# Patient Record
Sex: FEMALE | Race: WHITE | NOT HISPANIC OR LATINO | Employment: OTHER | URBAN - METROPOLITAN AREA
[De-identification: names, ages, dates, MRNs, and addresses within clinical notes are randomized per-mention and may not be internally consistent; named-entity substitution may affect disease eponyms.]

---

## 2017-07-25 PROBLEM — F51.01 PRIMARY INSOMNIA: Chronic | Status: ACTIVE | Noted: 2017-07-25

## 2017-07-25 PROBLEM — F32.5 MAJOR DEPRESSIVE DISORDER IN REMISSION: Chronic | Status: ACTIVE | Noted: 2017-07-25

## 2017-09-01 ENCOUNTER — TELEPHONE (OUTPATIENT)
Dept: FAMILY MEDICINE | Facility: CLINIC | Age: 42
End: 2017-09-01

## 2017-09-01 RX ORDER — CLORAZEPATE DIPOTASSIUM 3.75 MG/1
7.5 TABLET ORAL 3 TIMES DAILY
COMMUNITY
End: 2017-12-11

## 2017-09-01 RX ORDER — BUTALBITAL, ASPIRIN, AND CAFFEINE 325; 50; 40 MG/1; MG/1; MG/1
1 CAPSULE ORAL EVERY 4 HOURS PRN
COMMUNITY
End: 2017-12-28

## 2017-10-09 ENCOUNTER — TELEPHONE (OUTPATIENT)
Dept: PRIMARY CARE CLINIC | Facility: CLINIC | Age: 42
End: 2017-10-09

## 2017-10-09 NOTE — TELEPHONE ENCOUNTER
Spoke to patient regarding custody of her son. Pt asking advice on this situation. I explained to her that I cannot give out medical information on her son and we cant give out legal advice that she would need to follow up with her .

## 2017-12-11 ENCOUNTER — OFFICE VISIT (OUTPATIENT)
Dept: PRIMARY CARE CLINIC | Facility: CLINIC | Age: 42
End: 2017-12-11
Payer: MEDICAID

## 2017-12-11 VITALS — WEIGHT: 120 LBS | BODY MASS INDEX: 22.67 KG/M2

## 2017-12-11 DIAGNOSIS — Z72.0 TOBACCO ABUSE: ICD-10-CM

## 2017-12-11 DIAGNOSIS — F43.10 PTSD (POST-TRAUMATIC STRESS DISORDER): ICD-10-CM

## 2017-12-11 DIAGNOSIS — F32.A DEPRESSION, UNSPECIFIED DEPRESSION TYPE: ICD-10-CM

## 2017-12-11 DIAGNOSIS — B20 HIV (HUMAN IMMUNODEFICIENCY VIRUS INFECTION): ICD-10-CM

## 2017-12-11 DIAGNOSIS — N30.01 ACUTE CYSTITIS WITH HEMATURIA: Primary | ICD-10-CM

## 2017-12-11 DIAGNOSIS — F32.5 MAJOR DEPRESSIVE DISORDER WITH SINGLE EPISODE, IN REMISSION: ICD-10-CM

## 2017-12-11 DIAGNOSIS — S29.019A THORACIC MYOFASCIAL STRAIN, INITIAL ENCOUNTER: ICD-10-CM

## 2017-12-11 DIAGNOSIS — R51.9 NONINTRACTABLE HEADACHE, UNSPECIFIED CHRONICITY PATTERN, UNSPECIFIED HEADACHE TYPE: ICD-10-CM

## 2017-12-11 DIAGNOSIS — S16.1XXD STRAIN OF NECK MUSCLE, SUBSEQUENT ENCOUNTER: ICD-10-CM

## 2017-12-11 PROBLEM — S16.1XXA CERVICAL STRAIN: Status: ACTIVE | Noted: 2017-12-11

## 2017-12-11 PROCEDURE — 99999 PR PBB SHADOW E&M-EST. PATIENT-LVL III: CPT | Mod: PBBFAC,,, | Performed by: FAMILY MEDICINE

## 2017-12-11 PROCEDURE — 99214 OFFICE O/P EST MOD 30 MIN: CPT | Mod: S$PBB,,, | Performed by: FAMILY MEDICINE

## 2017-12-11 PROCEDURE — 99213 OFFICE O/P EST LOW 20 MIN: CPT | Mod: PBBFAC,PN | Performed by: FAMILY MEDICINE

## 2017-12-11 RX ORDER — CLONAZEPAM 0.5 MG/1
0.5 TABLET ORAL DAILY
COMMUNITY
Start: 2017-10-23 | End: 2018-05-04

## 2017-12-11 RX ORDER — ZOLPIDEM TARTRATE 10 MG/1
10 TABLET ORAL NIGHTLY
COMMUNITY
Start: 2017-10-23 | End: 2018-05-04

## 2017-12-11 RX ORDER — NITROFURANTOIN 25; 75 MG/1; MG/1
100 CAPSULE ORAL 2 TIMES DAILY
Qty: 14 CAPSULE | Refills: 0 | Status: SHIPPED | OUTPATIENT
Start: 2017-12-11 | End: 2017-12-28

## 2017-12-11 RX ORDER — PHENAZOPYRIDINE HYDROCHLORIDE 200 MG/1
200 TABLET, FILM COATED ORAL 3 TIMES DAILY PRN
Qty: 15 TABLET | Refills: 0 | Status: SHIPPED | OUTPATIENT
Start: 2017-12-11 | End: 2017-12-21

## 2017-12-11 RX ORDER — VENLAFAXINE HYDROCHLORIDE 75 MG/1
75 CAPSULE, EXTENDED RELEASE ORAL DAILY
COMMUNITY
Start: 2017-10-23 | End: 2018-05-04
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
## 2017-12-28 ENCOUNTER — OFFICE VISIT (OUTPATIENT)
Dept: PRIMARY CARE CLINIC | Facility: CLINIC | Age: 42
End: 2017-12-28
Payer: MEDICAID

## 2017-12-28 VITALS
DIASTOLIC BLOOD PRESSURE: 81 MMHG | BODY MASS INDEX: 23.22 KG/M2 | TEMPERATURE: 99 F | HEIGHT: 61 IN | WEIGHT: 123 LBS | RESPIRATION RATE: 18 BRPM | HEART RATE: 74 BPM | SYSTOLIC BLOOD PRESSURE: 124 MMHG | OXYGEN SATURATION: 100 %

## 2017-12-28 DIAGNOSIS — R45.83 EXCESSIVE CRYING: ICD-10-CM

## 2017-12-28 DIAGNOSIS — S16.1XXD STRAIN OF NECK MUSCLE, SUBSEQUENT ENCOUNTER: ICD-10-CM

## 2017-12-28 DIAGNOSIS — Z72.0 TOBACCO ABUSE: ICD-10-CM

## 2017-12-28 DIAGNOSIS — M50.30 DDD (DEGENERATIVE DISC DISEASE), CERVICAL: ICD-10-CM

## 2017-12-28 DIAGNOSIS — R51.9 NONINTRACTABLE HEADACHE, UNSPECIFIED CHRONICITY PATTERN, UNSPECIFIED HEADACHE TYPE: ICD-10-CM

## 2017-12-28 DIAGNOSIS — S29.019A THORACIC MYOFASCIAL STRAIN, INITIAL ENCOUNTER: ICD-10-CM

## 2017-12-28 DIAGNOSIS — B20 HIV (HUMAN IMMUNODEFICIENCY VIRUS INFECTION): ICD-10-CM

## 2017-12-28 DIAGNOSIS — F31.9 BIPOLAR 1 DISORDER: ICD-10-CM

## 2017-12-28 DIAGNOSIS — F32.5 MAJOR DEPRESSIVE DISORDER WITH SINGLE EPISODE, IN REMISSION: Primary | ICD-10-CM

## 2017-12-28 DIAGNOSIS — F43.10 PTSD (POST-TRAUMATIC STRESS DISORDER): ICD-10-CM

## 2017-12-28 PROCEDURE — 99999 PR PBB SHADOW E&M-EST. PATIENT-LVL V: CPT | Mod: PBBFAC,,, | Performed by: FAMILY MEDICINE

## 2017-12-28 PROCEDURE — 99214 OFFICE O/P EST MOD 30 MIN: CPT | Mod: S$PBB,,, | Performed by: FAMILY MEDICINE

## 2017-12-28 PROCEDURE — 99215 OFFICE O/P EST HI 40 MIN: CPT | Mod: PBBFAC,PN | Performed by: FAMILY MEDICINE

## 2017-12-28 RX ORDER — LITHIUM CARBONATE 450 MG/1
TABLET ORAL
COMMUNITY
Start: 2017-12-26 | End: 2018-05-04

## 2017-12-28 RX ORDER — BUTALBITAL, ACETAMINOPHEN AND CAFFEINE 50; 325; 40 MG/1; MG/1; MG/1
1 TABLET ORAL EVERY 4 HOURS PRN
Qty: 30 TABLET | Refills: 0 | Status: SHIPPED | OUTPATIENT
Start: 2017-12-28 | End: 2018-01-23 | Stop reason: SDUPTHER

## 2017-12-28 RX ORDER — OLANZAPINE 10 MG/1
TABLET ORAL
COMMUNITY
Start: 2017-12-26 | End: 2018-05-04
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
## 2018-01-16 ENCOUNTER — OFFICE VISIT (OUTPATIENT)
Dept: PRIMARY CARE CLINIC | Facility: CLINIC | Age: 43
End: 2018-01-16
Payer: MEDICAID

## 2018-01-16 VITALS
RESPIRATION RATE: 18 BRPM | TEMPERATURE: 99 F | HEART RATE: 80 BPM | WEIGHT: 123 LBS | BODY MASS INDEX: 23.22 KG/M2 | OXYGEN SATURATION: 100 % | DIASTOLIC BLOOD PRESSURE: 78 MMHG | HEIGHT: 61 IN | SYSTOLIC BLOOD PRESSURE: 117 MMHG

## 2018-01-16 DIAGNOSIS — S00.83XA CONTUSION OF FOREHEAD, INITIAL ENCOUNTER: Primary | ICD-10-CM

## 2018-01-16 DIAGNOSIS — S39.012A LUMBOSACRAL STRAIN, INITIAL ENCOUNTER: ICD-10-CM

## 2018-01-16 DIAGNOSIS — F43.10 PTSD (POST-TRAUMATIC STRESS DISORDER): ICD-10-CM

## 2018-01-16 DIAGNOSIS — B20 HIV (HUMAN IMMUNODEFICIENCY VIRUS INFECTION): ICD-10-CM

## 2018-01-16 DIAGNOSIS — F32.5 MAJOR DEPRESSIVE DISORDER WITH SINGLE EPISODE, IN REMISSION: ICD-10-CM

## 2018-01-16 DIAGNOSIS — S80.01XA TRAUMATIC ECCHYMOSIS OF RIGHT KNEE, INITIAL ENCOUNTER: ICD-10-CM

## 2018-01-16 DIAGNOSIS — Z72.0 TOBACCO ABUSE: ICD-10-CM

## 2018-01-16 DIAGNOSIS — R51.9 NONINTRACTABLE HEADACHE, UNSPECIFIED CHRONICITY PATTERN, UNSPECIFIED HEADACHE TYPE: ICD-10-CM

## 2018-01-16 DIAGNOSIS — S29.019A THORACIC MYOFASCIAL STRAIN, INITIAL ENCOUNTER: ICD-10-CM

## 2018-01-16 PROCEDURE — 99213 OFFICE O/P EST LOW 20 MIN: CPT | Mod: S$PBB,,, | Performed by: FAMILY MEDICINE

## 2018-01-16 PROCEDURE — 99999 PR PBB SHADOW E&M-EST. PATIENT-LVL IV: CPT | Mod: PBBFAC,,, | Performed by: FAMILY MEDICINE

## 2018-01-16 PROCEDURE — 99214 OFFICE O/P EST MOD 30 MIN: CPT | Mod: PBBFAC,PN | Performed by: FAMILY MEDICINE

## 2018-01-16 RX ORDER — TRAMADOL HYDROCHLORIDE 50 MG/1
50 TABLET ORAL EVERY 8 HOURS PRN
Qty: 30 TABLET | Refills: 0 | Status: SHIPPED | OUTPATIENT
Start: 2018-01-16 | End: 2018-01-26
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
## 2018-01-23 ENCOUNTER — CLINICAL SUPPORT (OUTPATIENT)
Dept: PRIMARY CARE CLINIC | Facility: CLINIC | Age: 43
End: 2018-01-23
Payer: MEDICAID

## 2018-01-23 ENCOUNTER — OFFICE VISIT (OUTPATIENT)
Dept: PRIMARY CARE CLINIC | Facility: CLINIC | Age: 43
End: 2018-01-23
Payer: MEDICAID

## 2018-01-23 VITALS
HEART RATE: 63 BPM | RESPIRATION RATE: 18 BRPM | BODY MASS INDEX: 22.84 KG/M2 | OXYGEN SATURATION: 100 % | SYSTOLIC BLOOD PRESSURE: 113 MMHG | HEIGHT: 61 IN | DIASTOLIC BLOOD PRESSURE: 75 MMHG | WEIGHT: 121 LBS | TEMPERATURE: 99 F

## 2018-01-23 DIAGNOSIS — F32.A DEPRESSION, UNSPECIFIED DEPRESSION TYPE: ICD-10-CM

## 2018-01-23 DIAGNOSIS — F31.9 BIPOLAR 1 DISORDER: ICD-10-CM

## 2018-01-23 DIAGNOSIS — Z72.0 TOBACCO ABUSE: ICD-10-CM

## 2018-01-23 DIAGNOSIS — B20 HIV (HUMAN IMMUNODEFICIENCY VIRUS INFECTION): ICD-10-CM

## 2018-01-23 DIAGNOSIS — G44.311 INTRACTABLE ACUTE POST-TRAUMATIC HEADACHE: Primary | ICD-10-CM

## 2018-01-23 DIAGNOSIS — R45.83 EXCESSIVE CRYING: ICD-10-CM

## 2018-01-23 DIAGNOSIS — F43.10 PTSD (POST-TRAUMATIC STRESS DISORDER): ICD-10-CM

## 2018-01-23 PROCEDURE — 99999 PR PBB SHADOW E&M-EST. PATIENT-LVL II: CPT | Mod: PBBFAC,,,

## 2018-01-23 PROCEDURE — 99214 OFFICE O/P EST MOD 30 MIN: CPT | Mod: S$PBB,,, | Performed by: FAMILY MEDICINE

## 2018-01-23 PROCEDURE — 84439 ASSAY OF FREE THYROXINE: CPT

## 2018-01-23 PROCEDURE — 80053 COMPREHEN METABOLIC PANEL: CPT

## 2018-01-23 PROCEDURE — 99999 PR PBB SHADOW E&M-EST. PATIENT-LVL IV: CPT | Mod: PBBFAC,,, | Performed by: FAMILY MEDICINE

## 2018-01-23 PROCEDURE — 99212 OFFICE O/P EST SF 10 MIN: CPT | Mod: PBBFAC,27,PN

## 2018-01-23 PROCEDURE — 99214 OFFICE O/P EST MOD 30 MIN: CPT | Mod: PBBFAC,PN | Performed by: FAMILY MEDICINE

## 2018-01-23 PROCEDURE — 85025 COMPLETE CBC W/AUTO DIFF WBC: CPT

## 2018-01-23 PROCEDURE — 84443 ASSAY THYROID STIM HORMONE: CPT

## 2018-01-23 RX ORDER — BUTALBITAL, ACETAMINOPHEN AND CAFFEINE 50; 325; 40 MG/1; MG/1; MG/1
1 TABLET ORAL EVERY 4 HOURS PRN
Qty: 30 TABLET | Refills: 0 | Status: SHIPPED | OUTPATIENT
Start: 2018-01-23 | End: 2018-03-15 | Stop reason: SDUPTHER

## 2018-01-23 RX ORDER — BUTALBITAL, ACETAMINOPHEN AND CAFFEINE 50; 325; 40 MG/1; MG/1; MG/1
1 TABLET ORAL EVERY 4 HOURS PRN
Qty: 30 TABLET | Refills: 0 | Status: SHIPPED | OUTPATIENT
Start: 2018-01-23 | End: 2018-01-23 | Stop reason: SDUPTHER
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
## 2018-01-24 LAB
ALBUMIN SERPL BCP-MCNC: 3.7 G/DL
ALP SERPL-CCNC: 81 U/L
ALT SERPL W/O P-5'-P-CCNC: 14 U/L
ANION GAP SERPL CALC-SCNC: 8 MMOL/L
AST SERPL-CCNC: 22 U/L
BASOPHILS # BLD AUTO: 0.06 K/UL
BASOPHILS NFR BLD: 1 %
BILIRUB SERPL-MCNC: 0.1 MG/DL
BUN SERPL-MCNC: 9 MG/DL
CALCIUM SERPL-MCNC: 9.5 MG/DL
CHLORIDE SERPL-SCNC: 106 MMOL/L
CO2 SERPL-SCNC: 23 MMOL/L
CREAT SERPL-MCNC: 0.8 MG/DL
DIFFERENTIAL METHOD: ABNORMAL
EOSINOPHIL # BLD AUTO: 0.2 K/UL
EOSINOPHIL NFR BLD: 4.1 %
ERYTHROCYTE [DISTWIDTH] IN BLOOD BY AUTOMATED COUNT: 16.8 %
EST. GFR  (AFRICAN AMERICAN): >60 ML/MIN/1.73 M^2
EST. GFR  (NON AFRICAN AMERICAN): >60 ML/MIN/1.73 M^2
GLUCOSE SERPL-MCNC: 80 MG/DL
HCT VFR BLD AUTO: 38.6 %
HGB BLD-MCNC: 12.2 G/DL
IMM GRANULOCYTES # BLD AUTO: 0.01 K/UL
IMM GRANULOCYTES NFR BLD AUTO: 0.2 %
LYMPHOCYTES # BLD AUTO: 1.4 K/UL
LYMPHOCYTES NFR BLD: 23.9 %
MCH RBC QN AUTO: 27.1 PG
MCHC RBC AUTO-ENTMCNC: 31.6 G/DL
MCV RBC AUTO: 86 FL
MONOCYTES # BLD AUTO: 0.4 K/UL
MONOCYTES NFR BLD: 6.3 %
NEUTROPHILS # BLD AUTO: 3.8 K/UL
NEUTROPHILS NFR BLD: 64.5 %
NRBC BLD-RTO: 0 /100 WBC
PLATELET # BLD AUTO: 237 K/UL
PMV BLD AUTO: 12.2 FL
POTASSIUM SERPL-SCNC: 4.1 MMOL/L
PROT SERPL-MCNC: 8 G/DL
RBC # BLD AUTO: 4.5 M/UL
SODIUM SERPL-SCNC: 137 MMOL/L
T4 FREE SERPL-MCNC: 1.04 NG/DL
TSH SERPL DL<=0.005 MIU/L-ACNC: 4.48 UIU/ML
WBC # BLD AUTO: 5.87 K/UL

## 2018-02-02 RX ORDER — VARENICLINE TARTRATE 0.5 (11)-1
KIT ORAL
Qty: 1 PACKAGE | Refills: 0 | OUTPATIENT
Start: 2018-02-02

## 2018-02-02 RX ORDER — CLONAZEPAM 0.5 MG/1
0.5 TABLET ORAL DAILY
OUTPATIENT
Start: 2018-02-02

## 2018-02-07 ENCOUNTER — TELEPHONE (OUTPATIENT)
Dept: PRIMARY CARE CLINIC | Facility: CLINIC | Age: 43
End: 2018-02-07

## 2018-02-07 NOTE — TELEPHONE ENCOUNTER
----- Message from Chio Lafleur sent at 2/7/2018  2:50 PM CST -----  Contact: self  Patient had a CT Scan with contrast which has made her very sick. Patient has been vomiting since she left at her 2:00 appointment. Patient would like something for nausea and headache. Please call patient at [phone]. Thanks!   [facility] - [place], [place] - [address]  [address] [facility] [address]  Phone: [phone] Fax: [phone]

## 2018-02-08 ENCOUNTER — OFFICE VISIT (OUTPATIENT)
Dept: PRIMARY CARE CLINIC | Facility: CLINIC | Age: 43
End: 2018-02-08
Payer: MEDICAID

## 2018-02-08 VITALS
OXYGEN SATURATION: 100 % | DIASTOLIC BLOOD PRESSURE: 75 MMHG | HEART RATE: 90 BPM | BODY MASS INDEX: 23.03 KG/M2 | WEIGHT: 122 LBS | SYSTOLIC BLOOD PRESSURE: 112 MMHG | TEMPERATURE: 99 F | HEIGHT: 61 IN | RESPIRATION RATE: 18 BRPM

## 2018-02-08 DIAGNOSIS — F31.9 BIPOLAR 1 DISORDER: ICD-10-CM

## 2018-02-08 DIAGNOSIS — R11.10 VOMITING, INTRACTABILITY OF VOMITING NOT SPECIFIED, PRESENCE OF NAUSEA NOT SPECIFIED, UNSPECIFIED VOMITING TYPE: ICD-10-CM

## 2018-02-08 DIAGNOSIS — R45.83 EXCESSIVE CRYING: ICD-10-CM

## 2018-02-08 DIAGNOSIS — F51.01 PRIMARY INSOMNIA: ICD-10-CM

## 2018-02-08 DIAGNOSIS — F32.A DEPRESSION, UNSPECIFIED DEPRESSION TYPE: ICD-10-CM

## 2018-02-08 DIAGNOSIS — F43.10 PTSD (POST-TRAUMATIC STRESS DISORDER): ICD-10-CM

## 2018-02-08 DIAGNOSIS — R11.0 NAUSEA: Primary | ICD-10-CM

## 2018-02-08 DIAGNOSIS — F32.5 MAJOR DEPRESSIVE DISORDER WITH SINGLE EPISODE, IN REMISSION: ICD-10-CM

## 2018-02-08 DIAGNOSIS — B20 HIV (HUMAN IMMUNODEFICIENCY VIRUS INFECTION): ICD-10-CM

## 2018-02-08 DIAGNOSIS — Z72.0 TOBACCO ABUSE: ICD-10-CM

## 2018-02-08 DIAGNOSIS — G44.311 INTRACTABLE ACUTE POST-TRAUMATIC HEADACHE: ICD-10-CM

## 2018-02-08 PROCEDURE — 99214 OFFICE O/P EST MOD 30 MIN: CPT | Mod: S$PBB,,, | Performed by: FAMILY MEDICINE

## 2018-02-08 PROCEDURE — 99213 OFFICE O/P EST LOW 20 MIN: CPT | Mod: PBBFAC,PN | Performed by: FAMILY MEDICINE

## 2018-02-08 PROCEDURE — 3008F BODY MASS INDEX DOCD: CPT | Mod: ,,, | Performed by: FAMILY MEDICINE

## 2018-02-08 PROCEDURE — 99999 PR PBB SHADOW E&M-EST. PATIENT-LVL III: CPT | Mod: PBBFAC,,, | Performed by: FAMILY MEDICINE

## 2018-02-08 RX ORDER — ONDANSETRON 2 MG/ML
4 INJECTION INTRAMUSCULAR; INTRAVENOUS
Status: COMPLETED | OUTPATIENT
Start: 2018-02-08 | End: 2018-02-08

## 2018-02-08 RX ORDER — ONDANSETRON 2 MG/ML
4 INJECTION INTRAMUSCULAR; INTRAVENOUS
Status: DISCONTINUED | OUTPATIENT
Start: 2018-02-08 | End: 2018-02-08

## 2018-02-08 RX ADMIN — ONDANSETRON 4 MG: 2 INJECTION INTRAMUSCULAR; INTRAVENOUS at 03:02
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
## 2018-02-08 NOTE — PROGRESS NOTES
Pt ID verified by  and Name. Allergies verified. Zofran 4mg IM to L VG administered per MD order. No adverse reactions noted. Pt tolerated well.

## 2018-03-15 DIAGNOSIS — R51.9 NONINTRACTABLE HEADACHE, UNSPECIFIED CHRONICITY PATTERN, UNSPECIFIED HEADACHE TYPE: Primary | ICD-10-CM

## 2018-03-17 RX ORDER — BUTALBITAL, ACETAMINOPHEN AND CAFFEINE 50; 325; 40 MG/1; MG/1; MG/1
1 TABLET ORAL DAILY PRN
Qty: 30 TABLET | Refills: 0 | Status: SHIPPED | OUTPATIENT
Start: 2018-03-17 | End: 2018-04-16

## 2018-05-04 ENCOUNTER — OFFICE VISIT (OUTPATIENT)
Dept: PRIMARY CARE CLINIC | Facility: CLINIC | Age: 43
End: 2018-05-04
Payer: MEDICAID

## 2018-05-04 VITALS
HEART RATE: 72 BPM | TEMPERATURE: 99 F | HEIGHT: 61 IN | WEIGHT: 122 LBS | SYSTOLIC BLOOD PRESSURE: 101 MMHG | DIASTOLIC BLOOD PRESSURE: 71 MMHG | BODY MASS INDEX: 23.03 KG/M2 | OXYGEN SATURATION: 100 % | RESPIRATION RATE: 18 BRPM

## 2018-05-04 DIAGNOSIS — F31.9 BIPOLAR 1 DISORDER: ICD-10-CM

## 2018-05-04 DIAGNOSIS — G89.29 CHRONIC INTRACTABLE HEADACHE, UNSPECIFIED HEADACHE TYPE: ICD-10-CM

## 2018-05-04 DIAGNOSIS — B20 HIV (HUMAN IMMUNODEFICIENCY VIRUS INFECTION): ICD-10-CM

## 2018-05-04 DIAGNOSIS — N30.00 ACUTE CYSTITIS WITHOUT HEMATURIA: Primary | ICD-10-CM

## 2018-05-04 DIAGNOSIS — F43.10 PTSD (POST-TRAUMATIC STRESS DISORDER): ICD-10-CM

## 2018-05-04 DIAGNOSIS — F32.A DEPRESSION, UNSPECIFIED DEPRESSION TYPE: ICD-10-CM

## 2018-05-04 DIAGNOSIS — R51.9 CHRONIC INTRACTABLE HEADACHE, UNSPECIFIED HEADACHE TYPE: ICD-10-CM

## 2018-05-04 DIAGNOSIS — Z72.0 TOBACCO ABUSE: ICD-10-CM

## 2018-05-04 DIAGNOSIS — E03.9 ACQUIRED HYPOTHYROIDISM: ICD-10-CM

## 2018-05-04 PROCEDURE — 99999 PR PBB SHADOW E&M-EST. PATIENT-LVL III: CPT | Mod: PBBFAC,,, | Performed by: FAMILY MEDICINE

## 2018-05-04 PROCEDURE — 99213 OFFICE O/P EST LOW 20 MIN: CPT | Mod: PBBFAC,PN | Performed by: FAMILY MEDICINE

## 2018-05-04 PROCEDURE — 99213 OFFICE O/P EST LOW 20 MIN: CPT | Mod: S$PBB,,, | Performed by: FAMILY MEDICINE

## 2018-05-04 RX ORDER — LEVOTHYROXINE SODIUM 25 UG/1
25 TABLET ORAL
Qty: 90 TABLET | Refills: 0 | Status: SHIPPED | OUTPATIENT
Start: 2018-05-04 | End: 2018-08-16 | Stop reason: SDUPTHER

## 2018-05-04 RX ORDER — PHENAZOPYRIDINE HYDROCHLORIDE 200 MG/1
200 TABLET, FILM COATED ORAL 3 TIMES DAILY PRN
Qty: 15 TABLET | Refills: 0 | Status: SHIPPED | OUTPATIENT
Start: 2018-05-04 | End: 2018-05-14

## 2018-05-04 RX ORDER — BUTALBITAL, ACETAMINOPHEN AND CAFFEINE 50; 325; 40 MG/1; MG/1; MG/1
1 TABLET ORAL EVERY 6 HOURS PRN
Qty: 30 TABLET | Refills: 0 | Status: SHIPPED | OUTPATIENT
Start: 2018-05-04 | End: 2018-05-23 | Stop reason: SDUPTHER

## 2018-05-04 RX ORDER — CIPROFLOXACIN 500 MG/1
500 TABLET ORAL 2 TIMES DAILY
Qty: 14 TABLET | Refills: 0 | Status: SHIPPED | OUTPATIENT
Start: 2018-05-04 | End: 2018-05-11
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
## 2018-05-23 ENCOUNTER — OFFICE VISIT (OUTPATIENT)
Dept: PRIMARY CARE CLINIC | Facility: CLINIC | Age: 43
End: 2018-05-23
Payer: MEDICAID

## 2018-05-23 ENCOUNTER — CLINICAL SUPPORT (OUTPATIENT)
Dept: PRIMARY CARE CLINIC | Facility: CLINIC | Age: 43
End: 2018-05-23
Payer: MEDICAID

## 2018-05-23 VITALS
TEMPERATURE: 99 F | SYSTOLIC BLOOD PRESSURE: 109 MMHG | DIASTOLIC BLOOD PRESSURE: 78 MMHG | WEIGHT: 122 LBS | HEART RATE: 115 BPM | HEIGHT: 61 IN | RESPIRATION RATE: 18 BRPM | OXYGEN SATURATION: 98 % | BODY MASS INDEX: 23.03 KG/M2

## 2018-05-23 DIAGNOSIS — F32.A DEPRESSION, UNSPECIFIED DEPRESSION TYPE: ICD-10-CM

## 2018-05-23 DIAGNOSIS — F31.9 BIPOLAR 1 DISORDER: Primary | ICD-10-CM

## 2018-05-23 DIAGNOSIS — F43.10 PTSD (POST-TRAUMATIC STRESS DISORDER): ICD-10-CM

## 2018-05-23 DIAGNOSIS — E03.9 ACQUIRED HYPOTHYROIDISM: ICD-10-CM

## 2018-05-23 DIAGNOSIS — G89.29 CHRONIC INTRACTABLE HEADACHE, UNSPECIFIED HEADACHE TYPE: ICD-10-CM

## 2018-05-23 DIAGNOSIS — Z85.41 HISTORY OF CERVICAL CANCER: ICD-10-CM

## 2018-05-23 DIAGNOSIS — B20 HIV (HUMAN IMMUNODEFICIENCY VIRUS INFECTION): ICD-10-CM

## 2018-05-23 DIAGNOSIS — N30.00 ACUTE CYSTITIS WITHOUT HEMATURIA: ICD-10-CM

## 2018-05-23 DIAGNOSIS — Z72.0 TOBACCO ABUSE: ICD-10-CM

## 2018-05-23 DIAGNOSIS — R51.9 CHRONIC INTRACTABLE HEADACHE, UNSPECIFIED HEADACHE TYPE: ICD-10-CM

## 2018-05-23 PROCEDURE — 87077 CULTURE AEROBIC IDENTIFY: CPT

## 2018-05-23 PROCEDURE — 99214 OFFICE O/P EST MOD 30 MIN: CPT | Mod: PBBFAC,PN | Performed by: FAMILY MEDICINE

## 2018-05-23 PROCEDURE — 99213 OFFICE O/P EST LOW 20 MIN: CPT | Mod: S$PBB,,, | Performed by: FAMILY MEDICINE

## 2018-05-23 PROCEDURE — 99211 OFF/OP EST MAY X REQ PHY/QHP: CPT | Mod: PBBFAC,27,PN

## 2018-05-23 PROCEDURE — 87088 URINE BACTERIA CULTURE: CPT

## 2018-05-23 PROCEDURE — 87186 SC STD MICRODIL/AGAR DIL: CPT

## 2018-05-23 PROCEDURE — 99999 PR PBB SHADOW E&M-EST. PATIENT-LVL IV: CPT | Mod: PBBFAC,,, | Performed by: FAMILY MEDICINE

## 2018-05-23 PROCEDURE — 87086 URINE CULTURE/COLONY COUNT: CPT

## 2018-05-23 PROCEDURE — 99999 PR PBB SHADOW E&M-EST. PATIENT-LVL I: CPT | Mod: PBBFAC,,,

## 2018-05-23 RX ORDER — SUMATRIPTAN 50 MG/1
TABLET, FILM COATED ORAL
Qty: 9 TABLET | Refills: 5 | Status: SHIPPED | OUTPATIENT
Start: 2018-05-23 | End: 2018-06-14 | Stop reason: SDUPTHER

## 2018-05-23 RX ORDER — BUTALBITAL, ACETAMINOPHEN AND CAFFEINE 50; 325; 40 MG/1; MG/1; MG/1
1 TABLET ORAL EVERY 6 HOURS PRN
Qty: 30 TABLET | Refills: 0 | Status: SHIPPED | OUTPATIENT
Start: 2018-05-23 | End: 2018-06-29 | Stop reason: SDUPTHER

## 2018-05-23 RX ORDER — NITROFURANTOIN 25; 75 MG/1; MG/1
100 CAPSULE ORAL 2 TIMES DAILY
Qty: 14 CAPSULE | Refills: 0 | Status: SHIPPED | OUTPATIENT
Start: 2018-05-23 | End: 2018-06-29
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
## 2018-05-25 LAB — BACTERIA UR CULT: NORMAL

## 2018-06-14 DIAGNOSIS — R51.9 CHRONIC INTRACTABLE HEADACHE, UNSPECIFIED HEADACHE TYPE: ICD-10-CM

## 2018-06-14 DIAGNOSIS — G89.29 CHRONIC INTRACTABLE HEADACHE, UNSPECIFIED HEADACHE TYPE: ICD-10-CM

## 2018-06-14 NOTE — TELEPHONE ENCOUNTER
----- Message from Chio Lafleur sent at 6/14/2018  2:11 PM CDT -----  Contact: self  Type:  RX Refill Request    Who Called:  self  Refill or New Rx:  refill  RX Name and Strength:  sumatriptan (IMITREX) 50 MG tablet; butalbital-acetaminophen-caffeine -40 mg (FIORICET, ESGIC) -40 mg per tablet  How is the patient currently taking it? (ex. 1XDay):    Is this a 30 day or 90 day RX:  30  Preferred Pharmacy with phone number:  [facility]  Local or Mail Order:  local  Ordering Provider:  Dr Britt  Best Call Back Number:  [phone] or [phone]  Additional Information:  Patient would like to know which one has more aspirin in it Fioricet or Fiorinal. Please call patient. Thanks!   [facility] [address]  [address] [place] [facility] [address]  Phone: [phone] Fax: [phone]

## 2018-06-16 RX ORDER — BUTALBITAL, ACETAMINOPHEN AND CAFFEINE 50; 325; 40 MG/1; MG/1; MG/1
1 TABLET ORAL EVERY 6 HOURS PRN
Qty: 30 TABLET | Refills: 0 | OUTPATIENT
Start: 2018-06-16

## 2018-06-16 RX ORDER — SUMATRIPTAN 50 MG/1
TABLET, FILM COATED ORAL
Qty: 9 TABLET | Refills: 5 | Status: SHIPPED | OUTPATIENT
Start: 2018-06-16 | End: 2018-06-29 | Stop reason: SDUPTHER

## 2018-06-16 NOTE — TELEPHONE ENCOUNTER
Call tell fioricet not refilled over thephone OK imitrex If having alot headaches Give referral to neurologist covered by her insurance If medicaid refer to [facility] neurology

## 2018-06-29 ENCOUNTER — CLINICAL SUPPORT (OUTPATIENT)
Dept: PRIMARY CARE CLINIC | Facility: CLINIC | Age: 43
End: 2018-06-29
Payer: MEDICAID

## 2018-06-29 ENCOUNTER — OFFICE VISIT (OUTPATIENT)
Dept: PRIMARY CARE CLINIC | Facility: CLINIC | Age: 43
End: 2018-06-29
Payer: MEDICAID

## 2018-06-29 VITALS
TEMPERATURE: 98 F | DIASTOLIC BLOOD PRESSURE: 80 MMHG | SYSTOLIC BLOOD PRESSURE: 114 MMHG | WEIGHT: 119 LBS | OXYGEN SATURATION: 99 % | RESPIRATION RATE: 18 BRPM | BODY MASS INDEX: 22.47 KG/M2 | HEIGHT: 61 IN

## 2018-06-29 DIAGNOSIS — B20 HIV (HUMAN IMMUNODEFICIENCY VIRUS INFECTION): ICD-10-CM

## 2018-06-29 DIAGNOSIS — G89.29 CHRONIC INTRACTABLE HEADACHE, UNSPECIFIED HEADACHE TYPE: ICD-10-CM

## 2018-06-29 DIAGNOSIS — F32.5 MAJOR DEPRESSIVE DISORDER WITH SINGLE EPISODE, IN REMISSION: Chronic | ICD-10-CM

## 2018-06-29 DIAGNOSIS — Z85.41 HISTORY OF CERVICAL CANCER: ICD-10-CM

## 2018-06-29 DIAGNOSIS — R51.9 NONINTRACTABLE HEADACHE, UNSPECIFIED CHRONICITY PATTERN, UNSPECIFIED HEADACHE TYPE: ICD-10-CM

## 2018-06-29 DIAGNOSIS — E03.9 ACQUIRED HYPOTHYROIDISM: ICD-10-CM

## 2018-06-29 DIAGNOSIS — R00.0 TACHYCARDIA: Primary | ICD-10-CM

## 2018-06-29 DIAGNOSIS — R51.9 CHRONIC INTRACTABLE HEADACHE, UNSPECIFIED HEADACHE TYPE: ICD-10-CM

## 2018-06-29 DIAGNOSIS — F43.10 PTSD (POST-TRAUMATIC STRESS DISORDER): ICD-10-CM

## 2018-06-29 DIAGNOSIS — Z72.0 TOBACCO ABUSE: ICD-10-CM

## 2018-06-29 DIAGNOSIS — R11.0 NAUSEA: ICD-10-CM

## 2018-06-29 DIAGNOSIS — R00.0 TACHYCARDIA: ICD-10-CM

## 2018-06-29 LAB
ALBUMIN SERPL BCP-MCNC: 4.2 G/DL
ALP SERPL-CCNC: 80 U/L
ALT SERPL W/O P-5'-P-CCNC: 18 U/L
ANION GAP SERPL CALC-SCNC: 9 MMOL/L
AST SERPL-CCNC: 25 U/L
BASOPHILS # BLD AUTO: 0 K/UL
BASOPHILS NFR BLD: 0.7 %
BILIRUB SERPL-MCNC: 0.3 MG/DL
BUN SERPL-MCNC: 15 MG/DL
CALCIUM SERPL-MCNC: 9.3 MG/DL
CHLORIDE SERPL-SCNC: 102 MMOL/L
CO2 SERPL-SCNC: 23 MMOL/L
CREAT SERPL-MCNC: 1 MG/DL
DIFFERENTIAL METHOD: ABNORMAL
EOSINOPHIL # BLD AUTO: 0 K/UL
EOSINOPHIL NFR BLD: 0.5 %
ERYTHROCYTE [DISTWIDTH] IN BLOOD BY AUTOMATED COUNT: 15.2 %
EST. GFR  (AFRICAN AMERICAN): >60 ML/MIN/1.73 M^2
EST. GFR  (NON AFRICAN AMERICAN): >60 ML/MIN/1.73 M^2
GLUCOSE SERPL-MCNC: 118 MG/DL
HCT VFR BLD AUTO: 40.4 %
HGB BLD-MCNC: 13.4 G/DL
LYMPHOCYTES # BLD AUTO: 2.1 K/UL
LYMPHOCYTES NFR BLD: 31 %
MCH RBC QN AUTO: 27.5 PG
MCHC RBC AUTO-ENTMCNC: 33.2 G/DL
MCV RBC AUTO: 83 FL
MONOCYTES # BLD AUTO: 0.5 K/UL
MONOCYTES NFR BLD: 8.1 %
NEUTROPHILS # BLD AUTO: 4 K/UL
NEUTROPHILS NFR BLD: 59.7 %
PLATELET # BLD AUTO: 231 K/UL
PMV BLD AUTO: 9.9 FL
POTASSIUM SERPL-SCNC: 3.6 MMOL/L
PROT SERPL-MCNC: 8.7 G/DL
RBC # BLD AUTO: 4.88 M/UL
SODIUM SERPL-SCNC: 134 MMOL/L
WBC # BLD AUTO: 6.7 K/UL

## 2018-06-29 PROCEDURE — 99999 PR PBB SHADOW E&M-EST. PATIENT-LVL III: CPT | Mod: PBBFAC,,, | Performed by: FAMILY MEDICINE

## 2018-06-29 PROCEDURE — 99213 OFFICE O/P EST LOW 20 MIN: CPT | Mod: PBBFAC,PN | Performed by: FAMILY MEDICINE

## 2018-06-29 PROCEDURE — 99213 OFFICE O/P EST LOW 20 MIN: CPT | Mod: S$PBB,,, | Performed by: FAMILY MEDICINE

## 2018-06-29 RX ORDER — SUMATRIPTAN 50 MG/1
TABLET, FILM COATED ORAL
Qty: 9 TABLET | Refills: 5 | Status: SHIPPED | OUTPATIENT
Start: 2018-06-29 | End: 2018-08-16 | Stop reason: SDUPTHER

## 2018-06-29 RX ORDER — BUTALBITAL, ACETAMINOPHEN AND CAFFEINE 50; 325; 40 MG/1; MG/1; MG/1
1 TABLET ORAL EVERY 6 HOURS PRN
Qty: 30 TABLET | Refills: 0 | Status: SHIPPED | OUTPATIENT
Start: 2018-06-29 | End: 2018-07-16 | Stop reason: SDUPTHER

## 2018-06-29 RX ORDER — TOPIRAMATE 50 MG/1
TABLET, FILM COATED ORAL
COMMUNITY
Start: 2018-05-29 | End: 2018-08-16 | Stop reason: SDUPTHER

## 2018-06-29 RX ORDER — ONDANSETRON 4 MG/1
4 TABLET, ORALLY DISINTEGRATING ORAL EVERY 8 HOURS PRN
Qty: 10 TABLET | Refills: 0 | Status: SHIPPED | OUTPATIENT
Start: 2018-06-29 | End: 2018-08-16 | Stop reason: SDUPTHER
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
## 2018-07-16 DIAGNOSIS — R51.9 CHRONIC INTRACTABLE HEADACHE, UNSPECIFIED HEADACHE TYPE: ICD-10-CM

## 2018-07-16 DIAGNOSIS — G89.29 CHRONIC INTRACTABLE HEADACHE, UNSPECIFIED HEADACHE TYPE: ICD-10-CM

## 2018-07-16 RX ORDER — BUTALBITAL, ACETAMINOPHEN AND CAFFEINE 50; 325; 40 MG/1; MG/1; MG/1
1 TABLET ORAL EVERY 6 HOURS PRN
Qty: 30 TABLET | Refills: 0 | Status: SHIPPED | OUTPATIENT
Start: 2018-07-16 | End: 2018-08-16 | Stop reason: SDUPTHER

## 2018-07-16 NOTE — TELEPHONE ENCOUNTER
Call tell fioricet not refilled over the phone if alot HA needs see [facility] neurology clinic see if may be helped with NSAID's or triptans Do HA diary quality HA, duration, location, severity

## 2018-08-16 ENCOUNTER — OFFICE VISIT (OUTPATIENT)
Dept: PRIMARY CARE CLINIC | Facility: CLINIC | Age: 43
End: 2018-08-16
Payer: MEDICAID

## 2018-08-16 VITALS
WEIGHT: 119 LBS | RESPIRATION RATE: 18 BRPM | TEMPERATURE: 99 F | SYSTOLIC BLOOD PRESSURE: 118 MMHG | HEIGHT: 61 IN | DIASTOLIC BLOOD PRESSURE: 82 MMHG | HEART RATE: 99 BPM | BODY MASS INDEX: 22.47 KG/M2 | OXYGEN SATURATION: 99 %

## 2018-08-16 DIAGNOSIS — E03.9 ACQUIRED HYPOTHYROIDISM: ICD-10-CM

## 2018-08-16 DIAGNOSIS — R51.9 CHRONIC INTRACTABLE HEADACHE, UNSPECIFIED HEADACHE TYPE: ICD-10-CM

## 2018-08-16 DIAGNOSIS — G89.29 CHRONIC INTRACTABLE HEADACHE, UNSPECIFIED HEADACHE TYPE: ICD-10-CM

## 2018-08-16 DIAGNOSIS — F31.9 BIPOLAR 1 DISORDER: Primary | ICD-10-CM

## 2018-08-16 DIAGNOSIS — Z12.31 ENCOUNTER FOR SCREENING MAMMOGRAM FOR BREAST CANCER: ICD-10-CM

## 2018-08-16 DIAGNOSIS — F43.10 PTSD (POST-TRAUMATIC STRESS DISORDER): ICD-10-CM

## 2018-08-16 PROCEDURE — 99213 OFFICE O/P EST LOW 20 MIN: CPT | Mod: PBBFAC,PN | Performed by: INTERNAL MEDICINE

## 2018-08-16 PROCEDURE — 99213 OFFICE O/P EST LOW 20 MIN: CPT | Mod: S$PBB,,, | Performed by: INTERNAL MEDICINE

## 2018-08-16 PROCEDURE — 99999 PR PBB SHADOW E&M-EST. PATIENT-LVL III: CPT | Mod: PBBFAC,,, | Performed by: INTERNAL MEDICINE

## 2018-08-16 RX ORDER — ONDANSETRON 4 MG/1
4 TABLET, ORALLY DISINTEGRATING ORAL EVERY 8 HOURS PRN
Qty: 10 TABLET | Refills: 0 | Status: SHIPPED | OUTPATIENT
Start: 2018-08-16 | End: 2019-01-14

## 2018-08-16 RX ORDER — SUMATRIPTAN 50 MG/1
TABLET, FILM COATED ORAL
Qty: 9 TABLET | Refills: 5 | Status: SHIPPED | OUTPATIENT
Start: 2018-08-16 | End: 2019-01-14

## 2018-08-16 RX ORDER — LEVOTHYROXINE SODIUM 25 UG/1
25 TABLET ORAL
Qty: 90 TABLET | Refills: 1 | Status: SHIPPED | OUTPATIENT
Start: 2018-08-16 | End: 2019-01-14

## 2018-08-16 RX ORDER — BUTALBITAL, ACETAMINOPHEN AND CAFFEINE 50; 325; 40 MG/1; MG/1; MG/1
1 TABLET ORAL EVERY 6 HOURS PRN
Qty: 30 TABLET | Refills: 0 | Status: SHIPPED | OUTPATIENT
Start: 2018-08-16 | End: 2019-01-14

## 2018-08-16 RX ORDER — TOPIRAMATE 50 MG/1
50 TABLET, FILM COATED ORAL 2 TIMES DAILY
Qty: 60 TABLET | Refills: 3 | Status: SHIPPED | OUTPATIENT
Start: 2018-08-16 | End: 2019-01-14
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
## 2018-09-21 ENCOUNTER — OFFICE VISIT (OUTPATIENT)
Dept: PRIMARY CARE CLINIC | Facility: CLINIC | Age: 43
End: 2018-09-21
Payer: MEDICAID

## 2018-09-21 VITALS
DIASTOLIC BLOOD PRESSURE: 75 MMHG | OXYGEN SATURATION: 99 % | RESPIRATION RATE: 18 BRPM | BODY MASS INDEX: 21.71 KG/M2 | HEART RATE: 68 BPM | SYSTOLIC BLOOD PRESSURE: 105 MMHG | HEIGHT: 61 IN | WEIGHT: 115 LBS

## 2018-09-21 DIAGNOSIS — J40 BRONCHITIS: ICD-10-CM

## 2018-09-21 DIAGNOSIS — F32.5 MAJOR DEPRESSIVE DISORDER WITH SINGLE EPISODE, IN REMISSION: Chronic | ICD-10-CM

## 2018-09-21 DIAGNOSIS — E03.9 ACQUIRED HYPOTHYROIDISM: ICD-10-CM

## 2018-09-21 DIAGNOSIS — J98.01 BRONCHOSPASM: ICD-10-CM

## 2018-09-21 DIAGNOSIS — F31.9 BIPOLAR 1 DISORDER: ICD-10-CM

## 2018-09-21 DIAGNOSIS — S16.1XXD STRAIN OF NECK MUSCLE, SUBSEQUENT ENCOUNTER: ICD-10-CM

## 2018-09-21 DIAGNOSIS — F43.10 PTSD (POST-TRAUMATIC STRESS DISORDER): ICD-10-CM

## 2018-09-21 DIAGNOSIS — J32.9 SINUSITIS, UNSPECIFIED CHRONICITY, UNSPECIFIED LOCATION: Primary | ICD-10-CM

## 2018-09-21 DIAGNOSIS — Z72.0 TOBACCO ABUSE: ICD-10-CM

## 2018-09-21 PROBLEM — R11.0 NAUSEA: Status: RESOLVED | Noted: 2018-06-29 | Resolved: 2018-09-21

## 2018-09-21 PROCEDURE — 99213 OFFICE O/P EST LOW 20 MIN: CPT | Mod: PBBFAC,PN,25 | Performed by: FAMILY MEDICINE

## 2018-09-21 PROCEDURE — 96372 THER/PROPH/DIAG INJ SC/IM: CPT | Mod: PBBFAC,PN

## 2018-09-21 PROCEDURE — 99214 OFFICE O/P EST MOD 30 MIN: CPT | Mod: S$PBB,,, | Performed by: FAMILY MEDICINE

## 2018-09-21 PROCEDURE — 99999 PR PBB SHADOW E&M-EST. PATIENT-LVL III: CPT | Mod: PBBFAC,,, | Performed by: FAMILY MEDICINE

## 2018-09-21 RX ORDER — AZITHROMYCIN 250 MG/1
TABLET, FILM COATED ORAL
Qty: 6 TABLET | Refills: 0 | Status: SHIPPED | OUTPATIENT
Start: 2018-09-21 | End: 2018-09-25

## 2018-09-21 RX ORDER — PROMETHAZINE HYDROCHLORIDE AND CODEINE PHOSPHATE 6.25; 1 MG/5ML; MG/5ML
5 SOLUTION ORAL EVERY 6 HOURS PRN
Qty: 180 ML | Refills: 0 | Status: SHIPPED | OUTPATIENT
Start: 2018-09-21 | End: 2019-01-14

## 2018-09-21 RX ORDER — PREDNISONE 5 MG/1
TABLET ORAL
Qty: 20 TABLET | Refills: 0 | Status: SHIPPED | OUTPATIENT
Start: 2018-09-21 | End: 2019-01-14

## 2018-09-21 RX ORDER — BETAMETHASONE SODIUM PHOSPHATE AND BETAMETHASONE ACETATE 3; 3 MG/ML; MG/ML
12 INJECTION, SUSPENSION INTRA-ARTICULAR; INTRALESIONAL; INTRAMUSCULAR; SOFT TISSUE
Status: COMPLETED | OUTPATIENT
Start: 2018-09-21 | End: 2018-09-21

## 2018-09-21 RX ADMIN — BETAMETHASONE SODIUM PHOSPHATE AND BETAMETHASONE ACETATE 12 MG: 3; 3 INJECTION, SUSPENSION INTRA-ARTICULAR; INTRALESIONAL; INTRAMUSCULAR at 03:09

## 2018-09-21 NOTE — PROGRESS NOTES
Subjective:       Patient ID: Patricia Nye is a 43 y.o. female.    Chief Complaint: Sinus Problem (cough congestion)    HPI  43-year-old female in for cold--started last week--+ subjective fever and chills, +runny nose, +sore throat, +cough, +phlegm-clear.  No pneumonia asthma TB.  +smoking 1 pack per day    Review of Systems     Skin history of eczema-dry skin no psoriasis or skin cancer--some insect bite  HEENT no headaches ocular pain blurred vision diplopia epistaxis hoarseness change in voice +hypothyroidism  Lung-no pneumonia asthma TB smoking 1 pack per day  Heart history of tachycardia in the past but now faint heartbeat, no chest pain ankle edema palpitations MI heart murmur hypertension or hyperlipidemia  Abd no nausea vomiting diarrhea constipation ulcers hepatitis gallbladder disease melena hematochezia hematemesis   patient worried may have a bladder infection pain in the suprapubic area--urine very concentrated, dark yellow, no odor.  No frequency urgency hesitancy.  States just block all curled up on the sofa  GYN just had.  In 09/05/2018  MS occasional neck pain and occasional left elbow pain  Neuro occasional dizziness no loss of consciousness or seizures--can't walk for long distances but able to drive okay  No diabetes, no anemia +anxiety, +depression, +posttraumatic stress disorder +bipolar  Objective:    General 43-year-old white female then well-nourished well-developed in no acute distress  Skin insect bites on legs or arms--most lesions are excoriated from scratching minimal erythema  HEENT ears TMs clear nose patent throat erythema +1/4 eyes PERRLA EOM intact  Neck is supple nodes bruise JVD  Chest lungs with coarse cough no rales rhonchi wheezes  Abdomen flat bowel sounds 5 min tenderness again megaly rebound or guarding  MS range of motion with strength intact reflexes 2/4--patient states has difficulty ambulating but did fine when asked to do so in the office and walk down the  [address]  Neurologic patient alert cranial nerves intact oriented x3 Romberg negative heel-toe intact  Extremities no sinus clubbing or edema      Assessment:       1. Sinusitis, unspecified chronicity, unspecified location    2. Bronchitis    3. Bronchospasm    4. Tobacco abuse    5. Strain of neck muscle, subsequent encounter    6. Acquired hypothyroidism    7. Major depressive disorder with single episode, in remission    8. PTSD (post-traumatic stress disorder)    9. Bipolar 1 disorder        Plan:       Sinusitis, unspecified chronicity, unspecified location    Bronchitis    Bronchospasm    Tobacco abuse  -     Ambulatory referral to Smoking Cessation Program    Strain of neck muscle, subsequent encounter    Acquired hypothyroidism    Major depressive disorder with single episode, in remission    PTSD (post-traumatic stress disorder)    Bipolar 1 disorder    Other orders  -     betamethasone acetate-betamethasone sodium phosphate injection 12 mg  -     azithromycin (Z-RUBÉN) 250 MG tablet; 2 tabs by mouth day 1, then 1 tab by mouth daily x 4 days  Dispense: 6 tablet; Refill: 0  -     Discontinue: promethazine-codeine 6.25-10 mg/5 ml (PHENERGAN WITH CODEINE) 6.25-10 mg/5 mL syrup; Take 5 mLs by mouth every 6 (six) hours as needed for Cough.  Dispense: 180 mL; Refill: 0  -     Discontinue: predniSONE (DELTASONE) 5 MG tablet; 4 po qd x 2, 3 po qd x2, 2 po qd x2, 1 po qd x2  Dispense: 20 tablet; Refill: 0  -     methylPREDNISolone (MEDROL DOSEPACK) 4 mg tablet; use as directed  Dispense: 1 Package; Refill: 0  -     traMADol (ULTRAM) 50 mg tablet; Take 1 tablet (50 mg total) by mouth every 6 (six) hours as needed.  Dispense: 30 tablet; Refill: 0  -     butalbital-acetaminophen-caffeine -40 mg (FIORICET, ESGIC) -40 mg per tablet; Take 1 tablet by mouth daily as needed for Pain.  Dispense: 30 tablet; Refill: 0      Celestone/Ultram/Medrol/NSAID of choice  Moist heat Theragesic, ROM exercises  Fioricet for  headache  Patient needs to go see a neurologist for neuropathy of the left arm and left hip  Due to depression Celexa 10 mg q.d. does rule 50 q.h.s. p.r.n. sleep

## 2018-09-21 NOTE — PROGRESS NOTES
Patient verified by name and . 12 mg betamethasone given im to right vent gluteal using aseptic technique. Patient tolerated well

## 2018-10-18 ENCOUNTER — TELEPHONE (OUTPATIENT)
Dept: PRIMARY CARE CLINIC | Facility: CLINIC | Age: 43
End: 2018-10-18

## 2018-10-18 NOTE — TELEPHONE ENCOUNTER
----- Message from Madison Han sent at 10/18/2018  1:26 PM CDT -----  Contact: Patient  Type: Needs Medical Advice    Who Called:  Patricia patient  Symptoms (please be specific):  Burning urination  How long has patient had these symptoms:  3 days  Pharmacy name and phone #:    [facility]  [place] [address]  Best Call Back Number: [phone] or [phone]  Additional Information: Calling to ask for something for a bladder infection. Please advise. Thanks.

## 2018-11-07 DIAGNOSIS — R51.9 CHRONIC INTRACTABLE HEADACHE, UNSPECIFIED HEADACHE TYPE: ICD-10-CM

## 2018-11-07 DIAGNOSIS — G89.29 CHRONIC INTRACTABLE HEADACHE, UNSPECIFIED HEADACHE TYPE: ICD-10-CM

## 2018-11-07 RX ORDER — CIPROFLOXACIN 500 MG/1
500 TABLET ORAL 2 TIMES DAILY
Qty: 20 TABLET | Refills: 0 | OUTPATIENT
Start: 2018-11-07

## 2018-11-07 RX ORDER — BUTALBITAL, ACETAMINOPHEN AND CAFFEINE 50; 325; 40 MG/1; MG/1; MG/1
1 TABLET ORAL EVERY 6 HOURS PRN
Qty: 30 TABLET | Refills: 0 | OUTPATIENT
Start: 2018-11-07

## 2018-11-07 RX ORDER — PROMETHAZINE HYDROCHLORIDE AND CODEINE PHOSPHATE 6.25; 1 MG/5ML; MG/5ML
5 SOLUTION ORAL EVERY 6 HOURS PRN
Qty: 180 ML | Refills: 0 | OUTPATIENT
Start: 2018-11-07

## 2018-11-07 RX ORDER — CIPROFLOXACIN 500 MG/1
500 TABLET ORAL 2 TIMES DAILY
Refills: 0 | COMMUNITY
Start: 2018-10-20 | End: 2019-01-14

## 2018-11-07 NOTE — TELEPHONE ENCOUNTER
----- Message from Moraima Patel sent at 11/7/2018  1:23 PM CST -----  Pt wants to see if she can get refills on antibiotic,cough syrup, and diflucan and furicets to [facility]

## 2019-01-16 ENCOUNTER — TELEPHONE (OUTPATIENT)
Dept: OBSTETRICS AND GYNECOLOGY | Facility: CLINIC | Age: 44
End: 2019-01-16

## 2019-01-16 NOTE — TELEPHONE ENCOUNTER
Pt missed appt today. Calling to reschedule.     No answer. Message left to call clinic back to reschedule annual appt.

## 2019-02-11 ENCOUNTER — TELEPHONE (OUTPATIENT)
Dept: PRIMARY CARE CLINIC | Facility: CLINIC | Age: 44
End: 2019-02-11

## 2019-02-11 NOTE — TELEPHONE ENCOUNTER
Spoke with patient states she needs her regular meds refilled. Imitrex, levaquin and fioricet. Notified her I do not see those in her chart and that she will have to see Dr. Britt for that. States she will see him on Friday.

## 2019-02-11 NOTE — TELEPHONE ENCOUNTER
----- Message from Moraima Patel sent at 2/11/2019  2:30 PM CST -----  Pt had appt for today and the doctor was out, asked if you can call in her meds.  Please call her.

## 2019-02-15 ENCOUNTER — OFFICE VISIT (OUTPATIENT)
Dept: PRIMARY CARE CLINIC | Facility: CLINIC | Age: 44
End: 2019-02-15
Payer: MEDICAID

## 2019-02-15 ENCOUNTER — TELEPHONE (OUTPATIENT)
Dept: PRIMARY CARE CLINIC | Facility: CLINIC | Age: 44
End: 2019-02-15

## 2019-02-15 VITALS
OXYGEN SATURATION: 99 % | HEIGHT: 61 IN | HEART RATE: 84 BPM | RESPIRATION RATE: 18 BRPM | SYSTOLIC BLOOD PRESSURE: 105 MMHG | BODY MASS INDEX: 22.66 KG/M2 | WEIGHT: 120 LBS | DIASTOLIC BLOOD PRESSURE: 67 MMHG

## 2019-02-15 DIAGNOSIS — M79.602 LEFT ARM PAIN: ICD-10-CM

## 2019-02-15 DIAGNOSIS — F31.9 BIPOLAR 1 DISORDER: ICD-10-CM

## 2019-02-15 DIAGNOSIS — M25.552 LEFT HIP PAIN: ICD-10-CM

## 2019-02-15 DIAGNOSIS — E03.9 ACQUIRED HYPOTHYROIDISM: ICD-10-CM

## 2019-02-15 DIAGNOSIS — F51.01 PRIMARY INSOMNIA: Chronic | ICD-10-CM

## 2019-02-15 DIAGNOSIS — R51.9 NONINTRACTABLE HEADACHE, UNSPECIFIED CHRONICITY PATTERN, UNSPECIFIED HEADACHE TYPE: ICD-10-CM

## 2019-02-15 DIAGNOSIS — Z85.41 HISTORY OF CERVICAL CANCER: ICD-10-CM

## 2019-02-15 DIAGNOSIS — Z72.0 TOBACCO ABUSE: ICD-10-CM

## 2019-02-15 DIAGNOSIS — F43.10 PTSD (POST-TRAUMATIC STRESS DISORDER): ICD-10-CM

## 2019-02-15 DIAGNOSIS — F32.5 MAJOR DEPRESSIVE DISORDER WITH SINGLE EPISODE, IN REMISSION: Primary | Chronic | ICD-10-CM

## 2019-02-15 PROCEDURE — 99214 PR OFFICE/OUTPT VISIT, EST, LEVL IV, 30-39 MIN: ICD-10-PCS | Mod: S$PBB,,, | Performed by: FAMILY MEDICINE

## 2019-02-15 PROCEDURE — 99214 OFFICE O/P EST MOD 30 MIN: CPT | Mod: PBBFAC,PN | Performed by: FAMILY MEDICINE

## 2019-02-15 PROCEDURE — 99214 OFFICE O/P EST MOD 30 MIN: CPT | Mod: S$PBB,,, | Performed by: FAMILY MEDICINE

## 2019-02-15 PROCEDURE — 99999 PR PBB SHADOW E&M-EST. PATIENT-LVL IV: ICD-10-PCS | Mod: PBBFAC,,, | Performed by: FAMILY MEDICINE

## 2019-02-15 PROCEDURE — 99999 PR PBB SHADOW E&M-EST. PATIENT-LVL IV: CPT | Mod: PBBFAC,,, | Performed by: FAMILY MEDICINE

## 2019-02-15 RX ORDER — CITALOPRAM 10 MG/1
10 TABLET ORAL DAILY
Qty: 30 TABLET | Refills: 11 | Status: SHIPPED | OUTPATIENT
Start: 2019-02-15 | End: 2019-03-18 | Stop reason: SDUPTHER

## 2019-02-15 RX ORDER — TRAZODONE HYDROCHLORIDE 50 MG/1
50 TABLET ORAL NIGHTLY
Qty: 30 TABLET | Refills: 11 | Status: SHIPPED | OUTPATIENT
Start: 2019-02-15 | End: 2019-04-17 | Stop reason: SDUPTHER

## 2019-02-15 RX ORDER — BUTALBITAL, ACETAMINOPHEN AND CAFFEINE 50; 325; 40 MG/1; MG/1; MG/1
1 TABLET ORAL DAILY PRN
Qty: 30 TABLET | Refills: 0 | Status: SHIPPED | OUTPATIENT
Start: 2019-02-15 | End: 2019-02-15 | Stop reason: SDUPTHER

## 2019-02-15 RX ORDER — METHYLPREDNISOLONE 4 MG/1
TABLET ORAL
Qty: 1 PACKAGE | Refills: 0 | Status: SHIPPED | OUTPATIENT
Start: 2019-02-15 | End: 2019-03-18

## 2019-02-15 RX ORDER — LEVOTHYROXINE SODIUM 25 UG/1
25 TABLET ORAL DAILY
Qty: 30 TABLET | Refills: 5 | Status: SHIPPED | OUTPATIENT
Start: 2019-02-15 | End: 2019-03-18 | Stop reason: SDUPTHER

## 2019-02-15 RX ORDER — NITROFURANTOIN 25; 75 MG/1; MG/1
100 CAPSULE ORAL 2 TIMES DAILY
Qty: 10 CAPSULE | Refills: 0 | Status: SHIPPED | OUTPATIENT
Start: 2019-02-15 | End: 2019-03-18

## 2019-02-15 RX ORDER — BUTALBITAL, ACETAMINOPHEN AND CAFFEINE 50; 325; 40 MG/1; MG/1; MG/1
1 TABLET ORAL DAILY PRN
Qty: 30 TABLET | Refills: 0 | Status: SHIPPED | OUTPATIENT
Start: 2019-02-15 | End: 2019-03-18

## 2019-02-15 RX ORDER — TRAMADOL HYDROCHLORIDE 50 MG/1
50 TABLET ORAL EVERY 6 HOURS PRN
Qty: 30 TABLET | Refills: 0 | Status: SHIPPED | OUTPATIENT
Start: 2019-02-15 | End: 2019-02-25

## 2019-02-15 RX ORDER — LEVOTHYROXINE SODIUM 25 UG/1
25 TABLET ORAL DAILY
COMMUNITY
End: 2019-02-15 | Stop reason: SDUPTHER

## 2019-02-15 NOTE — TELEPHONE ENCOUNTER
----- Message from Nohelia Her sent at 2/15/2019  4:53 PM CST -----  Contact: [phone]  Patient is requesting a call back from the nurse stated she was seen today and want to know was medication for uti called in.  She's at the pharmacy waiting.  Patient inquiring about urine results, was it clear on pregnancy?    Please call the patient upon request at phone number [phone].
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
## 2019-02-19 ENCOUNTER — TELEPHONE (OUTPATIENT)
Dept: PRIMARY CARE CLINIC | Facility: CLINIC | Age: 44
End: 2019-02-19

## 2019-02-19 NOTE — TELEPHONE ENCOUNTER
----- Message from Madison Maria sent at 2/19/2019  1:19 PM CST -----  Type:  Test Results    Who Called:  self  Name of Test (Lab/Mammo/Etc):  Urine test  Date of Test:  02/15/19   Ordering Provider:  Dr Britt   Where the test was performed:  At office   Best Call Back Number:   [phone]   Additional Information:  States she will com back and do another urine test ?

## 2019-02-20 ENCOUNTER — TELEPHONE (OUTPATIENT)
Dept: PRIMARY CARE CLINIC | Facility: CLINIC | Age: 44
End: 2019-02-20

## 2019-02-20 NOTE — TELEPHONE ENCOUNTER
----- Message from Enzo Wilson sent at 2/20/2019  3:16 PM CST -----  Type: Needs Medical Advice    Who Called:  Patient    Best Call Back Number: [phone]  Additional Information: Caller states that she would like her referral to [facility] faxed to Dr. Stephens (Not sure of the name).  She also states that she would like a referral to OBGYN and she would also like a written copy of her negative HIV test.  Please call to advise
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
## 2019-03-18 PROBLEM — N30.00 ACUTE CYSTITIS WITHOUT HEMATURIA: Status: RESOLVED | Noted: 2017-12-11 | Resolved: 2019-03-18

## 2019-04-05 ENCOUNTER — OFFICE VISIT (OUTPATIENT)
Dept: OBSTETRICS AND GYNECOLOGY | Facility: CLINIC | Age: 44
End: 2019-04-05
Payer: MEDICAID

## 2019-04-05 VITALS
SYSTOLIC BLOOD PRESSURE: 148 MMHG | HEIGHT: 62 IN | BODY MASS INDEX: 21.54 KG/M2 | WEIGHT: 117.06 LBS | DIASTOLIC BLOOD PRESSURE: 100 MMHG

## 2019-04-05 DIAGNOSIS — Z01.419 WELL WOMAN EXAM WITH ROUTINE GYNECOLOGICAL EXAM: Primary | ICD-10-CM

## 2019-04-05 DIAGNOSIS — Z11.3 ROUTINE SCREENING FOR STI (SEXUALLY TRANSMITTED INFECTION): ICD-10-CM

## 2019-04-05 PROCEDURE — 87624 HPV HI-RISK TYP POOLED RSLT: CPT

## 2019-04-05 PROCEDURE — 99999 PR PBB SHADOW E&M-EST. PATIENT-LVL III: CPT | Mod: PBBFAC,,, | Performed by: OBSTETRICS & GYNECOLOGY

## 2019-04-05 PROCEDURE — 88175 CYTOPATH C/V AUTO FLUID REDO: CPT

## 2019-04-05 PROCEDURE — 99386 PR PREVENTIVE VISIT,NEW,40-64: ICD-10-PCS | Mod: S$PBB,,, | Performed by: OBSTETRICS & GYNECOLOGY

## 2019-04-05 PROCEDURE — 99386 PREV VISIT NEW AGE 40-64: CPT | Mod: S$PBB,,, | Performed by: OBSTETRICS & GYNECOLOGY

## 2019-04-05 PROCEDURE — 87491 CHLMYD TRACH DNA AMP PROBE: CPT

## 2019-04-05 PROCEDURE — 99213 OFFICE O/P EST LOW 20 MIN: CPT | Mod: PBBFAC,PN | Performed by: OBSTETRICS & GYNECOLOGY

## 2019-04-05 PROCEDURE — 87510 GARDNER VAG DNA DIR PROBE: CPT

## 2019-04-05 PROCEDURE — 99999 PR PBB SHADOW E&M-EST. PATIENT-LVL III: ICD-10-PCS | Mod: PBBFAC,,, | Performed by: OBSTETRICS & GYNECOLOGY

## 2019-04-05 PROCEDURE — 87480 CANDIDA DNA DIR PROBE: CPT

## 2019-04-05 NOTE — PROGRESS NOTES
History & Physical  Gynecology      SUBJECTIVE:     Chief Complaint: STD CHECK and Gynecologic Exam       History of Present Illness:  Ms. Nye is a 43 yr old female who presents for annual, well woman exam. Complaints: Possible exposure to STI. Also some vaginal itching, irritation with discharge. Denies odor. Denies pelvic pain. Denies fever or chills. Reports menstrual cycles normal. + hx abnormal paps. Last pap was last year and had colpo for HR HPV +. Denies excisional prodecure. Currently sexually active. No hx of STIs. Denies fam hx of breast, ovarian or colon cancer. Feels safe at home, wears seatbelts, exercises intermittently. Patient is HIV positive. Used to go to [facility] clinic but stopped going. Couseled patient extensively re follow up with [facility]        Review of patient's allergies indicates:   Allergen Reactions    Sulfa (sulfonamide antibiotics)      Rash (skin)^       Past Medical History:   Diagnosis Date    Allergy     Anxiety     Depression     HIV infection     Hyperthyroidism     Insomnia      Past Surgical History:   Procedure Laterality Date    SKULL FRACTURE ELEVATION       OB History        4    Para   2    Term                AB   2    Living           SAB   2    TAB        Ectopic        Multiple        Live Births                   Family History   Problem Relation Age of Onset    Heart disease Mother     Breast cancer Neg Hx     Colon cancer Neg Hx     Ovarian cancer Neg Hx      Social History     Tobacco Use    Smoking status: Current Some Day Smoker    Smokeless tobacco: Never Used   Substance Use Topics    Alcohol use: No    Drug use: No       Current Outpatient Medications   Medication Sig    ALPRAZolam (XANAX) 0.5 MG tablet TAKE 1 TABLET (0.5 MG TOTAL) BY MOUTH 2 (TWO) TIMES DAILY AS NEEDED FOR SLEEP OR ANXIETY    citalopram (CELEXA) 10 MG tablet Take 1 tablet (10 mg total) by mouth once daily.    levothyroxine (SYNTHROID) 25 MCG tablet Take 1 tablet  (25 mcg total) by mouth once daily.    topiramate (TOPAMAX) 25 MG tablet TAKE 1 TABLET (25 MG TOTAL) BY MOUTH 2 (TWO) TIMES DAILY FOR HEADACHES    elviteg-cob-emtri-tenof ALAFEN (GENVOYA) [phone] mg Tab Take 1 tablet by mouth.    traZODone (DESYREL) 50 MG tablet Take 1 tablet (50 mg total) by mouth every evening.     No current facility-administered medications for this visit.          Review of Systems:  Review of Systems   Constitutional: Negative for activity change, fatigue, fever and unexpected weight change.   Respiratory: Negative for cough and shortness of breath.    Cardiovascular: Negative for chest pain and palpitations.   Gastrointestinal: Negative for abdominal pain, constipation, diarrhea and nausea.   Endocrine: Negative for hot flashes.   Genitourinary: Positive for vaginal discharge and vaginal pain. Negative for dyspareunia, dysuria, menorrhagia, menstrual problem and pelvic pain.   Musculoskeletal: Negative for back pain.   Integumentary:  Negative for nipple discharge.   Neurological: Negative for headaches.   Psychiatric/Behavioral: The patient is not nervous/anxious.    Breast: Negative for nipple discharge       OBJECTIVE:     Physical Exam:  Physical Exam   Constitutional: She is oriented to person, place, and time. She appears well-developed and well-nourished.   HENT:   Head: Normocephalic and atraumatic.   Neck: Normal range of motion. Neck supple.   Cardiovascular: Normal rate, regular rhythm, normal heart sounds and intact distal pulses.   Pulmonary/Chest: Effort normal and breath sounds normal. Right breast exhibits no inverted nipple, no mass, no nipple discharge, no skin change and no tenderness. Left breast exhibits no inverted nipple, no mass, no nipple discharge, no skin change and no tenderness.   Abdominal: Soft. Bowel sounds are normal. There is no tenderness. There is no guarding.   Genitourinary: There is no rash, tenderness, lesion or injury on the right labia.  There is no rash, tenderness, lesion or injury on the left labia. Uterus is not deviated, not enlarged, not fixed and not tender. Cervix exhibits no motion tenderness, no discharge and no friability. Right adnexum displays no mass, no tenderness and no fullness. Left adnexum displays no mass, no tenderness and no fullness. No erythema, tenderness or bleeding in the vagina. No foreign body in the vagina. No signs of injury around the vagina. No vaginal discharge found.   Neurological: She is alert and oriented to person, place, and time.   Skin: Skin is warm and dry.   Psychiatric: She has a normal mood and affect.   Vitals reviewed.        ASSESSMENT:       ICD-10-CM ICD-9-CM    1. Well woman exam with routine gynecological exam Z01.419 V72.31 Liquid-based pap smear, screening      HPV High Risk Genotypes, PCR   2. Routine screening for STI (sexually transmitted infection) Z11.3 V74.5 Vaginosis Screen by DNA Probe      C. trachomatis/N. gonorrhoeae by AMP DNA      HIV 1/2 Ag/Ab (4th Gen)      RPR      Hepatitis panel, acute          Plan:      Annual, well woman, pap/cotesting today. Hx of HR HPV infection with colpo last year  Possible exposure to STI. Hepatitis and RPR. Hx of HIV. Was being seen at [facility] but now non-compliant. Stressed importance of follow up with [facility] and PCP. Discussed transmission with high viral load and that patient is required by law to disclosure HIV status to partners. Pt voiced understanding.  Vaginal discharge: Affirm and GCCT collected and sent  Mammogram scheduled  RTC in 1 yr for annual exam or PRN    Counseling time: 15 minutes    Jai Velarde

## 2019-04-08 LAB
BACTERIAL VAGINOSIS DNA: NEGATIVE
CANDIDA GLABRATA DNA: NEGATIVE
CANDIDA KRUSEI DNA: NEGATIVE
CANDIDA RRNA VAG QL PROBE: POSITIVE
T VAGINALIS RRNA GENITAL QL PROBE: NEGATIVE

## 2019-04-09 ENCOUNTER — TELEPHONE (OUTPATIENT)
Dept: OBSTETRICS AND GYNECOLOGY | Facility: CLINIC | Age: 44
End: 2019-04-09

## 2019-04-09 LAB
C TRACH DNA SPEC QL NAA+PROBE: NOT DETECTED
N GONORRHOEA DNA SPEC QL NAA+PROBE: NOT DETECTED

## 2019-04-09 RX ORDER — FLUCONAZOLE 150 MG/1
150 TABLET ORAL DAILY
Qty: 2 TABLET | Refills: 0 | Status: SHIPPED | OUTPATIENT
Start: 2019-04-09 | End: 2019-04-17

## 2019-04-09 NOTE — TELEPHONE ENCOUNTER
----- Message from Jai Velarde MD sent at 4/9/2019  8:04 AM CDT -----  Results reviewed. + yeast. Rx for diflucan sent to pharmacy. Please notify patient.

## 2019-04-09 NOTE — TELEPHONE ENCOUNTER
----- Message from Daija Frank sent at 4/9/2019 12:56 PM CDT -----  Contact: Pt   Name of Who is Calling: HEIDI CAMARGO [[id number]]      What is the request in detail: Patient states she is experiencing vaginal burning, itching, and irritation with urinating, symptoms have gotten worsen since visit. Please contact to further discuss and advise      Can the clinic reply by [facility]: No       [facility]: [phone]

## 2019-04-10 ENCOUNTER — TELEPHONE (OUTPATIENT)
Dept: OBSTETRICS AND GYNECOLOGY | Facility: CLINIC | Age: 44
End: 2019-04-10

## 2019-04-10 NOTE — TELEPHONE ENCOUNTER
----- Message from Alex Siegel sent at 4/10/2019  1:20 PM CDT -----  Contact: HEIDI CAMARGO [[id number]]  Type:  Patient Returning Call    Who Called: HEIDI CAMARGO [[id number]]    Who Left Message for Patient: Radha Fairbanks LPN      Does the patient know what this is regarding?: Yes ( test results)    Best Call Back Number:[phone]    Additional Information:  Pt request a call back from nurse. Pt states that she need to know results as well as if she was tested for possible staff infection. Please advise. Thanks

## 2019-04-10 NOTE — TELEPHONE ENCOUNTER
----- Message from Adelia Ang sent at 4/10/2019  2:00 PM CDT -----  Contact: Pt  Type:  Patient Returning Call    Who Called: HEIDI CAMARGO [[id number]]    Who Left Message for Patient: Radha Fairbanks LPN      Does the patient know what this is regarding?:Test results     Best Call Back Number: [phone]    Additional Information: No

## 2019-04-11 LAB
HPV HR 12 DNA CVX QL NAA+PROBE: NEGATIVE
HPV16 AG SPEC QL: NEGATIVE
HPV18 DNA SPEC QL NAA+PROBE: NEGATIVE

## 2019-04-16 ENCOUNTER — TELEPHONE (OUTPATIENT)
Dept: PRIMARY CARE CLINIC | Facility: CLINIC | Age: 44
End: 2019-04-16

## 2019-04-16 NOTE — TELEPHONE ENCOUNTER
----- Message from Ishan Peña sent at 4/16/2019  3:18 PM CDT -----  Type: Needs Medical Advice    Who Called:  Patient  Best Call Back Number: [phone] (home) [phone] (work)  Additional Information: Patient would like to speak to office regarding mouthwash prescription

## 2019-04-17 ENCOUNTER — OFFICE VISIT (OUTPATIENT)
Dept: PRIMARY CARE CLINIC | Facility: CLINIC | Age: 44
End: 2019-04-17
Payer: MEDICAID

## 2019-04-17 VITALS
HEART RATE: 62 BPM | SYSTOLIC BLOOD PRESSURE: 111 MMHG | OXYGEN SATURATION: 99 % | RESPIRATION RATE: 18 BRPM | BODY MASS INDEX: 21.53 KG/M2 | WEIGHT: 117 LBS | DIASTOLIC BLOOD PRESSURE: 75 MMHG | HEIGHT: 62 IN

## 2019-04-17 DIAGNOSIS — F31.9 BIPOLAR 1 DISORDER: ICD-10-CM

## 2019-04-17 DIAGNOSIS — R51.9 NONINTRACTABLE HEADACHE, UNSPECIFIED CHRONICITY PATTERN, UNSPECIFIED HEADACHE TYPE: ICD-10-CM

## 2019-04-17 DIAGNOSIS — Z72.0 TOBACCO ABUSE: ICD-10-CM

## 2019-04-17 DIAGNOSIS — F32.5 MAJOR DEPRESSIVE DISORDER WITH SINGLE EPISODE, IN REMISSION: Chronic | ICD-10-CM

## 2019-04-17 DIAGNOSIS — E03.9 ACQUIRED HYPOTHYROIDISM: ICD-10-CM

## 2019-04-17 DIAGNOSIS — F43.10 PTSD (POST-TRAUMATIC STRESS DISORDER): ICD-10-CM

## 2019-04-17 DIAGNOSIS — K05.10 GINGIVITIS: Primary | ICD-10-CM

## 2019-04-17 DIAGNOSIS — B20 HIV (HUMAN IMMUNODEFICIENCY VIRUS INFECTION): ICD-10-CM

## 2019-04-17 DIAGNOSIS — F32.0 CURRENT MILD EPISODE OF MAJOR DEPRESSIVE DISORDER WITHOUT PRIOR EPISODE: ICD-10-CM

## 2019-04-17 PROCEDURE — 99213 PR OFFICE/OUTPT VISIT, EST, LEVL III, 20-29 MIN: ICD-10-PCS | Mod: S$PBB,,, | Performed by: FAMILY MEDICINE

## 2019-04-17 PROCEDURE — 99999 PR PBB SHADOW E&M-EST. PATIENT-LVL III: CPT | Mod: PBBFAC,,, | Performed by: FAMILY MEDICINE

## 2019-04-17 PROCEDURE — 99999 PR PBB SHADOW E&M-EST. PATIENT-LVL III: ICD-10-PCS | Mod: PBBFAC,,, | Performed by: FAMILY MEDICINE

## 2019-04-17 PROCEDURE — 99213 OFFICE O/P EST LOW 20 MIN: CPT | Mod: S$PBB,,, | Performed by: FAMILY MEDICINE

## 2019-04-17 PROCEDURE — 99213 OFFICE O/P EST LOW 20 MIN: CPT | Mod: PBBFAC,PN | Performed by: FAMILY MEDICINE

## 2019-04-17 RX ORDER — AMOXICILLIN 500 MG/1
500 CAPSULE ORAL EVERY 8 HOURS
Qty: 30 CAPSULE | Refills: 0 | Status: SHIPPED | OUTPATIENT
Start: 2019-04-17 | End: 2019-09-11

## 2019-04-17 RX ORDER — LEVOTHYROXINE SODIUM 25 UG/1
25 TABLET ORAL DAILY
Qty: 90 TABLET | Refills: 0 | Status: SHIPPED | OUTPATIENT
Start: 2019-04-17 | End: 2019-09-11

## 2019-04-17 RX ORDER — TOPIRAMATE 25 MG/1
TABLET ORAL
Qty: 60 TABLET | Refills: 5 | Status: SHIPPED | OUTPATIENT
Start: 2019-04-17 | End: 2019-09-11

## 2019-04-17 RX ORDER — NYSTATIN 100000 [USP'U]/ML
5 SUSPENSION ORAL 4 TIMES DAILY
Qty: 200 ML | Refills: 0 | Status: SHIPPED | OUTPATIENT
Start: 2019-04-17 | End: 2019-04-27

## 2019-04-17 RX ORDER — FLUCONAZOLE 150 MG/1
TABLET ORAL
Qty: 3 TABLET | Refills: 0 | Status: SHIPPED | OUTPATIENT
Start: 2019-04-17 | End: 2019-09-11

## 2019-04-17 RX ORDER — CITALOPRAM 10 MG/1
10 TABLET ORAL DAILY
Qty: 90 TABLET | Refills: 0 | Status: SHIPPED | OUTPATIENT
Start: 2019-04-17 | End: 2019-10-22 | Stop reason: ALTCHOICE

## 2019-04-17 RX ORDER — TRAZODONE HYDROCHLORIDE 50 MG/1
50 TABLET ORAL NIGHTLY
Qty: 30 TABLET | Refills: 11 | Status: SHIPPED | OUTPATIENT
Start: 2019-04-17 | End: 2020-05-14 | Stop reason: SDUPTHER
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
## 2019-05-13 ENCOUNTER — OFFICE VISIT (OUTPATIENT)
Dept: NEUROLOGY | Facility: CLINIC | Age: 44
End: 2019-05-13
Payer: MEDICAID

## 2019-05-13 VITALS
WEIGHT: 114.44 LBS | HEIGHT: 61 IN | HEART RATE: 69 BPM | SYSTOLIC BLOOD PRESSURE: 102 MMHG | BODY MASS INDEX: 21.61 KG/M2 | DIASTOLIC BLOOD PRESSURE: 68 MMHG

## 2019-05-13 DIAGNOSIS — M54.42 CHRONIC MIDLINE LOW BACK PAIN WITH LEFT-SIDED SCIATICA: ICD-10-CM

## 2019-05-13 DIAGNOSIS — G89.29 CHRONIC MIDLINE LOW BACK PAIN WITH LEFT-SIDED SCIATICA: ICD-10-CM

## 2019-05-13 DIAGNOSIS — M54.2 NECK PAIN: Primary | ICD-10-CM

## 2019-05-13 DIAGNOSIS — M25.552 LEFT HIP PAIN: ICD-10-CM

## 2019-05-13 DIAGNOSIS — M25.522 ELBOW PAIN, LEFT: ICD-10-CM

## 2019-05-13 DIAGNOSIS — R51.9 HEADACHE, UNSPECIFIED HEADACHE TYPE: ICD-10-CM

## 2019-05-13 PROCEDURE — 99205 OFFICE O/P NEW HI 60 MIN: CPT | Mod: S$PBB,,, | Performed by: PSYCHIATRY & NEUROLOGY

## 2019-05-13 PROCEDURE — 99214 OFFICE O/P EST MOD 30 MIN: CPT | Mod: PBBFAC | Performed by: PSYCHIATRY & NEUROLOGY

## 2019-05-13 PROCEDURE — 99999 PR PBB SHADOW E&M-EST. PATIENT-LVL IV: ICD-10-PCS | Mod: PBBFAC,,, | Performed by: PSYCHIATRY & NEUROLOGY

## 2019-05-13 PROCEDURE — 99205 PR OFFICE/OUTPT VISIT, NEW, LEVL V, 60-74 MIN: ICD-10-PCS | Mod: S$PBB,,, | Performed by: PSYCHIATRY & NEUROLOGY

## 2019-05-13 PROCEDURE — 99999 PR PBB SHADOW E&M-EST. PATIENT-LVL IV: CPT | Mod: PBBFAC,,, | Performed by: PSYCHIATRY & NEUROLOGY

## 2019-05-13 RX ORDER — METOPROLOL SUCCINATE 25 MG/1
25 TABLET, EXTENDED RELEASE ORAL DAILY
COMMUNITY
End: 2019-09-11

## 2019-05-13 RX ORDER — GABAPENTIN 100 MG/1
100 CAPSULE ORAL 3 TIMES DAILY
Qty: 90 CAPSULE | Refills: 1 | Status: SHIPPED | OUTPATIENT
Start: 2019-05-13 | End: 2020-05-14 | Stop reason: SDUPTHER

## 2019-05-13 RX ORDER — TRAMADOL HYDROCHLORIDE 50 MG/1
50 TABLET ORAL EVERY 8 HOURS PRN
COMMUNITY
End: 2020-02-06 | Stop reason: SDUPTHER
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
## 2019-05-15 ENCOUNTER — TELEPHONE (OUTPATIENT)
Dept: NEUROLOGY | Facility: CLINIC | Age: 44
End: 2019-05-15

## 2019-05-15 DIAGNOSIS — G89.29 CHRONIC MIDLINE LOW BACK PAIN WITH LEFT-SIDED SCIATICA: ICD-10-CM

## 2019-05-15 DIAGNOSIS — M54.42 CHRONIC MIDLINE LOW BACK PAIN WITH LEFT-SIDED SCIATICA: ICD-10-CM

## 2019-05-15 DIAGNOSIS — M54.2 NECK PAIN: Primary | ICD-10-CM

## 2019-05-15 NOTE — TELEPHONE ENCOUNTER
----- Message from Anna Cindy sent at 5/15/2019  9:59 AM CDT -----  Contact: self @ [phone]  Pt says she called the [facility] spine center and was advised that they are not taking medicaid.  Pt would like to discuss with Dr Tate.  Pls call.

## 2019-05-17 ENCOUNTER — TELEPHONE (OUTPATIENT)
Dept: NEUROLOGY | Facility: CLINIC | Age: 44
End: 2019-05-17

## 2019-05-17 NOTE — TELEPHONE ENCOUNTER
----- Message from Adelia Ang sent at 5/17/2019 11:51 AM CDT -----    Name of Who is Calling:HEIDI CAMARGO [[id number]]    What is the request in detail:  Patient would like to be to schedule an appointment Please contact to further discuss and advise    Can the clinic reply by [facility]: no    What Number to Call Back if not in [facility]: [phone]

## 2019-05-24 RX ORDER — BUTALBITAL, ACETAMINOPHEN AND CAFFEINE 50; 325; 40 MG/1; MG/1; MG/1
TABLET ORAL
Qty: 30 TABLET | Refills: 0 | OUTPATIENT
Start: 2019-05-24

## 2019-05-28 ENCOUNTER — DOCUMENTATION ONLY (OUTPATIENT)
Dept: NEUROLOGY | Facility: CLINIC | Age: 44
End: 2019-05-28

## 2019-08-23 DIAGNOSIS — Z12.39 BREAST CANCER SCREENING: ICD-10-CM

## 2019-09-11 ENCOUNTER — OFFICE VISIT (OUTPATIENT)
Dept: PRIMARY CARE CLINIC | Facility: CLINIC | Age: 44
End: 2019-09-11
Payer: MEDICAID

## 2019-09-11 VITALS
RESPIRATION RATE: 18 BRPM | HEART RATE: 99 BPM | OXYGEN SATURATION: 98 % | HEIGHT: 61 IN | TEMPERATURE: 99 F | BODY MASS INDEX: 20.05 KG/M2 | SYSTOLIC BLOOD PRESSURE: 126 MMHG | WEIGHT: 106.19 LBS | DIASTOLIC BLOOD PRESSURE: 87 MMHG

## 2019-09-11 DIAGNOSIS — F32.5 MAJOR DEPRESSIVE DISORDER WITH SINGLE EPISODE, IN REMISSION: Chronic | ICD-10-CM

## 2019-09-11 DIAGNOSIS — Z72.0 TOBACCO ABUSE: ICD-10-CM

## 2019-09-11 DIAGNOSIS — R39.9 UTI SYMPTOMS: ICD-10-CM

## 2019-09-11 DIAGNOSIS — E03.9 ACQUIRED HYPOTHYROIDISM: ICD-10-CM

## 2019-09-11 DIAGNOSIS — F43.10 PTSD (POST-TRAUMATIC STRESS DISORDER): ICD-10-CM

## 2019-09-11 DIAGNOSIS — F32.0 CURRENT MILD EPISODE OF MAJOR DEPRESSIVE DISORDER WITHOUT PRIOR EPISODE: ICD-10-CM

## 2019-09-11 DIAGNOSIS — G89.29 CHRONIC MIDLINE LOW BACK PAIN WITH LEFT-SIDED SCIATICA: ICD-10-CM

## 2019-09-11 DIAGNOSIS — F31.9 BIPOLAR 1 DISORDER: Primary | ICD-10-CM

## 2019-09-11 DIAGNOSIS — M54.42 CHRONIC MIDLINE LOW BACK PAIN WITH LEFT-SIDED SCIATICA: ICD-10-CM

## 2019-09-11 DIAGNOSIS — M54.2 NECK PAIN: ICD-10-CM

## 2019-09-11 DIAGNOSIS — R51.9 NONINTRACTABLE HEADACHE, UNSPECIFIED CHRONICITY PATTERN, UNSPECIFIED HEADACHE TYPE: ICD-10-CM

## 2019-09-11 DIAGNOSIS — R51.9 HEADACHE, UNSPECIFIED HEADACHE TYPE: ICD-10-CM

## 2019-09-11 DIAGNOSIS — B20 HIV (HUMAN IMMUNODEFICIENCY VIRUS INFECTION): ICD-10-CM

## 2019-09-11 LAB
BILIRUB SERPL-MCNC: ABNORMAL MG/DL
BLOOD URINE, POC: ABNORMAL
COLOR, POC UA: YELLOW
GLUCOSE UR QL STRIP: ABNORMAL
KETONES UR QL STRIP: ABNORMAL
LEUKOCYTE ESTERASE URINE, POC: ABNORMAL
NITRITE, POC UA: ABNORMAL
PH, POC UA: 6
PROTEIN, POC: ABNORMAL
SPECIFIC GRAVITY, POC UA: 1.01
UROBILINOGEN, POC UA: ABNORMAL

## 2019-09-11 PROCEDURE — 99999 PR PBB SHADOW E&M-EST. PATIENT-LVL IV: CPT | Mod: PBBFAC,,, | Performed by: FAMILY MEDICINE

## 2019-09-11 PROCEDURE — 99214 PR OFFICE/OUTPT VISIT, EST, LEVL IV, 30-39 MIN: ICD-10-PCS | Mod: S$PBB,,, | Performed by: FAMILY MEDICINE

## 2019-09-11 PROCEDURE — 99999 PR PBB SHADOW E&M-EST. PATIENT-LVL IV: ICD-10-PCS | Mod: PBBFAC,,, | Performed by: FAMILY MEDICINE

## 2019-09-11 PROCEDURE — 99214 OFFICE O/P EST MOD 30 MIN: CPT | Mod: S$PBB,,, | Performed by: FAMILY MEDICINE

## 2019-09-11 PROCEDURE — 99214 OFFICE O/P EST MOD 30 MIN: CPT | Mod: PBBFAC,PN | Performed by: FAMILY MEDICINE

## 2019-09-11 PROCEDURE — 81001 URINALYSIS AUTO W/SCOPE: CPT | Mod: PBBFAC,PN | Performed by: FAMILY MEDICINE

## 2019-09-11 RX ORDER — TRAZODONE HYDROCHLORIDE 50 MG/1
50 TABLET ORAL NIGHTLY
Qty: 30 TABLET | Refills: 5 | Status: CANCELLED | OUTPATIENT
Start: 2019-09-11 | End: 2020-09-10

## 2019-09-11 RX ORDER — ALPRAZOLAM 0.5 MG/1
TABLET ORAL
Qty: 30 TABLET | Refills: 0 | Status: CANCELLED | OUTPATIENT
Start: 2019-09-11

## 2019-09-11 RX ORDER — GABAPENTIN 100 MG/1
100 CAPSULE ORAL 3 TIMES DAILY
Qty: 90 CAPSULE | Refills: 1 | Status: CANCELLED | OUTPATIENT
Start: 2019-09-11 | End: 2020-09-10

## 2019-09-11 RX ORDER — CITALOPRAM 10 MG/1
10 TABLET ORAL DAILY
Qty: 30 TABLET | Refills: 5 | Status: CANCELLED | OUTPATIENT
Start: 2019-09-11 | End: 2020-09-10

## 2019-09-11 RX ORDER — BUTALBITAL, ACETAMINOPHEN AND CAFFEINE 50; 325; 40 MG/1; MG/1; MG/1
TABLET ORAL
Qty: 30 TABLET | Refills: 0 | Status: CANCELLED | OUTPATIENT
Start: 2019-09-11
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
## 2019-09-18 ENCOUNTER — OFFICE VISIT (OUTPATIENT)
Dept: PSYCHOLOGY | Facility: CLINIC | Age: 44
End: 2019-09-18
Payer: MEDICAID

## 2019-09-18 DIAGNOSIS — F31.11 BIPOLAR 1 DISORDER, MANIC, MILD: Primary | ICD-10-CM

## 2019-09-18 DIAGNOSIS — F41.9 ANXIETY: ICD-10-CM

## 2019-09-18 PROCEDURE — 90791 PSYCH DIAGNOSTIC EVALUATION: CPT | Mod: HP,HB,, | Performed by: PSYCHOLOGIST

## 2019-09-18 PROCEDURE — 90791 PR PSYCHIATRIC DIAGNOSTIC EVALUATION: ICD-10-PCS | Mod: HP,HB,, | Performed by: PSYCHOLOGIST
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
## 2019-10-09 DIAGNOSIS — R51.9 HEADACHE, UNSPECIFIED HEADACHE TYPE: ICD-10-CM

## 2019-10-09 DIAGNOSIS — M54.2 NECK PAIN: ICD-10-CM

## 2019-10-09 DIAGNOSIS — M54.42 CHRONIC MIDLINE LOW BACK PAIN WITH LEFT-SIDED SCIATICA: ICD-10-CM

## 2019-10-09 DIAGNOSIS — G89.29 CHRONIC MIDLINE LOW BACK PAIN WITH LEFT-SIDED SCIATICA: ICD-10-CM

## 2019-10-09 RX ORDER — GABAPENTIN 100 MG/1
100 CAPSULE ORAL 3 TIMES DAILY
Qty: 90 CAPSULE | Refills: 1 | OUTPATIENT
Start: 2019-10-09 | End: 2020-10-08

## 2019-10-14 ENCOUNTER — DOCUMENTATION ONLY (OUTPATIENT)
Dept: NEUROLOGY | Facility: CLINIC | Age: 44
End: 2019-10-14

## 2019-10-22 ENCOUNTER — CLINICAL SUPPORT (OUTPATIENT)
Dept: PRIMARY CARE CLINIC | Facility: CLINIC | Age: 44
End: 2019-10-22
Payer: MEDICAID

## 2019-10-22 ENCOUNTER — OFFICE VISIT (OUTPATIENT)
Dept: PRIMARY CARE CLINIC | Facility: CLINIC | Age: 44
End: 2019-10-22
Payer: MEDICAID

## 2019-10-22 VITALS
OXYGEN SATURATION: 98 % | TEMPERATURE: 100 F | WEIGHT: 105.81 LBS | DIASTOLIC BLOOD PRESSURE: 80 MMHG | BODY MASS INDEX: 19.98 KG/M2 | HEART RATE: 96 BPM | RESPIRATION RATE: 18 BRPM | SYSTOLIC BLOOD PRESSURE: 106 MMHG | HEIGHT: 61 IN

## 2019-10-22 DIAGNOSIS — N76.0 BACTERIAL VAGINOSIS: ICD-10-CM

## 2019-10-22 DIAGNOSIS — B96.89 BACTERIAL VAGINOSIS: ICD-10-CM

## 2019-10-22 DIAGNOSIS — R30.0 DYSURIA: ICD-10-CM

## 2019-10-22 DIAGNOSIS — R51.9 NONINTRACTABLE HEADACHE, UNSPECIFIED CHRONICITY PATTERN, UNSPECIFIED HEADACHE TYPE: Primary | ICD-10-CM

## 2019-10-22 DIAGNOSIS — F32.A DEPRESSION, UNSPECIFIED DEPRESSION TYPE: ICD-10-CM

## 2019-10-22 DIAGNOSIS — F32.0 CURRENT MILD EPISODE OF MAJOR DEPRESSIVE DISORDER WITHOUT PRIOR EPISODE: ICD-10-CM

## 2019-10-22 PROCEDURE — 87186 SC STD MICRODIL/AGAR DIL: CPT

## 2019-10-22 PROCEDURE — 99214 OFFICE O/P EST MOD 30 MIN: CPT | Mod: S$PBB,,, | Performed by: FAMILY MEDICINE

## 2019-10-22 PROCEDURE — 99214 PR OFFICE/OUTPT VISIT, EST, LEVL IV, 30-39 MIN: ICD-10-PCS | Mod: S$PBB,,, | Performed by: FAMILY MEDICINE

## 2019-10-22 PROCEDURE — 99999 PR PBB SHADOW E&M-EST. PATIENT-LVL IV: ICD-10-PCS | Mod: PBBFAC,,, | Performed by: FAMILY MEDICINE

## 2019-10-22 PROCEDURE — 87086 URINE CULTURE/COLONY COUNT: CPT

## 2019-10-22 PROCEDURE — 99999 PR PBB SHADOW E&M-EST. PATIENT-LVL IV: CPT | Mod: PBBFAC,,, | Performed by: FAMILY MEDICINE

## 2019-10-22 PROCEDURE — 87077 CULTURE AEROBIC IDENTIFY: CPT

## 2019-10-22 PROCEDURE — 87088 URINE BACTERIA CULTURE: CPT

## 2019-10-22 PROCEDURE — 99214 OFFICE O/P EST MOD 30 MIN: CPT | Mod: PBBFAC,PN | Performed by: FAMILY MEDICINE

## 2019-10-22 RX ORDER — DULOXETIN HYDROCHLORIDE 30 MG/1
30 CAPSULE, DELAYED RELEASE ORAL DAILY
Qty: 30 CAPSULE | Refills: 11 | Status: SHIPPED | OUTPATIENT
Start: 2019-10-22 | End: 2019-12-02

## 2019-10-22 RX ORDER — METRONIDAZOLE 500 MG/1
500 TABLET ORAL EVERY 12 HOURS
Qty: 14 TABLET | Refills: 1 | Status: SHIPPED | OUTPATIENT
Start: 2019-10-22 | End: 2020-03-16

## 2019-10-22 NOTE — PROGRESS NOTES
"C  Subjective:       Patient ID: Patricia Nye is a 44 y.o. female.    Chief Complaint: Headache (Chronic); Rash ( right nostril); and Urinary Tract Infection (Burning when urinates,chills)    Complains of chronic headaches since head trauma years ago. Was referred to therapy but has been dealing with a lot of family strife lately and lost contact info. Would like new referral.  Also complains of burning irritation similar to vaginosis infections shes had a lot in the past.      Review of Systems   Constitutional: Negative for activity change, chills and fever.   Respiratory: Negative for cough, chest tightness, shortness of breath and wheezing.    Cardiovascular: Negative for chest pain, palpitations and leg swelling.   Gastrointestinal: Negative for abdominal distention, abdominal pain, constipation, diarrhea, nausea and vomiting.   Genitourinary: Positive for dysuria.   Skin: Negative for rash.   Neurological: Negative for weakness.   Hematological: Does not bruise/bleed easily.   Psychiatric/Behavioral: Positive for agitation and behavioral problems. Negative for suicidal ideas. The patient is nervous/anxious.        Objective:      Vitals:    10/22/19 1447   BP: 106/80   BP Location: Right arm   Patient Position: Sitting   BP Method: Medium (Manual)   Pulse: 96   Resp: 18   Temp: 99.8 °F (37.7 °C)   TempSrc: Oral   SpO2: 98%   Weight: 48 kg (105 lb 12.8 oz)   Height: 5' 1" (1.549 m)     Physical Exam   Constitutional: She appears well-developed and well-nourished.   HENT:   Head: Normocephalic and atraumatic.   Nose: Nose normal.   Mouth/Throat: Oropharynx is clear and moist.   Eyes: Conjunctivae and EOM are normal.   Cardiovascular: Normal rate, regular rhythm and normal heart sounds.   Pulmonary/Chest: Effort normal and breath sounds normal. No respiratory distress. She has no wheezes. She has no rales.   Abdominal: Soft. She exhibits no distension. There is no tenderness.   Lymphadenopathy:     She has no " cervical adenopathy.   Neurological: She is alert. No cranial nerve deficit.   Psychiatric: She has a normal mood and affect.   Nursing note and vitals reviewed.          Lab Results   Component Value Date     03/21/2019    K 3.2 (L) 03/21/2019     03/21/2019    CO2 23 03/21/2019    BUN 10 03/21/2019    CREATININE 0.6 03/21/2019    ANIONGAP 9 03/21/2019     No results found for: HGBA1C  Lab Results   Component Value Date    BNP 27 03/21/2019       Lab Results   Component Value Date    WBC 3.00 (L) 03/21/2019    HGB 11.6 (L) 03/21/2019    HCT 29 (L) 03/21/2019     03/21/2019    GRAN 1.9 03/21/2019    GRAN 61.1 03/21/2019     Lab Results   Component Value Date    CHOL 188 07/27/2017    HDL 83 07/27/2017    LDLCALC 84 07/27/2017    TRIG 106 07/27/2017          Current Outpatient Medications:     ALPRAZolam (XANAX) 0.5 MG tablet, TAKE 1 TABLET (0.5 MG TOTAL) BY MOUTH 2 (TWO) TIMES DAILY AS NEEDED FOR SLEEP OR ANXIETY, Disp: , Rfl:     gabapentin (NEURONTIN) 100 MG capsule, Take 1 capsule (100 mg total) by mouth 3 (three) times daily., Disp: 90 capsule, Rfl: 1    traMADol (ULTRAM) 50 mg tablet, Take 50 mg by mouth every 8 (eight) hours as needed for Pain., Disp: , Rfl:     traZODone (DESYREL) 50 MG tablet, Take 1 tablet (50 mg total) by mouth every evening., Disp: 30 tablet, Rfl: 11    DULoxetine (CYMBALTA) 30 MG capsule, Take 1 capsule (30 mg total) by mouth once daily., Disp: 30 capsule, Rfl: 11    metroNIDAZOLE (FLAGYL) 500 MG tablet, Take 1 tablet (500 mg total) by mouth every 12 (twelve) hours., Disp: 14 tablet, Rfl: 1        Assessment:       1. Nonintractable headache, unspecified chronicity pattern, unspecified headache type    2. Bacterial vaginosis    3. Current mild episode of major depressive disorder without prior episode    4. Depression, unspecified depression type    5. Dysuria           Plan:       Nonintractable headache, unspecified chronicity pattern, unspecified headache  type  -     Ambulatory consult to Neurology  Followed before but recent social stressors have made follow up complicated. Ready to follow up now and asking for help.     Bacterial vaginosis  -     metroNIDAZOLE (FLAGYL) 500 MG tablet; Take 1 tablet (500 mg total) by mouth every 12 (twelve) hours.  Dispense: 14 tablet; Refill: 1  Symptoms of discharge and irritation typcal of her vaginosis. No exam based on her hx today. Getting urine culture as well  Current mild episode of major depressive disorder without prior episode    Depression, unspecified depression type  -     DULoxetine (CYMBALTA) 30 MG capsule; Take 1 capsule (30 mg total) by mouth once daily.  Dispense: 30 capsule; Refill: 11  Denies any help from citalopram 10 mg prior. Dc today and starting cymbalta 30 mg. Encourage f/u with her psychiatrist asap as well. RTC in 1 month of eval if does not see pscych by then.     Dysuria  -     Urine culture  Recently screened for STIS at [facility] where she plans to get treatment for her HIV.

## 2019-10-24 DIAGNOSIS — N39.0 UTI (URINARY TRACT INFECTION) DUE TO ENTEROCOCCUS: Primary | ICD-10-CM

## 2019-10-24 DIAGNOSIS — B95.2 UTI (URINARY TRACT INFECTION) DUE TO ENTEROCOCCUS: Primary | ICD-10-CM

## 2019-10-24 RX ORDER — NITROFURANTOIN (MACROCRYSTALS) 100 MG/1
100 CAPSULE ORAL EVERY 12 HOURS
Qty: 10 CAPSULE | Refills: 0 | Status: SHIPPED | OUTPATIENT
Start: 2019-10-24 | End: 2020-03-16

## 2019-10-25 LAB — BACTERIA UR CULT: ABNORMAL

## 2019-11-05 ENCOUNTER — TELEPHONE (OUTPATIENT)
Dept: PRIMARY CARE CLINIC | Facility: CLINIC | Age: 44
End: 2019-11-05

## 2019-11-05 NOTE — TELEPHONE ENCOUNTER
"Patient called c/o SOB for the last week on and off along with a nonproductive cough. Patient states "It feels like something wants to jump out of my chest." Advised patient to go to the ER for a further workup. Patient states "I do not have a ride." Offered patient an appointment for tomorrow or Thursday. Appointment scheduled for 11/7/19 at 0915 with PCP. Patient verbalized understanding. Instructed patient to report to the ER if her symptoms worsen. Patient verbalized understanding.   "

## 2019-11-07 ENCOUNTER — OFFICE VISIT (OUTPATIENT)
Dept: PRIMARY CARE CLINIC | Facility: CLINIC | Age: 44
End: 2019-11-07
Payer: MEDICAID

## 2019-11-07 VITALS
RESPIRATION RATE: 17 BRPM | DIASTOLIC BLOOD PRESSURE: 80 MMHG | TEMPERATURE: 99 F | SYSTOLIC BLOOD PRESSURE: 100 MMHG | HEART RATE: 95 BPM | HEIGHT: 61 IN | BODY MASS INDEX: 19.63 KG/M2 | WEIGHT: 104 LBS | OXYGEN SATURATION: 99 %

## 2019-11-07 DIAGNOSIS — F32.0 CURRENT MILD EPISODE OF MAJOR DEPRESSIVE DISORDER WITHOUT PRIOR EPISODE: ICD-10-CM

## 2019-11-07 DIAGNOSIS — R10.13 ABDOMINAL PAIN, EPIGASTRIC: ICD-10-CM

## 2019-11-07 DIAGNOSIS — R51.9 NONINTRACTABLE HEADACHE, UNSPECIFIED CHRONICITY PATTERN, UNSPECIFIED HEADACHE TYPE: ICD-10-CM

## 2019-11-07 DIAGNOSIS — E03.9 ACQUIRED HYPOTHYROIDISM: ICD-10-CM

## 2019-11-07 DIAGNOSIS — Z72.0 TOBACCO ABUSE: ICD-10-CM

## 2019-11-07 DIAGNOSIS — F31.9 BIPOLAR 1 DISORDER: ICD-10-CM

## 2019-11-07 DIAGNOSIS — R11.2 NON-INTRACTABLE VOMITING WITH NAUSEA, UNSPECIFIED VOMITING TYPE: ICD-10-CM

## 2019-11-07 DIAGNOSIS — R07.89 ATYPICAL CHEST PAIN: Primary | ICD-10-CM

## 2019-11-07 DIAGNOSIS — B20 HUMAN IMMUNODEFICIENCY VIRUS (HIV) DISEASE: ICD-10-CM

## 2019-11-07 PROBLEM — F32.A DEPRESSION: Status: RESOLVED | Noted: 2017-12-11 | Resolved: 2019-11-07

## 2019-11-07 PROCEDURE — 99215 OFFICE O/P EST HI 40 MIN: CPT | Mod: PBBFAC,PN | Performed by: FAMILY MEDICINE

## 2019-11-07 PROCEDURE — 99999 PR PBB SHADOW E&M-EST. PATIENT-LVL V: CPT | Mod: PBBFAC,,, | Performed by: FAMILY MEDICINE

## 2019-11-07 PROCEDURE — 99214 PR OFFICE/OUTPT VISIT, EST, LEVL IV, 30-39 MIN: ICD-10-PCS | Mod: S$PBB,,, | Performed by: FAMILY MEDICINE

## 2019-11-07 PROCEDURE — 99214 OFFICE O/P EST MOD 30 MIN: CPT | Mod: S$PBB,,, | Performed by: FAMILY MEDICINE

## 2019-11-07 PROCEDURE — 99999 PR PBB SHADOW E&M-EST. PATIENT-LVL V: ICD-10-PCS | Mod: PBBFAC,,, | Performed by: FAMILY MEDICINE

## 2019-11-07 RX ORDER — ATOVAQUONE 750 MG/5ML
1500 SUSPENSION ORAL
COMMUNITY
Start: 2019-10-10 | End: 2022-06-06 | Stop reason: SDUPTHER

## 2019-11-07 RX ORDER — ONDANSETRON 4 MG/1
4 TABLET, FILM COATED ORAL 2 TIMES DAILY
Qty: 30 TABLET | Refills: 2 | Status: SHIPPED | OUTPATIENT
Start: 2019-11-07 | End: 2019-12-02

## 2019-11-07 RX ORDER — SUCRALFATE 1 G/1
TABLET ORAL
Qty: 30 TABLET | Refills: 0 | Status: SHIPPED | OUTPATIENT
Start: 2019-11-07 | End: 2019-12-02

## 2019-11-07 RX ORDER — OMEPRAZOLE 40 MG/1
40 CAPSULE, DELAYED RELEASE ORAL DAILY
Qty: 30 CAPSULE | Refills: 5 | Status: SHIPPED | OUTPATIENT
Start: 2019-11-07 | End: 2020-02-06 | Stop reason: SDUPTHER
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
## 2019-12-02 ENCOUNTER — OFFICE VISIT (OUTPATIENT)
Dept: PRIMARY CARE CLINIC | Facility: CLINIC | Age: 44
End: 2019-12-02
Payer: MEDICAID

## 2019-12-02 VITALS
HEART RATE: 100 BPM | OXYGEN SATURATION: 96 % | HEIGHT: 61 IN | SYSTOLIC BLOOD PRESSURE: 104 MMHG | BODY MASS INDEX: 19.45 KG/M2 | RESPIRATION RATE: 18 BRPM | DIASTOLIC BLOOD PRESSURE: 82 MMHG | WEIGHT: 103 LBS

## 2019-12-02 DIAGNOSIS — B20 HIV INFECTION, UNSPECIFIED SYMPTOM STATUS: ICD-10-CM

## 2019-12-02 DIAGNOSIS — F41.9 ANXIETY: ICD-10-CM

## 2019-12-02 DIAGNOSIS — B00.1 FEVER BLISTER: Primary | ICD-10-CM

## 2019-12-02 PROCEDURE — 99214 OFFICE O/P EST MOD 30 MIN: CPT | Mod: S$PBB,,, | Performed by: FAMILY MEDICINE

## 2019-12-02 PROCEDURE — 99999 PR PBB SHADOW E&M-EST. PATIENT-LVL III: ICD-10-PCS | Mod: PBBFAC,,, | Performed by: FAMILY MEDICINE

## 2019-12-02 PROCEDURE — 99213 OFFICE O/P EST LOW 20 MIN: CPT | Mod: PBBFAC,PN | Performed by: FAMILY MEDICINE

## 2019-12-02 PROCEDURE — 99999 PR PBB SHADOW E&M-EST. PATIENT-LVL III: CPT | Mod: PBBFAC,,, | Performed by: FAMILY MEDICINE

## 2019-12-02 PROCEDURE — 99214 PR OFFICE/OUTPT VISIT, EST, LEVL IV, 30-39 MIN: ICD-10-PCS | Mod: S$PBB,,, | Performed by: FAMILY MEDICINE

## 2019-12-02 RX ORDER — ACYCLOVIR 400 MG/1
400 TABLET ORAL 3 TIMES DAILY
Qty: 10 TABLET | Refills: 1 | Status: SHIPPED | OUTPATIENT
Start: 2019-12-02 | End: 2020-02-06 | Stop reason: SDUPTHER

## 2019-12-02 RX ORDER — CITALOPRAM 10 MG/1
10 TABLET ORAL DAILY
COMMUNITY
End: 2020-02-06 | Stop reason: SDUPTHER

## 2019-12-02 RX ORDER — AZITHROMYCIN 600 MG/1
1200 TABLET, FILM COATED ORAL
COMMUNITY
Start: 2019-11-25 | End: 2020-03-16

## 2019-12-02 NOTE — PROGRESS NOTES
"Subjective:       Patient ID: Patricia Nye is a 44 y.o. female.    Chief Complaint: Blister (blister on lip x 2-3 days )    1.Recently seen at [facility] for HIV management and started taking atovaquone but no genvoya as prescribed. Brings in all her meds today and this is not listed.    2.Complains of sore on her lip for the the past 5 days. Has not had this before but says she may have had something similarly on her genitals at one time.    3. Stopped taking cymbalta but seeing psychiatrist? Who is prescribing citalopram 10 mg which she finds helpful.      She is having a hard time keeping up with her care since her housing remains unstable.     Review of Systems   Constitutional: Negative for fever.   HENT: Positive for mouth sores, sore throat and trouble swallowing.    Allergic/Immunologic: Positive for immunocompromised state. Negative for environmental allergies and food allergies.   Psychiatric/Behavioral: Positive for agitation. The patient is hyperactive.        Objective:      Vitals:    12/02/19 1352   BP: 104/82   BP Location: Right arm   Patient Position: Sitting   BP Method: Medium (Manual)   Pulse: 100   Resp: 18   SpO2: 96%   Weight: 46.7 kg (103 lb)   Height: 5' 1" (1.549 m)     Physical Exam   Constitutional: She appears well-developed and well-nourished.   HENT:   Head: Normocephalic and atraumatic.   Nose: Nose normal.   Mouth/Throat: Oropharynx is clear and moist.   anterior lower lip with encrusted and draining ulceration. No surrounding erythema or induration.   Eyes: Conjunctivae and EOM are normal.   Cardiovascular: Normal rate, regular rhythm and normal heart sounds.   Pulmonary/Chest: Effort normal and breath sounds normal. No respiratory distress. She has no wheezes. She has no rales.   Abdominal: Soft. She exhibits no distension. There is no tenderness.   Lymphadenopathy:     She has no cervical adenopathy.   Neurological: She is alert. No cranial nerve deficit.   Psychiatric:   Agitated and " tangiential thoughts   Nursing note and vitals reviewed.          Lab Results   Component Value Date     11/07/2019    K 4.2 11/07/2019     11/07/2019    CO2 24 11/07/2019    BUN 9 11/07/2019    CREATININE 0.7 11/07/2019    ANIONGAP 12 11/07/2019     No results found for: HGBA1C  Lab Results   Component Value Date    BNP 27 03/21/2019       Lab Results   Component Value Date    WBC 4.60 11/07/2019    HGB 11.5 (L) 11/07/2019    HCT 36.0 (L) 11/07/2019    HCT 29 (L) 03/21/2019     11/07/2019    GRAN 3.5 11/07/2019    GRAN 77.0 (H) 11/07/2019     Lab Results   Component Value Date    CHOL 152 11/07/2019    HDL 51 11/07/2019    LDLCALC 62 11/07/2019    TRIG 194 (H) 11/07/2019          Current Outpatient Medications:     atovaquone (MEPRON) 750 mg/5 mL Susp, Take 1,500 mg by mouth., Disp: , Rfl:     azithromycin (ZITHROMAX) 600 MG Tab, Take 1,200 mg by mouth., Disp: , Rfl:     citalopram (CELEXA) 10 MG tablet, Take 10 mg by mouth once daily., Disp: , Rfl:     gabapentin (NEURONTIN) 100 MG capsule, Take 1 capsule (100 mg total) by mouth 3 (three) times daily., Disp: 90 capsule, Rfl: 1    metroNIDAZOLE (FLAGYL) 500 MG tablet, Take 1 tablet (500 mg total) by mouth every 12 (twelve) hours., Disp: 14 tablet, Rfl: 1    nitrofurantoin (MACRODANTIN) 100 MG capsule, Take 1 capsule (100 mg total) by mouth every 12 (twelve) hours., Disp: 10 capsule, Rfl: 0    acyclovir (ZOVIRAX) 400 MG tablet, Take 1 tablet (400 mg total) by mouth 3 (three) times daily., Disp: 10 tablet, Rfl: 1    ALPRAZolam (XANAX) 0.5 MG tablet, TAKE 1 TABLET (0.5 MG TOTAL) BY MOUTH 2 (TWO) TIMES DAILY AS NEEDED FOR SLEEP OR ANXIETY, Disp: , Rfl:     omeprazole (PRILOSEC) 40 MG capsule, Take 1 capsule (40 mg total) by mouth once daily. (Patient not taking: Reported on 12/2/2019), Disp: 30 capsule, Rfl: 5    traMADol (ULTRAM) 50 mg tablet, Take 50 mg by mouth every 8 (eight) hours as needed for Pain., Disp: , Rfl:     traZODone  (DESYREL) 50 MG tablet, Take 1 tablet (50 mg total) by mouth every evening. (Patient not taking: Reported on 11/7/2019), Disp: 30 tablet, Rfl: 11        Assessment:       1. Fever blister    2. Anxiety    3. HIV infection, unspecified symptom status           Plan:       Fever blister  -     acyclovir (ZOVIRAX) 400 MG tablet; Take 1 tablet (400 mg total) by mouth 3 (three) times daily.  Dispense: 10 tablet; Refill: 1    Anxiety  -encouraged f/u for psych. Mental health not optimized. Does not wish changes in her current meds of citalopram 10 mg.     HIV infection, unspecified symptom status  -brings in a list of meds today for one-month actual pill bottles.  However Genvoya a HIV medication is not included.  Educated on risks of non adherence to HIV medications as well as current problem created largely because of this.  Encouraged to follow up with \[facility]\"" HIV clinic ASAP to ensure delivery of this medication.  Referral to case management due to complexity of health delivery and unstable housing.      Additionally pt has not followed up with cardiac testing. She is encouraged to do so and risks explained. Care complicated by multiple providers and psychosocial barriers.

## 2019-12-03 ENCOUNTER — TELEPHONE (OUTPATIENT)
Dept: PSYCHOLOGY | Facility: CLINIC | Age: 44
End: 2019-12-03

## 2019-12-03 NOTE — TELEPHONE ENCOUNTER
----- Message from Zandra Mcduffie sent at 12/3/2019 12:10 PM CST -----  Contact: zandra  Patient needs appt with Dr Roper [phone] Thanks

## 2019-12-09 NOTE — TELEPHONE ENCOUNTER
Called patient back to follow up on making an appointment with Dr Roper. Unable to leave a callback message.

## 2019-12-16 NOTE — TELEPHONE ENCOUNTER
3rd attempt to contact patient about making an appointment. Cannot leave a message for the patient.

## 2019-12-21 ENCOUNTER — NURSE TRIAGE (OUTPATIENT)
Dept: ADMINISTRATIVE | Facility: CLINIC | Age: 44
End: 2019-12-21

## 2019-12-21 NOTE — TELEPHONE ENCOUNTER
Patient is calling stating that she had a temp of 101.9 and she has self admitted into [facility] and wanted to let Dr. Britt know. She states they are doing test and doing psych eval. Please contact caller directly to discuss further.    Reason for Disposition   [1] Follow-up call from patient regarding patient's clinical status AND [2] information NON-URGENT    Additional Information   Negative: Lab calling with strep throat test results and triager can call in prescription   Negative: Lab calling with urinalysis test results and triager can call in prescription   Negative: Medication questions   Negative: [1] Caller requests to speak to PCP now AND [2] won't tell us reason for call  (Exception: if 10 pm to 6 am, caller must first discuss reason for the call)   Negative: Lab or radiology calling with test results   Negative: Caller requesting lab results   Negative: ED call to PCP   Negative: Physician call to PCP   Negative: Call about patient who is currently hospitalized   Negative: Lab or radiology calling with CRITICAL test results   Negative: [1] Prescription not at pharmacy AND [2] was prescribed today by PCP   Negative: [1] Follow-up call from patient regarding patient's clinical status AND [2] information urgent   Negative: [1] Caller requests to speak ONLY to PCP AND [2] urgent question   Negative: Notification of hospital admission   Negative: Notification of death    Protocols used: [facility]

## 2020-01-07 ENCOUNTER — SSC ENCOUNTER (OUTPATIENT)
Dept: ADMINISTRATIVE | Facility: OTHER | Age: 45
End: 2020-01-07

## 2020-01-07 NOTE — PROGRESS NOTES
Please note the following patient's information was forwarded to the Medicaid ([place] Medicaid,  [employer], [employer], [facility], [facility], [facility]) Case Management office.    Please contact Outpatient Complex Care Management at ext [phone] with any questions.    Thank you,    Connie Colon, [facility]  [place] [facility] Mg[facility]nt  [phone]

## 2020-02-06 ENCOUNTER — OFFICE VISIT (OUTPATIENT)
Dept: PRIMARY CARE CLINIC | Facility: CLINIC | Age: 45
End: 2020-02-06
Payer: MEDICAID

## 2020-02-06 ENCOUNTER — CLINICAL SUPPORT (OUTPATIENT)
Dept: PRIMARY CARE CLINIC | Facility: CLINIC | Age: 45
End: 2020-02-06
Payer: MEDICAID

## 2020-02-06 VITALS
OXYGEN SATURATION: 99 % | RESPIRATION RATE: 18 BRPM | BODY MASS INDEX: 20.8 KG/M2 | HEIGHT: 61 IN | WEIGHT: 110.19 LBS | SYSTOLIC BLOOD PRESSURE: 118 MMHG | HEART RATE: 88 BPM | DIASTOLIC BLOOD PRESSURE: 78 MMHG | TEMPERATURE: 99 F

## 2020-02-06 DIAGNOSIS — Z85.41 HISTORY OF CERVICAL CANCER: ICD-10-CM

## 2020-02-06 DIAGNOSIS — Z23 NEED FOR VACCINATION: Primary | ICD-10-CM

## 2020-02-06 DIAGNOSIS — F41.9 ANXIETY: ICD-10-CM

## 2020-02-06 DIAGNOSIS — E03.9 ACQUIRED HYPOTHYROIDISM: ICD-10-CM

## 2020-02-06 DIAGNOSIS — F51.01 PRIMARY INSOMNIA: Chronic | ICD-10-CM

## 2020-02-06 DIAGNOSIS — F43.10 PTSD (POST-TRAUMATIC STRESS DISORDER): ICD-10-CM

## 2020-02-06 DIAGNOSIS — B20 HIV INFECTION, UNSPECIFIED SYMPTOM STATUS: ICD-10-CM

## 2020-02-06 DIAGNOSIS — N95.1 MENOPAUSAL SYNDROME: ICD-10-CM

## 2020-02-06 DIAGNOSIS — F32.5 MAJOR DEPRESSIVE DISORDER WITH SINGLE EPISODE, IN REMISSION: Chronic | ICD-10-CM

## 2020-02-06 DIAGNOSIS — Z72.0 TOBACCO ABUSE: ICD-10-CM

## 2020-02-06 DIAGNOSIS — G47.00 INSOMNIA, UNSPECIFIED TYPE: ICD-10-CM

## 2020-02-06 DIAGNOSIS — F31.9 BIPOLAR 1 DISORDER: ICD-10-CM

## 2020-02-06 DIAGNOSIS — B00.1 FEVER BLISTER: ICD-10-CM

## 2020-02-06 LAB
ALBUMIN SERPL BCP-MCNC: 4.3 G/DL (ref 3.5–5.2)
ALP SERPL-CCNC: 69 U/L (ref 38–126)
ALT SERPL W/O P-5'-P-CCNC: 12 U/L (ref 14–54)
ANION GAP SERPL CALC-SCNC: 9 MMOL/L (ref 8–16)
AST SERPL-CCNC: 21 U/L (ref 15–41)
BASOPHILS # BLD AUTO: 0.1 K/UL (ref 0–0.2)
BASOPHILS NFR BLD: 1.5 % (ref 0–1.9)
BILIRUB SERPL-MCNC: 0.4 MG/DL (ref 0.3–1.2)
BUN SERPL-MCNC: 11 MG/DL (ref 6–20)
CALCIUM SERPL-MCNC: 9.1 MG/DL (ref 8.6–10)
CHLORIDE SERPL-SCNC: 105 MMOL/L (ref 101–111)
CHOLEST SERPL-MCNC: 209 MG/DL (ref 80–200)
CHOLEST/HDLC SERPL: 2.2 {RATIO} (ref 2–5)
CO2 SERPL-SCNC: 25 MMOL/L (ref 23–29)
CREAT SERPL-MCNC: 0.7 MG/DL (ref 0.5–1.4)
DIFFERENTIAL METHOD: ABNORMAL
EOSINOPHIL # BLD AUTO: 0.1 K/UL (ref 0–0.5)
EOSINOPHIL NFR BLD: 2.3 % (ref 0–8)
ERYTHROCYTE [DISTWIDTH] IN BLOOD BY AUTOMATED COUNT: 18.6 % (ref 11.5–14.5)
EST. GFR  (AFRICAN AMERICAN): >60 ML/MIN/1.73 M^2
EST. GFR  (NON AFRICAN AMERICAN): >60 ML/MIN/1.73 M^2
ESTRADIOL SERPL-MCNC: 282 PG/ML
FSH SERPL-ACNC: 23.7 MIU/ML
GLUCOSE SERPL-MCNC: 89 MG/DL (ref 74–118)
HCT VFR BLD AUTO: 36.8 % (ref 37–48.5)
HDLC SERPL-MCNC: 97 MG/DL (ref 40–75)
HDLC SERPL: 46.4 % (ref 20–50)
HGB BLD-MCNC: 12 G/DL (ref 12–16)
LDLC SERPL CALC-MCNC: 93 MG/DL
LH SERPL-ACNC: 23.8 MIU/ML
LYMPHOCYTES # BLD AUTO: 0.9 K/UL (ref 1–4.8)
LYMPHOCYTES NFR BLD: 23.3 % (ref 18–48)
MCH RBC QN AUTO: 27.1 PG (ref 27–31)
MCHC RBC AUTO-ENTMCNC: 32.6 G/DL (ref 32–36)
MCV RBC AUTO: 83 FL (ref 82–98)
MONOCYTES # BLD AUTO: 0.3 K/UL (ref 0.3–1)
MONOCYTES NFR BLD: 7.3 % (ref 4–15)
NEUTROPHILS # BLD AUTO: 2.6 K/UL (ref 1.8–7.7)
NEUTROPHILS NFR BLD: 65.6 % (ref 38–73)
NONHDLC SERPL-MCNC: 112 MG/DL
PLATELET # BLD AUTO: 252 K/UL (ref 150–350)
PMV BLD AUTO: 8.4 FL (ref 9.2–12.9)
POTASSIUM SERPL-SCNC: 3.8 MMOL/L (ref 3.5–5.1)
PROT SERPL-MCNC: 8.8 G/DL (ref 6–8.4)
RBC # BLD AUTO: 4.43 M/UL (ref 4–5.4)
SODIUM SERPL-SCNC: 139 MMOL/L (ref 136–145)
T4 FREE SERPL-MCNC: 0.72 NG/DL (ref 0.61–1.12)
TRIGL SERPL-MCNC: 96 MG/DL (ref 30–150)
TSH SERPL DL<=0.005 MIU/L-ACNC: 2.85 UIU/ML (ref 0.45–5.33)
WBC # BLD AUTO: 4 K/UL (ref 3.9–12.7)

## 2020-02-06 PROCEDURE — 85025 COMPLETE CBC W/AUTO DIFF WBC: CPT

## 2020-02-06 PROCEDURE — 83002 ASSAY OF GONADOTROPIN (LH): CPT

## 2020-02-06 PROCEDURE — 82670 ASSAY OF TOTAL ESTRADIOL: CPT

## 2020-02-06 PROCEDURE — 90471 IMMUNIZATION ADMIN: CPT | Mod: PBBFAC,PN

## 2020-02-06 PROCEDURE — 83001 ASSAY OF GONADOTROPIN (FSH): CPT

## 2020-02-06 PROCEDURE — 99999 PR PBB SHADOW E&M-EST. PATIENT-LVL III: ICD-10-PCS | Mod: PBBFAC,,, | Performed by: FAMILY MEDICINE

## 2020-02-06 PROCEDURE — 99214 PR OFFICE/OUTPT VISIT, EST, LEVL IV, 30-39 MIN: ICD-10-PCS | Mod: S$PBB,25,, | Performed by: FAMILY MEDICINE

## 2020-02-06 PROCEDURE — 84443 ASSAY THYROID STIM HORMONE: CPT

## 2020-02-06 PROCEDURE — 99999 PR PBB SHADOW E&M-EST. PATIENT-LVL III: CPT | Mod: PBBFAC,,, | Performed by: FAMILY MEDICINE

## 2020-02-06 PROCEDURE — 99214 OFFICE O/P EST MOD 30 MIN: CPT | Mod: S$PBB,25,, | Performed by: FAMILY MEDICINE

## 2020-02-06 PROCEDURE — 99213 OFFICE O/P EST LOW 20 MIN: CPT | Mod: PBBFAC,PN,25 | Performed by: FAMILY MEDICINE

## 2020-02-06 PROCEDURE — 90714 TD VACC NO PRESV 7 YRS+ IM: CPT | Mod: PBBFAC,PN

## 2020-02-06 PROCEDURE — 80061 LIPID PANEL: CPT

## 2020-02-06 PROCEDURE — 80053 COMPREHEN METABOLIC PANEL: CPT

## 2020-02-06 PROCEDURE — 36415 COLL VENOUS BLD VENIPUNCTURE: CPT | Mod: PBBFAC,PN

## 2020-02-06 PROCEDURE — 84439 ASSAY OF FREE THYROXINE: CPT

## 2020-02-06 RX ORDER — ALPRAZOLAM 0.5 MG/1
TABLET ORAL
Qty: 30 TABLET | Refills: 0 | Status: SHIPPED | OUTPATIENT
Start: 2020-02-06 | End: 2020-05-14 | Stop reason: SDUPTHER

## 2020-02-06 RX ORDER — OMEPRAZOLE 40 MG/1
40 CAPSULE, DELAYED RELEASE ORAL DAILY
Qty: 30 CAPSULE | Refills: 5 | Status: SHIPPED | OUTPATIENT
Start: 2020-02-06 | End: 2020-11-20

## 2020-02-06 RX ORDER — TRAMADOL HYDROCHLORIDE 50 MG/1
50 TABLET ORAL EVERY 8 HOURS PRN
Qty: 30 TABLET | Refills: 0 | Status: SHIPPED | OUTPATIENT
Start: 2020-02-06 | End: 2020-03-16

## 2020-02-06 RX ORDER — BETAMETHASONE VALERATE 1.2 MG/G
CREAM TOPICAL 2 TIMES DAILY
Qty: 15 G | Refills: 0 | Status: SHIPPED | OUTPATIENT
Start: 2020-02-06 | End: 2020-11-20

## 2020-02-06 RX ORDER — CITALOPRAM 10 MG/1
10 TABLET ORAL DAILY
Qty: 30 TABLET | Refills: 0 | Status: SHIPPED | OUTPATIENT
Start: 2020-02-06 | End: 2020-05-14 | Stop reason: SDUPTHER

## 2020-02-06 RX ORDER — ACYCLOVIR 400 MG/1
400 TABLET ORAL 3 TIMES DAILY
Qty: 10 TABLET | Refills: 1 | Status: SHIPPED | OUTPATIENT
Start: 2020-02-06 | End: 2020-03-16

## 2020-02-06 NOTE — PROGRESS NOTES
Verified pt ID using name and . Verified allergies. Administered TD in right deltoid and pneumonia 23 in left deltoid per physician order using aseptic technique. Aspirated and no blood return noted. Pt tolerated well with no adverse reactions noted.
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
## 2020-03-10 ENCOUNTER — HOSPITAL ENCOUNTER (OUTPATIENT)
Dept: RADIOLOGY | Facility: OTHER | Age: 45
Discharge: HOME | End: 2020-03-10
Attending: OBSTETRICS & GYNECOLOGY
Payer: MEDICAID

## 2020-03-10 ENCOUNTER — PATIENT OUTREACH (OUTPATIENT)
Dept: ADMINISTRATIVE | Facility: OTHER | Age: 45
End: 2020-03-10

## 2020-03-10 ENCOUNTER — OFFICE VISIT (OUTPATIENT)
Dept: OBSTETRICS AND GYNECOLOGY | Facility: CLINIC | Age: 45
End: 2020-03-10
Payer: MEDICAID

## 2020-03-10 VITALS
HEIGHT: 62 IN | WEIGHT: 112.44 LBS | BODY MASS INDEX: 20.69 KG/M2 | SYSTOLIC BLOOD PRESSURE: 108 MMHG | DIASTOLIC BLOOD PRESSURE: 68 MMHG

## 2020-03-10 DIAGNOSIS — N93.8 DUB (DYSFUNCTIONAL UTERINE BLEEDING): Primary | ICD-10-CM

## 2020-03-10 DIAGNOSIS — N93.8 DUB (DYSFUNCTIONAL UTERINE BLEEDING): ICD-10-CM

## 2020-03-10 PROBLEM — Z85.41 HISTORY OF CERVICAL CANCER: Status: RESOLVED | Noted: 2018-05-23 | Resolved: 2020-03-10

## 2020-03-10 PROBLEM — B96.89 BACTERIAL VAGINOSIS: Status: RESOLVED | Noted: 2019-10-22 | Resolved: 2020-03-10

## 2020-03-10 PROBLEM — N76.0 BACTERIAL VAGINOSIS: Status: RESOLVED | Noted: 2019-10-22 | Resolved: 2020-03-10

## 2020-03-10 PROBLEM — N95.1 MENOPAUSAL SYNDROME: Status: RESOLVED | Noted: 2020-02-06 | Resolved: 2020-03-10

## 2020-03-10 LAB
B-HCG UR QL: NEGATIVE
CTP QC/QA: YES

## 2020-03-10 PROCEDURE — 76830 TRANSVAGINAL US NON-OB: CPT | Mod: TC

## 2020-03-10 PROCEDURE — 87491 CHLMYD TRACH DNA AMP PROBE: CPT

## 2020-03-10 PROCEDURE — 99213 OFFICE O/P EST LOW 20 MIN: CPT | Mod: PBBFAC,25 | Performed by: OBSTETRICS & GYNECOLOGY

## 2020-03-10 PROCEDURE — 76856 US EXAM PELVIC COMPLETE: CPT | Mod: 26,,, | Performed by: RADIOLOGY

## 2020-03-10 PROCEDURE — 99214 PR OFFICE/OUTPT VISIT, EST, LEVL IV, 30-39 MIN: ICD-10-PCS | Mod: S$PBB,,, | Performed by: OBSTETRICS & GYNECOLOGY

## 2020-03-10 PROCEDURE — 99999 PR PBB SHADOW E&M-EST. PATIENT-LVL III: ICD-10-PCS | Mod: PBBFAC,,, | Performed by: OBSTETRICS & GYNECOLOGY

## 2020-03-10 PROCEDURE — 76830 TRANSVAGINAL US NON-OB: CPT | Mod: 26,,, | Performed by: RADIOLOGY

## 2020-03-10 PROCEDURE — 76856 US PELVIS COMP WITH TRANSVAG NON-OB (XPD): ICD-10-PCS | Mod: 26,,, | Performed by: RADIOLOGY

## 2020-03-10 PROCEDURE — 76830 US PELVIS COMP WITH TRANSVAG NON-OB (XPD): ICD-10-PCS | Mod: 26,,, | Performed by: RADIOLOGY

## 2020-03-10 PROCEDURE — 99999 PR PBB SHADOW E&M-EST. PATIENT-LVL III: CPT | Mod: PBBFAC,,, | Performed by: OBSTETRICS & GYNECOLOGY

## 2020-03-10 PROCEDURE — 99214 OFFICE O/P EST MOD 30 MIN: CPT | Mod: S$PBB,,, | Performed by: OBSTETRICS & GYNECOLOGY

## 2020-03-10 PROCEDURE — 81025 URINE PREGNANCY TEST: CPT | Mod: PBBFAC | Performed by: OBSTETRICS & GYNECOLOGY

## 2020-03-10 RX ORDER — NAPROXEN SODIUM 550 MG/1
550 TABLET ORAL
Qty: 30 TABLET | Refills: 12 | Status: SHIPPED | OUTPATIENT
Start: 2020-03-10 | End: 2020-11-20

## 2020-03-10 RX ORDER — MEDROXYPROGESTERONE ACETATE 10 MG/1
10 TABLET ORAL DAILY
Qty: 10 TABLET | Refills: 12 | Status: SHIPPED | OUTPATIENT
Start: 2020-03-10 | End: 2020-11-20

## 2020-03-10 RX ORDER — MISOPROSTOL 200 UG/1
TABLET ORAL
Qty: 3 TABLET | Refills: 0 | Status: SHIPPED | OUTPATIENT
Start: 2020-03-10 | End: 2020-03-16

## 2020-03-10 NOTE — PROGRESS NOTES
Care Everywhere: updated  Immunization: updated  Health Maintenance: updated  Media Review: reviewed for possible outside mammogram report  Legacy Review: n/a  Order placed: n/a  Upcoming appts:n/a

## 2020-03-10 NOTE — PROGRESS NOTES
03/10/20 U/S  Uterus:  Size: 7.4 x 4.4 x 4.3 cm  Masses: None  Endometrium: Normal in this pre menopausal patient, measuring 7 mm.  Rightovary:  Size: 2.6 cm  Appearance: Normal  Vascular flow: Normal.  Left ovary:  Size: 3.5 cm  Appearance: Multiple simple physiologic follicles and or cysts the largest of which measures 2.8 cm compatible with benign findings.  Vascular Flow: Normal  Free Fluid:  None.      Impression     No sonographic abnormality.     PT HERE WITH NO BLEEDING SINCE 10/2019.  NOW WITH LIGHT TO MOD BLEEDING X 2 WEEKS. HAD SEEN PCP.  DID NOT REMEMBER NOT HAVING A MENSES.   NONCOMPLIANT WITH HIV MEDS.     2/6/2020   Hemoglobin 12.0   TSH 2.85   Free T4 0.72   Estradiol 282   FSH 23.70   LH 23.8     ROS:  GENERAL: No fever, chills, fatigability or weight loss.  VULVAR: No pain, no lesions and no itching.  VAGINAL: No relaxation, no itching, no discharge, no abnormal bleeding and no lesions.  ABDOMEN: No abdominal pain. Denies nausea. Denies vomiting. No diarrhea. No constipation  BREAST: Denies pain. No lumps. No discharge.  URINARY: No incontinence, no nocturia, no frequency and no dysuria.  CARDIOVASCULAR: No chest pain. No shortness of breath. No leg cramps.  NEUROLOGICAL: No headaches. No vision changes.  The remainder of the review of systems was negative.    PE:  General Appearance: normal weight And Well developed. Well nourished. In no acute distress.  Vulva: Lesions: No.  Urethral Meatus: Normal size. Normal location. No lesions. No prolapse.  Urethra: No masses. No tenderness. No prolapse. No scarring.  Bladder: No masses. No tenderness.  Vagina: Mucosa NI:yes discharge no, atrophy no, cystocele no or rectocele no.  Cervix: Lesion: no  Stenotic: no Cervical motion tenderness: no  Uterus: Uterus size: 7 weeks. Support good. Uterus size: Normal  Adnexa: Masses: No Tenderness: No CDS Nodularity: No  Abdomen: normal weight No masses. No tenderness.  CHEST: CTA B  HEART: RRR  EXT: NO  EDEMA      PROCEDURES:  UPT-    PLAN:     DIAGNOSIS:  1. DUB (dysfunctional uterine bleeding)        MEDICATIONS & ORDERS:  Orders Placed This Encounter    C. trachomatis/N. gonorrhoeae by AMP DNA    US Pelvis Comp with Transvag NON-OB (xpd    miSOPROStoL (CYTOTEC) 200 MCG Tab    medroxyPROGESTERone (PROVERA) 10 MG tablet    naproxen sodium (ANAPROX) 550 MG tablet       20 MIN D/W PT DUB.    FOLLOW-UP: With me TOMASX

## 2020-03-11 ENCOUNTER — TELEPHONE (OUTPATIENT)
Dept: OBSTETRICS AND GYNECOLOGY | Facility: CLINIC | Age: 45
End: 2020-03-11

## 2020-03-11 NOTE — TELEPHONE ENCOUNTER
----- Message from Champ Sutton Jr., MD sent at 3/11/2020 12:56 PM CDT -----  CALL [facility]

## 2020-03-12 LAB
C TRACH DNA SPEC QL NAA+PROBE: NOT DETECTED
N GONORRHOEA DNA SPEC QL NAA+PROBE: NOT DETECTED

## 2020-04-07 ENCOUNTER — TELEPHONE (OUTPATIENT)
Dept: PRIMARY CARE CLINIC | Facility: CLINIC | Age: 45
End: 2020-04-07

## 2020-05-12 ENCOUNTER — TELEPHONE (OUTPATIENT)
Dept: PRIMARY CARE CLINIC | Facility: CLINIC | Age: 45
End: 2020-05-12

## 2020-05-12 NOTE — TELEPHONE ENCOUNTER
Called for pt, no answer. LM on VM to return call   Let patient know we do not make those calls, that the patient needs to call and schedule that at their convenience

## 2020-05-12 NOTE — TELEPHONE ENCOUNTER
----- Message from Anna Del Cid sent at 5/12/2020  1:42 PM CDT -----  Contact: Patient [phone]  Patient is requesting a call back about someone calling ride assistance to arrange for transportation [phone]

## 2020-05-14 ENCOUNTER — OFFICE VISIT (OUTPATIENT)
Dept: PRIMARY CARE CLINIC | Facility: CLINIC | Age: 45
End: 2020-05-14
Payer: MEDICAID

## 2020-05-14 VITALS
RESPIRATION RATE: 18 BRPM | WEIGHT: 116.81 LBS | DIASTOLIC BLOOD PRESSURE: 76 MMHG | TEMPERATURE: 97 F | SYSTOLIC BLOOD PRESSURE: 118 MMHG | HEIGHT: 61 IN | OXYGEN SATURATION: 99 % | BODY MASS INDEX: 22.05 KG/M2 | HEART RATE: 82 BPM

## 2020-05-14 DIAGNOSIS — B00.1 FEVER BLISTER: ICD-10-CM

## 2020-05-14 DIAGNOSIS — M54.42 CHRONIC MIDLINE LOW BACK PAIN WITH LEFT-SIDED SCIATICA: ICD-10-CM

## 2020-05-14 DIAGNOSIS — M54.2 NECK PAIN: ICD-10-CM

## 2020-05-14 DIAGNOSIS — T74.91XD DOMESTIC VIOLENCE OF ADULT, SUBSEQUENT ENCOUNTER: ICD-10-CM

## 2020-05-14 DIAGNOSIS — R51.9 NONINTRACTABLE HEADACHE, UNSPECIFIED CHRONICITY PATTERN, UNSPECIFIED HEADACHE TYPE: ICD-10-CM

## 2020-05-14 DIAGNOSIS — B20 HIV INFECTION, UNSPECIFIED SYMPTOM STATUS: ICD-10-CM

## 2020-05-14 DIAGNOSIS — M25.552 LEFT HIP PAIN: ICD-10-CM

## 2020-05-14 DIAGNOSIS — F41.9 ANXIETY: ICD-10-CM

## 2020-05-14 DIAGNOSIS — E03.9 ACQUIRED HYPOTHYROIDISM: ICD-10-CM

## 2020-05-14 DIAGNOSIS — M25.552 PAIN OF LEFT HIP JOINT: ICD-10-CM

## 2020-05-14 DIAGNOSIS — R51.9 HEADACHE, UNSPECIFIED HEADACHE TYPE: ICD-10-CM

## 2020-05-14 DIAGNOSIS — S02.2XXD CLOSED FRACTURE OF NASAL BONE WITH ROUTINE HEALING, SUBSEQUENT ENCOUNTER: Primary | ICD-10-CM

## 2020-05-14 DIAGNOSIS — N30.00 ACUTE CYSTITIS WITHOUT HEMATURIA: ICD-10-CM

## 2020-05-14 DIAGNOSIS — F31.9 BIPOLAR 1 DISORDER: ICD-10-CM

## 2020-05-14 DIAGNOSIS — F43.10 PTSD (POST-TRAUMATIC STRESS DISORDER): ICD-10-CM

## 2020-05-14 DIAGNOSIS — Z72.0 TOBACCO ABUSE: ICD-10-CM

## 2020-05-14 DIAGNOSIS — G89.29 CHRONIC MIDLINE LOW BACK PAIN WITH LEFT-SIDED SCIATICA: ICD-10-CM

## 2020-05-14 LAB
BILIRUB SERPL-MCNC: ABNORMAL MG/DL
BLOOD URINE, POC: ABNORMAL
COLOR, POC UA: ABNORMAL
GLUCOSE UR QL STRIP: ABNORMAL
KETONES UR QL STRIP: ABNORMAL
LEUKOCYTE ESTERASE URINE, POC: ABNORMAL
NITRITE, POC UA: ABNORMAL
PH, POC UA: 7
PROTEIN, POC: ABNORMAL
SPECIFIC GRAVITY, POC UA: 1
UROBILINOGEN, POC UA: ABNORMAL

## 2020-05-14 PROCEDURE — 99214 PR OFFICE/OUTPT VISIT, EST, LEVL IV, 30-39 MIN: ICD-10-PCS | Mod: S$PBB,,, | Performed by: FAMILY MEDICINE

## 2020-05-14 PROCEDURE — 81002 URINALYSIS NONAUTO W/O SCOPE: CPT | Mod: PBBFAC,PN | Performed by: FAMILY MEDICINE

## 2020-05-14 PROCEDURE — 99213 OFFICE O/P EST LOW 20 MIN: CPT | Mod: PBBFAC,PN | Performed by: FAMILY MEDICINE

## 2020-05-14 PROCEDURE — 99999 PR PBB SHADOW E&M-EST. PATIENT-LVL III: ICD-10-PCS | Mod: PBBFAC,,, | Performed by: FAMILY MEDICINE

## 2020-05-14 PROCEDURE — 99214 OFFICE O/P EST MOD 30 MIN: CPT | Mod: S$PBB,,, | Performed by: FAMILY MEDICINE

## 2020-05-14 PROCEDURE — 99999 PR PBB SHADOW E&M-EST. PATIENT-LVL III: CPT | Mod: PBBFAC,,, | Performed by: FAMILY MEDICINE

## 2020-05-14 RX ORDER — ALPRAZOLAM 0.5 MG/1
TABLET ORAL
Qty: 30 TABLET | Refills: 0 | Status: SHIPPED | OUTPATIENT
Start: 2020-05-14 | End: 2020-12-11 | Stop reason: SDUPTHER

## 2020-05-14 RX ORDER — IBUPROFEN 600 MG/1
TABLET ORAL
Qty: 30 TABLET | Refills: 5 | Status: SHIPPED | OUTPATIENT
Start: 2020-05-14 | End: 2020-11-20

## 2020-05-14 RX ORDER — CITALOPRAM 10 MG/1
10 TABLET ORAL DAILY
Qty: 30 TABLET | Refills: 0 | Status: SHIPPED | OUTPATIENT
Start: 2020-05-14 | End: 2020-12-18 | Stop reason: SDUPTHER

## 2020-05-14 RX ORDER — CIPROFLOXACIN 250 MG/1
250 TABLET, FILM COATED ORAL 2 TIMES DAILY
Qty: 20 TABLET | Refills: 0 | Status: SHIPPED | OUTPATIENT
Start: 2020-05-14 | End: 2020-05-21

## 2020-05-14 RX ORDER — PHENAZOPYRIDINE HYDROCHLORIDE 200 MG/1
200 TABLET, FILM COATED ORAL 3 TIMES DAILY PRN
Qty: 15 TABLET | Refills: 0 | Status: SHIPPED | OUTPATIENT
Start: 2020-05-14 | End: 2020-05-24

## 2020-05-14 RX ORDER — TRIAMCINOLONE ACETONIDE 40 MG/ML
40 INJECTION, SUSPENSION INTRA-ARTICULAR; INTRAMUSCULAR ONCE
Status: COMPLETED | OUTPATIENT
Start: 2020-05-14 | End: 2020-05-14

## 2020-05-14 RX ORDER — VALACYCLOVIR HYDROCHLORIDE 1 G/1
1000 TABLET, FILM COATED ORAL 2 TIMES DAILY
Qty: 60 TABLET | Refills: 11 | Status: SHIPPED | OUTPATIENT
Start: 2020-05-14 | End: 2021-05-18

## 2020-05-14 RX ORDER — TRAMADOL HYDROCHLORIDE 50 MG/1
50 TABLET ORAL EVERY 6 HOURS PRN
Qty: 30 TABLET | Refills: 0 | Status: SHIPPED | OUTPATIENT
Start: 2020-05-14 | End: 2020-05-24

## 2020-05-14 RX ORDER — CYANOCOBALAMIN 1000 UG/ML
100 INJECTION, SOLUTION INTRAMUSCULAR; SUBCUTANEOUS
Status: COMPLETED | OUTPATIENT
Start: 2020-05-14 | End: 2020-05-14

## 2020-05-14 RX ORDER — GABAPENTIN 100 MG/1
100 CAPSULE ORAL 3 TIMES DAILY
Qty: 90 CAPSULE | Refills: 1 | Status: SHIPPED | OUTPATIENT
Start: 2020-05-14 | End: 2020-12-18

## 2020-05-14 RX ORDER — TRAZODONE HYDROCHLORIDE 50 MG/1
50 TABLET ORAL NIGHTLY
Qty: 30 TABLET | Refills: 11 | Status: SHIPPED | OUTPATIENT
Start: 2020-05-14 | End: 2021-05-18

## 2020-05-14 RX ORDER — LEVOTHYROXINE SODIUM 25 UG/1
25 TABLET ORAL DAILY
Qty: 30 TABLET | Refills: 5 | Status: SHIPPED | OUTPATIENT
Start: 2020-05-14 | End: 2020-12-18

## 2020-05-14 RX ORDER — ACYCLOVIR 50 MG/G
CREAM TOPICAL
Qty: 5 G | Refills: 2 | Status: SHIPPED | OUTPATIENT
Start: 2020-05-14 | End: 2021-05-18

## 2020-05-14 RX ORDER — CYCLOBENZAPRINE HCL 10 MG
10 TABLET ORAL 3 TIMES DAILY PRN
Qty: 30 TABLET | Refills: 5 | Status: SHIPPED | OUTPATIENT
Start: 2020-05-14 | End: 2020-05-24

## 2020-05-14 RX ADMIN — CYANOCOBALAMIN 100 MCG: 1000 INJECTION, SOLUTION INTRAMUSCULAR at 05:05

## 2020-05-14 RX ADMIN — TRIAMCINOLONE ACETONIDE 40 MG: 40 INJECTION, SUSPENSION INTRA-ARTICULAR; INTRAMUSCULAR at 05:05
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
## 2020-05-14 NOTE — PROGRESS NOTES
Verified pt ID using name and . Verified allergies. Administered 40 mg kenalog in left VG and 1000 mcg B12 in left deltoid per physician order using aseptic technique. Aspirated and no blood return noted. Pt tolerated well with no adverse reactions noted.

## 2020-05-15 ENCOUNTER — TELEPHONE (OUTPATIENT)
Dept: PSYCHOLOGY | Facility: CLINIC | Age: 45
End: 2020-05-15

## 2020-05-15 NOTE — TELEPHONE ENCOUNTER
----- Message from Arabella Mcduffie sent at 5/15/2020  8:13 AM CDT -----  Needs appt with Dr Roper

## 2020-06-08 ENCOUNTER — TELEPHONE (OUTPATIENT)
Dept: NEUROLOGY | Facility: CLINIC | Age: 45
End: 2020-06-08

## 2020-07-28 ENCOUNTER — TELEPHONE (OUTPATIENT)
Dept: PSYCHOLOGY | Facility: CLINIC | Age: 45
End: 2020-07-28

## 2020-08-03 ENCOUNTER — TELEPHONE (OUTPATIENT)
Dept: NEUROLOGY | Facility: CLINIC | Age: 45
End: 2020-08-03

## 2020-08-03 NOTE — TELEPHONE ENCOUNTER
----- Message from Luis Armando Sanches sent at 8/3/2020  2:35 PM CDT -----  Contact: pt @ [phone]  Pt calling to schedule an appt w/ the doctor, pt states she's pulled a muscle

## 2020-09-01 ENCOUNTER — OFFICE VISIT (OUTPATIENT)
Dept: PRIMARY CARE CLINIC | Facility: CLINIC | Age: 45
End: 2020-09-01
Payer: MEDICAID

## 2020-09-01 VITALS
HEIGHT: 61 IN | OXYGEN SATURATION: 99 % | HEART RATE: 86 BPM | WEIGHT: 119.06 LBS | SYSTOLIC BLOOD PRESSURE: 100 MMHG | DIASTOLIC BLOOD PRESSURE: 70 MMHG | RESPIRATION RATE: 17 BRPM | BODY MASS INDEX: 22.48 KG/M2

## 2020-09-01 DIAGNOSIS — R45.83 EXCESSIVE CRYING: ICD-10-CM

## 2020-09-01 DIAGNOSIS — R51.9 NONINTRACTABLE HEADACHE, UNSPECIFIED CHRONICITY PATTERN, UNSPECIFIED HEADACHE TYPE: ICD-10-CM

## 2020-09-01 DIAGNOSIS — F43.10 PTSD (POST-TRAUMATIC STRESS DISORDER): Primary | ICD-10-CM

## 2020-09-01 DIAGNOSIS — E03.9 ACQUIRED HYPOTHYROIDISM: ICD-10-CM

## 2020-09-01 DIAGNOSIS — B20 HIV INFECTION, UNSPECIFIED SYMPTOM STATUS: ICD-10-CM

## 2020-09-01 DIAGNOSIS — L73.9 FOLLICULITIS: ICD-10-CM

## 2020-09-01 DIAGNOSIS — N30.00 ACUTE CYSTITIS WITHOUT HEMATURIA: ICD-10-CM

## 2020-09-01 DIAGNOSIS — F32.5 MAJOR DEPRESSIVE DISORDER WITH SINGLE EPISODE, IN REMISSION: Chronic | ICD-10-CM

## 2020-09-01 DIAGNOSIS — Z72.0 TOBACCO ABUSE: ICD-10-CM

## 2020-09-01 DIAGNOSIS — F31.9 BIPOLAR 1 DISORDER: ICD-10-CM

## 2020-09-01 PROBLEM — N39.0 UTI (URINARY TRACT INFECTION): Status: ACTIVE | Noted: 2020-09-01

## 2020-09-01 LAB
BILIRUB SERPL-MCNC: ABNORMAL MG/DL
BLOOD URINE, POC: ABNORMAL
COLOR, POC UA: ABNORMAL
GLUCOSE UR QL STRIP: ABNORMAL
KETONES UR QL STRIP: ABNORMAL
LEUKOCYTE ESTERASE URINE, POC: ABNORMAL
NITRITE, POC UA: ABNORMAL
PH, POC UA: 7
PROTEIN, POC: ABNORMAL
SPECIFIC GRAVITY, POC UA: 1.01
UROBILINOGEN, POC UA: ABNORMAL

## 2020-09-01 PROCEDURE — 99999 PR PBB SHADOW E&M-EST. PATIENT-LVL III: ICD-10-PCS | Mod: PBBFAC,,, | Performed by: FAMILY MEDICINE

## 2020-09-01 PROCEDURE — 81001 URINALYSIS AUTO W/SCOPE: CPT | Mod: PBBFAC,PN | Performed by: FAMILY MEDICINE

## 2020-09-01 PROCEDURE — 99213 OFFICE O/P EST LOW 20 MIN: CPT | Mod: PBBFAC,PN | Performed by: FAMILY MEDICINE

## 2020-09-01 PROCEDURE — 99214 PR OFFICE/OUTPT VISIT, EST, LEVL IV, 30-39 MIN: ICD-10-PCS | Mod: S$PBB,,, | Performed by: FAMILY MEDICINE

## 2020-09-01 PROCEDURE — 99999 PR PBB SHADOW E&M-EST. PATIENT-LVL III: CPT | Mod: PBBFAC,,, | Performed by: FAMILY MEDICINE

## 2020-09-01 PROCEDURE — 99214 OFFICE O/P EST MOD 30 MIN: CPT | Mod: S$PBB,,, | Performed by: FAMILY MEDICINE

## 2020-09-01 RX ORDER — PHENAZOPYRIDINE HYDROCHLORIDE 200 MG/1
200 TABLET, FILM COATED ORAL 3 TIMES DAILY PRN
Qty: 15 TABLET | Refills: 0 | Status: SHIPPED | OUTPATIENT
Start: 2020-09-01 | End: 2020-09-11

## 2020-09-01 RX ORDER — MUPIROCIN 20 MG/G
OINTMENT TOPICAL 3 TIMES DAILY
Qty: 22 G | Refills: 1 | Status: SHIPPED | OUTPATIENT
Start: 2020-09-01 | End: 2020-12-18

## 2020-09-01 RX ORDER — NITROFURANTOIN 25; 75 MG/1; MG/1
100 CAPSULE ORAL 2 TIMES DAILY
Qty: 14 CAPSULE | Refills: 0 | Status: SHIPPED | OUTPATIENT
Start: 2020-09-01 | End: 2020-11-20
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
## 2020-10-12 ENCOUNTER — TELEPHONE (OUTPATIENT)
Dept: OBSTETRICS AND GYNECOLOGY | Facility: CLINIC | Age: 45
End: 2020-10-12

## 2020-10-12 NOTE — TELEPHONE ENCOUNTER
----- Message from Annemarie Feliciano sent at 10/12/2020  3:37 PM CDT -----  Type:  Sooner Appointment Request    Patient is requesting a sooner appointment.  Patient declined first available appointment listed as well as another facility and provider .  Patient will not accept being placed on the waitlist and is requesting a message be sent to doctor.    Name of Caller: pt     When is the first available appointment? 11/02    Symptoms: odor and pelvic pain     Would the patient rather a call back or a response via [facility]? Call back     Best Call Back Number: [phone] (home)     Additional Information: Pt would be a Np with Dr. Gallo. I offered pt walk in clinic but pt has to schedule transportation and needs for appt on Thursday. Walk in scheduled open 10/13 only. Pt asking for a call back to be accommodated by Dr. Gallo.

## 2020-10-19 ENCOUNTER — TELEPHONE (OUTPATIENT)
Dept: PRIMARY CARE CLINIC | Facility: CLINIC | Age: 45
End: 2020-10-19

## 2020-10-19 NOTE — TELEPHONE ENCOUNTER
Spoke with patient, verbalized she would need to be seen to get prescriptions. Patient has an appointment tomorrow morning for 0830 with Dr. Ferrell. Patient verbalized understanding.

## 2020-10-19 NOTE — TELEPHONE ENCOUNTER
----- Message from Alfie Prabhakar sent at 10/19/2020  4:38 PM CDT -----  Regarding: Pt [phone]  Would like to get medical advice.  Symptoms (please be specific):  Pain during urination, and chest congestion, and sore on the outside  How long has patient had these symptoms:  7 days  Pharmacy name and phone # [facility] - [place], [place] - [address] [phone] (Phone) [phone] (Fax)      Comments:

## 2020-11-20 ENCOUNTER — OFFICE VISIT (OUTPATIENT)
Dept: PRIMARY CARE CLINIC | Facility: CLINIC | Age: 45
End: 2020-11-20
Payer: MEDICAID

## 2020-11-20 VITALS
OXYGEN SATURATION: 98 % | WEIGHT: 121.19 LBS | BODY MASS INDEX: 22.88 KG/M2 | RESPIRATION RATE: 18 BRPM | HEART RATE: 112 BPM | SYSTOLIC BLOOD PRESSURE: 122 MMHG | TEMPERATURE: 99 F | HEIGHT: 61 IN | DIASTOLIC BLOOD PRESSURE: 74 MMHG

## 2020-11-20 DIAGNOSIS — F31.2 BIPOLAR AFFECTIVE DISORDER, CURRENTLY MANIC, SEVERE, WITH PSYCHOTIC FEATURES: ICD-10-CM

## 2020-11-20 DIAGNOSIS — F43.10 PTSD (POST-TRAUMATIC STRESS DISORDER): ICD-10-CM

## 2020-11-20 DIAGNOSIS — Z23 NEED FOR VACCINATION: Primary | ICD-10-CM

## 2020-11-20 DIAGNOSIS — R51.9 HEADACHE, UNSPECIFIED HEADACHE TYPE: ICD-10-CM

## 2020-11-20 DIAGNOSIS — B20 HIV INFECTION, UNSPECIFIED SYMPTOM STATUS: ICD-10-CM

## 2020-11-20 DIAGNOSIS — Z12.31 SCREENING MAMMOGRAM, ENCOUNTER FOR: ICD-10-CM

## 2020-11-20 PROBLEM — F31.10 BIPOLAR AFFECTIVE DISORDER, CURRENT EPISODE MANIC: Status: ACTIVE | Noted: 2020-11-20

## 2020-11-20 PROCEDURE — 99214 PR OFFICE/OUTPT VISIT, EST, LEVL IV, 30-39 MIN: ICD-10-PCS | Mod: S$PBB,,, | Performed by: STUDENT IN AN ORGANIZED HEALTH CARE EDUCATION/TRAINING PROGRAM

## 2020-11-20 PROCEDURE — 99999 PR PBB SHADOW E&M-EST. PATIENT-LVL IV: CPT | Mod: PBBFAC,,, | Performed by: STUDENT IN AN ORGANIZED HEALTH CARE EDUCATION/TRAINING PROGRAM

## 2020-11-20 PROCEDURE — 99214 OFFICE O/P EST MOD 30 MIN: CPT | Mod: PBBFAC,PN,25 | Performed by: STUDENT IN AN ORGANIZED HEALTH CARE EDUCATION/TRAINING PROGRAM

## 2020-11-20 PROCEDURE — 99999 PR PBB SHADOW E&M-EST. PATIENT-LVL IV: ICD-10-PCS | Mod: PBBFAC,,, | Performed by: STUDENT IN AN ORGANIZED HEALTH CARE EDUCATION/TRAINING PROGRAM

## 2020-11-20 PROCEDURE — 99214 OFFICE O/P EST MOD 30 MIN: CPT | Mod: S$PBB,,, | Performed by: STUDENT IN AN ORGANIZED HEALTH CARE EDUCATION/TRAINING PROGRAM

## 2020-11-20 PROCEDURE — 90471 IMMUNIZATION ADMIN: CPT | Mod: PBBFAC,PN

## 2020-11-20 RX ORDER — LITHIUM CARBONATE 300 MG/1
1 TABLET, FILM COATED, EXTENDED RELEASE ORAL DAILY
COMMUNITY
Start: 2020-11-04 | End: 2021-09-23 | Stop reason: SDUPTHER

## 2020-11-20 RX ORDER — LURASIDONE HYDROCHLORIDE 80 MG/1
1 TABLET, FILM COATED ORAL
COMMUNITY
Start: 2020-11-05 | End: 2021-05-18

## 2020-11-20 RX ORDER — IBUPROFEN 600 MG/1
600 TABLET ORAL EVERY 6 HOURS PRN
Qty: 40 TABLET | Refills: 1 | Status: SHIPPED | OUTPATIENT
Start: 2020-11-20 | End: 2020-12-18
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
## 2020-11-20 NOTE — PATIENT INSTRUCTIONS
In your Celexa as needed.  Take this medication every day or discuss stopping the medication with her psychiatrist.    Please make sure to follow-up with your psychiatrist and your infectious disease doctors.  Missing appointments with these physicians will affect the ability of our treatment team's to adequately address your medical needs.    I would advise not contacting or coming in contact with anyone you have gotten a restraining order against.

## 2020-12-01 ENCOUNTER — TELEPHONE (OUTPATIENT)
Dept: PRIMARY CARE CLINIC | Facility: CLINIC | Age: 45
End: 2020-12-01

## 2020-12-01 NOTE — TELEPHONE ENCOUNTER
----- Message from Shell Hardy sent at 12/1/2020  6:28 AM CST -----  Regarding: Same Day Appointment  Type:  Same Day Appointment Request      Name of Caller:Patient states experiencing sore throat and sinus infection need appointment for today  When is the first available appointment?  Symptoms:  Best Call Back Number:[phone]  Additional Information: I was not able to schedule appointment with any of the doctors

## 2020-12-04 ENCOUNTER — TELEPHONE (OUTPATIENT)
Dept: PRIMARY CARE CLINIC | Facility: CLINIC | Age: 45
End: 2020-12-04

## 2020-12-04 NOTE — TELEPHONE ENCOUNTER
----- Message from Anna Del Cid sent at 12/4/2020  2:04 PM CST -----  Regarding: refill reqeust  Contact: patient [phone]  Requesting an RX refill or new RX.  Is this a refill or new RX: refill  RX name and strength:ALPRAZolam (XANAX) 0.5 MG tablet  Is this a 30 day or 90 day RX: 90  Pharmacy name and phone # ([facility] [address] [phone] (Phone) [phone] (Fax)

## 2020-12-04 NOTE — TELEPHONE ENCOUNTER
Spoke with patient, verbalized that she would need an appointment to get a refill. Patient verbalized understanding. Also c/o sinus and sore throat. Would like to be tested for COVID. Audio appointment scheduled for Friday, 12/11/20 at 4:40 with PCP. Patient verbalized understanding.

## 2020-12-11 ENCOUNTER — OFFICE VISIT (OUTPATIENT)
Dept: PRIMARY CARE CLINIC | Facility: CLINIC | Age: 45
End: 2020-12-11
Payer: MEDICAID

## 2020-12-11 DIAGNOSIS — E03.9 ACQUIRED HYPOTHYROIDISM: ICD-10-CM

## 2020-12-11 DIAGNOSIS — Z72.0 TOBACCO ABUSE: ICD-10-CM

## 2020-12-11 DIAGNOSIS — F31.9 BIPOLAR 1 DISORDER: ICD-10-CM

## 2020-12-11 DIAGNOSIS — F43.10 PTSD (POST-TRAUMATIC STRESS DISORDER): Primary | ICD-10-CM

## 2020-12-11 DIAGNOSIS — R51.9 HEADACHE, UNSPECIFIED HEADACHE TYPE: ICD-10-CM

## 2020-12-11 DIAGNOSIS — F41.9 ANXIETY: ICD-10-CM

## 2020-12-11 DIAGNOSIS — F32.5 MAJOR DEPRESSIVE DISORDER WITH SINGLE EPISODE, IN REMISSION: Chronic | ICD-10-CM

## 2020-12-11 DIAGNOSIS — B20 HIV INFECTION, UNSPECIFIED SYMPTOM STATUS: ICD-10-CM

## 2020-12-11 DIAGNOSIS — F32.0 CURRENT MILD EPISODE OF MAJOR DEPRESSIVE DISORDER WITHOUT PRIOR EPISODE: ICD-10-CM

## 2020-12-11 DIAGNOSIS — R45.83 EXCESSIVE CRYING: ICD-10-CM

## 2020-12-11 PROCEDURE — 99214 PR OFFICE/OUTPT VISIT, EST, LEVL IV, 30-39 MIN: ICD-10-PCS | Mod: 95,,, | Performed by: FAMILY MEDICINE

## 2020-12-11 PROCEDURE — 99214 OFFICE O/P EST MOD 30 MIN: CPT | Mod: 95,,, | Performed by: FAMILY MEDICINE

## 2020-12-11 RX ORDER — ALPRAZOLAM 0.5 MG/1
TABLET ORAL
Qty: 30 TABLET | Refills: 2 | Status: SHIPPED | OUTPATIENT
Start: 2020-12-11 | End: 2021-06-25 | Stop reason: CLARIF
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
## 2020-12-18 ENCOUNTER — OFFICE VISIT (OUTPATIENT)
Dept: PRIMARY CARE CLINIC | Facility: CLINIC | Age: 45
End: 2020-12-18
Payer: MEDICAID

## 2020-12-18 VITALS
BODY MASS INDEX: 23.29 KG/M2 | HEIGHT: 61 IN | RESPIRATION RATE: 18 BRPM | TEMPERATURE: 98 F | WEIGHT: 123.38 LBS | SYSTOLIC BLOOD PRESSURE: 126 MMHG | HEART RATE: 83 BPM | DIASTOLIC BLOOD PRESSURE: 82 MMHG | OXYGEN SATURATION: 96 %

## 2020-12-18 DIAGNOSIS — F41.9 ANXIETY: ICD-10-CM

## 2020-12-18 DIAGNOSIS — M54.42 CHRONIC MIDLINE LOW BACK PAIN WITH LEFT-SIDED SCIATICA: ICD-10-CM

## 2020-12-18 DIAGNOSIS — R51.9 HEADACHE, UNSPECIFIED HEADACHE TYPE: ICD-10-CM

## 2020-12-18 DIAGNOSIS — N76.0 BACTERIAL VAGINOSIS: Primary | ICD-10-CM

## 2020-12-18 DIAGNOSIS — M54.2 NECK PAIN: ICD-10-CM

## 2020-12-18 DIAGNOSIS — B96.89 BACTERIAL VAGINOSIS: Primary | ICD-10-CM

## 2020-12-18 DIAGNOSIS — G89.29 CHRONIC MIDLINE LOW BACK PAIN WITH LEFT-SIDED SCIATICA: ICD-10-CM

## 2020-12-18 DIAGNOSIS — R30.0 DYSURIA: ICD-10-CM

## 2020-12-18 LAB
BILIRUB SERPL-MCNC: ABNORMAL MG/DL
BLOOD URINE, POC: ABNORMAL
COLOR, POC UA: YELLOW
GLUCOSE UR QL STRIP: ABNORMAL
KETONES UR QL STRIP: ABNORMAL
LEUKOCYTE ESTERASE URINE, POC: ABNORMAL
NITRITE, POC UA: ABNORMAL
PH, POC UA: 5
PROTEIN, POC: ABNORMAL
SPECIFIC GRAVITY, POC UA: 1.02
UROBILINOGEN, POC UA: ABNORMAL

## 2020-12-18 PROCEDURE — 99214 OFFICE O/P EST MOD 30 MIN: CPT | Mod: PBBFAC,PN | Performed by: STUDENT IN AN ORGANIZED HEALTH CARE EDUCATION/TRAINING PROGRAM

## 2020-12-18 PROCEDURE — 99214 OFFICE O/P EST MOD 30 MIN: CPT | Mod: S$PBB,,, | Performed by: STUDENT IN AN ORGANIZED HEALTH CARE EDUCATION/TRAINING PROGRAM

## 2020-12-18 PROCEDURE — 81001 URINALYSIS AUTO W/SCOPE: CPT | Mod: PBBFAC,PN | Performed by: STUDENT IN AN ORGANIZED HEALTH CARE EDUCATION/TRAINING PROGRAM

## 2020-12-18 PROCEDURE — 99999 PR PBB SHADOW E&M-EST. PATIENT-LVL IV: ICD-10-PCS | Mod: PBBFAC,,, | Performed by: STUDENT IN AN ORGANIZED HEALTH CARE EDUCATION/TRAINING PROGRAM

## 2020-12-18 PROCEDURE — 99214 PR OFFICE/OUTPT VISIT, EST, LEVL IV, 30-39 MIN: ICD-10-PCS | Mod: S$PBB,,, | Performed by: STUDENT IN AN ORGANIZED HEALTH CARE EDUCATION/TRAINING PROGRAM

## 2020-12-18 PROCEDURE — 99999 PR PBB SHADOW E&M-EST. PATIENT-LVL IV: CPT | Mod: PBBFAC,,, | Performed by: STUDENT IN AN ORGANIZED HEALTH CARE EDUCATION/TRAINING PROGRAM

## 2020-12-18 RX ORDER — METRONIDAZOLE 500 MG/1
500 TABLET ORAL EVERY 12 HOURS
Qty: 14 TABLET | Refills: 0 | Status: SHIPPED | OUTPATIENT
Start: 2020-12-18 | End: 2021-05-18

## 2020-12-18 RX ORDER — MUPIROCIN 20 MG/G
OINTMENT TOPICAL 3 TIMES DAILY
Qty: 22 G | Refills: 1 | Status: CANCELLED | OUTPATIENT
Start: 2020-12-18

## 2020-12-18 RX ORDER — IBUPROFEN 600 MG/1
600 TABLET ORAL EVERY 6 HOURS PRN
Qty: 40 TABLET | Refills: 1 | Status: CANCELLED | OUTPATIENT
Start: 2020-12-18

## 2020-12-18 RX ORDER — TRAZODONE HYDROCHLORIDE 50 MG/1
50 TABLET ORAL NIGHTLY
Qty: 30 TABLET | Refills: 11 | Status: CANCELLED | OUTPATIENT
Start: 2020-12-18 | End: 2021-12-18

## 2020-12-18 RX ORDER — LEVOTHYROXINE SODIUM 25 UG/1
25 TABLET ORAL DAILY
Qty: 30 TABLET | Refills: 5 | Status: CANCELLED | OUTPATIENT
Start: 2020-12-18

## 2020-12-18 RX ORDER — ACYCLOVIR 50 MG/G
CREAM TOPICAL
Qty: 5 G | Refills: 2 | Status: CANCELLED | OUTPATIENT
Start: 2020-12-18

## 2020-12-18 RX ORDER — CITALOPRAM 10 MG/1
20 TABLET ORAL DAILY
Qty: 60 TABLET | Refills: 1 | Status: SHIPPED | OUTPATIENT
Start: 2020-12-18 | End: 2021-06-25 | Stop reason: CLARIF

## 2020-12-18 RX ORDER — NAPROXEN 500 MG/1
500 TABLET ORAL 2 TIMES DAILY
Qty: 60 TABLET | Refills: 0 | Status: ON HOLD | OUTPATIENT
Start: 2020-12-18 | End: 2022-03-29 | Stop reason: HOSPADM

## 2020-12-18 RX ORDER — ALPRAZOLAM 0.5 MG/1
TABLET ORAL
Qty: 30 TABLET | Refills: 2 | Status: CANCELLED | OUTPATIENT
Start: 2020-12-18

## 2020-12-18 RX ORDER — GABAPENTIN 100 MG/1
100 CAPSULE ORAL 3 TIMES DAILY
Qty: 90 CAPSULE | Refills: 2 | Status: CANCELLED | OUTPATIENT
Start: 2020-12-18

## 2020-12-18 RX ORDER — VALACYCLOVIR HYDROCHLORIDE 1 G/1
1000 TABLET, FILM COATED ORAL 2 TIMES DAILY
Qty: 60 TABLET | Refills: 11 | Status: CANCELLED | OUTPATIENT
Start: 2020-12-18 | End: 2021-12-18
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
## 2020-12-18 NOTE — PATIENT INSTRUCTIONS
Would discuss you anxiety issues with the psychiatrist at [facility].  Your should discuss what anxiety medications they feel is appropriate for you to be taking.    Recommend getting regular counseling to help address their anxiety.  Counseling if very important in improving resiliency and should be a important part of the treatment plan.    Also will give Metronidazole to address this likely BV.  Take twice daily for 7 days.     Discuss sinus issues with allergist you saw.  They had suggested sinus surgery which may be a good option and should be further discussed.

## 2021-01-29 ENCOUNTER — PATIENT OUTREACH (OUTPATIENT)
Dept: ADMINISTRATIVE | Facility: OTHER | Age: 46
End: 2021-01-29

## 2021-02-02 ENCOUNTER — TELEPHONE (OUTPATIENT)
Dept: OBSTETRICS AND GYNECOLOGY | Facility: CLINIC | Age: 46
End: 2021-02-02

## 2021-02-02 ENCOUNTER — OFFICE VISIT (OUTPATIENT)
Dept: OBSTETRICS AND GYNECOLOGY | Facility: CLINIC | Age: 46
End: 2021-02-02
Payer: MEDICAID

## 2021-02-02 VITALS
WEIGHT: 119.5 LBS | SYSTOLIC BLOOD PRESSURE: 102 MMHG | BODY MASS INDEX: 22.56 KG/M2 | HEIGHT: 61 IN | DIASTOLIC BLOOD PRESSURE: 60 MMHG

## 2021-02-02 DIAGNOSIS — R39.9 UTI SYMPTOMS: ICD-10-CM

## 2021-02-02 DIAGNOSIS — Z01.419 ENCOUNTER FOR WELL WOMAN EXAM WITH ROUTINE GYNECOLOGICAL EXAM: Primary | ICD-10-CM

## 2021-02-02 LAB
BILIRUB SERPL-MCNC: ABNORMAL MG/DL
BLOOD URINE, POC: ABNORMAL
CLARITY, POC UA: CLEAR
COLOR, POC UA: ABNORMAL
GLUCOSE UR QL STRIP: NORMAL
KETONES UR QL STRIP: ABNORMAL
LEUKOCYTE ESTERASE URINE, POC: ABNORMAL
NITRITE, POC UA: ABNORMAL
PH, POC UA: 8
PROTEIN, POC: ABNORMAL
SPECIFIC GRAVITY, POC UA: 1.01
UROBILINOGEN, POC UA: NORMAL

## 2021-02-02 PROCEDURE — 81002 URINALYSIS NONAUTO W/O SCOPE: CPT | Mod: PBBFAC,PN | Performed by: NURSE PRACTITIONER

## 2021-02-02 PROCEDURE — 99396 PR PREVENTIVE VISIT,EST,40-64: ICD-10-PCS | Mod: S$PBB,,, | Performed by: NURSE PRACTITIONER

## 2021-02-02 PROCEDURE — 99999 PR PBB SHADOW E&M-EST. PATIENT-LVL III: CPT | Mod: PBBFAC,,, | Performed by: NURSE PRACTITIONER

## 2021-02-02 PROCEDURE — 99999 PR PBB SHADOW E&M-EST. PATIENT-LVL III: ICD-10-PCS | Mod: PBBFAC,,, | Performed by: NURSE PRACTITIONER

## 2021-02-02 PROCEDURE — 87088 URINE BACTERIA CULTURE: CPT

## 2021-02-02 PROCEDURE — 99396 PREV VISIT EST AGE 40-64: CPT | Mod: S$PBB,,, | Performed by: NURSE PRACTITIONER

## 2021-02-02 PROCEDURE — 99213 OFFICE O/P EST LOW 20 MIN: CPT | Mod: PBBFAC,PN | Performed by: NURSE PRACTITIONER

## 2021-02-02 PROCEDURE — 87086 URINE CULTURE/COLONY COUNT: CPT

## 2021-02-02 RX ORDER — NITROFURANTOIN 25; 75 MG/1; MG/1
100 CAPSULE ORAL 2 TIMES DAILY
Qty: 10 CAPSULE | Refills: 0 | Status: SHIPPED | OUTPATIENT
Start: 2021-02-02 | End: 2021-02-07

## 2021-02-02 RX ORDER — LORATADINE 10 MG/1
10 TABLET ORAL DAILY
COMMUNITY
Start: 2020-12-21 | End: 2021-02-19

## 2021-02-02 RX ORDER — CYCLOBENZAPRINE HCL 10 MG
10 TABLET ORAL 3 TIMES DAILY
COMMUNITY
Start: 2020-12-21 | End: 2021-06-25 | Stop reason: CLARIF

## 2021-02-02 RX ORDER — IBUPROFEN 600 MG/1
600 TABLET ORAL 3 TIMES DAILY
COMMUNITY
Start: 2020-12-21 | End: 2021-02-19

## 2021-02-02 RX ORDER — AZELASTINE HCL 205.5 UG/1
SPRAY NASAL
COMMUNITY
Start: 2020-12-21 | End: 2021-02-19

## 2021-02-02 RX ORDER — FLUTICASONE PROPIONATE 50 MCG
SPRAY, SUSPENSION (ML) NASAL
COMMUNITY
Start: 2020-12-21 | End: 2021-02-19

## 2021-02-02 RX ORDER — PHENAZOPYRIDINE HYDROCHLORIDE 200 MG/1
200 TABLET, FILM COATED ORAL 3 TIMES DAILY PRN
Qty: 6 TABLET | Refills: 0 | Status: SHIPPED | OUTPATIENT
Start: 2021-02-02 | End: 2021-02-04

## 2021-02-04 LAB — BACTERIA UR CULT: ABNORMAL

## 2021-04-01 ENCOUNTER — OFFICE VISIT (OUTPATIENT)
Dept: PSYCHOLOGY | Facility: CLINIC | Age: 46
End: 2021-04-01
Payer: MEDICAID

## 2021-04-01 DIAGNOSIS — F41.0 GENERALIZED ANXIETY DISORDER WITH PANIC ATTACKS: ICD-10-CM

## 2021-04-01 DIAGNOSIS — F31.62 BIPOLAR DISORDER, CURRENT EPISODE MIXED, MODERATE: Primary | ICD-10-CM

## 2021-04-01 DIAGNOSIS — F41.1 GENERALIZED ANXIETY DISORDER WITH PANIC ATTACKS: ICD-10-CM

## 2021-04-01 PROCEDURE — 90791 PSYCH DIAGNOSTIC EVALUATION: CPT | Mod: HP,HB,, | Performed by: PSYCHOLOGIST

## 2021-04-01 PROCEDURE — 90791 PR PSYCHIATRIC DIAGNOSTIC EVALUATION: ICD-10-PCS | Mod: HP,HB,, | Performed by: PSYCHOLOGIST

## 2021-05-18 ENCOUNTER — TELEPHONE (OUTPATIENT)
Dept: PRIMARY CARE CLINIC | Facility: CLINIC | Age: 46
End: 2021-05-18

## 2021-05-18 ENCOUNTER — OFFICE VISIT (OUTPATIENT)
Dept: PRIMARY CARE CLINIC | Facility: CLINIC | Age: 46
End: 2021-05-18
Payer: MEDICAID

## 2021-05-18 VITALS
HEART RATE: 100 BPM | HEIGHT: 61 IN | RESPIRATION RATE: 18 BRPM | TEMPERATURE: 99 F | DIASTOLIC BLOOD PRESSURE: 72 MMHG | OXYGEN SATURATION: 99 % | WEIGHT: 123 LBS | BODY MASS INDEX: 23.22 KG/M2 | SYSTOLIC BLOOD PRESSURE: 118 MMHG

## 2021-05-18 DIAGNOSIS — B96.89 BACTERIAL VAGINOSIS: ICD-10-CM

## 2021-05-18 DIAGNOSIS — N30.00 ACUTE CYSTITIS WITHOUT HEMATURIA: ICD-10-CM

## 2021-05-18 DIAGNOSIS — N76.0 BACTERIAL VAGINOSIS: ICD-10-CM

## 2021-05-18 DIAGNOSIS — Z12.31 ENCOUNTER FOR SCREENING MAMMOGRAM FOR MALIGNANT NEOPLASM OF BREAST: ICD-10-CM

## 2021-05-18 DIAGNOSIS — Z72.0 TOBACCO ABUSE: ICD-10-CM

## 2021-05-18 DIAGNOSIS — F43.10 PTSD (POST-TRAUMATIC STRESS DISORDER): ICD-10-CM

## 2021-05-18 DIAGNOSIS — J40 BRONCHITIS: ICD-10-CM

## 2021-05-18 DIAGNOSIS — J32.9 SINUSITIS, UNSPECIFIED CHRONICITY, UNSPECIFIED LOCATION: Primary | ICD-10-CM

## 2021-05-18 DIAGNOSIS — B20 HIV INFECTION, UNSPECIFIED SYMPTOM STATUS: ICD-10-CM

## 2021-05-18 DIAGNOSIS — F32.5 MAJOR DEPRESSIVE DISORDER WITH SINGLE EPISODE, IN REMISSION: Chronic | ICD-10-CM

## 2021-05-18 DIAGNOSIS — F31.9 BIPOLAR 1 DISORDER: ICD-10-CM

## 2021-05-18 DIAGNOSIS — N39.0 RECURRENT UTI: ICD-10-CM

## 2021-05-18 DIAGNOSIS — E03.9 ACQUIRED HYPOTHYROIDISM: ICD-10-CM

## 2021-05-18 LAB
BILIRUB SERPL-MCNC: NEGATIVE MG/DL
BLOOD URINE, POC: ABNORMAL
CLARITY, POC UA: CLEAR
COLOR, POC UA: ABNORMAL
GLUCOSE UR QL STRIP: NEGATIVE
KETONES UR QL STRIP: NEGATIVE
LEUKOCYTE ESTERASE URINE, POC: ABNORMAL
NITRITE, POC UA: NEGATIVE
PH, POC UA: 7
PROTEIN, POC: ABNORMAL
SPECIFIC GRAVITY, POC UA: 1.01
UROBILINOGEN, POC UA: NEGATIVE

## 2021-05-18 PROCEDURE — 99214 OFFICE O/P EST MOD 30 MIN: CPT | Mod: S$PBB,,, | Performed by: FAMILY MEDICINE

## 2021-05-18 PROCEDURE — 99999 PR PBB SHADOW E&M-EST. PATIENT-LVL V: CPT | Mod: PBBFAC,,, | Performed by: FAMILY MEDICINE

## 2021-05-18 PROCEDURE — 81002 URINALYSIS NONAUTO W/O SCOPE: CPT | Mod: PBBFAC,PN | Performed by: FAMILY MEDICINE

## 2021-05-18 PROCEDURE — 99214 PR OFFICE/OUTPT VISIT, EST, LEVL IV, 30-39 MIN: ICD-10-PCS | Mod: S$PBB,,, | Performed by: FAMILY MEDICINE

## 2021-05-18 PROCEDURE — 99215 OFFICE O/P EST HI 40 MIN: CPT | Mod: PBBFAC,PN | Performed by: FAMILY MEDICINE

## 2021-05-18 PROCEDURE — 99999 PR PBB SHADOW E&M-EST. PATIENT-LVL V: ICD-10-PCS | Mod: PBBFAC,,, | Performed by: FAMILY MEDICINE

## 2021-05-18 RX ORDER — LEVOFLOXACIN 500 MG/1
500 TABLET, FILM COATED ORAL DAILY
Qty: 10 TABLET | Refills: 0 | Status: SHIPPED | OUTPATIENT
Start: 2021-05-18 | End: 2021-05-28

## 2021-05-18 RX ORDER — METHYLPREDNISOLONE 4 MG/1
TABLET ORAL
Qty: 1 PACKAGE | Refills: 0 | Status: SHIPPED | OUTPATIENT
Start: 2021-05-18 | End: 2021-06-25 | Stop reason: CLARIF

## 2021-05-18 RX ORDER — PHENAZOPYRIDINE HYDROCHLORIDE 200 MG/1
200 TABLET, FILM COATED ORAL 3 TIMES DAILY PRN
Qty: 15 TABLET | Refills: 0 | Status: SHIPPED | OUTPATIENT
Start: 2021-05-18 | End: 2021-05-28

## 2021-05-25 ENCOUNTER — PATIENT OUTREACH (OUTPATIENT)
Dept: ADMINISTRATIVE | Facility: OTHER | Age: 46
End: 2021-05-25

## 2021-05-27 ENCOUNTER — OFFICE VISIT (OUTPATIENT)
Dept: UROLOGY | Facility: CLINIC | Age: 46
End: 2021-05-27
Payer: MEDICAID

## 2021-05-27 ENCOUNTER — OFFICE VISIT (OUTPATIENT)
Dept: OBSTETRICS AND GYNECOLOGY | Facility: CLINIC | Age: 46
End: 2021-05-27
Payer: MEDICAID

## 2021-05-27 VITALS — BODY MASS INDEX: 22.39 KG/M2 | WEIGHT: 118.5 LBS | SYSTOLIC BLOOD PRESSURE: 116 MMHG | DIASTOLIC BLOOD PRESSURE: 80 MMHG

## 2021-05-27 VITALS
BODY MASS INDEX: 22.78 KG/M2 | HEART RATE: 87 BPM | WEIGHT: 120.69 LBS | DIASTOLIC BLOOD PRESSURE: 82 MMHG | HEIGHT: 61 IN | SYSTOLIC BLOOD PRESSURE: 117 MMHG

## 2021-05-27 DIAGNOSIS — Z11.3 SCREEN FOR STD (SEXUALLY TRANSMITTED DISEASE): Primary | ICD-10-CM

## 2021-05-27 DIAGNOSIS — N39.0 RECURRENT UTI: ICD-10-CM

## 2021-05-27 DIAGNOSIS — R30.0 DYSURIA: Primary | ICD-10-CM

## 2021-05-27 LAB
BACTERIA #/AREA URNS HPF: ABNORMAL /HPF
BILIRUB SERPL-MCNC: ABNORMAL MG/DL
BLOOD URINE, POC: ABNORMAL
CLARITY, POC UA: ABNORMAL
COLOR, POC UA: YELLOW
GLUCOSE UR QL STRIP: NORMAL
KETONES UR QL STRIP: ABNORMAL
LEUKOCYTE ESTERASE URINE, POC: ABNORMAL
MICROSCOPIC COMMENT: ABNORMAL
NITRITE, POC UA: ABNORMAL
PH, POC UA: 7
POC RESIDUAL URINE VOLUME: 64 ML (ref 0–100)
PROTEIN, POC: ABNORMAL
RBC #/AREA URNS HPF: 4 /HPF (ref 0–4)
SPECIFIC GRAVITY, POC UA: 1.01
SQUAMOUS #/AREA URNS HPF: 5 /HPF
UROBILINOGEN, POC UA: ABNORMAL
WBC #/AREA URNS HPF: 30 /HPF (ref 0–5)

## 2021-05-27 PROCEDURE — 99214 OFFICE O/P EST MOD 30 MIN: CPT | Mod: S$PBB,,, | Performed by: NURSE PRACTITIONER

## 2021-05-27 PROCEDURE — 99213 OFFICE O/P EST LOW 20 MIN: CPT | Mod: PBBFAC,PN,25 | Performed by: NURSE PRACTITIONER

## 2021-05-27 PROCEDURE — 87086 URINE CULTURE/COLONY COUNT: CPT | Performed by: NURSE PRACTITIONER

## 2021-05-27 PROCEDURE — 87088 URINE BACTERIA CULTURE: CPT | Performed by: NURSE PRACTITIONER

## 2021-05-27 PROCEDURE — 99999 PR PBB SHADOW E&M-EST. PATIENT-LVL V: ICD-10-PCS | Mod: PBBFAC,,, | Performed by: NURSE PRACTITIONER

## 2021-05-27 PROCEDURE — 99999 PR PBB SHADOW E&M-EST. PATIENT-LVL III: CPT | Mod: PBBFAC,,, | Performed by: NURSE PRACTITIONER

## 2021-05-27 PROCEDURE — 51798 US URINE CAPACITY MEASURE: CPT | Mod: PBBFAC,PN | Performed by: NURSE PRACTITIONER

## 2021-05-27 PROCEDURE — 99214 PR OFFICE/OUTPT VISIT, EST, LEVL IV, 30-39 MIN: ICD-10-PCS | Mod: S$PBB,,, | Performed by: NURSE PRACTITIONER

## 2021-05-27 PROCEDURE — 87591 N.GONORRHOEAE DNA AMP PROB: CPT | Performed by: NURSE PRACTITIONER

## 2021-05-27 PROCEDURE — 99999 PR PBB SHADOW E&M-EST. PATIENT-LVL III: ICD-10-PCS | Mod: PBBFAC,,, | Performed by: NURSE PRACTITIONER

## 2021-05-27 PROCEDURE — 99213 PR OFFICE/OUTPT VISIT, EST, LEVL III, 20-29 MIN: ICD-10-PCS | Mod: S$PBB,,, | Performed by: NURSE PRACTITIONER

## 2021-05-27 PROCEDURE — 81002 URINALYSIS NONAUTO W/O SCOPE: CPT | Mod: PBBFAC,PN | Performed by: NURSE PRACTITIONER

## 2021-05-27 PROCEDURE — 99213 OFFICE O/P EST LOW 20 MIN: CPT | Mod: S$PBB,,, | Performed by: NURSE PRACTITIONER

## 2021-05-27 PROCEDURE — 87481 CANDIDA DNA AMP PROBE: CPT | Mod: 59 | Performed by: NURSE PRACTITIONER

## 2021-05-27 PROCEDURE — 99215 OFFICE O/P EST HI 40 MIN: CPT | Mod: PBBFAC,27,PN,25 | Performed by: NURSE PRACTITIONER

## 2021-05-27 PROCEDURE — 87491 CHLMYD TRACH DNA AMP PROBE: CPT | Mod: 59 | Performed by: NURSE PRACTITIONER

## 2021-05-27 PROCEDURE — 99999 PR PBB SHADOW E&M-EST. PATIENT-LVL V: CPT | Mod: PBBFAC,,, | Performed by: NURSE PRACTITIONER

## 2021-05-27 RX ORDER — PHENAZOPYRIDINE HYDROCHLORIDE 100 MG/1
200 TABLET, FILM COATED ORAL 3 TIMES DAILY PRN
Qty: 30 TABLET | Refills: 0 | Status: SHIPPED | OUTPATIENT
Start: 2021-05-27 | End: 2021-06-06

## 2021-05-27 RX ORDER — NITROFURANTOIN 25; 75 MG/1; MG/1
100 CAPSULE ORAL 2 TIMES DAILY
Qty: 14 CAPSULE | Refills: 0 | Status: SHIPPED | OUTPATIENT
Start: 2021-05-27 | End: 2021-06-03

## 2021-05-27 RX ORDER — ELVITEGRAVIR, COBICISTAT, EMTRICITABINE, AND TENOFOVIR ALAFENAMIDE 150; 150; 200; 10 MG/1; MG/1; MG/1; MG/1
1 TABLET ORAL EVERY MORNING
COMMUNITY
Start: 2021-05-18 | End: 2021-09-23

## 2021-05-27 RX ORDER — AZITHROMYCIN 600 MG/1
TABLET, FILM COATED ORAL
COMMUNITY
Start: 2021-02-02 | End: 2021-06-25 | Stop reason: CLARIF

## 2021-05-28 ENCOUNTER — TELEPHONE (OUTPATIENT)
Dept: OBSTETRICS AND GYNECOLOGY | Facility: CLINIC | Age: 46
End: 2021-05-28

## 2021-05-28 DIAGNOSIS — N76.0 BV (BACTERIAL VAGINOSIS): Primary | ICD-10-CM

## 2021-05-28 DIAGNOSIS — B96.89 BV (BACTERIAL VAGINOSIS): Primary | ICD-10-CM

## 2021-05-28 LAB
BACTERIAL VAGINOSIS DNA: POSITIVE
CANDIDA GLABRATA DNA: NEGATIVE
CANDIDA KRUSEI DNA: NEGATIVE
CANDIDA RRNA VAG QL PROBE: NEGATIVE
T VAGINALIS RRNA GENITAL QL PROBE: NEGATIVE

## 2021-05-28 RX ORDER — METRONIDAZOLE 500 MG/1
500 TABLET ORAL 2 TIMES DAILY
Qty: 14 TABLET | Refills: 0 | Status: SHIPPED | OUTPATIENT
Start: 2021-05-28 | End: 2021-06-04

## 2021-05-29 LAB — BACTERIA UR CULT: ABNORMAL

## 2021-05-31 ENCOUNTER — TELEPHONE (OUTPATIENT)
Dept: UROLOGY | Facility: CLINIC | Age: 46
End: 2021-05-31

## 2021-05-31 LAB
C TRACH DNA SPEC QL NAA+PROBE: NOT DETECTED
N GONORRHOEA DNA SPEC QL NAA+PROBE: NOT DETECTED

## 2021-06-25 ENCOUNTER — PATIENT OUTREACH (OUTPATIENT)
Dept: ADMINISTRATIVE | Facility: OTHER | Age: 46
End: 2021-06-25

## 2021-07-06 ENCOUNTER — PATIENT MESSAGE (OUTPATIENT)
Dept: ADMINISTRATIVE | Facility: HOSPITAL | Age: 46
End: 2021-07-06

## 2021-09-09 ENCOUNTER — TELEPHONE (OUTPATIENT)
Dept: PRIMARY CARE CLINIC | Facility: CLINIC | Age: 46
End: 2021-09-09

## 2021-09-20 ENCOUNTER — TELEPHONE (OUTPATIENT)
Dept: PRIMARY CARE CLINIC | Facility: CLINIC | Age: 46
End: 2021-09-20

## 2021-09-23 ENCOUNTER — OFFICE VISIT (OUTPATIENT)
Dept: PRIMARY CARE CLINIC | Facility: CLINIC | Age: 46
End: 2021-09-23
Payer: MEDICAID

## 2021-09-23 DIAGNOSIS — B20 HIV INFECTION, UNSPECIFIED SYMPTOM STATUS: ICD-10-CM

## 2021-09-23 DIAGNOSIS — N30.00 ACUTE CYSTITIS WITHOUT HEMATURIA: ICD-10-CM

## 2021-09-23 DIAGNOSIS — Z72.0 TOBACCO ABUSE: ICD-10-CM

## 2021-09-23 DIAGNOSIS — F43.10 PTSD (POST-TRAUMATIC STRESS DISORDER): ICD-10-CM

## 2021-09-23 DIAGNOSIS — E03.9 ACQUIRED HYPOTHYROIDISM: Primary | ICD-10-CM

## 2021-09-23 DIAGNOSIS — R51.9 HEADACHE, UNSPECIFIED HEADACHE TYPE: ICD-10-CM

## 2021-09-23 DIAGNOSIS — F32.5 MAJOR DEPRESSIVE DISORDER WITH SINGLE EPISODE, IN REMISSION: Chronic | ICD-10-CM

## 2021-09-23 DIAGNOSIS — F31.2 BIPOLAR AFFECTIVE DISORDER, CURRENTLY MANIC, SEVERE, WITH PSYCHOTIC FEATURES: ICD-10-CM

## 2021-09-23 PROCEDURE — 99213 PR OFFICE/OUTPT VISIT, EST, LEVL III, 20-29 MIN: ICD-10-PCS | Mod: 95,,, | Performed by: FAMILY MEDICINE

## 2021-09-23 PROCEDURE — 99213 OFFICE O/P EST LOW 20 MIN: CPT | Mod: 95,,, | Performed by: FAMILY MEDICINE

## 2021-09-23 RX ORDER — LEVOTHYROXINE SODIUM 25 UG/1
25 TABLET ORAL
Qty: 30 TABLET | Refills: 11 | Status: ON HOLD | OUTPATIENT
Start: 2021-09-23 | End: 2021-11-30 | Stop reason: HOSPADM

## 2021-09-23 RX ORDER — NITROFURANTOIN 25; 75 MG/1; MG/1
100 CAPSULE ORAL 2 TIMES DAILY
Qty: 14 CAPSULE | Refills: 0 | Status: SHIPPED | OUTPATIENT
Start: 2021-09-23 | End: 2022-02-10

## 2021-09-23 RX ORDER — LITHIUM CARBONATE 300 MG/1
300 TABLET, FILM COATED, EXTENDED RELEASE ORAL DAILY
Qty: 30 TABLET | Refills: 5 | Status: ON HOLD | OUTPATIENT
Start: 2021-09-23 | End: 2021-11-30 | Stop reason: HOSPADM

## 2021-09-23 RX ORDER — ALPRAZOLAM 0.5 MG/1
TABLET, EXTENDED RELEASE ORAL
Qty: 30 TABLET | Refills: 2 | Status: SHIPPED | OUTPATIENT
Start: 2021-09-23 | End: 2021-10-05

## 2021-09-24 ENCOUNTER — TELEPHONE (OUTPATIENT)
Dept: PRIMARY CARE CLINIC | Facility: CLINIC | Age: 46
End: 2021-09-24

## 2021-10-05 ENCOUNTER — PATIENT MESSAGE (OUTPATIENT)
Dept: ADMINISTRATIVE | Facility: HOSPITAL | Age: 46
End: 2021-10-05

## 2021-10-05 RX ORDER — ALPRAZOLAM 0.5 MG/1
TABLET, EXTENDED RELEASE ORAL
Qty: 30 TABLET | Refills: 2 | Status: SHIPPED | OUTPATIENT
Start: 2021-10-05 | End: 2022-02-10 | Stop reason: SDUPTHER

## 2021-10-20 ENCOUNTER — TELEPHONE (OUTPATIENT)
Dept: PRIMARY CARE CLINIC | Facility: CLINIC | Age: 46
End: 2021-10-20

## 2021-11-10 ENCOUNTER — TELEPHONE (OUTPATIENT)
Dept: PRIMARY CARE CLINIC | Facility: CLINIC | Age: 46
End: 2021-11-10
Payer: MEDICAID

## 2021-11-14 ENCOUNTER — HOSPITAL ENCOUNTER (EMERGENCY)
Facility: HOSPITAL | Age: 46
Discharge: HOME | End: 2021-11-14
Attending: EMERGENCY MEDICINE
Payer: MEDICAID

## 2021-11-14 VITALS
SYSTOLIC BLOOD PRESSURE: 112 MMHG | RESPIRATION RATE: 16 BRPM | WEIGHT: 107 LBS | TEMPERATURE: 99 F | HEIGHT: 61 IN | DIASTOLIC BLOOD PRESSURE: 71 MMHG | HEART RATE: 85 BPM | BODY MASS INDEX: 20.2 KG/M2 | OXYGEN SATURATION: 97 %

## 2021-11-14 DIAGNOSIS — R07.9 CHEST PAIN: Primary | ICD-10-CM

## 2021-11-14 DIAGNOSIS — B20 HIV INFECTION, UNSPECIFIED SYMPTOM STATUS: ICD-10-CM

## 2021-11-14 DIAGNOSIS — F31.9 BIPOLAR AFFECTIVE DISORDER, REMISSION STATUS UNSPECIFIED: ICD-10-CM

## 2021-11-14 LAB
ANION GAP SERPL CALC-SCNC: 12 MMOL/L (ref 8–16)
BASOPHILS # BLD AUTO: 0.02 K/UL (ref 0–0.2)
BASOPHILS NFR BLD: 0.7 % (ref 0–1.9)
BUN SERPL-MCNC: 6 MG/DL (ref 6–20)
CALCIUM SERPL-MCNC: 9.2 MG/DL (ref 8.7–10.5)
CHLORIDE SERPL-SCNC: 106 MMOL/L (ref 95–110)
CO2 SERPL-SCNC: 22 MMOL/L (ref 23–29)
CREAT SERPL-MCNC: 0.7 MG/DL (ref 0.5–1.4)
DIFFERENTIAL METHOD: ABNORMAL
EOSINOPHIL # BLD AUTO: 0 K/UL (ref 0–0.5)
EOSINOPHIL NFR BLD: 1.3 % (ref 0–8)
ERYTHROCYTE [DISTWIDTH] IN BLOOD BY AUTOMATED COUNT: 14 % (ref 11.5–14.5)
EST. GFR  (AFRICAN AMERICAN): >60 ML/MIN/1.73 M^2
EST. GFR  (NON AFRICAN AMERICAN): >60 ML/MIN/1.73 M^2
GLUCOSE SERPL-MCNC: 90 MG/DL (ref 70–110)
HCT VFR BLD AUTO: 38.6 % (ref 37–48.5)
HGB BLD-MCNC: 12.7 G/DL (ref 12–16)
IMM GRANULOCYTES # BLD AUTO: 0.01 K/UL (ref 0–0.04)
IMM GRANULOCYTES NFR BLD AUTO: 0.3 % (ref 0–0.5)
LYMPHOCYTES # BLD AUTO: 0.3 K/UL (ref 1–4.8)
LYMPHOCYTES NFR BLD: 9 % (ref 18–48)
MCH RBC QN AUTO: 27.5 PG (ref 27–31)
MCHC RBC AUTO-ENTMCNC: 32.9 G/DL (ref 32–36)
MCV RBC AUTO: 84 FL (ref 82–98)
MONOCYTES # BLD AUTO: 0.3 K/UL (ref 0.3–1)
MONOCYTES NFR BLD: 10.7 % (ref 4–15)
NEUTROPHILS # BLD AUTO: 2.3 K/UL (ref 1.8–7.7)
NEUTROPHILS NFR BLD: 78 % (ref 38–73)
NRBC BLD-RTO: 0 /100 WBC
PLATELET # BLD AUTO: 180 K/UL (ref 150–450)
PMV BLD AUTO: 12 FL (ref 9.2–12.9)
POTASSIUM SERPL-SCNC: 3.3 MMOL/L (ref 3.5–5.1)
RBC # BLD AUTO: 4.62 M/UL (ref 4–5.4)
SODIUM SERPL-SCNC: 140 MMOL/L (ref 136–145)
TROPONIN I SERPL DL<=0.01 NG/ML-MCNC: <0.006 NG/ML (ref 0–0.03)
WBC # BLD AUTO: 3 K/UL (ref 3.9–12.7)

## 2021-11-14 PROCEDURE — 80048 BASIC METABOLIC PNL TOTAL CA: CPT | Performed by: EMERGENCY MEDICINE

## 2021-11-14 PROCEDURE — 99284 PR EMERGENCY DEPT VISIT,LEVEL IV: ICD-10-PCS | Mod: ,,, | Performed by: EMERGENCY MEDICINE

## 2021-11-14 PROCEDURE — 99285 EMERGENCY DEPT VISIT HI MDM: CPT | Mod: 25

## 2021-11-14 PROCEDURE — 93010 ELECTROCARDIOGRAM REPORT: CPT | Mod: ,,, | Performed by: INTERNAL MEDICINE

## 2021-11-14 PROCEDURE — 93005 ELECTROCARDIOGRAM TRACING: CPT

## 2021-11-14 PROCEDURE — 84484 ASSAY OF TROPONIN QUANT: CPT | Performed by: EMERGENCY MEDICINE

## 2021-11-14 PROCEDURE — 99284 EMERGENCY DEPT VISIT MOD MDM: CPT | Mod: ,,, | Performed by: EMERGENCY MEDICINE

## 2021-11-14 PROCEDURE — 93010 EKG 12-LEAD: ICD-10-PCS | Mod: ,,, | Performed by: INTERNAL MEDICINE

## 2021-11-14 PROCEDURE — 85025 COMPLETE CBC W/AUTO DIFF WBC: CPT | Performed by: EMERGENCY MEDICINE

## 2021-11-14 PROCEDURE — 25000003 PHARM REV CODE 250: Performed by: EMERGENCY MEDICINE

## 2021-11-14 RX ORDER — NYSTATIN 100000 [USP'U]/ML
5 SUSPENSION ORAL 4 TIMES DAILY
Qty: 280 ML | Refills: 0 | Status: SHIPPED | OUTPATIENT
Start: 2021-11-14 | End: 2021-11-14 | Stop reason: SDUPTHER

## 2021-11-14 RX ORDER — ACETAMINOPHEN 500 MG
500 TABLET ORAL
Qty: 42 TABLET | Refills: 0 | Status: SHIPPED | OUTPATIENT
Start: 2021-11-14 | End: 2021-11-21

## 2021-11-14 RX ORDER — NYSTATIN 100000 [USP'U]/ML
5 SUSPENSION ORAL 4 TIMES DAILY
Qty: 280 ML | Refills: 0 | Status: SHIPPED | OUTPATIENT
Start: 2021-11-14 | End: 2021-11-28

## 2021-11-14 RX ORDER — HYDROXYZINE PAMOATE 25 MG/1
25 CAPSULE ORAL
Status: COMPLETED | OUTPATIENT
Start: 2021-11-14 | End: 2021-11-14

## 2021-11-14 RX ORDER — FAMOTIDINE 20 MG/1
20 TABLET, FILM COATED ORAL 2 TIMES DAILY
Qty: 60 TABLET | Refills: 0 | Status: SHIPPED | OUTPATIENT
Start: 2021-11-14 | End: 2022-02-10 | Stop reason: SDUPTHER

## 2021-11-14 RX ORDER — MAG HYDROX/ALUMINUM HYD/SIMETH 200-200-20
5 SUSPENSION, ORAL (FINAL DOSE FORM) ORAL
Status: COMPLETED | OUTPATIENT
Start: 2021-11-14 | End: 2021-11-14

## 2021-11-14 RX ORDER — ACETAMINOPHEN 500 MG
1000 TABLET ORAL
Status: COMPLETED | OUTPATIENT
Start: 2021-11-14 | End: 2021-11-14

## 2021-11-14 RX ORDER — HYDROXYZINE HYDROCHLORIDE 25 MG/1
25 TABLET, FILM COATED ORAL 3 TIMES DAILY PRN
Qty: 12 TABLET | Refills: 0 | Status: SHIPPED | OUTPATIENT
Start: 2021-11-14 | End: 2022-06-06

## 2021-11-14 RX ADMIN — HYDROXYZINE PAMOATE 25 MG: 25 CAPSULE ORAL at 05:11

## 2021-11-14 RX ADMIN — ACETAMINOPHEN 1000 MG: 500 TABLET ORAL at 04:11

## 2021-11-14 RX ADMIN — ALUMINUM HYDROXIDE, MAGNESIUM HYDROXIDE, AND SIMETHICONE 5 ML: 200; 200; 20 SUSPENSION ORAL at 04:11

## 2021-11-15 ENCOUNTER — TELEPHONE (OUTPATIENT)
Dept: GASTROENTEROLOGY | Facility: CLINIC | Age: 46
End: 2021-11-15
Payer: MEDICAID

## 2021-12-01 ENCOUNTER — TELEPHONE (OUTPATIENT)
Dept: PRIMARY CARE CLINIC | Facility: CLINIC | Age: 46
End: 2021-12-01
Payer: MEDICAID

## 2021-12-06 ENCOUNTER — OFFICE VISIT (OUTPATIENT)
Dept: PRIMARY CARE CLINIC | Facility: CLINIC | Age: 46
End: 2021-12-06
Payer: MEDICAID

## 2021-12-06 VITALS
WEIGHT: 99.19 LBS | TEMPERATURE: 99 F | RESPIRATION RATE: 20 BRPM | OXYGEN SATURATION: 99 % | SYSTOLIC BLOOD PRESSURE: 110 MMHG | BODY MASS INDEX: 16.51 KG/M2 | DIASTOLIC BLOOD PRESSURE: 80 MMHG | HEART RATE: 92 BPM

## 2021-12-06 DIAGNOSIS — F32.5 MAJOR DEPRESSIVE DISORDER WITH SINGLE EPISODE, IN REMISSION: Chronic | ICD-10-CM

## 2021-12-06 DIAGNOSIS — F41.9 ANXIETY DISORDER, UNSPECIFIED TYPE: ICD-10-CM

## 2021-12-06 DIAGNOSIS — F31.9 BIPOLAR 1 DISORDER: ICD-10-CM

## 2021-12-06 DIAGNOSIS — B20 HIV INFECTION, UNSPECIFIED SYMPTOM STATUS: ICD-10-CM

## 2021-12-06 DIAGNOSIS — J02.9 SORE THROAT: ICD-10-CM

## 2021-12-06 DIAGNOSIS — F25.0 SCHIZOAFFECTIVE DISORDER, BIPOLAR TYPE: ICD-10-CM

## 2021-12-06 DIAGNOSIS — B37.0 THRUSH: ICD-10-CM

## 2021-12-06 DIAGNOSIS — R51.9 HEADACHE, UNSPECIFIED HEADACHE TYPE: ICD-10-CM

## 2021-12-06 DIAGNOSIS — J02.9 PHARYNGITIS, UNSPECIFIED ETIOLOGY: Primary | ICD-10-CM

## 2021-12-06 DIAGNOSIS — R13.10 DYSPHAGIA, UNSPECIFIED TYPE: ICD-10-CM

## 2021-12-06 DIAGNOSIS — Z72.0 TOBACCO ABUSE: ICD-10-CM

## 2021-12-06 PROCEDURE — 99214 PR OFFICE/OUTPT VISIT, EST, LEVL IV, 30-39 MIN: ICD-10-PCS | Mod: S$PBB,,, | Performed by: FAMILY MEDICINE

## 2021-12-06 PROCEDURE — 99999 PR PBB SHADOW E&M-EST. PATIENT-LVL IV: ICD-10-PCS | Mod: PBBFAC,,, | Performed by: FAMILY MEDICINE

## 2021-12-06 PROCEDURE — 99214 OFFICE O/P EST MOD 30 MIN: CPT | Mod: S$PBB,,, | Performed by: FAMILY MEDICINE

## 2021-12-06 PROCEDURE — 99999 PR PBB SHADOW E&M-EST. PATIENT-LVL IV: CPT | Mod: PBBFAC,,, | Performed by: FAMILY MEDICINE

## 2021-12-06 PROCEDURE — 99214 OFFICE O/P EST MOD 30 MIN: CPT | Mod: PBBFAC,PN | Performed by: FAMILY MEDICINE

## 2021-12-06 RX ORDER — LIDOCAINE HYDROCHLORIDE 20 MG/ML
SOLUTION OROPHARYNGEAL
Qty: 100 ML | Refills: 1 | Status: SHIPPED | OUTPATIENT
Start: 2021-12-06 | End: 2022-01-06

## 2021-12-06 RX ORDER — NYSTATIN 100000 [USP'U]/ML
5 SUSPENSION ORAL 4 TIMES DAILY
Qty: 200 ML | Refills: 0 | Status: SHIPPED | OUTPATIENT
Start: 2021-12-06 | End: 2021-12-16

## 2021-12-06 RX ORDER — FLUCONAZOLE 150 MG/1
TABLET ORAL
Qty: 7 TABLET | Refills: 0 | Status: ON HOLD | OUTPATIENT
Start: 2021-12-06 | End: 2022-03-29 | Stop reason: HOSPADM

## 2021-12-06 RX ORDER — FLUCONAZOLE 150 MG/1
150 TABLET ORAL ONCE
Qty: 1 TABLET | Refills: 0 | Status: SHIPPED | OUTPATIENT
Start: 2021-12-06 | End: 2021-12-06

## 2022-01-06 ENCOUNTER — NURSE TRIAGE (OUTPATIENT)
Dept: ADMINISTRATIVE | Facility: CLINIC | Age: 47
End: 2022-01-06
Payer: MEDICAID

## 2022-01-06 RX ORDER — LIDOCAINE HYDROCHLORIDE 20 MG/ML
SOLUTION OROPHARYNGEAL
Qty: 100 ML | Refills: 1 | Status: SHIPPED | OUTPATIENT
Start: 2022-01-06 | End: 2022-01-07 | Stop reason: SDUPTHER

## 2022-01-06 NOTE — TELEPHONE ENCOUNTER
Caller is  Requesting a transfer of viscous lidocaine prescription to requested pharmacy.  Pt advised per protocol and verbalized understanding.    Reason for Disposition   [1] Prescription prescribed recently is not at pharmacy AND [2] triager has access to patient's EMR AND [3] prescription is recorded in the EMR    Additional Information   Negative: [1] Intentional drug overdose AND [2] suicidal thoughts or ideas   Negative: MORE THAN A DOUBLE DOSE of a prescription or over-the-counter (OTC) drug   Negative: [1] DOUBLE DOSE (an extra dose or lesser amount) of prescription drug AND [2] any symptoms (e.g., dizziness, nausea, pain, sleepiness)   Negative: [1] DOUBLE DOSE (an extra dose or lesser amount) of over-the-counter (OTC) drug AND [2] any symptoms (e.g., dizziness, nausea, pain, sleepiness)   Negative: Took another person's prescription drug   Negative: [1] DOUBLE DOSE (an extra dose or lesser amount) of prescription drug AND [2] NO symptoms (Exception: a double dose of antibiotics)   Negative: Diabetes drug error or overdose (e.g., took wrong type of insulin or took extra dose)   Negative: [1] Prescription refill request for ESSENTIAL medicine (i.e., likelihood of harm to patient if not taken) AND [2] triager unable to refill per department policy   Negative: [1] Prescription not at pharmacy AND [2] was prescribed by PCP recently (Exception: triager has access to EMR and prescription is recorded there. Go to Home Care and confirm for pharmacy.)   Negative: [1] Pharmacy calling with prescription question AND [2] triager unable to answer question   Negative: [1] Caller has URGENT medicine question about med that PCP or specialist prescribed AND [2] triager unable to answer question   Negative: Medicine patch causing local rash or itching   Negative: [1] Caller has medicine question about med NOT prescribed by PCP AND [2] triager unable to answer question (e.g., compatibility with other med,  storage)   Negative: Prescription request for new medicine (not a refill)   Negative: Prescription refill request for a CONTROLLED substance (e.g., narcotics, ADHD medicines)   Negative: [1] Prescription refill request for NON-ESSENTIAL medicine (i.e., no harm to patient if med not taken) AND [2] triager unable to refill per department policy   Negative: [1] Caller has NON-URGENT medicine question about med that PCP prescribed AND [2] triager unable to answer question   Negative: Caller wants to use a complementary or alternative medicine    Protocols used: MEDICATION QUESTION CALL-A-AH

## 2022-01-07 NOTE — TELEPHONE ENCOUNTER
Pt. Asking for transfer of prescription notified Dr. Britt is out of the office til Monday wanting to transfer to [facility]

## 2022-01-08 RX ORDER — LIDOCAINE HYDROCHLORIDE 20 MG/ML
SOLUTION OROPHARYNGEAL
Qty: 100 ML | Refills: 1 | Status: ON HOLD | OUTPATIENT
Start: 2022-01-08 | End: 2022-03-18

## 2022-01-10 ENCOUNTER — TELEPHONE (OUTPATIENT)
Dept: PRIMARY CARE CLINIC | Facility: CLINIC | Age: 47
End: 2022-01-10
Payer: MEDICAID

## 2022-01-10 NOTE — TELEPHONE ENCOUNTER
----- Message from Lorne Sheth sent at 1/10/2022  2:43 PM CST -----  Regarding: Hematology referral  Patient is requesting to be seen with a Hematology, due to low platelet. Please call the patient. Thank you!

## 2022-01-12 ENCOUNTER — TELEPHONE (OUTPATIENT)
Dept: PRIMARY CARE CLINIC | Facility: CLINIC | Age: 47
End: 2022-01-12
Payer: MEDICAID

## 2022-01-12 NOTE — TELEPHONE ENCOUNTER
----- Message from Theresa Wolf sent at 1/12/2022  8:46 AM CST -----  Contact: [phone]  Type:  Needs Medical Advice    Who Called: Self  Would the patient rather a call back or a response via [facility]? Call back   Best Call Back Number: [phone]   Additional Information: patient needs referrals        More than 6 months

## 2022-01-12 NOTE — TELEPHONE ENCOUNTER
Pt states she needs referral to get rides to food pantry. Instructed patient to call and find out exactly what it is she needs because I have no idea what that referral would be

## 2022-01-13 NOTE — TELEPHONE ENCOUNTER
Spoke with patient and let her know that per Dr. Britt, she doesn't need to see Hematology. Patient stated understanding

## 2022-01-14 ENCOUNTER — TELEPHONE (OUTPATIENT)
Dept: PRIMARY CARE CLINIC | Facility: CLINIC | Age: 47
End: 2022-01-14
Payer: MEDICAID

## 2022-01-14 ENCOUNTER — IMMUNIZATION (OUTPATIENT)
Dept: PRIMARY CARE CLINIC | Facility: CLINIC | Age: 47
End: 2022-01-14
Payer: MEDICAID

## 2022-01-14 DIAGNOSIS — Z23 NEED FOR VACCINATION: Primary | ICD-10-CM

## 2022-01-14 PROCEDURE — 0001A COVID-19, MRNA, LNP-S, PF, 30 MCG/0.3 ML DOSE VACCINE: CPT | Mod: PBBFAC,PN

## 2022-01-14 PROCEDURE — 91300 COVID-19, MRNA, LNP-S, PF, 30 MCG/0.3 ML DOSE VACCINE: CPT | Mod: PBBFAC,PN

## 2022-01-14 NOTE — TELEPHONE ENCOUNTER
I spoke with the patient and let her know that she can discuss her concerns at her upcoming appt on Tuesday. I also let her know I don't see any record of a COVID vaccine, but once she gets hers today, It'll pop up

## 2022-01-14 NOTE — TELEPHONE ENCOUNTER
----- Message from Petra Presley sent at 1/14/2022  1:45 PM CST -----  PT has small bumps appearing on body. Pt has some questions. Please call back     Thanks

## 2022-01-21 ENCOUNTER — PATIENT MESSAGE (OUTPATIENT)
Dept: ADMINISTRATIVE | Facility: HOSPITAL | Age: 47
End: 2022-01-21
Payer: MEDICAID

## 2022-01-24 ENCOUNTER — TELEPHONE (OUTPATIENT)
Dept: PRIMARY CARE CLINIC | Facility: CLINIC | Age: 47
End: 2022-01-24
Payer: MEDICAID
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
## 2022-01-25 ENCOUNTER — TELEPHONE (OUTPATIENT)
Dept: PRIMARY CARE CLINIC | Facility: CLINIC | Age: 47
End: 2022-01-25
Payer: MEDICAID

## 2022-01-25 NOTE — TELEPHONE ENCOUNTER
Called and spoke with patient, appointment scheduled for tomorrow with Dr. Britt to discuss medications.

## 2022-01-25 NOTE — TELEPHONE ENCOUNTER
----- Message from Brooklynn Reid sent at 1/25/2022  9:30 AM CST -----  Contact: Pt [phone]  Caller is requesting an earlier appointment then we can schedule.  Caller is requesting a message be sent to the provider.  If this is for urgent care symptoms, did you offer other providers at this location, providers at other locations, or [facility]? (yes, no, n/a):  n/a  If this is for the patients physical, did you offer to schedule next available and put on wait list, or to see NP or PA for their physical?  (yes, no, n/a):  n/a  When is the next available appointment with their provider:  2/10/2022  Reason for the appointment:  shingles/medication  Patient preference of timeframe to be scheduled:  ASAP  Would the patient like a call back, or a response through their [facility] portal?:   Call back  Comments:      Please call and advise.    Thank You

## 2022-01-26 RX ORDER — ACYCLOVIR 200 MG/1
200 CAPSULE ORAL DAILY
Qty: 30 CAPSULE | Refills: 0 | Status: CANCELLED | OUTPATIENT
Start: 2022-01-26

## 2022-01-26 RX ORDER — ACYCLOVIR 200 MG/1
CAPSULE ORAL
COMMUNITY
Start: 2021-12-14 | End: 2022-02-10

## 2022-01-26 RX ORDER — BICTEGRAVIR SODIUM, EMTRICITABINE, AND TENOFOVIR ALAFENAMIDE FUMARATE 50; 200; 25 MG/1; MG/1; MG/1
1 TABLET ORAL DAILY
COMMUNITY
Start: 2022-01-20 | End: 2022-02-01 | Stop reason: SDUPTHER

## 2022-01-26 RX ORDER — BICTEGRAVIR SODIUM, EMTRICITABINE, AND TENOFOVIR ALAFENAMIDE FUMARATE 50; 200; 25 MG/1; MG/1; MG/1
1 TABLET ORAL DAILY
Qty: 30 TABLET | Refills: 0 | Status: CANCELLED | OUTPATIENT
Start: 2022-01-26

## 2022-01-26 NOTE — TELEPHONE ENCOUNTER
----- Message from Esther Christian MA sent at 1/26/2022 11:38 AM CST -----  Contact: self [phone]  She has an appointment today at 2:20 see if it is okay for her to come in early or not.    ----- Message -----  From: Nehal Caal  Sent: 1/26/2022  11:29 AM CST  To: [facility]abia Staff    Pt will like to know if she can be seen early.    Please call and advise

## 2022-01-26 NOTE — TELEPHONE ENCOUNTER
No new care gaps identified.  Powered by [employer]. Reference number: [id number].   1/26/2022 12:16:41 PM CST

## 2022-01-26 NOTE — TELEPHONE ENCOUNTER
I spoke with the patient. She said she doesn't have time to be seen today. She just wants Dr. Britt to refill her HIV and Herpes medications because she's not going back to [facility] and seeing her previous infection doctor. I let her know she should really come in for an appointment for that, but she declines being seen today when she's scheduled or earlier. I let her know I will forward him her request to see if he's open to filling the medications without her being seen and discussing it with him.

## 2022-01-28 NOTE — TELEPHONE ENCOUNTER
I left the patient a message that Dr. Britt said he can discuss the medications at her upcoming appointment and discuss possibly putting a referral to infectious disease with [facility] if she doesn't want to see her current doctor anymore.

## 2022-02-04 ENCOUNTER — IMMUNIZATION (OUTPATIENT)
Dept: PRIMARY CARE CLINIC | Facility: CLINIC | Age: 47
End: 2022-02-04
Payer: MEDICAID

## 2022-02-04 DIAGNOSIS — Z23 NEED FOR VACCINATION: Primary | ICD-10-CM

## 2022-02-04 PROCEDURE — 91305 COVID-19, MRNA, LNP-S, PF, 30 MCG/0.3 ML DOSE VACCINE (PFIZER): CPT | Mod: PBBFAC,PN

## 2022-02-10 ENCOUNTER — IMMUNIZATION (OUTPATIENT)
Dept: PRIMARY CARE CLINIC | Facility: CLINIC | Age: 47
End: 2022-02-10
Payer: MEDICAID

## 2022-02-10 ENCOUNTER — OFFICE VISIT (OUTPATIENT)
Dept: PRIMARY CARE CLINIC | Facility: CLINIC | Age: 47
End: 2022-02-10
Payer: MEDICAID

## 2022-02-10 VITALS
WEIGHT: 115.94 LBS | BODY MASS INDEX: 21.34 KG/M2 | RESPIRATION RATE: 18 BRPM | HEART RATE: 96 BPM | HEIGHT: 62 IN | OXYGEN SATURATION: 99 % | SYSTOLIC BLOOD PRESSURE: 110 MMHG | DIASTOLIC BLOOD PRESSURE: 68 MMHG

## 2022-02-10 DIAGNOSIS — F43.9 TRAUMA AND STRESSOR-RELATED DISORDER: ICD-10-CM

## 2022-02-10 DIAGNOSIS — F31.9 BIPOLAR 1 DISORDER: Primary | ICD-10-CM

## 2022-02-10 DIAGNOSIS — K12.0 ULCER APHTHOUS ORAL: ICD-10-CM

## 2022-02-10 DIAGNOSIS — F41.9 ANXIETY DISORDER, UNSPECIFIED TYPE: ICD-10-CM

## 2022-02-10 DIAGNOSIS — R51.9 HEADACHE, UNSPECIFIED HEADACHE TYPE: ICD-10-CM

## 2022-02-10 DIAGNOSIS — B20 HIV INFECTION, UNSPECIFIED SYMPTOM STATUS: ICD-10-CM

## 2022-02-10 DIAGNOSIS — J06.9 UPPER RESPIRATORY TRACT INFECTION, UNSPECIFIED TYPE: ICD-10-CM

## 2022-02-10 DIAGNOSIS — F25.0 SCHIZOAFFECTIVE DISORDER, BIPOLAR TYPE: ICD-10-CM

## 2022-02-10 DIAGNOSIS — Z23 NEED FOR VACCINATION: Primary | ICD-10-CM

## 2022-02-10 PROBLEM — E03.9 ACQUIRED HYPOTHYROIDISM: Status: RESOLVED | Noted: 2018-05-04 | Resolved: 2022-02-10

## 2022-02-10 PROCEDURE — 3008F BODY MASS INDEX DOCD: CPT | Mod: CPTII,,, | Performed by: FAMILY MEDICINE

## 2022-02-10 PROCEDURE — 99999 PR PBB SHADOW E&M-EST. PATIENT-LVL IV: ICD-10-PCS | Mod: PBBFAC,,, | Performed by: FAMILY MEDICINE

## 2022-02-10 PROCEDURE — 3078F PR MOST RECENT DIASTOLIC BLOOD PRESSURE < 80 MM HG: ICD-10-PCS | Mod: CPTII,,, | Performed by: FAMILY MEDICINE

## 2022-02-10 PROCEDURE — 99214 PR OFFICE/OUTPT VISIT, EST, LEVL IV, 30-39 MIN: ICD-10-PCS | Mod: S$PBB,,, | Performed by: FAMILY MEDICINE

## 2022-02-10 PROCEDURE — 91306 COVID-19, MRNA, LNP-S, PF, 100 MCG/0.25 ML DOSE VACCINE (MODERNA BOOSTER): CPT | Mod: PBBFAC,PN

## 2022-02-10 PROCEDURE — 1159F MED LIST DOCD IN RCRD: CPT | Mod: CPTII,,, | Performed by: FAMILY MEDICINE

## 2022-02-10 PROCEDURE — 1159F PR MEDICATION LIST DOCUMENTED IN MEDICAL RECORD: ICD-10-PCS | Mod: CPTII,,, | Performed by: FAMILY MEDICINE

## 2022-02-10 PROCEDURE — 3074F PR MOST RECENT SYSTOLIC BLOOD PRESSURE < 130 MM HG: ICD-10-PCS | Mod: CPTII,,, | Performed by: FAMILY MEDICINE

## 2022-02-10 PROCEDURE — 3074F SYST BP LT 130 MM HG: CPT | Mod: CPTII,,, | Performed by: FAMILY MEDICINE

## 2022-02-10 PROCEDURE — 99214 OFFICE O/P EST MOD 30 MIN: CPT | Mod: S$PBB,,, | Performed by: FAMILY MEDICINE

## 2022-02-10 PROCEDURE — 99214 OFFICE O/P EST MOD 30 MIN: CPT | Mod: PBBFAC,PN | Performed by: FAMILY MEDICINE

## 2022-02-10 PROCEDURE — 3078F DIAST BP <80 MM HG: CPT | Mod: CPTII,,, | Performed by: FAMILY MEDICINE

## 2022-02-10 PROCEDURE — 0064A COVID-19, MRNA, LNP-S, PF, 100 MCG/0.25 ML DOSE VACCINE (MODERNA BOOSTER): CPT | Mod: PBBFAC,PN

## 2022-02-10 PROCEDURE — 3008F PR BODY MASS INDEX (BMI) DOCUMENTED: ICD-10-PCS | Mod: CPTII,,, | Performed by: FAMILY MEDICINE

## 2022-02-10 PROCEDURE — 99999 PR PBB SHADOW E&M-EST. PATIENT-LVL IV: CPT | Mod: PBBFAC,,, | Performed by: FAMILY MEDICINE

## 2022-02-10 RX ORDER — QUETIAPINE FUMARATE 50 MG/1
50 TABLET, FILM COATED ORAL NIGHTLY
Qty: 30 TABLET | Refills: 11 | Status: SHIPPED | OUTPATIENT
Start: 2022-02-10 | End: 2022-06-06

## 2022-02-10 RX ORDER — FAMOTIDINE 20 MG/1
20 TABLET, FILM COATED ORAL 2 TIMES DAILY
Qty: 60 TABLET | Refills: 5 | Status: SHIPPED | OUTPATIENT
Start: 2022-02-10 | End: 2022-06-06

## 2022-02-10 RX ORDER — AZITHROMYCIN 250 MG/1
TABLET, FILM COATED ORAL
Qty: 6 TABLET | Refills: 0 | Status: SHIPPED | OUTPATIENT
Start: 2022-02-10 | End: 2022-02-14

## 2022-02-10 RX ORDER — BUSPIRONE HYDROCHLORIDE 10 MG/1
10 TABLET ORAL 3 TIMES DAILY
Qty: 90 TABLET | Refills: 5 | Status: SHIPPED | OUTPATIENT
Start: 2022-02-10 | End: 2022-06-06 | Stop reason: SDUPTHER

## 2022-02-10 RX ORDER — ALPRAZOLAM 0.5 MG/1
TABLET, EXTENDED RELEASE ORAL
Qty: 30 TABLET | Refills: 2 | Status: SHIPPED | OUTPATIENT
Start: 2022-02-10 | End: 2022-06-06

## 2022-02-10 RX ORDER — PREDNISONE 5 MG/1
TABLET ORAL
Qty: 20 TABLET | Refills: 0 | Status: ON HOLD | OUTPATIENT
Start: 2022-02-10 | End: 2022-03-29 | Stop reason: HOSPADM

## 2022-02-10 RX ORDER — IBUPROFEN 600 MG/1
TABLET ORAL
Qty: 100 TABLET | Refills: 5 | Status: SHIPPED | OUTPATIENT
Start: 2022-02-10 | End: 2022-06-06

## 2022-02-10 RX ORDER — CETIRIZINE HYDROCHLORIDE 10 MG/1
10 TABLET ORAL DAILY
Qty: 30 TABLET | Refills: 5 | Status: SHIPPED | OUTPATIENT
Start: 2022-02-10 | End: 2022-06-06

## 2022-02-10 RX ORDER — NAPROXEN 500 MG/1
500 TABLET ORAL 2 TIMES DAILY
Qty: 60 TABLET | Refills: 0 | Status: CANCELLED | OUTPATIENT
Start: 2022-02-10

## 2022-02-10 RX ORDER — FLUTICASONE PROPIONATE 50 MCG
1 SPRAY, SUSPENSION (ML) NASAL 2 TIMES DAILY PRN
Qty: 15 G | Refills: 0 | Status: SHIPPED | OUTPATIENT
Start: 2022-02-10 | End: 2022-06-06
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
## 2022-03-07 ENCOUNTER — TELEPHONE (OUTPATIENT)
Dept: PRIMARY CARE CLINIC | Facility: CLINIC | Age: 47
End: 2022-03-07
Payer: MEDICAID

## 2022-03-07 NOTE — TELEPHONE ENCOUNTER
----- Message from Hawk Caal sent at 3/7/2022  8:52 AM CST -----  Contact: [phone]  Pt received a call from the office and is returning it.Please advise

## 2022-03-09 ENCOUNTER — TELEPHONE (OUTPATIENT)
Dept: PRIMARY CARE CLINIC | Facility: CLINIC | Age: 47
End: 2022-03-09
Payer: MEDICAID

## 2022-03-09 NOTE — TELEPHONE ENCOUNTER
----- Message from Vero Mora sent at 3/9/2022 10:19 AM CST -----  Contact: Arlin [phone]  Arlin with [facility] is calling in regards to an order she received for the pt the left knee left shoulder left ankle and need clinical notes please advise and give return call     Fax [phone]

## 2022-03-18 ENCOUNTER — TELEPHONE (OUTPATIENT)
Dept: PRIMARY CARE CLINIC | Facility: CLINIC | Age: 47
End: 2022-03-18
Payer: MEDICAID

## 2022-03-18 NOTE — TELEPHONE ENCOUNTER
----- Message from Madison Han sent at 3/18/2022 10:40 AM CDT -----  Contact: Patient, [phone]  Calling to speak with the nurse regarding she has shingles around her eye. Please call her. Thanks.

## 2022-03-18 NOTE — TELEPHONE ENCOUNTER
Spoke with patient and let her know that she needs to go to the URGENT CARE or ER stated understanding

## 2022-03-19 ENCOUNTER — HOSPITAL ENCOUNTER (INPATIENT)
Facility: HOSPITAL | Age: 47
LOS: 10 days | Discharge: HOME | DRG: 976 | End: 2022-03-29
Attending: INTERNAL MEDICINE | Admitting: INTERNAL MEDICINE
Payer: MEDICAID

## 2022-03-19 DIAGNOSIS — Z72.0 TOBACCO ABUSE: ICD-10-CM

## 2022-03-19 DIAGNOSIS — F32.5 MAJOR DEPRESSIVE DISORDER WITH SINGLE EPISODE, IN REMISSION: Chronic | ICD-10-CM

## 2022-03-19 DIAGNOSIS — B20 HIV DISEASE: ICD-10-CM

## 2022-03-19 DIAGNOSIS — R07.9 CHEST PAIN: ICD-10-CM

## 2022-03-19 DIAGNOSIS — F41.9 ANXIETY DISORDER, UNSPECIFIED TYPE: ICD-10-CM

## 2022-03-19 DIAGNOSIS — B02.30 HERPES ZOSTER OPHTHALMICUS OF RIGHT EYE: Primary | ICD-10-CM

## 2022-03-19 DIAGNOSIS — B02.9 HERPES ZOSTER: ICD-10-CM

## 2022-03-19 LAB
ALBUMIN SERPL BCP-MCNC: 3.4 G/DL (ref 3.5–5.2)
ALP SERPL-CCNC: 67 U/L (ref 55–135)
ALT SERPL W/O P-5'-P-CCNC: 13 U/L (ref 10–44)
ANION GAP SERPL CALC-SCNC: 8 MMOL/L (ref 8–16)
AST SERPL-CCNC: 17 U/L (ref 10–40)
BILIRUB SERPL-MCNC: 0.2 MG/DL (ref 0.1–1)
BUN SERPL-MCNC: 10 MG/DL (ref 6–20)
CALCIUM SERPL-MCNC: 9.1 MG/DL (ref 8.7–10.5)
CHLORIDE SERPL-SCNC: 103 MMOL/L (ref 95–110)
CO2 SERPL-SCNC: 27 MMOL/L (ref 23–29)
CREAT SERPL-MCNC: 0.9 MG/DL (ref 0.5–1.4)
EST. GFR  (AFRICAN AMERICAN): >60 ML/MIN/1.73 M^2
EST. GFR  (NON AFRICAN AMERICAN): >60 ML/MIN/1.73 M^2
GLUCOSE SERPL-MCNC: 134 MG/DL (ref 70–110)
POTASSIUM SERPL-SCNC: 3.4 MMOL/L (ref 3.5–5.1)
PROT SERPL-MCNC: 7.3 G/DL (ref 6–8.4)
SODIUM SERPL-SCNC: 138 MMOL/L (ref 136–145)

## 2022-03-19 PROCEDURE — 86361 T CELL ABSOLUTE COUNT: CPT | Performed by: NURSE PRACTITIONER

## 2022-03-19 PROCEDURE — 99406 PR TOBACCO USE CESSATION INTERMEDIATE 3-10 MINUTES: ICD-10-PCS | Mod: ,,, | Performed by: NURSE PRACTITIONER

## 2022-03-19 PROCEDURE — 99223 PR INITIAL HOSPITAL CARE,LEVL III: ICD-10-PCS | Mod: ,,, | Performed by: NURSE PRACTITIONER

## 2022-03-19 PROCEDURE — 20600001 HC STEP DOWN PRIVATE ROOM

## 2022-03-19 PROCEDURE — 25000003 PHARM REV CODE 250: Performed by: NURSE PRACTITIONER

## 2022-03-19 PROCEDURE — 63600175 PHARM REV CODE 636 W HCPCS: Performed by: HOSPITALIST

## 2022-03-19 PROCEDURE — 99406 BEHAV CHNG SMOKING 3-10 MIN: CPT | Mod: ,,, | Performed by: NURSE PRACTITIONER

## 2022-03-19 PROCEDURE — 36415 COLL VENOUS BLD VENIPUNCTURE: CPT | Performed by: NURSE PRACTITIONER

## 2022-03-19 PROCEDURE — 27000207 HC ISOLATION

## 2022-03-19 PROCEDURE — 80053 COMPREHEN METABOLIC PANEL: CPT | Performed by: NURSE PRACTITIONER

## 2022-03-19 PROCEDURE — 63600175 PHARM REV CODE 636 W HCPCS: Performed by: NURSE PRACTITIONER

## 2022-03-19 PROCEDURE — 25000003 PHARM REV CODE 250: Performed by: HOSPITALIST

## 2022-03-19 PROCEDURE — 25000003 PHARM REV CODE 250: Performed by: STUDENT IN AN ORGANIZED HEALTH CARE EDUCATION/TRAINING PROGRAM

## 2022-03-19 PROCEDURE — 99223 1ST HOSP IP/OBS HIGH 75: CPT | Mod: ,,, | Performed by: NURSE PRACTITIONER

## 2022-03-19 RX ORDER — KETOROLAC TROMETHAMINE 15 MG/ML
15 INJECTION, SOLUTION INTRAMUSCULAR; INTRAVENOUS EVERY 6 HOURS PRN
Status: ACTIVE | OUTPATIENT
Start: 2022-03-19 | End: 2022-03-22

## 2022-03-19 RX ORDER — ALUMINUM HYDROXIDE, MAGNESIUM HYDROXIDE, AND SIMETHICONE 2400; 240; 2400 MG/30ML; MG/30ML; MG/30ML
30 SUSPENSION ORAL 4 TIMES DAILY PRN
Status: DISCONTINUED | OUTPATIENT
Start: 2022-03-19 | End: 2022-03-29 | Stop reason: HOSPADM

## 2022-03-19 RX ORDER — MIRTAZAPINE 15 MG/1
15 TABLET, FILM COATED ORAL NIGHTLY
Status: DISCONTINUED | OUTPATIENT
Start: 2022-03-19 | End: 2022-03-29 | Stop reason: HOSPADM

## 2022-03-19 RX ORDER — GABAPENTIN 100 MG/1
100 CAPSULE ORAL 3 TIMES DAILY
Status: DISCONTINUED | OUTPATIENT
Start: 2022-03-19 | End: 2022-03-21

## 2022-03-19 RX ORDER — MUPIROCIN 20 MG/G
OINTMENT TOPICAL 2 TIMES DAILY
Status: COMPLETED | OUTPATIENT
Start: 2022-03-19 | End: 2022-03-24

## 2022-03-19 RX ORDER — SODIUM CHLORIDE 0.9 % (FLUSH) 0.9 %
10 SYRINGE (ML) INJECTION
Status: DISCONTINUED | OUTPATIENT
Start: 2022-03-19 | End: 2022-03-29 | Stop reason: HOSPADM

## 2022-03-19 RX ORDER — LITHIUM CARBONATE 300 MG/1
300 TABLET, FILM COATED, EXTENDED RELEASE ORAL 2 TIMES DAILY
Status: DISCONTINUED | OUTPATIENT
Start: 2022-03-19 | End: 2022-03-29 | Stop reason: HOSPADM

## 2022-03-19 RX ORDER — SODIUM CHLORIDE 0.9 % (FLUSH) 0.9 %
10 SYRINGE (ML) INJECTION EVERY 12 HOURS PRN
Status: DISCONTINUED | OUTPATIENT
Start: 2022-03-19 | End: 2022-03-27

## 2022-03-19 RX ORDER — NALOXONE HCL 0.4 MG/ML
0.02 VIAL (ML) INJECTION
Status: DISCONTINUED | OUTPATIENT
Start: 2022-03-19 | End: 2022-03-29 | Stop reason: HOSPADM

## 2022-03-19 RX ORDER — MORPHINE SULFATE 2 MG/ML
2 INJECTION, SOLUTION INTRAMUSCULAR; INTRAVENOUS EVERY 4 HOURS PRN
Status: DISCONTINUED | OUTPATIENT
Start: 2022-03-19 | End: 2022-03-28

## 2022-03-19 RX ORDER — IBUPROFEN 200 MG
16 TABLET ORAL
Status: DISCONTINUED | OUTPATIENT
Start: 2022-03-19 | End: 2022-03-29 | Stop reason: HOSPADM

## 2022-03-19 RX ORDER — LEVOTHYROXINE SODIUM 25 UG/1
25 TABLET ORAL
Status: CANCELLED | OUTPATIENT
Start: 2022-03-20

## 2022-03-19 RX ORDER — TALC
6 POWDER (GRAM) TOPICAL NIGHTLY PRN
Status: DISCONTINUED | OUTPATIENT
Start: 2022-03-19 | End: 2022-03-29 | Stop reason: HOSPADM

## 2022-03-19 RX ORDER — BUSPIRONE HYDROCHLORIDE 10 MG/1
10 TABLET ORAL 3 TIMES DAILY
Status: DISCONTINUED | OUTPATIENT
Start: 2022-03-19 | End: 2022-03-29 | Stop reason: HOSPADM

## 2022-03-19 RX ORDER — HYDROXYZINE HYDROCHLORIDE 25 MG/1
25 TABLET, FILM COATED ORAL 3 TIMES DAILY PRN
Status: DISCONTINUED | OUTPATIENT
Start: 2022-03-19 | End: 2022-03-19

## 2022-03-19 RX ORDER — ALPRAZOLAM 0.25 MG/1
0.25 TABLET ORAL 2 TIMES DAILY PRN
Status: DISCONTINUED | OUTPATIENT
Start: 2022-03-19 | End: 2022-03-29 | Stop reason: HOSPADM

## 2022-03-19 RX ORDER — QUETIAPINE FUMARATE 25 MG/1
50 TABLET, FILM COATED ORAL NIGHTLY
Status: DISCONTINUED | OUTPATIENT
Start: 2022-03-19 | End: 2022-03-19

## 2022-03-19 RX ORDER — HYDROXYZINE HYDROCHLORIDE 25 MG/1
25 TABLET, FILM COATED ORAL 3 TIMES DAILY PRN
Status: DISCONTINUED | OUTPATIENT
Start: 2022-03-19 | End: 2022-03-27

## 2022-03-19 RX ORDER — MORPHINE SULFATE 4 MG/ML
4 INJECTION, SOLUTION INTRAMUSCULAR; INTRAVENOUS EVERY 4 HOURS PRN
Status: DISCONTINUED | OUTPATIENT
Start: 2022-03-19 | End: 2022-03-19

## 2022-03-19 RX ORDER — LEVOTHYROXINE SODIUM 25 UG/1
25 TABLET ORAL
Status: DISCONTINUED | OUTPATIENT
Start: 2022-03-20 | End: 2022-03-29 | Stop reason: HOSPADM

## 2022-03-19 RX ORDER — MORPHINE SULFATE 2 MG/ML
2 INJECTION, SOLUTION INTRAMUSCULAR; INTRAVENOUS EVERY 4 HOURS PRN
Status: DISCONTINUED | OUTPATIENT
Start: 2022-03-19 | End: 2022-03-19

## 2022-03-19 RX ORDER — GLUCAGON 1 MG
1 KIT INJECTION
Status: DISCONTINUED | OUTPATIENT
Start: 2022-03-19 | End: 2022-03-29 | Stop reason: HOSPADM

## 2022-03-19 RX ORDER — IBUPROFEN 200 MG
24 TABLET ORAL
Status: DISCONTINUED | OUTPATIENT
Start: 2022-03-19 | End: 2022-03-29 | Stop reason: HOSPADM

## 2022-03-19 RX ORDER — MORPHINE SULFATE 4 MG/ML
4 INJECTION, SOLUTION INTRAMUSCULAR; INTRAVENOUS EVERY 6 HOURS PRN
Status: DISCONTINUED | OUTPATIENT
Start: 2022-03-19 | End: 2022-03-19

## 2022-03-19 RX ORDER — SIMETHICONE 80 MG
80 TABLET,CHEWABLE ORAL 4 TIMES DAILY PRN
Status: DISCONTINUED | OUTPATIENT
Start: 2022-03-19 | End: 2022-03-29 | Stop reason: HOSPADM

## 2022-03-19 RX ORDER — ATOVAQUONE 750 MG/5ML
1500 SUSPENSION ORAL DAILY
Status: DISCONTINUED | OUTPATIENT
Start: 2022-03-20 | End: 2022-03-29 | Stop reason: HOSPADM

## 2022-03-19 RX ORDER — ONDANSETRON 2 MG/ML
8 INJECTION INTRAMUSCULAR; INTRAVENOUS EVERY 6 HOURS PRN
Status: DISCONTINUED | OUTPATIENT
Start: 2022-03-19 | End: 2022-03-29 | Stop reason: HOSPADM

## 2022-03-19 RX ORDER — FAMOTIDINE 20 MG/1
20 TABLET, FILM COATED ORAL 2 TIMES DAILY
Status: DISCONTINUED | OUTPATIENT
Start: 2022-03-19 | End: 2022-03-29 | Stop reason: HOSPADM

## 2022-03-19 RX ORDER — NICOTINE 7MG/24HR
1 PATCH, TRANSDERMAL 24 HOURS TRANSDERMAL DAILY
Status: DISCONTINUED | OUTPATIENT
Start: 2022-03-20 | End: 2022-03-29 | Stop reason: HOSPADM

## 2022-03-19 RX ORDER — ACETAMINOPHEN 325 MG/1
650 TABLET ORAL EVERY 4 HOURS PRN
Status: DISCONTINUED | OUTPATIENT
Start: 2022-03-19 | End: 2022-03-29 | Stop reason: HOSPADM

## 2022-03-19 RX ADMIN — BUSPIRONE HYDROCHLORIDE 10 MG: 10 TABLET ORAL at 08:03

## 2022-03-19 RX ADMIN — MORPHINE SULFATE 2 MG: 2 INJECTION, SOLUTION INTRAMUSCULAR; INTRAVENOUS at 09:03

## 2022-03-19 RX ADMIN — QUETIAPINE FUMARATE 50 MG: 25 TABLET ORAL at 08:03

## 2022-03-19 RX ADMIN — MUPIROCIN: 20 OINTMENT TOPICAL at 11:03

## 2022-03-19 RX ADMIN — ACETAMINOPHEN 650 MG: 325 TABLET ORAL at 08:03

## 2022-03-19 RX ADMIN — FAMOTIDINE 20 MG: 20 TABLET ORAL at 08:03

## 2022-03-19 RX ADMIN — LITHIUM CARBONATE 300 MG: 300 TABLET, FILM COATED, EXTENDED RELEASE ORAL at 08:03

## 2022-03-19 RX ADMIN — MIRTAZAPINE 15 MG: 15 TABLET, FILM COATED ORAL at 08:03

## 2022-03-19 RX ADMIN — ACYCLOVIR SODIUM 480 MG: 1000 INJECTION, SOLUTION INTRAVENOUS at 08:03

## 2022-03-19 RX ADMIN — GABAPENTIN 100 MG: 100 CAPSULE ORAL at 08:03

## 2022-03-19 NOTE — HPI
Patricia Nye is a 46 y.o. female with a PMHx of HIV, insomnia, anxiety/depression, polysubstance abuse, and hypothyroidism who was admitted to [facility] on March 18th with right periorbital swelling. The patient reports a rash developed to her chest wall about 1.5-2 weeks ago accompanied by a sensation of pins and needles. She then developed swelling around her right eye and had a rash around her eye and the bridge of her nose beginning 3 days ago. She was seen at [facility] two nights ago but left AMA and then presented to [place]. She was admitted with concern for herpes zoster ophthalmicus and started on IV acyclovir. Eye is reportedly more swollen, and she c/o of stinging pain to the skin over her eye and the right side of her face. Denies any pain to her actual eye and denies any blurry vision. Endorses chills. Denies any fever, abdominal pain, chest pain, nausea, vomiting, diarrhea, shortness of breath, cough, or dysuria. The patient was transferred to [facility] for ophthalmology consult.    Labs were reviewed from OSH: COVID negative, sodium 138, potassium 3.6, chloride 103, CO2 23, BUN 9, creatinine 0.8, glucose 100, AST 20, ALT 15, white blood cells 6.82, hemoglobin 12.2, hematocrit 37.5, platelets 324

## 2022-03-19 NOTE — ASSESSMENT & PLAN NOTE
Patient reports rash developed to her chest wall about 1.5-2 weeks ago accompanied by a sensation of pins and needles. She then developed swelling around her right eye and had a rash around her eye and the bridge of her nose beginning 3 days ago. She was seen at [facility] two nights ago but left AMA and presented to [place]. She was admitted with concern for herpes zoster ophthalmicus and started on IV acyclovir. Eye  is reportedly more swollen, and she c/o of stinging pain to the skin over her eye and the right side of her face. Denies any pain to her actual eye and denies any blurry vision.  The patient was transferred to [facility] for ophthalmology consult.    -Continue IV acyclovir 500mg q8hrs  -Contact, droplet, and airborne isolation  -Consult ophthalmology due to concern for possible acute retinal necrosis, appreciate recs. Discussed patient with resident. Plan to monitor overnight for any vision changes, and they will evaluate the patient in the morning.  -Add gabapentin 100mg tid

## 2022-03-19 NOTE — ASSESSMENT & PLAN NOTE
This patient in known to have AIDS (from CD4 count has been less than 200). Last CD4 21 (April 2021), new CD4 count pending. Patient is on HAART. Will continue HAART. Continued prophylaxis with atovaquone. Continue to monitor routine labs.     We will consult Infectious disease at this time. Evaluate and treat HIV-associated diseases as indicated by specific problems listed below.

## 2022-03-20 LAB
BASOPHILS # BLD AUTO: 0.04 K/UL (ref 0–0.2)
BASOPHILS NFR BLD: 0.5 % (ref 0–1.9)
DIFFERENTIAL METHOD: ABNORMAL
EOSINOPHIL # BLD AUTO: 0.1 K/UL (ref 0–0.5)
EOSINOPHIL NFR BLD: 1.3 % (ref 0–8)
ERYTHROCYTE [DISTWIDTH] IN BLOOD BY AUTOMATED COUNT: 14.6 % (ref 11.5–14.5)
HCT VFR BLD AUTO: 35.9 % (ref 37–48.5)
HGB BLD-MCNC: 11.6 G/DL (ref 12–16)
IMM GRANULOCYTES # BLD AUTO: 0.02 K/UL (ref 0–0.04)
IMM GRANULOCYTES NFR BLD AUTO: 0.2 % (ref 0–0.5)
LYMPHOCYTES # BLD AUTO: 0.9 K/UL (ref 1–4.8)
LYMPHOCYTES NFR BLD: 11 % (ref 18–48)
MCH RBC QN AUTO: 27.9 PG (ref 27–31)
MCHC RBC AUTO-ENTMCNC: 32.3 G/DL (ref 32–36)
MCV RBC AUTO: 86 FL (ref 82–98)
MONOCYTES # BLD AUTO: 0.7 K/UL (ref 0.3–1)
MONOCYTES NFR BLD: 8.4 % (ref 4–15)
NEUTROPHILS # BLD AUTO: 6.4 K/UL (ref 1.8–7.7)
NEUTROPHILS NFR BLD: 78.6 % (ref 38–73)
NRBC BLD-RTO: 0 /100 WBC
PLATELET # BLD AUTO: 277 K/UL (ref 150–450)
PMV BLD AUTO: 10.7 FL (ref 9.2–12.9)
RBC # BLD AUTO: 4.16 M/UL (ref 4–5.4)
WBC # BLD AUTO: 8.17 K/UL (ref 3.9–12.7)

## 2022-03-20 PROCEDURE — 63600175 PHARM REV CODE 636 W HCPCS: Performed by: NURSE PRACTITIONER

## 2022-03-20 PROCEDURE — S4991 NICOTINE PATCH NONLEGEND: HCPCS | Performed by: HOSPITALIST

## 2022-03-20 PROCEDURE — 63600175 PHARM REV CODE 636 W HCPCS: Performed by: HOSPITALIST

## 2022-03-20 PROCEDURE — 20600001 HC STEP DOWN PRIVATE ROOM

## 2022-03-20 PROCEDURE — 27000207 HC ISOLATION

## 2022-03-20 PROCEDURE — 25000003 PHARM REV CODE 250: Performed by: STUDENT IN AN ORGANIZED HEALTH CARE EDUCATION/TRAINING PROGRAM

## 2022-03-20 PROCEDURE — 85025 COMPLETE CBC W/AUTO DIFF WBC: CPT | Performed by: NURSE PRACTITIONER

## 2022-03-20 PROCEDURE — 25000003 PHARM REV CODE 250: Performed by: NURSE PRACTITIONER

## 2022-03-20 PROCEDURE — 63600175 PHARM REV CODE 636 W HCPCS: Performed by: STUDENT IN AN ORGANIZED HEALTH CARE EDUCATION/TRAINING PROGRAM

## 2022-03-20 PROCEDURE — 99223 1ST HOSP IP/OBS HIGH 75: CPT | Mod: ,,, | Performed by: INTERNAL MEDICINE

## 2022-03-20 PROCEDURE — 25000003 PHARM REV CODE 250: Performed by: HOSPITALIST

## 2022-03-20 PROCEDURE — 99232 PR SUBSEQUENT HOSPITAL CARE,LEVL II: ICD-10-PCS | Mod: ,,, | Performed by: STUDENT IN AN ORGANIZED HEALTH CARE EDUCATION/TRAINING PROGRAM

## 2022-03-20 PROCEDURE — 36415 COLL VENOUS BLD VENIPUNCTURE: CPT | Performed by: NURSE PRACTITIONER

## 2022-03-20 PROCEDURE — 99232 SBSQ HOSP IP/OBS MODERATE 35: CPT | Mod: ,,, | Performed by: STUDENT IN AN ORGANIZED HEALTH CARE EDUCATION/TRAINING PROGRAM

## 2022-03-20 PROCEDURE — 99223 PR INITIAL HOSPITAL CARE,LEVL III: ICD-10-PCS | Mod: ,,, | Performed by: INTERNAL MEDICINE

## 2022-03-20 RX ORDER — SODIUM CHLORIDE 9 MG/ML
INJECTION, SOLUTION INTRAVENOUS CONTINUOUS
Status: ACTIVE | OUTPATIENT
Start: 2022-03-20 | End: 2022-03-20

## 2022-03-20 RX ORDER — ERYTHROMYCIN 5 MG/G
OINTMENT OPHTHALMIC 2 TIMES DAILY
Status: DISCONTINUED | OUTPATIENT
Start: 2022-03-20 | End: 2022-03-20

## 2022-03-20 RX ORDER — BACITRACIN 500 [USP'U]/G
OINTMENT OPHTHALMIC 2 TIMES DAILY
Status: DISCONTINUED | OUTPATIENT
Start: 2022-03-20 | End: 2022-03-20

## 2022-03-20 RX ORDER — ERYTHROMYCIN 5 MG/G
OINTMENT OPHTHALMIC 2 TIMES DAILY
Status: DISCONTINUED | OUTPATIENT
Start: 2022-03-21 | End: 2022-03-29 | Stop reason: HOSPADM

## 2022-03-20 RX ADMIN — VANCOMYCIN HYDROCHLORIDE 750 MG: 750 INJECTION, POWDER, LYOPHILIZED, FOR SOLUTION INTRAVENOUS at 06:03

## 2022-03-20 RX ADMIN — POTASSIUM BICARBONATE 50 MEQ: 25 TABLET, EFFERVESCENT ORAL at 04:03

## 2022-03-20 RX ADMIN — LITHIUM CARBONATE 300 MG: 300 TABLET, FILM COATED, EXTENDED RELEASE ORAL at 11:03

## 2022-03-20 RX ADMIN — MELATONIN TAB 3 MG 6 MG: 3 TAB at 08:03

## 2022-03-20 RX ADMIN — GABAPENTIN 100 MG: 100 CAPSULE ORAL at 09:03

## 2022-03-20 RX ADMIN — MIRTAZAPINE 15 MG: 15 TABLET, FILM COATED ORAL at 09:03

## 2022-03-20 RX ADMIN — LITHIUM CARBONATE 300 MG: 300 TABLET, FILM COATED, EXTENDED RELEASE ORAL at 09:03

## 2022-03-20 RX ADMIN — MORPHINE SULFATE 2 MG: 2 INJECTION, SOLUTION INTRAMUSCULAR; INTRAVENOUS at 08:03

## 2022-03-20 RX ADMIN — MUPIROCIN: 20 OINTMENT TOPICAL at 09:03

## 2022-03-20 RX ADMIN — LEVOTHYROXINE SODIUM 25 MCG: 25 TABLET ORAL at 08:03

## 2022-03-20 RX ADMIN — ATOVAQUONE 1500 MG: 750 SUSPENSION ORAL at 08:03

## 2022-03-20 RX ADMIN — ACYCLOVIR SODIUM 480 MG: 1000 INJECTION, SOLUTION INTRAVENOUS at 03:03

## 2022-03-20 RX ADMIN — GABAPENTIN 100 MG: 100 CAPSULE ORAL at 08:03

## 2022-03-20 RX ADMIN — ERYTHROMYCIN 1 INCH: 5 OINTMENT OPHTHALMIC at 06:03

## 2022-03-20 RX ADMIN — HYPROMELLOSE 2910 1 DROP: 5 SOLUTION OPHTHALMIC at 04:03

## 2022-03-20 RX ADMIN — MORPHINE SULFATE 2 MG: 2 INJECTION, SOLUTION INTRAMUSCULAR; INTRAVENOUS at 04:03

## 2022-03-20 RX ADMIN — ACYCLOVIR SODIUM 480 MG: 1000 INJECTION, SOLUTION INTRAVENOUS at 09:03

## 2022-03-20 RX ADMIN — BICTEGRAVIR SODIUM, EMTRICITABINE, AND TENOFOVIR ALAFENAMIDE FUMARATE 1 TABLET: 50; 200; 25 TABLET ORAL at 01:03

## 2022-03-20 RX ADMIN — HYPROMELLOSE 2910 1 DROP: 5 SOLUTION OPHTHALMIC at 09:03

## 2022-03-20 RX ADMIN — HYDROXYZINE HYDROCHLORIDE 25 MG: 25 TABLET ORAL at 01:03

## 2022-03-20 RX ADMIN — CEFTRIAXONE 2 G: 2 INJECTION, SOLUTION INTRAVENOUS at 04:03

## 2022-03-20 RX ADMIN — BUSPIRONE HYDROCHLORIDE 10 MG: 10 TABLET ORAL at 08:03

## 2022-03-20 RX ADMIN — BUSPIRONE HYDROCHLORIDE 10 MG: 10 TABLET ORAL at 04:03

## 2022-03-20 RX ADMIN — FAMOTIDINE 20 MG: 20 TABLET ORAL at 09:03

## 2022-03-20 RX ADMIN — ACYCLOVIR SODIUM 480 MG: 1000 INJECTION, SOLUTION INTRAVENOUS at 05:03

## 2022-03-20 RX ADMIN — MUPIROCIN: 20 OINTMENT TOPICAL at 11:03

## 2022-03-20 RX ADMIN — GABAPENTIN 100 MG: 100 CAPSULE ORAL at 04:03

## 2022-03-20 RX ADMIN — SODIUM CHLORIDE: 0.9 INJECTION, SOLUTION INTRAVENOUS at 11:03

## 2022-03-20 RX ADMIN — BUSPIRONE HYDROCHLORIDE 10 MG: 10 TABLET ORAL at 09:03

## 2022-03-20 RX ADMIN — FAMOTIDINE 20 MG: 20 TABLET ORAL at 08:03

## 2022-03-20 RX ADMIN — MORPHINE SULFATE 2 MG: 2 INJECTION, SOLUTION INTRAMUSCULAR; INTRAVENOUS at 02:03

## 2022-03-20 RX ADMIN — NICOTINE 1 PATCH: 7 PATCH TRANSDERMAL at 08:03

## 2022-03-20 NOTE — ASSESSMENT & PLAN NOTE
Patient reports rash developed to her chest wall about 1.5-2 weeks ago accompanied by a sensation of pins and needles. She then developed swelling around her right eye and had a rash around her eye and the bridge of her nose beginning 3 days ago. She was seen at [facility] two nights ago but left AMA and presented to [place]. She was admitted with concern for herpes zoster ophthalmicus and started on IV acyclovir. Eye  is reportedly more swollen, and she c/o of stinging pain to the skin over her eye and the right side of her face. Denies any pain to her actual eye and denies any blurry vision.  The patient was transferred to [facility] for ophthalmology consult.    -Continue IV acyclovir 500mg q8hrs  -IVF  -Contact, droplet, and airborne isolation  -Consult ophthalmology due to concern for possible acute retinal necrosis, appreciate recs. Discussed patient with resident. Plan to monitor overnight for any vision changes, and they will evaluate the patient in the morning.  -Add gabapentin 100mg tid  -per Ophthalmology: V1 dermatome distribution vesicular rash with exam revealing no  cell/flare, no KP, positive for pseudodendritic lesions Posterior exam without signs of ARN/PORN (no retinal whitening).  -appreciate recs  -erythromycin ointment to both the skin lesions and the eye b.i.d.  -lubrication with preservative-free tears q.i.d.  -ID consult, appreciate recs

## 2022-03-20 NOTE — SUBJECTIVE & OBJECTIVE
Past Medical History:   Diagnosis Date    Allergy     Anxiety     Cervical dysplasia 1998 / 2016    Depression     HIV infection     Hyperthyroidism     Insomnia        Past Surgical History:   Procedure Laterality Date    SKULL FRACTURE ELEVATION         Review of patient's allergies indicates:   Allergen Reactions    Opioids - morphine analogues      Pt states she is not allergic to morphine      Pcn [penicillins] Other (See Comments)     Trouble swallowing and vomiting.     Sulfa (sulfonamide antibiotics)      Rash (skin)^       Medications:  Medications Prior to Admission   Medication Sig    ALPRAZolam (XANAX XR) 0.5 MG Tb24 Take one tablet PRN anxiety.    atovaquone (MEPRON) 750 mg/5 mL Susp Take 1,500 mg by mouth.    azelastine (ASTELIN) 137 mcg (0.1 %) nasal spray 1 spray (137 mcg total) by Nasal route 2 (two) times daily.    BIKTARVY -25 mg per tablet Take 1 tablet by mouth once daily.    busPIRone (BUSPAR) 10 MG tablet Take 1 tablet (10 mg total) by mouth 3 (three) times daily.    cetirizine (ZYRTEC) 10 MG tablet Take 1 tablet (10 mg total) by mouth once daily.    diphenhydrAMINE-aluminum-magnesium hydroxide-simethicone-LIDOcaine HCl 2% Swish and spit 15 mLs every 4 (four) hours as needed (mouth pain).    famotidine (PEPCID) 20 MG tablet Take 1 tablet (20 mg total) by mouth 2 (two) times daily.    fluconazole (DIFLUCAN) 150 MG Tab One p.o. q.d. x7 days    fluticasone propionate (FLONASE) 50 mcg/actuation nasal spray 1 spray (50 mcg total) by Each Nostril route 2 (two) times daily as needed for Rhinitis.    hydrOXYzine HCL (ATARAX) 25 MG tablet Take 1 tablet (25 mg total) by mouth 3 (three) times daily as needed for Anxiety.    ibuprofen (ADVIL,MOTRIN) 600 MG tablet Start after steroid complete 1 p.o. q.6 hours p.r.n. pain swell    ketotifen (ALAWAY) 0.025 % (0.035 %) ophthalmic solution Place 1 drop into both eyes once daily.    levothyroxine (SYNTHROID) 25 MCG tablet Take 1 tablet (25 mcg total) by  "mouth before breakfast.    LIDOcaine HCl 2% (LIDOCAINE VISCOUS) 2 % Soln TAKE ONE TEASPOONFULS right before eating EVERY 6 HOURS IF NEEDED FOR PAIN    lithium (LITHOTAB) 300 mg tablet Take 300 mg by mouth 2 (two) times a day. LITHIUM CARBONATE ER    [] methocarbamoL (ROBAXIN) 500 MG Tab Take 2 tablets (1,000 mg total) by mouth 3 (three) times daily. for 5 days    mirtazapine (REMERON) 15 MG tablet Take 1 tablet (15 mg total) by mouth every evening.    naproxen (NAPROSYN) 500 MG tablet Take 1 tablet (500 mg total) by mouth 2 (two) times daily.    predniSONE (DELTASONE) 5 MG tablet 4 po qd x 2, 3 po qd x2, 2 po qd x2, 1 po qd x2    promethazine (PHENERGAN) 25 MG tablet Take 1 tablet (25 mg total) by mouth every 6 (six) hours as needed for Nausea.    QUEtiapine (SEROQUEL) 50 MG tablet Take 1 tablet (50 mg total) by mouth every evening.     Antibiotics (From admission, onward)                Start     Stop Route Frequency Ordered    22 0900  erythromycin 5 mg/gram (0.5 %) ophthalmic ointment         -- RIGHT EYE 2 times daily 22 1502    22 1530  cefTRIAXone (ROCEPHIN) 2 g/50 mL D5W IVPB         -- IV Every 24 hours (non-standard times) 22 1420    22 1530  vancomycin 750 mg in dextrose 5 % 250 mL IVPB (ready to mix system)         -- IV Every 12 hours (non-standard times) 22 1430    22 1519  vancomycin - pharmacy to dose  (vancomycin IVPB)        "And" Linked Group Details    -- IV pharmacy to manage frequency 22 1419    22 2345  mupirocin 2 % ointment         2059 Nasl 2 times daily 22 2241          Antifungals (From admission, onward)                None          Antivirals (From admission, onward)          Stop Route Frequency     fteljefgz-eazgytww-zsuhdzt ala         -- Oral Daily     acyclovir (ZOVIRAX) injection         -- IV Every 8 hours (non-standard times)             Immunization History   Administered Date(s) Administered    COVID-19 " MRNA, LN-S PF (MODERNA HALF 0.25 ML DOSE) 02/10/2022    COVID-19, MRNA, LN-S, PF (Pfizer) (Gray Cap) 02/04/2022    COVID-19, MRNA, LN-S, PF (Pfizer) (Purple Cap) 01/14/2022    Influenza - Quadrivalent - MDCK - PF 10/10/2019    Influenza - Quadrivalent - PF *Preferred* (6 months and older) 11/09/2016, 11/20/2020    Pneumococcal Conjugate - 13 Valent 08/23/2017    Pneumococcal Polysaccharide - 23 Valent 02/06/2020    Td - PF (ADULT) 02/06/2020    Tdap 11/09/2016       Family History       Problem Relation (Age of Onset)    Heart disease Mother          Social History     Socioeconomic History    Marital status:    Occupational History    Occupation: unemployed    Tobacco Use    Smoking status: Current Some Day Smoker     Types: Vaping with nicotine    Smokeless tobacco: Never Used   Substance and Sexual Activity    Alcohol use: Yes     Comment: socially    Drug use: Not Currently    Sexual activity: Yes     Partners: Male     Birth control/protection: None     Social Determinants of Health     Financial Resource Strain: Medium Risk    Difficulty of Paying Living Expenses: Somewhat hard   Food Insecurity: No Food Insecurity    Worried About Running Out of Food in the Last Year: Never true    Ran Out of Food in the Last Year: Never true   Transportation Needs: No Transportation Needs    Lack of Transportation (Medical): No    Lack of Transportation (Non-Medical): No   Physical Activity: Inactive    Days of Exercise per Week: 0 days    Minutes of Exercise per Session: 0 min   Stress: Stress Concern Present    Feeling of Stress : To some extent   Social Connections: Unknown    Frequency of Communication with Friends and Family: More than three times a week    Frequency of Social Gatherings with Friends and Family: More than three times a week    Attends [employer] Services: Patient refused    Active Member of Clubs or Organizations: Patient refused    Attends Club or Organization Meetings: Patient refused     Marital Status: Patient refused   Housing Stability: Low Risk     Unable to Pay for Housing in the Last Year: No    Number of Places Lived in the Last Year: 1    Unstable Housing in the Last Year: No     Review of Systems   Constitutional:  Positive for activity change. Negative for appetite change, chills and fever.   HENT:  Negative for congestion.    Eyes:  Negative for pain and itching.        Right eye swelling and rash    Respiratory:  Negative for cough, chest tightness and shortness of breath.    Cardiovascular:  Negative for palpitations and leg swelling.   Gastrointestinal:  Negative for abdominal pain and nausea.   Endocrine: Negative for polyphagia and polyuria.   Genitourinary:  Negative for dysuria and frequency.   Musculoskeletal:  Negative for back pain.   Skin:  Positive for rash.   Neurological:  Negative for numbness and headaches.   Psychiatric/Behavioral:  Negative for agitation and behavioral problems.    Objective:     Vital Signs (Most Recent):  Temp: 98.4 °F (36.9 °C) (03/20/22 0138)  Pulse: 82 (03/20/22 0138)  Resp: 20 (03/20/22 1411)  BP: (!) 93/55 (03/20/22 1630)  SpO2: 100 % (03/20/22 0138)   Vital Signs (24h Range):  Temp:  [98 °F (36.7 °C)-98.4 °F (36.9 °C)] 98.4 °F (36.9 °C)  Pulse:  [76-82] 82  Resp:  [18-20] 20  SpO2:  [99 %-100 %] 100 %  BP: ()/(55-73) 93/55     Weight: 53.7 kg (118 lb 6.2 oz)  Body mass index is 22.37 kg/m².    Estimated Creatinine Clearance: 59.6 mL/min (based on SCr of 0.9 mg/dL).    Physical Exam  Constitutional:       Appearance: Normal appearance.   HENT:      Head: Normocephalic and atraumatic.   Cardiovascular:      Rate and Rhythm: Normal rate.   Pulmonary:      Effort: Pulmonary effort is normal.   Abdominal:      General: Abdomen is flat.      Palpations: Abdomen is soft.   Musculoskeletal:      Cervical back: Normal range of motion and neck supple.   Neurological:      Mental Status: She is alert and oriented to person, place, and time.    Psychiatric:         Behavior: Behavior normal.         Significant Labs: All pertinent labs within the past 24 hours have been reviewed.    Significant Imaging: I have reviewed all pertinent imaging results/findings within the past 24 hours.

## 2022-03-20 NOTE — PROGRESS NOTES
Pharmacokinetic Initial Assessment: IV Vancomycin    Assessment/Plan:  Initiate intravenous vancomycin 750 mg Q12H   Desired empiric serum trough concentration is 10 to 15 mcg/mL  Draw vancomycin trough level 30 min prior to fourth dose on 3/22 at approximately 0300  Pharmacy will continue to follow and monitor vancomycin.      Please contact pharmacy at extension [phone] with any questions regarding this assessment.     Thank you for the consult,   Ivanna Perdomo       Patient brief summary:  Patricia Nye is a 46 y.o. female initiated on antimicrobial therapy with IV Vancomycin for treatment of suspected skin & soft tissue infection    Drug Allergies:   Review of patient's allergies indicates:   Allergen Reactions    Opioids - morphine analogues      Pt states she is not allergic to morphine      Pcn [penicillins] Other (See Comments)     Trouble swallowing and vomiting.     Sulfa (sulfonamide antibiotics)      Rash (skin)^       Actual Body Weight:   53.7 kg    Renal Function:   Estimated Creatinine Clearance: 59.6 mL/min (based on SCr of 0.9 mg/dL).,     Dialysis Method (if applicable):  N/A    CBC (last 72 hours):  Recent Labs   Lab Result Units 03/18/22 1844 03/20/22  0544   WBC K/uL 6.82 8.17   Hemoglobin g/dL 12.2 11.6*   Hematocrit % 37.5 35.9*   Platelets K/uL 324 277   Gran % % 71.6 78.6*   Lymph % % 17.4* 11.0*   Mono % % 9.7 8.4   Eosinophil % % 0.6 1.3   Basophil % % 0.6 0.5   Differential Method  Automated Automated       Metabolic Panel (last 72 hours):  Recent Labs   Lab Result Units 03/18/22 1844 03/19/22  1953   Sodium mmol/L 138 138   Potassium mmol/L 3.6 3.4*   Chloride mmol/L 103 103   CO2 mmol/L 23 27   Glucose mg/dL 100 134*   BUN mg/dL 9 10   Creatinine mg/dL 0.8 0.9   Albumin g/dL 4.1 3.4*   Total Bilirubin mg/dL 0.3 0.2   Alkaline Phosphatase U/L 76 67   AST U/L 20 17   ALT U/L 15 13       Drug levels (last 3 results):  No results for input(s): VANCOMYCINRA, VANCOMYCINPE,  VANCOMYCINTR in the last 72 hours.    Microbiologic Results:  Microbiology Results (last 7 days)     ** No results found for the last 168 hours. **

## 2022-03-20 NOTE — CONSULTS
Consultation Report  Ophthalmology Service    Date: 03/20/2022    Chief complaint/Reason for Consult: concern for zoster ophthalmicus     History of Present Illness: Patricia Nye is a 46 y.o. female with PMHx of HIV, insomnia, anxiety/depression, polysubstance abuse, and hypothyroidism who was admitted to [facility] on March 18th with right periorbital swelling.    Patient denies any visual changes, visual disturbances, such as flashes, floaters, or curtain-veil in visual field OU. Pain mostly overlying nasal skin, no eye pain.    POcularHx: Denies history of ocular problems or past ocular surgeries.    Current eye gtts: Denies     Family Hx: Denies family history of glaucoma, macular degeneration, or blindness. family history includes Heart disease in her mother.     PMHx:  has a past medical history of Allergy, Anxiety, Cervical dysplasia (1998 / 2016), Depression, HIV infection, Hyperthyroidism, and Insomnia.     PSurgHx:  has a past surgical history that includes Skull fracture elevation.     Home Medications:   Prior to Admission medications    Medication Sig Start Date End Date Taking? Authorizing Provider   ALPRAZolam (XANAX XR) 0.5 MG Tb24 Take one tablet PRN anxiety. 2/10/22   Cornell Britt MD   atovaquone (MEPRON) 750 mg/5 mL Susp Take 1,500 mg by mouth. 10/10/19   Historical Provider   azelastine (ASTELIN) 137 mcg (0.1 %) nasal spray 1 spray (137 mcg total) by Nasal route 2 (two) times daily. 2/19/21 6/29/21  Connie Lake DNP   BIKTARVY -25 mg per tablet Take 1 tablet by mouth once daily. 2/1/22   Pedro Lott MD   busPIRone (BUSPAR) 10 MG tablet Take 1 tablet (10 mg total) by mouth 3 (three) times daily. 2/10/22 3/12/22  Cornell Britt MD   cetirizine (ZYRTEC) 10 MG tablet Take 1 tablet (10 mg total) by mouth once daily. 2/10/22 3/12/22  Cornell Britt MD   diphenhydrAMINE-aluminum-magnesium hydroxide-simethicone-LIDOcaine HCl 2% Swish and spit 15 mLs  every 4 (four) hours as needed (mouth pain). 2/10/22   Cornell Britt MD   famotidine (PEPCID) 20 MG tablet Take 1 tablet (20 mg total) by mouth 2 (two) times daily. 2/10/22 3/12/22  Cornell Britt MD   fluconazole (DIFLUCAN) 150 MG Tab One p.o. q.d. x7 days 12/6/21   Cornell Britt MD   fluticasone propionate (FLONASE) 50 mcg/actuation nasal spray 1 spray (50 mcg total) by Each Nostril route 2 (two) times daily as needed for Rhinitis. 2/10/22   Cornell Britt MD   hydrOXYzine HCL (ATARAX) 25 MG tablet Take 1 tablet (25 mg total) by mouth 3 (three) times daily as needed for Anxiety. 11/14/21   Raza Schmitz MD   ibuprofen (ADVIL,MOTRIN) 600 MG tablet Start after steroid complete 1 p.o. q.6 hours p.r.n. pain swell 2/10/22   Cornell Britt MD   ketotifen (ALAWAY) 0.025 % (0.035 %) ophthalmic solution Place 1 drop into both eyes once daily. 2/14/22 2/14/23  Kami Morgan NP   levothyroxine (SYNTHROID) 25 MCG tablet Take 1 tablet (25 mcg total) by mouth before breakfast. 12/1/21 12/31/21  West Christensen MD   LIDOcaine HCl 2% (LIDOCAINE VISCOUS) 2 % Soln TAKE ONE TEASPOONFULS right before eating EVERY 6 HOURS IF NEEDED FOR PAIN 3/18/22   Cornell Britt MD   lithium (LITHOTAB) 300 mg tablet Take 300 mg by mouth 2 (two) times a day. LITHIUM CARBONATE ER    Historical Provider   methocarbamoL (ROBAXIN) 500 MG Tab Take 2 tablets (1,000 mg total) by mouth 3 (three) times daily. for 5 days 3/14/22 3/19/22  Kelly Espino PA-C   mirtazapine (REMERON) 15 MG tablet Take 1 tablet (15 mg total) by mouth every evening. 12/1/21 12/31/21  West Christensen MD   naproxen (NAPROSYN) 500 MG tablet Take 1 tablet (500 mg total) by mouth 2 (two) times daily. 12/18/20   Ritchie Britt MD   predniSONE (DELTASONE) 5 MG tablet 4 po qd x 2, 3 po qd x2, 2 po qd x2, 1 po qd x2 2/10/22   Cornell Britt MD   promethazine (PHENERGAN) 25 MG tablet Take 1 tablet (25 mg total) by mouth every 6 (six) hours as  needed for Nausea. 2/1/22   Pedro Lott MD   QUEtiapine (SEROQUEL) 50 MG tablet Take 1 tablet (50 mg total) by mouth every evening. 2/10/22 2/10/23  Cornell Britt MD        Medications this encounter:    acyclovir  480 mg Intravenous Q8H    atovaquone  1,500 mg Oral Daily    hywdtxcej-ikzhlpfd-eznwuyy ala  1 tablet Oral Daily    busPIRone  10 mg Oral TID    famotidine  20 mg Oral BID    gabapentin  100 mg Oral TID    levothyroxine  25 mcg Oral Before breakfast    lithium  300 mg Oral BID    mirtazapine  15 mg Oral QHS    mupirocin   Nasal BID    nicotine  1 patch Transdermal Daily       Allergies: is allergic to opioids - morphine analogues, pcn [penicillins], and sulfa (sulfonamide antibiotics).     Social:  reports that she has been smoking vaping with nicotine. She has never used smokeless tobacco. She reports current alcohol use. She reports previous drug use.     ROS: As per HPI    Ocular examination/Dilated fundus examination:  Base Eye Exam     Visual Acuity (Snellen - Linear)       Right Left    Dist sc 20/20 20/20          Tonometry (Tonopen, 12:58 PM)       Right Left    Pressure 14 15          Pupils       Dark Light Shape React APD    Right 2 1 Round Brisk None    Left 2 1 Round Brisk None            Slit Lamp and Fundus Exam     External Exam       Right Left    External V1 dermatome vesicular rash, different stages of healing mostly medially. +Hutchingson sign. Normal          Slit Lamp Exam       Right Left    Lids/Lashes vesicular rash of UL>LL Normal    Conjunctiva/Sclera inferior chemosis, 1+ injection White and quiet    Cornea pseudodendritic lesions throughout temporal K extending to inferior K. No KP. Clear    Anterior Chamber Deep and quiet Deep and quiet    Iris Round and reactive Round and reactive    Lens Clear Clear          Fundus Exam       Right Left    Disc Pink and sharp Pink and sharp    C/D Ratio 0.4 0.4    Macula Flat Flat    Vessels Arteriolar narrowing  Arteriolar narrowing    Periphery Flat 360, no holes/tears/detachments Flat 360, no holes/tears/detachments                  Assessment/Plan:     1. Zoster Ophthalmicus, Right Eye  - Patient presents with V1 dermatome distribution vesicular rash starting 2-3 days prior to ophthalmology evaluation on affected side.  - History of HIV (pending CD4 count)  - Patient without significantly decreased vision, good IOP.  - Exam revealing no cell/flare, no KP, positive for pseudodendritic lesions (per exam). Posterior exam without signs of ARN/PORN (no retinal whitening).  - Recommendations:  - Ophthalmic antibiotic ointment (e.g. bacitracin or erythromycin) both to the skin lesions and the eye BID  - Lubrication with Preservative Free Tears QID (and ointment as above).  - Agree with IV Acyclovir 10mg/kg q8hr for 1 week (defer to ID)  - Will follow up while inpatient.    Recommendations:  - Ophthalmic antibiotic ointment (e.g. bacitracin or erythromycin) both to the skin lesions and the eye BID  - Lubrication with Preservative Free Tears QID (and ointment as above).  - Agree with IV Acyclovir 10mg/kg q8hr for 1 week (defer to ID)        Jan Yepez MD PGY-2  [employer] Ophthalmology Resident  03/20/2022  1:02 PM

## 2022-03-20 NOTE — HPI
"Patricia Nye is 47 y/o female with hx of HIV hypothyroidism, anxiety, depression, and insomnia presented to [facility] on 3/18 with right periorbital swelling.      Patient report having "chicken pox rash on chest and back" since her last admission ~12/2021 she was advised to take acyclovir which she has stopped end of feburary - now report rash appeared in her face and she noted a "pimple" on her upper eye led that she tried to "squeeze" and for past 2 days she had worsening swelling- denies any vision changes or eye pain- she does report some HA.    ID consulted for zoster ophthalmicus.       "

## 2022-03-20 NOTE — SUBJECTIVE & OBJECTIVE
Past Medical History:   Diagnosis Date    Allergy     Anxiety     Cervical dysplasia 1998 / 2016    Depression     HIV infection     Hyperthyroidism     Insomnia        Past Surgical History:   Procedure Laterality Date    SKULL FRACTURE ELEVATION         Review of patient's allergies indicates:   Allergen Reactions    Opioids - morphine analogues      Pt states she is not allergic to morphine      Pcn [penicillins] Other (See Comments)     Trouble swallowing and vomiting.     Sulfa (sulfonamide antibiotics)      Rash (skin)^       Current Facility-Administered Medications on File Prior to Encounter   Medication    [COMPLETED] acyclovir (ZOVIRAX) 400 mg in dextrose 5 % 100 mL IVPB    [DISCONTINUED] acetaminophen tablet 650 mg    [DISCONTINUED] acyclovir (ZOVIRAX) 500 mg in dextrose 5 % 100 mL IVPB    [DISCONTINUED] aluminum & magnesium hydroxide-simethicone 400-400-40 mg/5 mL suspension 30 mL    [DISCONTINUED] Atovaquone suspension (PATIENT HOME MEDICATION)    [DISCONTINUED] Biktarvy (PATIENT HOME MEDICATION)    [DISCONTINUED] busPIRone tablet 10 mg    [DISCONTINUED] dextrose 50% injection 12.5 g    [DISCONTINUED] dextrose 50% injection 25 g    [DISCONTINUED] famotidine tablet 20 mg    [DISCONTINUED] glucagon (human recombinant) injection 1 mg    [DISCONTINUED] glucose chewable tablet 16 g    [DISCONTINUED] glucose chewable tablet 24 g    [DISCONTINUED] hydrOXYzine HCL tablet 25 mg    [DISCONTINUED] levothyroxine tablet 25 mcg    [DISCONTINUED] lithium capsule 300 mg    [DISCONTINUED] magic mouthwash (diphenhydrAMINE 12.5 mg/5 mL 20 mL, aluminum & magnesium hydroxide-simethicone (MYLANTA) 20 mL, LIDOcaine HCl 2% (XYLOCAINE) 20 mL) solution    [DISCONTINUED] melatonin tablet 6 mg    [DISCONTINUED] melatonin tablet 6 mg    [DISCONTINUED] mirtazapine tablet 15 mg    [DISCONTINUED] morphine injection 2 mg    [DISCONTINUED] morphine injection 4 mg    [DISCONTINUED] naloxone 0.4 mg/mL injection 0.02 mg    [DISCONTINUED]  nicotine 7 mg/24 hr 1 patch    [DISCONTINUED] nicotine 7 mg/24 hr 1 patch    [DISCONTINUED] ondansetron injection 8 mg    [DISCONTINUED] QUEtiapine tablet 50 mg    [DISCONTINUED] simethicone chewable tablet 80 mg    [DISCONTINUED] sodium chloride 0.9% flush 10 mL    [DISCONTINUED] sodium chloride 0.9% flush 10 mL     Current Outpatient Medications on File Prior to Encounter   Medication Sig    ALPRAZolam (XANAX XR) 0.5 MG Tb24 Take one tablet PRN anxiety.    atovaquone (MEPRON) 750 mg/5 mL Susp Take 1,500 mg by mouth.    azelastine (ASTELIN) 137 mcg (0.1 %) nasal spray 1 spray (137 mcg total) by Nasal route 2 (two) times daily.    BIKTARVY -25 mg per tablet Take 1 tablet by mouth once daily.    busPIRone (BUSPAR) 10 MG tablet Take 1 tablet (10 mg total) by mouth 3 (three) times daily.    cetirizine (ZYRTEC) 10 MG tablet Take 1 tablet (10 mg total) by mouth once daily.    diphenhydrAMINE-aluminum-magnesium hydroxide-simethicone-LIDOcaine HCl 2% Swish and spit 15 mLs every 4 (four) hours as needed (mouth pain).    famotidine (PEPCID) 20 MG tablet Take 1 tablet (20 mg total) by mouth 2 (two) times daily.    fluconazole (DIFLUCAN) 150 MG Tab One p.o. q.d. x7 days    fluticasone propionate (FLONASE) 50 mcg/actuation nasal spray 1 spray (50 mcg total) by Each Nostril route 2 (two) times daily as needed for Rhinitis.    hydrOXYzine HCL (ATARAX) 25 MG tablet Take 1 tablet (25 mg total) by mouth 3 (three) times daily as needed for Anxiety.    ibuprofen (ADVIL,MOTRIN) 600 MG tablet Start after steroid complete 1 p.o. q.6 hours p.r.n. pain swell    ketotifen (ALAWAY) 0.025 % (0.035 %) ophthalmic solution Place 1 drop into both eyes once daily.    levothyroxine (SYNTHROID) 25 MCG tablet Take 1 tablet (25 mcg total) by mouth before breakfast.    LIDOcaine HCl 2% (LIDOCAINE VISCOUS) 2 % Soln TAKE ONE TEASPOONFULS right before eating EVERY 6 HOURS IF NEEDED FOR PAIN    lithium (LITHOTAB) 300 mg tablet Take 300 mg by mouth 2  "(two) times a day. LITHIUM CARBONATE ER    methocarbamoL (ROBAXIN) 500 MG Tab Take 2 tablets (1,000 mg total) by mouth 3 (three) times daily. for 5 days    mirtazapine (REMERON) 15 MG tablet Take 1 tablet (15 mg total) by mouth every evening.    naproxen (NAPROSYN) 500 MG tablet Take 1 tablet (500 mg total) by mouth 2 (two) times daily.    predniSONE (DELTASONE) 5 MG tablet 4 po qd x 2, 3 po qd x2, 2 po qd x2, 1 po qd x2    promethazine (PHENERGAN) 25 MG tablet Take 1 tablet (25 mg total) by mouth every 6 (six) hours as needed for Nausea.    QUEtiapine (SEROQUEL) 50 MG tablet Take 1 tablet (50 mg total) by mouth every evening.     Family History       Problem Relation (Age of Onset)    Heart disease Mother          Tobacco Use    Smoking status: Current Some Day Smoker     Types: Vaping with nicotine    Smokeless tobacco: Never Used   Substance and Sexual Activity    Alcohol use: Yes     Comment: socially    Drug use: Not Currently    Sexual activity: Yes     Partners: Male     Birth control/protection: None     Review of Systems   Constitutional:  Positive for chills. Negative for activity change, appetite change, diaphoresis, fatigue and fever.   HENT:  Positive for facial swelling. Negative for congestion, postnasal drip, rhinorrhea and sore throat.    Eyes:  Negative for photophobia, pain, redness and visual disturbance.        Pain surrounding right eye (stinging pain "like pins and needles")   Respiratory:  Negative for cough, chest tightness, shortness of breath and wheezing.    Cardiovascular:  Negative for chest pain, palpitations and leg swelling.   Gastrointestinal:  Negative for abdominal distention, abdominal pain, diarrhea, nausea and vomiting.   Genitourinary:  Negative for difficulty urinating, dysuria, frequency and hematuria.   Musculoskeletal:  Negative for arthralgias, back pain, myalgias and neck pain.   Skin:  Positive for rash. Negative for pallor.   Allergic/Immunologic: Positive for " immunocompromised state.   Neurological:  Negative for dizziness, syncope, weakness, light-headedness and headaches.   Psychiatric/Behavioral:  Negative for agitation, confusion and hallucinations. The patient is not nervous/anxious.    Objective:     Vital Signs (Most Recent):    Vital Signs (24h Range):  Temp:  [98.1 °F (36.7 °C)-98.4 °F (36.9 °C)] 98.2 °F (36.8 °C)  Pulse:  [63-86] 74  Resp:  [16-20] 20  SpO2:  [97 %-100 %] 99 %  BP: ()/(57-75) 111/69        There is no height or weight on file to calculate BMI.    Physical Exam  Vitals and nursing note reviewed.   Constitutional:       General: She is not in acute distress.     Appearance: She is not toxic-appearing.   HENT:      Head: Normocephalic and atraumatic.      Mouth/Throat:      Mouth: Mucous membranes are moist.   Eyes:      Pupils: Pupils are equal, round, and reactive to light.      Comments: White periocular vesicles that are coalescing with surrounding erythema, involving eyelid and the nasal bridge. Tender to touch.    Cardiovascular:      Rate and Rhythm: Normal rate and regular rhythm.      Heart sounds: No murmur heard.  Pulmonary:      Effort: Pulmonary effort is normal. No respiratory distress.      Breath sounds: No wheezing, rhonchi or rales.   Abdominal:      General: Bowel sounds are normal. There is no distension.      Palpations: Abdomen is soft.      Tenderness: There is no abdominal tenderness. There is no guarding.   Genitourinary:     Comments: deferred  Musculoskeletal:         General: No swelling, tenderness or deformity. Normal range of motion.      Cervical back: Normal range of motion. No tenderness.   Skin:     General: Skin is warm and dry.      Capillary Refill: Capillary refill takes less than 2 seconds.   Neurological:      General: No focal deficit present.      Mental Status: She is alert and oriented to person, place, and time.      Sensory: No sensory deficit.      Motor: No weakness.   Psychiatric:          Mood and Affect: Mood normal.         Behavior: Behavior normal.         Thought Content: Thought content normal.         Judgment: Judgment normal.         CRANIAL NERVES     CN III, IV, VI   Pupils are equal, round, and reactive to light.     Significant Labs: All pertinent labs within the past 24 hours have been reviewed.  BMP:   Recent Labs   Lab 03/18/22  1844         K 3.6      CO2 23   BUN 9   CREATININE 0.8   CALCIUM 9.8     CBC:   Recent Labs   Lab 03/18/22  1844   WBC 6.82   HGB 12.2   HCT 37.5          Significant Imaging: I have reviewed all pertinent imaging results/findings within the past 24 hours.

## 2022-03-20 NOTE — PROGRESS NOTES
[facility] ([address] Medicine  Progress Note    Patient Name: Patricia Nye  MRN: [id number]  Patient Class: IP- Inpatient   Admission Date: 3/19/2022  Length of Stay: 1 days  Attending Physician: Adelia Murphy MD  Primary Care Provider: Cornell Britt MD        Subjective:     Principal Problem:Herpes zoster ophthalmicus of right eye        HPI:  Patricia Nye is a 46 y.o. female with a PMHx of HIV, insomnia, anxiety/depression, polysubstance abuse, and hypothyroidism who was admitted to [facility] on March 18th with right periorbital swelling. The patient reports a rash developed to her chest wall about 1.5-2 weeks ago accompanied by a sensation of pins and needles. She then developed swelling around her right eye and had a rash around her eye and the bridge of her nose beginning 3 days ago. She was seen at [facility] two nights ago but left AMA and then presented to [facility]. She was admitted with concern for herpes zoster ophthalmicus and started on IV acyclovir. Eye is reportedly more swollen, and she c/o of stinging pain to the skin over her eye and the right side of her face. Denies any pain to her actual eye and denies any blurry vision. Endorses chills. Denies any fever, abdominal pain, chest pain, nausea, vomiting, diarrhea, shortness of breath, cough, or dysuria. The patient was transferred to [facility] for ophthalmology consult.    Labs were reviewed from OSH: COVID negative, sodium 138, potassium 3.6, chloride 103, CO2 23, BUN 9, creatinine 0.8, glucose 100, AST 20, ALT 15, white blood cells 6.82, hemoglobin 12.2, hematocrit 37.5, platelets 324      Overview/Hospital Course:  No notes on file    Interval History:   No acute events overnight.  Patient knows no chills or fever like symptoms overnight.  She also denies any discharge from right eye.  She has stinging around the eye that is ongoing, however denies eye pain with movement or visual  "changes.  She notes that the swelling just makes her eye difficult open on the right side.    Review of Systems   Constitutional:  Positive for chills (A couple days prior to presentation). Negative for activity change, appetite change, diaphoresis, fatigue and fever.   HENT:  Positive for facial swelling. Negative for congestion, postnasal drip, rhinorrhea, sore throat, tinnitus and trouble swallowing.    Eyes:  Negative for photophobia, pain, redness and visual disturbance.        Pain surrounding right eye (stinging pain "like pins and needles")  However no pain with eye movement    Respiratory:  Negative for cough, chest tightness, shortness of breath and wheezing.    Cardiovascular:  Negative for chest pain, palpitations and leg swelling.   Gastrointestinal:  Negative for abdominal distention, abdominal pain, diarrhea, nausea and vomiting.   Genitourinary:  Negative for difficulty urinating, dysuria, frequency and hematuria.   Musculoskeletal:  Negative for arthralgias, back pain, myalgias and neck pain.   Skin:  Positive for rash. Negative for pallor.   Allergic/Immunologic: Positive for immunocompromised state.   Neurological:  Negative for dizziness, syncope, weakness, light-headedness and headaches.   Psychiatric/Behavioral:  Negative for decreased concentration.      Objective:     Vital Signs (Most Recent):  Temp: 98.4 °F (36.9 °C) (03/20/22 0138)  Pulse: 82 (03/20/22 0138)  Resp: 20 (03/20/22 1411)  BP: 112/73 (03/20/22 0138)  SpO2: 100 % (03/20/22 0138) Vital Signs (24h Range):  Temp:  [98 °F (36.7 °C)-98.4 °F (36.9 °C)] 98.4 °F (36.9 °C)  Pulse:  [74-82] 82  Resp:  [18-20] 20  SpO2:  [99 %-100 %] 100 %  BP: (111-120)/(69-73) 112/73     Weight: 53.7 kg (118 lb 6.2 oz)  Body mass index is 22.37 kg/m².  No intake or output data in the 24 hours ending 03/20/22 1446   Physical Exam  Vitals and nursing note reviewed.   Constitutional:       General: She is not in acute distress.     Appearance: She is not " toxic-appearing.   HENT:      Head: Normocephalic and atraumatic.      Right Ear: External ear normal.      Left Ear: External ear normal.      Nose: No congestion or rhinorrhea.      Mouth/Throat:      Mouth: Mucous membranes are moist.      Pharynx: No oropharyngeal exudate or posterior oropharyngeal erythema.   Eyes:      Comments: White periocular vesicles that are coalescing with surrounding erythema, involving eyelid and the nasal bridge. Tender to touch.    Cardiovascular:      Rate and Rhythm: Normal rate and regular rhythm.      Heart sounds: No murmur heard.  Pulmonary:      Effort: Pulmonary effort is normal. No respiratory distress.      Breath sounds: No wheezing, rhonchi or rales.   Abdominal:      General: Bowel sounds are normal. There is no distension.      Palpations: Abdomen is soft.      Tenderness: There is no abdominal tenderness. There is no guarding.   Musculoskeletal:         General: No swelling, tenderness or deformity. Normal range of motion.      Cervical back: Normal range of motion. No tenderness.   Skin:     General: Skin is warm and dry.      Capillary Refill: Capillary refill takes less than 2 seconds.   Neurological:      Mental Status: She is alert and oriented to person, place, and time.      Motor: No weakness.   Psychiatric:         Mood and Affect: Mood normal.         Behavior: Behavior normal.         Thought Content: Thought content normal.         Judgment: Judgment normal.       Significant Labs: All pertinent labs within the past 24 hours have been reviewed.    Recent Results (from the past 24 hour(s))   Comprehensive Metabolic Panel    Collection Time: 03/19/22  7:53 PM   Result Value Ref Range    Sodium 138 136 - 145 mmol/L    Potassium 3.4 (L) 3.5 - 5.1 mmol/L    Chloride 103 95 - 110 mmol/L    CO2 27 23 - 29 mmol/L    Glucose 134 (H) 70 - 110 mg/dL    BUN 10 6 - 20 mg/dL    Creatinine 0.9 0.5 - 1.4 mg/dL    Calcium 9.1 8.7 - 10.5 mg/dL    Total Protein 7.3 6.0 - 8.4  g/dL    Albumin 3.4 (L) 3.5 - 5.2 g/dL    Total Bilirubin 0.2 0.1 - 1.0 mg/dL    Alkaline Phosphatase 67 55 - 135 U/L    AST 17 10 - 40 U/L    ALT 13 10 - 44 U/L    Anion Gap 8 8 - 16 mmol/L    eGFR if African American >60.0 >60 mL/min/1.73 m^2    eGFR if non African American >60.0 >60 mL/min/1.73 m^2   CBC Auto Differential    Collection Time: 03/20/22  5:44 AM   Result Value Ref Range    WBC 8.17 3.90 - 12.70 K/uL    RBC 4.16 4.00 - 5.40 M/uL    Hemoglobin 11.6 (L) 12.0 - 16.0 g/dL    Hematocrit 35.9 (L) 37.0 - 48.5 %    MCV 86 82 - 98 fL    MCH 27.9 27.0 - 31.0 pg    MCHC 32.3 32.0 - 36.0 g/dL    RDW 14.6 (H) 11.5 - 14.5 %    Platelets 277 150 - 450 K/uL    MPV 10.7 9.2 - 12.9 fL    Immature Granulocytes 0.2 0.0 - 0.5 %    Gran # (ANC) 6.4 1.8 - 7.7 K/uL    Immature Grans (Abs) 0.02 0.00 - 0.04 K/uL    Lymph # 0.9 (L) 1.0 - 4.8 K/uL    Mono # 0.7 0.3 - 1.0 K/uL    Eos # 0.1 0.0 - 0.5 K/uL    Baso # 0.04 0.00 - 0.20 K/uL    nRBC 0 0 /100 WBC    Gran % 78.6 (H) 38.0 - 73.0 %    Lymph % 11.0 (L) 18.0 - 48.0 %    Mono % 8.4 4.0 - 15.0 %    Eosinophil % 1.3 0.0 - 8.0 %    Basophil % 0.5 0.0 - 1.9 %    Differential Method Automated            Significant Imaging: I have reviewed all pertinent imaging results/findings within the past 24 hours.      Assessment/Plan:      * Herpes zoster ophthalmicus of right eye  Patient reports rash developed to her chest wall about 1.5-2 weeks ago accompanied by a sensation of pins and needles. She then developed swelling around her right eye and had a rash around her eye and the bridge of her nose beginning 3 days ago. She was seen at [facility] two nights ago but left AMA and presented to [facility]. She was admitted with concern for herpes zoster ophthalmicus and started on IV acyclovir. Eye  is reportedly more swollen, and she c/o of stinging pain to the skin over her eye and the right side of her face. Denies any pain to her actual eye and denies any blurry vision.  The patient was  transferred to [facility] for ophthalmology consult.    -Continue IV acyclovir 500mg q8hrs  -IVF  -Contact, droplet, and airborne isolation  -Consult ophthalmology due to concern for possible acute retinal necrosis, appreciate recs. Discussed patient with resident. Plan to monitor overnight for any vision changes, and they will evaluate the patient in the morning.  -Add gabapentin 100mg tid  -per Ophthalmology: V1 dermatome distribution vesicular rash with exam revealing no  cell/flare, no KP, positive for pseudodendritic lesions Posterior exam without signs of ARN/PORN (no retinal whitening).  -appreciate recs  -erythromycin ointment to both the skin lesions and the eye b.i.d.  -lubrication with preservative-free tears q.i.d.  -ID consult, appreciate recs    Anxiety disorder  -Chronic, stable.  reviewed.   -Continue home xanax PRN  -Continue buspar 10mg tid    Tobacco abuse  Assistance with smoking cessation was offered, including:  [x]  Medications  [x]  Counseling  []  Printed Information on Smoking Cessation  []  Referral to a Smoking Cessation Program    Patient was counseled regarding smoking for 3-10 minutes.    Primary insomnia  -Continue remeron qhs    Major depressive disorder in remission  Bipolar 1 disorder  -Continue lithium    HIV disease  This patient in known to have AIDS (from CD4 count has been less than 200). Last CD4 21 (April 2021), new CD4 count pending. Patient is on HAART. Will continue HAART. Continued prophylaxis with atovaquone. Continue to monitor routine labs.     We will consult Infectious disease at this time. Evaluate and treat HIV-associated diseases as indicated by specific problems listed below.       VTE Risk Mitigation (From admission, onward)         Ordered     IP VTE LOW RISK PATIENT  Once         03/19/22 1925     Place sequential compression device  Until discontinued         03/19/22 1925                Discharge Planning   RUFINA: 3/24/2022     Code Status: Full Code   Is the  patient medically ready for discharge?: No    Reason for patient still in hospital (select all that apply): Patient trending condition, Treatment and Consult recommendations                     Adelia Murphy MD  Department of Hospital Medicine   [facility] - Intensive Care ([place]-[facility])

## 2022-03-20 NOTE — SUBJECTIVE & OBJECTIVE
"Interval History:   No acute events overnight.  Patient knows no chills or fever like symptoms overnight.  She also denies any discharge from right eye.  She has stinging around the eye that is ongoing, however denies eye pain with movement or visual changes.  She notes that the swelling just makes her eye difficult open on the right side.    Review of Systems   Constitutional:  Positive for chills (A couple days prior to presentation). Negative for activity change, appetite change, diaphoresis, fatigue and fever.   HENT:  Positive for facial swelling. Negative for congestion, postnasal drip, rhinorrhea, sore throat, tinnitus and trouble swallowing.    Eyes:  Negative for photophobia, pain, redness and visual disturbance.        Pain surrounding right eye (stinging pain "like pins and needles")  However no pain with eye movement    Respiratory:  Negative for cough, chest tightness, shortness of breath and wheezing.    Cardiovascular:  Negative for chest pain, palpitations and leg swelling.   Gastrointestinal:  Negative for abdominal distention, abdominal pain, diarrhea, nausea and vomiting.   Genitourinary:  Negative for difficulty urinating, dysuria, frequency and hematuria.   Musculoskeletal:  Negative for arthralgias, back pain, myalgias and neck pain.   Skin:  Positive for rash. Negative for pallor.   Allergic/Immunologic: Positive for immunocompromised state.   Neurological:  Negative for dizziness, syncope, weakness, light-headedness and headaches.   Psychiatric/Behavioral:  Negative for decreased concentration.      Objective:     Vital Signs (Most Recent):  Temp: 98.4 °F (36.9 °C) (03/20/22 0138)  Pulse: 82 (03/20/22 0138)  Resp: 20 (03/20/22 1411)  BP: 112/73 (03/20/22 0138)  SpO2: 100 % (03/20/22 0138) Vital Signs (24h Range):  Temp:  [98 °F (36.7 °C)-98.4 °F (36.9 °C)] 98.4 °F (36.9 °C)  Pulse:  [74-82] 82  Resp:  [18-20] 20  SpO2:  [99 %-100 %] 100 %  BP: (111-120)/(69-73) 112/73     Weight: 53.7 kg (118 " lb 6.2 oz)  Body mass index is 22.37 kg/m².  No intake or output data in the 24 hours ending 03/20/22 1446   Physical Exam  Vitals and nursing note reviewed.   Constitutional:       General: She is not in acute distress.     Appearance: She is not toxic-appearing.   HENT:      Head: Normocephalic and atraumatic.      Right Ear: External ear normal.      Left Ear: External ear normal.      Nose: No congestion or rhinorrhea.      Mouth/Throat:      Mouth: Mucous membranes are moist.      Pharynx: No oropharyngeal exudate or posterior oropharyngeal erythema.   Eyes:      Comments: White periocular vesicles that are coalescing with surrounding erythema, involving eyelid and the nasal bridge. Tender to touch.    Cardiovascular:      Rate and Rhythm: Normal rate and regular rhythm.      Heart sounds: No murmur heard.  Pulmonary:      Effort: Pulmonary effort is normal. No respiratory distress.      Breath sounds: No wheezing, rhonchi or rales.   Abdominal:      General: Bowel sounds are normal. There is no distension.      Palpations: Abdomen is soft.      Tenderness: There is no abdominal tenderness. There is no guarding.   Musculoskeletal:         General: No swelling, tenderness or deformity. Normal range of motion.      Cervical back: Normal range of motion. No tenderness.   Skin:     General: Skin is warm and dry.      Capillary Refill: Capillary refill takes less than 2 seconds.   Neurological:      Mental Status: She is alert and oriented to person, place, and time.      Motor: No weakness.   Psychiatric:         Mood and Affect: Mood normal.         Behavior: Behavior normal.         Thought Content: Thought content normal.         Judgment: Judgment normal.       Significant Labs: All pertinent labs within the past 24 hours have been reviewed.    Recent Results (from the past 24 hour(s))   Comprehensive Metabolic Panel    Collection Time: 03/19/22  7:53 PM   Result Value Ref Range    Sodium 138 136 - 145 mmol/L     Potassium 3.4 (L) 3.5 - 5.1 mmol/L    Chloride 103 95 - 110 mmol/L    CO2 27 23 - 29 mmol/L    Glucose 134 (H) 70 - 110 mg/dL    BUN 10 6 - 20 mg/dL    Creatinine 0.9 0.5 - 1.4 mg/dL    Calcium 9.1 8.7 - 10.5 mg/dL    Total Protein 7.3 6.0 - 8.4 g/dL    Albumin 3.4 (L) 3.5 - 5.2 g/dL    Total Bilirubin 0.2 0.1 - 1.0 mg/dL    Alkaline Phosphatase 67 55 - 135 U/L    AST 17 10 - 40 U/L    ALT 13 10 - 44 U/L    Anion Gap 8 8 - 16 mmol/L    eGFR if African American >60.0 >60 mL/min/1.73 m^2    eGFR if non African American >60.0 >60 mL/min/1.73 m^2   CBC Auto Differential    Collection Time: 03/20/22  5:44 AM   Result Value Ref Range    WBC 8.17 3.90 - 12.70 K/uL    RBC 4.16 4.00 - 5.40 M/uL    Hemoglobin 11.6 (L) 12.0 - 16.0 g/dL    Hematocrit 35.9 (L) 37.0 - 48.5 %    MCV 86 82 - 98 fL    MCH 27.9 27.0 - 31.0 pg    MCHC 32.3 32.0 - 36.0 g/dL    RDW 14.6 (H) 11.5 - 14.5 %    Platelets 277 150 - 450 K/uL    MPV 10.7 9.2 - 12.9 fL    Immature Granulocytes 0.2 0.0 - 0.5 %    Gran # (ANC) 6.4 1.8 - 7.7 K/uL    Immature Grans (Abs) 0.02 0.00 - 0.04 K/uL    Lymph # 0.9 (L) 1.0 - 4.8 K/uL    Mono # 0.7 0.3 - 1.0 K/uL    Eos # 0.1 0.0 - 0.5 K/uL    Baso # 0.04 0.00 - 0.20 K/uL    nRBC 0 0 /100 WBC    Gran % 78.6 (H) 38.0 - 73.0 %    Lymph % 11.0 (L) 18.0 - 48.0 %    Mono % 8.4 4.0 - 15.0 %    Eosinophil % 1.3 0.0 - 8.0 %    Basophil % 0.5 0.0 - 1.9 %    Differential Method Automated            Significant Imaging: I have reviewed all pertinent imaging results/findings within the past 24 hours.

## 2022-03-20 NOTE — ASSESSMENT & PLAN NOTE
45 y/o female with a hx of HIV (on biktarvy) presented 3/18 with right periorbital swelling and rash concerning for herpes zoster ophthalmicus started on IV acyclovir s/p ophtha exam 3/20 (Exam positive for pseudodendritic lesions (per exam)- Now with worsening swelling concerning for superimposed bacterial infection.    Recommendations:    1. Continue IV acyclovir.  2. Start IV vancomycin and ceftriaxone for superimposed bacterial infection.  3. Appreciate optha evaluation.

## 2022-03-20 NOTE — PLAN OF CARE
Pt rested well throughout shift, no distress at this time, no acute changes, VSS, personal items within reach, call light in reach, bed alarm set, will continue to monitor.

## 2022-03-20 NOTE — PLAN OF CARE
Alert and oriented x 4. Speech is clear. Ambulates independently in room and to bathroom. Compliant with meds. IV antibiotics given with no adverse affects. Safety measures in place. Pain meds given and effective.

## 2022-03-20 NOTE — PLAN OF CARE
Handoff received for ongoing RN that  patient transferred to this facility from  other hospital via W/C alert and oriented x 4 able to make needs known. Skin warm and dry to touch to respirations easy and non labored, demeanor is calm denies pain and or discomfort at this time educated on the safety interventions including call bell. White board updated, will continue to monitor

## 2022-03-20 NOTE — CONSULTS
"[facility] - Intensive Care ([place] [address])  Infectious Disease  Consult Note    Patient Name: Patricia Nye  MRN: [id number]  Admission Date: 3/19/2022  Hospital Length of Stay: 1 days  Attending Physician: Adelia Murphy MD  Primary Care Provider: Cornell Britt MD     Isolation Status: Airborne and Contact and Droplet    Patient information was obtained from patient, past medical records and ER records.      Inpatient consult to Infectious Diseases  Consult performed by: Kendall Benton MD  Consult ordered by: Kailee Long NP        Assessment/Plan:     * Herpes zoster ophthalmicus of right eye  47 y/o female with a hx of HIV (on biktarvy) presented 3/18 with right periorbital swelling and rash concerning for herpes zoster ophthalmicus started on IV acyclovir s/p ophtha exam 3/20 (Exam positive for pseudodendritic lesions (per exam)- Now with worsening swelling concerning for superimposed bacterial infection.    Recommendations:    1. Continue IV acyclovir.  2. Start IV vancomycin and ceftriaxone for superimposed bacterial infection.  3. Appreciate optha evaluation.     HIV disease  Patient of Dr. Mendoza- Diagnosed 1997- last VL 1/2022 ~149     Recommendations:    -- Continue Biktarvy and atovaquone ppx.  -- Follow up CD4 levels.        Thank you for your consult. I will follow-up with patient. Please contact us if you have any additional questions.    Kendall Benton MD  Infectious Disease  [facility] - Intensive Care ([place] [address])    Subjective:     Principal Problem: Herpes zoster ophthalmicus of right eye    HPI: Patricia Nye is 47 y/o female with hx of HIV hypothyroidism, anxiety, depression, and insomnia presented to [facility] on 3/18 with right periorbital swelling.      Patient report having "chicken pox rash on chest and back" since her last admission ~12/2021 she was advised to take acyclovir which she has stopped end of feburary - now report rash " "appeared in her face and she noted a "pimple" on her upper eye led that she tried to "squeeze" and for past 2 days she had worsening swelling- denies any vision changes or eye pain- she does report some HA.    ID consulted for zoster ophthalmicus.           Past Medical History:   Diagnosis Date    Allergy     Anxiety     Cervical dysplasia 1998 / 2016    Depression     HIV infection     Hyperthyroidism     Insomnia        Past Surgical History:   Procedure Laterality Date    SKULL FRACTURE ELEVATION         Review of patient's allergies indicates:   Allergen Reactions    Opioids - morphine analogues      Pt states she is not allergic to morphine      Pcn [penicillins] Other (See Comments)     Trouble swallowing and vomiting.     Sulfa (sulfonamide antibiotics)      Rash (skin)^       Medications:  Medications Prior to Admission   Medication Sig    ALPRAZolam (XANAX XR) 0.5 MG Tb24 Take one tablet PRN anxiety.    atovaquone (MEPRON) 750 mg/5 mL Susp Take 1,500 mg by mouth.    azelastine (ASTELIN) 137 mcg (0.1 %) nasal spray 1 spray (137 mcg total) by Nasal route 2 (two) times daily.    BIKTARVY -25 mg per tablet Take 1 tablet by mouth once daily.    busPIRone (BUSPAR) 10 MG tablet Take 1 tablet (10 mg total) by mouth 3 (three) times daily.    cetirizine (ZYRTEC) 10 MG tablet Take 1 tablet (10 mg total) by mouth once daily.    diphenhydrAMINE-aluminum-magnesium hydroxide-simethicone-LIDOcaine HCl 2% Swish and spit 15 mLs every 4 (four) hours as needed (mouth pain).    famotidine (PEPCID) 20 MG tablet Take 1 tablet (20 mg total) by mouth 2 (two) times daily.    fluconazole (DIFLUCAN) 150 MG Tab One p.o. q.d. x7 days    fluticasone propionate (FLONASE) 50 mcg/actuation nasal spray 1 spray (50 mcg total) by Each Nostril route 2 (two) times daily as needed for Rhinitis.    hydrOXYzine HCL (ATARAX) 25 MG tablet Take 1 tablet (25 mg total) by mouth 3 (three) times daily as needed for Anxiety. " "   ibuprofen (ADVIL,MOTRIN) 600 MG tablet Start after steroid complete 1 p.o. q.6 hours p.r.n. pain swell    ketotifen (ALAWAY) 0.025 % (0.035 %) ophthalmic solution Place 1 drop into both eyes once daily.    levothyroxine (SYNTHROID) 25 MCG tablet Take 1 tablet (25 mcg total) by mouth before breakfast.    LIDOcaine HCl 2% (LIDOCAINE VISCOUS) 2 % Soln TAKE ONE TEASPOONFULS right before eating EVERY 6 HOURS IF NEEDED FOR PAIN    lithium (LITHOTAB) 300 mg tablet Take 300 mg by mouth 2 (two) times a day. LITHIUM CARBONATE ER    [] methocarbamoL (ROBAXIN) 500 MG Tab Take 2 tablets (1,000 mg total) by mouth 3 (three) times daily. for 5 days    mirtazapine (REMERON) 15 MG tablet Take 1 tablet (15 mg total) by mouth every evening.    naproxen (NAPROSYN) 500 MG tablet Take 1 tablet (500 mg total) by mouth 2 (two) times daily.    predniSONE (DELTASONE) 5 MG tablet 4 po qd x 2, 3 po qd x2, 2 po qd x2, 1 po qd x2    promethazine (PHENERGAN) 25 MG tablet Take 1 tablet (25 mg total) by mouth every 6 (six) hours as needed for Nausea.    QUEtiapine (SEROQUEL) 50 MG tablet Take 1 tablet (50 mg total) by mouth every evening.     Antibiotics (From admission, onward)                Start     Stop Route Frequency Ordered    22 0900  erythromycin 5 mg/gram (0.5 %) ophthalmic ointment         -- RIGHT EYE 2 times daily 22 1502    22 1530  cefTRIAXone (ROCEPHIN) 2 g/50 mL D5W IVPB         -- IV Every 24 hours (non-standard times) 22 1420    22 1530  vancomycin 750 mg in dextrose 5 % 250 mL IVPB (ready to mix system)         -- IV Every 12 hours (non-standard times) 22 1430    22 1519  vancomycin - pharmacy to dose  (vancomycin IVPB)        "And" Linked Group Details    -- IV pharmacy to manage frequency 22 1419    22 234  mupirocin 2 % ointment         2059 Nasl 2 times daily 22 436          Antifungals (From admission, onward)                None      "     Antivirals (From admission, onward)          Stop Route Frequency     [id number] ala         -- Oral Daily     acyclovir (ZOVIRAX) injection         -- IV Every 8 hours (non-standard times)             Immunization History   Administered Date(s) Administered    COVID-19 MRNA, LN-S PF (MODERNA HALF 0.25 ML DOSE) 02/10/2022    COVID-19, MRNA, LN-S, PF (Pfizer) (Gray Cap) 02/04/2022    COVID-19, MRNA, LN-S, PF (Pfizer) (Purple Cap) 01/14/2022    Influenza - Quadrivalent - MDCK - PF 10/10/2019    Influenza - Quadrivalent - PF *Preferred* (6 months and older) 11/09/2016, 11/20/2020    Pneumococcal Conjugate - 13 Valent 08/23/2017    Pneumococcal Polysaccharide - 23 Valent 02/06/2020    Td - PF (ADULT) 02/06/2020    Tdap 11/09/2016       Family History       Problem Relation (Age of Onset)    Heart disease Mother          Social History     Socioeconomic History    Marital status:    Occupational History    Occupation: unemployed    Tobacco Use    Smoking status: Current Some Day Smoker     Types: Vaping with nicotine    Smokeless tobacco: Never Used   Substance and Sexual Activity    Alcohol use: Yes     Comment: socially    Drug use: Not Currently    Sexual activity: Yes     Partners: Male     Birth control/protection: None     Social Determinants of Health     Financial Resource Strain: Medium Risk    Difficulty of Paying Living Expenses: Somewhat hard   Food Insecurity: No Food Insecurity    Worried About Running Out of Food in the Last Year: Never true    Ran Out of Food in the Last Year: Never true   Transportation Needs: No Transportation Needs    Lack of Transportation (Medical): No    Lack of Transportation (Non-Medical): No   Physical Activity: Inactive    Days of Exercise per Week: 0 days    Minutes of Exercise per Session: 0 min   Stress: Stress Concern Present    Feeling of Stress : To some extent   Social Connections: Unknown    Frequency of Communication  with Friends and Family: More than three times a week    Frequency of Social Gatherings with Friends and Family: More than three times a week    Attends Yarsanism Services: Patient refused    Active Member of Clubs or Organizations: Patient refused    Attends Club or Organization Meetings: Patient refused    Marital Status: Patient refused   Housing Stability: Low Risk     Unable to Pay for Housing in the Last Year: No    Number of Places Lived in the Last Year: 1    Unstable Housing in the Last Year: No     Review of Systems   Constitutional:  Positive for activity change. Negative for appetite change, chills and fever.   HENT:  Negative for congestion.    Eyes:  Negative for pain and itching.        Right eye swelling and rash    Respiratory:  Negative for cough, chest tightness and shortness of breath.    Cardiovascular:  Negative for palpitations and leg swelling.   Gastrointestinal:  Negative for abdominal pain and nausea.   Endocrine: Negative for polyphagia and polyuria.   Genitourinary:  Negative for dysuria and frequency.   Musculoskeletal:  Negative for back pain.   Skin:  Positive for rash.   Neurological:  Negative for numbness and headaches.   Psychiatric/Behavioral:  Negative for agitation and behavioral problems.    Objective:     Vital Signs (Most Recent):  Temp: 98.4 °F (36.9 °C) (03/20/22 0138)  Pulse: 82 (03/20/22 0138)  Resp: 20 (03/20/22 1411)  BP: (!) 93/55 (03/20/22 1630)  SpO2: 100 % (03/20/22 0138)   Vital Signs (24h Range):  Temp:  [98 °F (36.7 °C)-98.4 °F (36.9 °C)] 98.4 °F (36.9 °C)  Pulse:  [76-82] 82  Resp:  [18-20] 20  SpO2:  [99 %-100 %] 100 %  BP: ()/(55-73) 93/55     Weight: 53.7 kg (118 lb 6.2 oz)  Body mass index is 22.37 kg/m².    Estimated Creatinine Clearance: 59.6 mL/min (based on SCr of 0.9 mg/dL).    Physical Exam  Constitutional:       Appearance: Normal appearance.   HENT:      Head: Normocephalic and atraumatic.   Cardiovascular:      Rate and Rhythm: Normal  rate.   Pulmonary:      Effort: Pulmonary effort is normal.   Abdominal:      General: Abdomen is flat.      Palpations: Abdomen is soft.   Musculoskeletal:      Cervical back: Normal range of motion and neck supple.   Neurological:      Mental Status: She is alert and oriented to person, place, and time.   Psychiatric:         Behavior: Behavior normal.         Significant Labs: All pertinent labs within the past 24 hours have been reviewed.    Significant Imaging: I have reviewed all pertinent imaging results/findings within the past 24 hours.

## 2022-03-20 NOTE — H&P
[facility] - [facility] ([address] Medicine  History & Physical    Patient Name: Patricia Nye  MRN: [id number]  Patient Class: IP- Inpatient  Admission Date: 3/19/2022  Attending Physician: Adelia Murphy MD   Primary Care Provider: Cornell Britt MD         Patient information was obtained from patient, past medical records and ER records.     Subjective:     Principal Problem:Herpes zoster ophthalmicus of right eye    Chief Complaint:   Chief Complaint   Patient presents with    Herpes Zoster        HPI: Patricia Nye is a 46 y.o. female with a PMHx of HIV, insomnia, anxiety/depression, polysubstance abuse, and hypothyroidism who was admitted to [facility] on March 18th with right periorbital swelling. The patient reports a rash developed to her chest wall about 1.5-2 weeks ago accompanied by a sensation of pins and needles. She then developed swelling around her right eye and had a rash around her eye and the bridge of her nose beginning 3 days ago. She was seen at [facility] two nights ago but left AMA and then presented to [place]. She was admitted with concern for herpes zoster ophthalmicus and started on IV acyclovir. Eye is reportedly more swollen, and she c/o of stinging pain to the skin over her eye and the right side of her face. Denies any pain to her actual eye and denies any blurry vision. Endorses chills. Denies any fever, abdominal pain, chest pain, nausea, vomiting, diarrhea, shortness of breath, cough, or dysuria. [place] [facility] [place]    Labs were reviewed from OSH: COVID negative, sodium 138, potassium 3.6, chloride 103, CO2 23, BUN 9, creatinine 0.8, glucose 100, AST 20, ALT 15, white blood cells 6.82, hemoglobin 12.2, hematocrit 37.5, platelets 324      Past Medical History:   Diagnosis Date    Allergy     Anxiety     Cervical dysplasia 1998 / 2016    Depression     HIV infection     Hyperthyroidism      Insomnia        Past Surgical History:   Procedure Laterality Date    SKULL FRACTURE ELEVATION         Review of patient's allergies indicates:   Allergen Reactions    Opioids - morphine analogues      Pt states she is not allergic to morphine      Pcn [penicillins] Other (See Comments)     Trouble swallowing and vomiting.     Sulfa (sulfonamide antibiotics)      Rash (skin)^       Current Facility-Administered Medications on File Prior to Encounter   Medication    [COMPLETED] acyclovir (ZOVIRAX) 400 mg in dextrose 5 % 100 mL IVPB    [DISCONTINUED] acetaminophen tablet 650 mg    [DISCONTINUED] acyclovir (ZOVIRAX) 500 mg in dextrose 5 % 100 mL IVPB    [DISCONTINUED] aluminum & magnesium hydroxide-simethicone 400-400-40 mg/5 mL suspension 30 mL    [DISCONTINUED] Atovaquone suspension (PATIENT HOME MEDICATION)    [DISCONTINUED] Biktarvy (PATIENT HOME MEDICATION)    [DISCONTINUED] busPIRone tablet 10 mg    [DISCONTINUED] dextrose 50% injection 12.5 g    [DISCONTINUED] dextrose 50% injection 25 g    [DISCONTINUED] famotidine tablet 20 mg    [DISCONTINUED] glucagon (human recombinant) injection 1 mg    [DISCONTINUED] glucose chewable tablet 16 g    [DISCONTINUED] glucose chewable tablet 24 g    [DISCONTINUED] hydrOXYzine HCL tablet 25 mg    [DISCONTINUED] levothyroxine tablet 25 mcg    [DISCONTINUED] lithium capsule 300 mg    [DISCONTINUED] magic mouthwash (diphenhydrAMINE 12.5 mg/5 mL 20 mL, aluminum & magnesium hydroxide-simethicone (MYLANTA) 20 mL, LIDOcaine HCl 2% (XYLOCAINE) 20 mL) solution    [DISCONTINUED] melatonin tablet 6 mg    [DISCONTINUED] melatonin tablet 6 mg    [DISCONTINUED] mirtazapine tablet 15 mg    [DISCONTINUED] morphine injection 2 mg    [DISCONTINUED] morphine injection 4 mg    [DISCONTINUED] naloxone 0.4 mg/mL injection 0.02 mg    [DISCONTINUED] nicotine 7 mg/24 hr 1 patch    [DISCONTINUED] nicotine 7 mg/24 hr 1 patch    [DISCONTINUED] ondansetron injection 8 mg     [DISCONTINUED] QUEtiapine tablet 50 mg    [DISCONTINUED] simethicone chewable tablet 80 mg    [DISCONTINUED] sodium chloride 0.9% flush 10 mL    [DISCONTINUED] sodium chloride 0.9% flush 10 mL     Current Outpatient Medications on File Prior to Encounter   Medication Sig    ALPRAZolam (XANAX XR) 0.5 MG Tb24 Take one tablet PRN anxiety.    atovaquone (MEPRON) 750 mg/5 mL Susp Take 1,500 mg by mouth.    azelastine (ASTELIN) 137 mcg (0.1 %) nasal spray 1 spray (137 mcg total) by Nasal route 2 (two) times daily.    BIKTARVY -25 mg per tablet Take 1 tablet by mouth once daily.    busPIRone (BUSPAR) 10 MG tablet Take 1 tablet (10 mg total) by mouth 3 (three) times daily.    cetirizine (ZYRTEC) 10 MG tablet Take 1 tablet (10 mg total) by mouth once daily.    diphenhydrAMINE-aluminum-magnesium hydroxide-simethicone-LIDOcaine HCl 2% Swish and spit 15 mLs every 4 (four) hours as needed (mouth pain).    famotidine (PEPCID) 20 MG tablet Take 1 tablet (20 mg total) by mouth 2 (two) times daily.    fluconazole (DIFLUCAN) 150 MG Tab One p.o. q.d. x7 days    fluticasone propionate (FLONASE) 50 mcg/actuation nasal spray 1 spray (50 mcg total) by Each Nostril route 2 (two) times daily as needed for Rhinitis.    hydrOXYzine HCL (ATARAX) 25 MG tablet Take 1 tablet (25 mg total) by mouth 3 (three) times daily as needed for Anxiety.    ibuprofen (ADVIL,MOTRIN) 600 MG tablet Start after steroid complete 1 p.o. q.6 hours p.r.n. pain swell    ketotifen (ALAWAY) 0.025 % (0.035 %) ophthalmic solution Place 1 drop into both eyes once daily.    levothyroxine (SYNTHROID) 25 MCG tablet Take 1 tablet (25 mcg total) by mouth before breakfast.    LIDOcaine HCl 2% (LIDOCAINE VISCOUS) 2 % Soln TAKE ONE TEASPOONFULS right before eating EVERY 6 HOURS IF NEEDED FOR PAIN    lithium (LITHOTAB) 300 mg tablet Take 300 mg by mouth 2 (two) times a day. LITHIUM CARBONATE ER    methocarbamoL (ROBAXIN) 500 MG Tab Take 2 tablets  "(1,000 mg total) by mouth 3 (three) times daily. for 5 days    mirtazapine (REMERON) 15 MG tablet Take 1 tablet (15 mg total) by mouth every evening.    naproxen (NAPROSYN) 500 MG tablet Take 1 tablet (500 mg total) by mouth 2 (two) times daily.    predniSONE (DELTASONE) 5 MG tablet 4 po qd x 2, 3 po qd x2, 2 po qd x2, 1 po qd x2    promethazine (PHENERGAN) 25 MG tablet Take 1 tablet (25 mg total) by mouth every 6 (six) hours as needed for Nausea.    QUEtiapine (SEROQUEL) 50 MG tablet Take 1 tablet (50 mg total) by mouth every evening.     Family History       Problem Relation (Age of Onset)    Heart disease Mother          Tobacco Use    Smoking status: Current Some Day Smoker     Types: Vaping with nicotine    Smokeless tobacco: Never Used   Substance and Sexual Activity    Alcohol use: Yes     Comment: socially    Drug use: Not Currently    Sexual activity: Yes     Partners: Male     Birth control/protection: None     Review of Systems   Constitutional:  Positive for chills. Negative for activity change, appetite change, diaphoresis, fatigue and fever.   HENT:  Positive for facial swelling. Negative for congestion, postnasal drip, rhinorrhea and sore throat.    Eyes:  Negative for photophobia, pain, redness and visual disturbance.        Pain surrounding right eye (stinging pain "like pins and needles")   Respiratory:  Negative for cough, chest tightness, shortness of breath and wheezing.    Cardiovascular:  Negative for chest pain, palpitations and leg swelling.   Gastrointestinal:  Negative for abdominal distention, abdominal pain, diarrhea, nausea and vomiting.   Genitourinary:  Negative for difficulty urinating, dysuria, frequency and hematuria.   Musculoskeletal:  Negative for arthralgias, back pain, myalgias and neck pain.   Skin:  Positive for rash. Negative for pallor.   Allergic/Immunologic: Positive for immunocompromised state.   Neurological:  Negative for dizziness, syncope, weakness, " light-headedness and headaches.   Psychiatric/Behavioral:  Negative for agitation, confusion and hallucinations. The patient is not nervous/anxious.    Objective:     Vital Signs (Most Recent):    Vital Signs (24h Range):  Temp:  [98.1 °F (36.7 °C)-98.4 °F (36.9 °C)] 98.2 °F (36.8 °C)  Pulse:  [63-86] 74  Resp:  [16-20] 20  SpO2:  [97 %-100 %] 99 %  BP: ()/(57-75) 111/69        There is no height or weight on file to calculate BMI.    Physical Exam  Vitals and nursing note reviewed.   Constitutional:       General: She is not in acute distress.     Appearance: She is not toxic-appearing.   HENT:      Head: Normocephalic and atraumatic.      Mouth/Throat:      Mouth: Mucous membranes are moist.   Eyes:      Pupils: Pupils are equal, round, and reactive to light.      Comments: White periocular vesicles that are coalescing with surrounding erythema, involving eyelid and the nasal bridge. Tender to touch.    Cardiovascular:      Rate and Rhythm: Normal rate and regular rhythm.      Heart sounds: No murmur heard.  Pulmonary:      Effort: Pulmonary effort is normal. No respiratory distress.      Breath sounds: No wheezing, rhonchi or rales.   Abdominal:      General: Bowel sounds are normal. There is no distension.      Palpations: Abdomen is soft.      Tenderness: There is no abdominal tenderness. There is no guarding.   Genitourinary:     Comments: deferred  Musculoskeletal:         General: No swelling, tenderness or deformity. Normal range of motion.      Cervical back: Normal range of motion. No tenderness.   Skin:     General: Skin is warm and dry.      Capillary Refill: Capillary refill takes less than 2 seconds.   Neurological:      General: No focal deficit present.      Mental Status: She is alert and oriented to person, place, and time.      Sensory: No sensory deficit.      Motor: No weakness.   Psychiatric:         Mood and Affect: Mood normal.         Behavior: Behavior normal.         Thought  Content: Thought content normal.         Judgment: Judgment normal.         CRANIAL NERVES     CN III, IV, VI   Pupils are equal, round, and reactive to light.     Significant Labs: All pertinent labs within the past 24 hours have been reviewed.  BMP:   Recent Labs   Lab 03/18/22  1844         K 3.6      CO2 23   BUN 9   CREATININE 0.8   CALCIUM 9.8     CBC:   Recent Labs   Lab 03/18/22  1844   WBC 6.82   HGB 12.2   HCT 37.5          Significant Imaging: I have reviewed all pertinent imaging results/findings within the past 24 hours.        Assessment/Plan:     * Herpes zoster ophthalmicus of right eye  Patient reports rash developed to her chest wall about 1.5-2 weeks ago accompanied by a sensation of pins and needles. She then developed swelling around her right eye and had a rash around her eye and the bridge of her nose beginning 3 days ago. She was seen at [facility] two nights ago but left AMA and presented to [place]. She was admitted with concern for herpes zoster ophthalmicus and started on IV acyclovir. Eye  is reportedly more swollen, and she c/o of stinging pain to the skin over her eye and the right side of her face. Denies any pain to her actual eye and denies any blurry vision.  The patient was transferred to [facility] [place] consult.    -Continue IV acyclovir 500mg q8hrs  -Contact, droplet, and airborne isolation  -Consult ophthalmology due to concern for possible acute retinal necrosis, appreciate recs. Discussed patient with resident. Plan to monitor overnight for any vision changes, and they will evaluate the patient in the morning.  -Add gabapentin 100mg tid    Anxiety disorder  -Chronic, stable.  reviewed.   -Continue home xanax PRN  -Continue buspar 10mg tid    Tobacco abuse  Assistance with smoking cessation was offered, including:  [x]  Medications  [x]  Counseling  []  Printed Information on Smoking Cessation  []  Referral to a Smoking Cessation  Program    Patient was counseled regarding smoking for 3-10 minutes.    Primary insomnia  -Continue remeron qhs    Major depressive disorder in remission  Bipolar 1 disorder  -Continue lithium    HIV disease  This patient in known to have AIDS (from CD4 count has been less than 200). Last CD4 21 (April 2021), new CD4 count pending. Patient is on HAART. Will continue HAART. Continued prophylaxis with atovaquone. Continue to monitor routine labs.     We will consult Infectious disease at this time. Evaluate and treat HIV-associated diseases as indicated by specific problems listed below.     VTE Risk Mitigation (From admission, onward)         Ordered     IP VTE LOW RISK PATIENT  Once         03/19/22 1925     Place sequential compression device  Until discontinued         03/19/22 1925                   Kailee Long NP  Department of Hospital Medicine   [facility] - Intensive Care ([place]-)

## 2022-03-20 NOTE — ASSESSMENT & PLAN NOTE
Patient of Dr. Mendoza- Diagnosed 1997- last VL 1/2022 ~149     Recommendations:    -- Continue Biktarvy and atovaquone ppx.  -- Follow up CD4 levels.

## 2022-03-21 LAB
ALBUMIN SERPL BCP-MCNC: 2.9 G/DL (ref 3.5–5.2)
ALP SERPL-CCNC: 60 U/L (ref 55–135)
ALT SERPL W/O P-5'-P-CCNC: 10 U/L (ref 10–44)
ANION GAP SERPL CALC-SCNC: 5 MMOL/L (ref 8–16)
AST SERPL-CCNC: 13 U/L (ref 10–40)
BASOPHILS # BLD AUTO: 0.05 K/UL (ref 0–0.2)
BASOPHILS NFR BLD: 0.8 % (ref 0–1.9)
BILIRUB SERPL-MCNC: 0.1 MG/DL (ref 0.1–1)
BUN SERPL-MCNC: 9 MG/DL (ref 6–20)
CALCIUM SERPL-MCNC: 8.7 MG/DL (ref 8.7–10.5)
CD3+CD4+ CELLS # BLD: 99 CELLS/UL (ref 300–1400)
CD3+CD4+ CELLS NFR BLD: 11.2 % (ref 28–57)
CHLORIDE SERPL-SCNC: 109 MMOL/L (ref 95–110)
CO2 SERPL-SCNC: 26 MMOL/L (ref 23–29)
CREAT SERPL-MCNC: 0.7 MG/DL (ref 0.5–1.4)
DIFFERENTIAL METHOD: ABNORMAL
EOSINOPHIL # BLD AUTO: 0.2 K/UL (ref 0–0.5)
EOSINOPHIL NFR BLD: 3.7 % (ref 0–8)
ERYTHROCYTE [DISTWIDTH] IN BLOOD BY AUTOMATED COUNT: 14.6 % (ref 11.5–14.5)
EST. GFR  (AFRICAN AMERICAN): >60 ML/MIN/1.73 M^2
EST. GFR  (NON AFRICAN AMERICAN): >60 ML/MIN/1.73 M^2
GLUCOSE SERPL-MCNC: 92 MG/DL (ref 70–110)
HCT VFR BLD AUTO: 36.2 % (ref 37–48.5)
HGB BLD-MCNC: 11.4 G/DL (ref 12–16)
IMM GRANULOCYTES # BLD AUTO: 0.01 K/UL (ref 0–0.04)
IMM GRANULOCYTES NFR BLD AUTO: 0.2 % (ref 0–0.5)
LYMPHOCYTES # BLD AUTO: 1 K/UL (ref 1–4.8)
LYMPHOCYTES NFR BLD: 16.4 % (ref 18–48)
MAGNESIUM SERPL-MCNC: 1.8 MG/DL (ref 1.6–2.6)
MCH RBC QN AUTO: 27.7 PG (ref 27–31)
MCHC RBC AUTO-ENTMCNC: 31.5 G/DL (ref 32–36)
MCV RBC AUTO: 88 FL (ref 82–98)
MONOCYTES # BLD AUTO: 0.6 K/UL (ref 0.3–1)
MONOCYTES NFR BLD: 9.5 % (ref 4–15)
NEUTROPHILS # BLD AUTO: 4.2 K/UL (ref 1.8–7.7)
NEUTROPHILS NFR BLD: 69.4 % (ref 38–73)
NRBC BLD-RTO: 0 /100 WBC
PHOSPHATE SERPL-MCNC: 3 MG/DL (ref 2.7–4.5)
PLATELET # BLD AUTO: 271 K/UL (ref 150–450)
PMV BLD AUTO: 10.7 FL (ref 9.2–12.9)
POTASSIUM SERPL-SCNC: 4.1 MMOL/L (ref 3.5–5.1)
PROT SERPL-MCNC: 6.5 G/DL (ref 6–8.4)
RBC # BLD AUTO: 4.12 M/UL (ref 4–5.4)
SODIUM SERPL-SCNC: 140 MMOL/L (ref 136–145)
WBC # BLD AUTO: 5.99 K/UL (ref 3.9–12.7)

## 2022-03-21 PROCEDURE — 80053 COMPREHEN METABOLIC PANEL: CPT | Performed by: STUDENT IN AN ORGANIZED HEALTH CARE EDUCATION/TRAINING PROGRAM

## 2022-03-21 PROCEDURE — 99232 SBSQ HOSP IP/OBS MODERATE 35: CPT | Mod: ,,, | Performed by: INTERNAL MEDICINE

## 2022-03-21 PROCEDURE — 63600175 PHARM REV CODE 636 W HCPCS: Performed by: HOSPITALIST

## 2022-03-21 PROCEDURE — 27000207 HC ISOLATION

## 2022-03-21 PROCEDURE — 20600001 HC STEP DOWN PRIVATE ROOM

## 2022-03-21 PROCEDURE — 63600175 PHARM REV CODE 636 W HCPCS: Performed by: NURSE PRACTITIONER

## 2022-03-21 PROCEDURE — 84100 ASSAY OF PHOSPHORUS: CPT | Performed by: STUDENT IN AN ORGANIZED HEALTH CARE EDUCATION/TRAINING PROGRAM

## 2022-03-21 PROCEDURE — 25000003 PHARM REV CODE 250: Performed by: STUDENT IN AN ORGANIZED HEALTH CARE EDUCATION/TRAINING PROGRAM

## 2022-03-21 PROCEDURE — S4991 NICOTINE PATCH NONLEGEND: HCPCS | Performed by: HOSPITALIST

## 2022-03-21 PROCEDURE — 25000003 PHARM REV CODE 250: Performed by: NURSE PRACTITIONER

## 2022-03-21 PROCEDURE — 85025 COMPLETE CBC W/AUTO DIFF WBC: CPT | Performed by: STUDENT IN AN ORGANIZED HEALTH CARE EDUCATION/TRAINING PROGRAM

## 2022-03-21 PROCEDURE — 99232 PR SUBSEQUENT HOSPITAL CARE,LEVL II: ICD-10-PCS | Mod: ,,, | Performed by: INTERNAL MEDICINE

## 2022-03-21 PROCEDURE — 63600175 PHARM REV CODE 636 W HCPCS: Performed by: STUDENT IN AN ORGANIZED HEALTH CARE EDUCATION/TRAINING PROGRAM

## 2022-03-21 PROCEDURE — 36415 COLL VENOUS BLD VENIPUNCTURE: CPT | Performed by: STUDENT IN AN ORGANIZED HEALTH CARE EDUCATION/TRAINING PROGRAM

## 2022-03-21 PROCEDURE — 99232 SBSQ HOSP IP/OBS MODERATE 35: CPT | Mod: ,,, | Performed by: STUDENT IN AN ORGANIZED HEALTH CARE EDUCATION/TRAINING PROGRAM

## 2022-03-21 PROCEDURE — 25000003 PHARM REV CODE 250: Performed by: HOSPITALIST

## 2022-03-21 PROCEDURE — 99232 PR SUBSEQUENT HOSPITAL CARE,LEVL II: ICD-10-PCS | Mod: ,,, | Performed by: STUDENT IN AN ORGANIZED HEALTH CARE EDUCATION/TRAINING PROGRAM

## 2022-03-21 PROCEDURE — 83735 ASSAY OF MAGNESIUM: CPT | Performed by: STUDENT IN AN ORGANIZED HEALTH CARE EDUCATION/TRAINING PROGRAM

## 2022-03-21 RX ORDER — SODIUM CHLORIDE 9 MG/ML
INJECTION, SOLUTION INTRAVENOUS CONTINUOUS
Status: ACTIVE | OUTPATIENT
Start: 2022-03-21 | End: 2022-03-22

## 2022-03-21 RX ORDER — GABAPENTIN 300 MG/1
300 CAPSULE ORAL 3 TIMES DAILY
Status: DISCONTINUED | OUTPATIENT
Start: 2022-03-21 | End: 2022-03-29 | Stop reason: HOSPADM

## 2022-03-21 RX ADMIN — HYPROMELLOSE 2910 1 DROP: 5 SOLUTION OPHTHALMIC at 01:03

## 2022-03-21 RX ADMIN — MORPHINE SULFATE 2 MG: 2 INJECTION, SOLUTION INTRAMUSCULAR; INTRAVENOUS at 01:03

## 2022-03-21 RX ADMIN — HYPROMELLOSE 2910 1 DROP: 5 SOLUTION OPHTHALMIC at 09:03

## 2022-03-21 RX ADMIN — ACYCLOVIR SODIUM 480 MG: 1000 INJECTION, SOLUTION INTRAVENOUS at 09:03

## 2022-03-21 RX ADMIN — ATOVAQUONE 1500 MG: 750 SUSPENSION ORAL at 08:03

## 2022-03-21 RX ADMIN — ACYCLOVIR SODIUM 480 MG: 1000 INJECTION, SOLUTION INTRAVENOUS at 05:03

## 2022-03-21 RX ADMIN — BUSPIRONE HYDROCHLORIDE 10 MG: 10 TABLET ORAL at 08:03

## 2022-03-21 RX ADMIN — VANCOMYCIN HYDROCHLORIDE 750 MG: 750 INJECTION, POWDER, LYOPHILIZED, FOR SOLUTION INTRAVENOUS at 03:03

## 2022-03-21 RX ADMIN — NICOTINE 1 PATCH: 7 PATCH TRANSDERMAL at 08:03

## 2022-03-21 RX ADMIN — HYPROMELLOSE 2910 1 DROP: 5 SOLUTION OPHTHALMIC at 05:03

## 2022-03-21 RX ADMIN — LITHIUM CARBONATE 300 MG: 300 TABLET, FILM COATED, EXTENDED RELEASE ORAL at 12:03

## 2022-03-21 RX ADMIN — VANCOMYCIN HYDROCHLORIDE 750 MG: 750 INJECTION, POWDER, LYOPHILIZED, FOR SOLUTION INTRAVENOUS at 04:03

## 2022-03-21 RX ADMIN — ERYTHROMYCIN: 5 OINTMENT OPHTHALMIC at 09:03

## 2022-03-21 RX ADMIN — MORPHINE SULFATE 2 MG: 2 INJECTION, SOLUTION INTRAMUSCULAR; INTRAVENOUS at 08:03

## 2022-03-21 RX ADMIN — FAMOTIDINE 20 MG: 20 TABLET ORAL at 08:03

## 2022-03-21 RX ADMIN — GABAPENTIN 300 MG: 300 CAPSULE ORAL at 08:03

## 2022-03-21 RX ADMIN — GABAPENTIN 100 MG: 100 CAPSULE ORAL at 08:03

## 2022-03-21 RX ADMIN — SODIUM CHLORIDE: 0.9 INJECTION, SOLUTION INTRAVENOUS at 08:03

## 2022-03-21 RX ADMIN — GABAPENTIN 300 MG: 300 CAPSULE ORAL at 03:03

## 2022-03-21 RX ADMIN — MUPIROCIN: 20 OINTMENT TOPICAL at 08:03

## 2022-03-21 RX ADMIN — MUPIROCIN: 20 OINTMENT TOPICAL at 09:03

## 2022-03-21 RX ADMIN — BICTEGRAVIR SODIUM, EMTRICITABINE, AND TENOFOVIR ALAFENAMIDE FUMARATE 1 TABLET: 50; 200; 25 TABLET ORAL at 08:03

## 2022-03-21 RX ADMIN — MORPHINE SULFATE 2 MG: 2 INJECTION, SOLUTION INTRAMUSCULAR; INTRAVENOUS at 06:03

## 2022-03-21 RX ADMIN — LEVOTHYROXINE SODIUM 25 MCG: 25 TABLET ORAL at 06:03

## 2022-03-21 RX ADMIN — ACYCLOVIR SODIUM 480 MG: 1000 INJECTION, SOLUTION INTRAVENOUS at 01:03

## 2022-03-21 RX ADMIN — BUSPIRONE HYDROCHLORIDE 10 MG: 10 TABLET ORAL at 03:03

## 2022-03-21 RX ADMIN — MIRTAZAPINE 15 MG: 15 TABLET, FILM COATED ORAL at 08:03

## 2022-03-21 RX ADMIN — ERYTHROMYCIN: 5 OINTMENT OPHTHALMIC at 08:03

## 2022-03-21 RX ADMIN — HYDROXYZINE HYDROCHLORIDE 25 MG: 25 TABLET ORAL at 08:03

## 2022-03-21 RX ADMIN — LITHIUM CARBONATE 300 MG: 300 TABLET, FILM COATED, EXTENDED RELEASE ORAL at 09:03

## 2022-03-21 NOTE — ASSESSMENT & PLAN NOTE
45 y/o female with a hx of HIV (on biktarvy) presented 3/18 with right periorbital swelling and rash concerning for herpes zoster ophthalmicus started on IV acyclovir s/p ophtha exam 3/20 (Exam positive for pseudodendritic lesions (per exam)- Now with worsening swelling concerning for superimposed bacterial infection.    Recommendations:    1.Continue IV acyclovir X 7 days for herpes zoster ophthalmicus.   2. Continue IV vancomycin while inpatient. On discharge, can deescalate to doxycycline 100 mg bid to complete 14 day course for superimposed bacterial infection.  3. D/C CTX  4. Appreciate optha evaluation.

## 2022-03-21 NOTE — PROGRESS NOTES
"[facility] - Intensive Care ([place] [address])  Infectious Disease  Progress Note    Patient Name: Patricia Nye  MRN: [id number]  Admission Date: 3/19/2022  Length of Stay: 2 days  Attending Physician: Adelia Murphy MD  Primary Care Provider: Cornell Britt MD    Isolation Status: Airborne and Contact and Droplet  Assessment/Plan:      * Herpes zoster ophthalmicus of right eye  45 y/o female with a hx of HIV (on biktarvy) presented 3/18 with right periorbital swelling and rash concerning for herpes zoster ophthalmicus started on IV acyclovir s/p ophtha exam 3/20 (Exam positive for pseudodendritic lesions (per exam)- Now with worsening swelling concerning for superimposed bacterial infection.    Recommendations:    1.Continue IV acyclovir X 7 days for herpes zoster ophthalmicus.   2. Continue IV vancomycin while inpatient. On discharge, can deescalate to doxycycline 100 mg bid to complete 14 day course for superimposed bacterial infection.  3. D/C CTX  4. Appreciate optha evaluation.       HIV disease  Patient of Dr. Mendoza- Diagnosed 1997- last VL 1/2022 ~149     Recommendations:  -- CD 4 level 99.  HIV viral load 146 on 1/5/22.   -- Continue Biktarvy and atovaquone ppx.  -- F/U Dr. Mendoza on discharge.           Anticipated Disposition: As above.     Thank you for your consult. I will follow-up with patient. Please contact us if you have any additional questions.    Pipe Post MD  Infectious Disease  [facility] - Intensive Care ([place] [address])    Subjective:     Principal Problem:Herpes zoster ophthalmicus of right eye    HPI: Patricia Nye is 45 y/o female with hx of HIV hypothyroidism, anxiety, depression, and insomnia presented to [facility] on 3/18 with right periorbital swelling.      Patient report having "chicken pox rash on chest and back" since her last admission ~12/2021 she was advised to take acyclovir which she has stopped end of feburary - now report rash appeared in " "her face and she noted a "pimple" on her upper eye led that she tried to "squeeze" and for past 2 days she had worsening swelling- denies any vision changes or eye pain- she does report some HA.    ID consulted for zoster ophthalmicus.         Interval History: States that she continues to have swelling and it has not improved.     Review of Systems   Constitutional:  Positive for activity change. Negative for appetite change, chills and fever.   HENT:  Negative for congestion.    Eyes:  Negative for pain and itching.        Right eye swelling and rash    Respiratory:  Negative for cough, chest tightness and shortness of breath.    Cardiovascular:  Negative for palpitations and leg swelling.   Gastrointestinal:  Negative for abdominal pain and nausea.   Endocrine: Negative for polyphagia and polyuria.   Genitourinary:  Negative for dysuria and frequency.   Musculoskeletal:  Negative for back pain.   Skin:  Positive for rash.   Neurological:  Negative for numbness and headaches.   Psychiatric/Behavioral:  Negative for agitation and behavioral problems.    Objective:     Vital Signs (Most Recent):  Temp: 98.4 °F (36.9 °C) (03/21/22 1315)  Pulse: 82 (03/21/22 1015)  Resp: 18 (03/21/22 1329)  BP: 105/66 (03/21/22 1015)  SpO2: 99 % (03/21/22 1015) Vital Signs (24h Range):  Temp:  [97.8 °F (36.6 °C)-98.4 °F (36.9 °C)] 98.4 °F (36.9 °C)  Pulse:  [82-86] 82  Resp:  [18-20] 18  SpO2:  [99 %-100 %] 99 %  BP: ()/(55-72) 105/66     Weight: 53.7 kg (118 lb 6.2 oz)  Body mass index is 22.37 kg/m².    Estimated Creatinine Clearance: 76.6 mL/min (based on SCr of 0.7 mg/dL).    Physical Exam  Constitutional:       Appearance: Normal appearance.   HENT:      Head: Normocephalic.      Comments: Erythematous papules and pustules along V1 dermatome extending the the nose. Tenderness to palpation; periorbital swelling noted.   Cardiovascular:      Rate and Rhythm: Normal rate.   Pulmonary:      Effort: Pulmonary effort is normal. "   Abdominal:      General: Abdomen is flat.      Palpations: Abdomen is soft.   Musculoskeletal:      Cervical back: Normal range of motion and neck supple.   Neurological:      Mental Status: She is alert and oriented to person, place, and time.   Psychiatric:         Behavior: Behavior normal.       Significant Labs: All pertinent labs within the past 24 hours have been reviewed.    Significant Imaging: I have reviewed all pertinent imaging results/findings within the past 24 hours.

## 2022-03-21 NOTE — ASSESSMENT & PLAN NOTE
This patient in known to have AIDS (from CD4 count has been less than 200). Last CD4 21 (April 2021), new CD4 count pending. Patient is on HAART. Will continue HAART. Continued prophylaxis with atovaquone. Continue to monitor routine labs.     We will consult Infectious disease at this time. Appreciate recs.   Continue Biktarvy and atovaquone ppx.  CD4 on admit 99

## 2022-03-21 NOTE — PLAN OF CARE
Patient received alert and oriented x4, no indication of any pain and or discomfort at this time. White board updated on the plan of care . All safety interventions are in place, call bell in use. Will continue to observe

## 2022-03-21 NOTE — ASSESSMENT & PLAN NOTE
Assistance with smoking cessation was offered, including:  [x]  Medications  [x]  Counseling  []  Printed Information on Smoking Cessation  []  Referral to a Smoking Cessation Program    Patient was counseled regarding smoking for 3-10 minutes on admit.

## 2022-03-21 NOTE — SUBJECTIVE & OBJECTIVE
Interval History: States that she continues to have swelling and it has not improved.     Review of Systems   Constitutional:  Positive for activity change. Negative for appetite change, chills and fever.   HENT:  Negative for congestion.    Eyes:  Negative for pain and itching.        Right eye swelling and rash    Respiratory:  Negative for cough, chest tightness and shortness of breath.    Cardiovascular:  Negative for palpitations and leg swelling.   Gastrointestinal:  Negative for abdominal pain and nausea.   Endocrine: Negative for polyphagia and polyuria.   Genitourinary:  Negative for dysuria and frequency.   Musculoskeletal:  Negative for back pain.   Skin:  Positive for rash.   Neurological:  Negative for numbness and headaches.   Psychiatric/Behavioral:  Negative for agitation and behavioral problems.    Objective:     Vital Signs (Most Recent):  Temp: 98.4 °F (36.9 °C) (03/21/22 1315)  Pulse: 82 (03/21/22 1015)  Resp: 18 (03/21/22 1329)  BP: 105/66 (03/21/22 1015)  SpO2: 99 % (03/21/22 1015) Vital Signs (24h Range):  Temp:  [97.8 °F (36.6 °C)-98.4 °F (36.9 °C)] 98.4 °F (36.9 °C)  Pulse:  [82-86] 82  Resp:  [18-20] 18  SpO2:  [99 %-100 %] 99 %  BP: ()/(55-72) 105/66     Weight: 53.7 kg (118 lb 6.2 oz)  Body mass index is 22.37 kg/m².    Estimated Creatinine Clearance: 76.6 mL/min (based on SCr of 0.7 mg/dL).    Physical Exam  Constitutional:       Appearance: Normal appearance.   HENT:      Head: Normocephalic.      Comments: Erythematous papules and pustules along V1 dermatome extending the the nose. Tenderness to palpation; periorbital swelling noted.   Cardiovascular:      Rate and Rhythm: Normal rate.   Pulmonary:      Effort: Pulmonary effort is normal.   Abdominal:      General: Abdomen is flat.      Palpations: Abdomen is soft.   Musculoskeletal:      Cervical back: Normal range of motion and neck supple.   Neurological:      Mental Status: She is alert and oriented to person, place, and  time.   Psychiatric:         Behavior: Behavior normal.       Significant Labs: All pertinent labs within the past 24 hours have been reviewed.    Significant Imaging: I have reviewed all pertinent imaging results/findings within the past 24 hours.

## 2022-03-21 NOTE — SUBJECTIVE & OBJECTIVE
"Interval History:   No acute events overnight.  Patient knows no chills or fever like symptoms overnight.  She also denies any milkly or pus like discharge from right eye; discharge is clear.  She has stinging around the eye that is ongoing, however denies eye pain with movement or visual changes.  She notes that the swelling just makes her eye difficult open on the right side.    Review of Systems   Constitutional:  Positive for chills (A couple days prior to presentation). Negative for activity change, appetite change, diaphoresis, fatigue and fever.   HENT:  Positive for facial swelling. Negative for congestion, postnasal drip, rhinorrhea, sore throat, tinnitus and trouble swallowing.    Eyes:  Negative for photophobia, pain, redness and visual disturbance.        Pain surrounding right eye (stinging pain "like pins and needles")  However no pain with eye movement    Respiratory:  Negative for cough, chest tightness, shortness of breath and wheezing.    Cardiovascular:  Negative for chest pain, palpitations and leg swelling.   Gastrointestinal:  Negative for abdominal distention, abdominal pain, diarrhea, nausea and vomiting.   Genitourinary:  Negative for difficulty urinating, dysuria, frequency and hematuria.   Musculoskeletal:  Negative for arthralgias, back pain, myalgias and neck pain.   Skin:  Positive for rash. Negative for pallor.   Allergic/Immunologic: Positive for immunocompromised state.   Neurological:  Negative for dizziness, syncope, weakness, light-headedness and headaches.   Psychiatric/Behavioral:  Negative for decreased concentration.      Objective:     Vital Signs (Most Recent):  Temp: 98.4 °F (36.9 °C) (03/21/22 1015)  Pulse: 82 (03/21/22 1015)  Resp: 19 (03/21/22 1015)  BP: 105/66 (03/21/22 1015)  SpO2: 99 % (03/21/22 1015) Vital Signs (24h Range):  Temp:  [97.8 °F (36.6 °C)-98.4 °F (36.9 °C)] 98.4 °F (36.9 °C)  Pulse:  [82-86] 82  Resp:  [18-20] 19  SpO2:  [99 %-100 %] 99 %  BP: " ()/(55-72) 105/66     Weight: 53.7 kg (118 lb 6.2 oz)  Body mass index is 22.37 kg/m².  No intake or output data in the 24 hours ending 03/21/22 1310   Physical Exam  Vitals and nursing note reviewed.   Constitutional:       General: She is not in acute distress.     Appearance: She is not toxic-appearing.   HENT:      Head: Normocephalic and atraumatic.      Right Ear: External ear normal.      Left Ear: External ear normal.      Nose: No congestion or rhinorrhea.      Mouth/Throat:      Mouth: Mucous membranes are moist.      Pharynx: No oropharyngeal exudate or posterior oropharyngeal erythema.   Eyes:      Comments: White periocular vesicles that are coalescing with surrounding erythema, involving eyelid and the nasal bridge. Tender to touch.    Cardiovascular:      Rate and Rhythm: Normal rate and regular rhythm.      Heart sounds: No murmur heard.  Pulmonary:      Effort: Pulmonary effort is normal. No respiratory distress.      Breath sounds: No wheezing, rhonchi or rales.   Abdominal:      General: Bowel sounds are normal. There is no distension.      Palpations: Abdomen is soft.      Tenderness: There is no abdominal tenderness. There is no guarding.   Musculoskeletal:         General: No swelling, tenderness or deformity. Normal range of motion.      Cervical back: Normal range of motion. No tenderness.   Skin:     General: Skin is warm and dry.      Capillary Refill: Capillary refill takes less than 2 seconds.   Neurological:      Mental Status: She is alert and oriented to person, place, and time.      Motor: No weakness.   Psychiatric:         Mood and Affect: Mood normal.         Behavior: Behavior normal.         Thought Content: Thought content normal.         Judgment: Judgment normal.       Significant Labs: All pertinent labs within the past 24 hours have been reviewed.    Recent Results (from the past 24 hour(s))   Comprehensive Metabolic Panel    Collection Time: 03/21/22  4:43 AM   Result  Value Ref Range    Sodium 140 136 - 145 mmol/L    Potassium 4.1 3.5 - 5.1 mmol/L    Chloride 109 95 - 110 mmol/L    CO2 26 23 - 29 mmol/L    Glucose 92 70 - 110 mg/dL    BUN 9 6 - 20 mg/dL    Creatinine 0.7 0.5 - 1.4 mg/dL    Calcium 8.7 8.7 - 10.5 mg/dL    Total Protein 6.5 6.0 - 8.4 g/dL    Albumin 2.9 (L) 3.5 - 5.2 g/dL    Total Bilirubin 0.1 0.1 - 1.0 mg/dL    Alkaline Phosphatase 60 55 - 135 U/L    AST 13 10 - 40 U/L    ALT 10 10 - 44 U/L    Anion Gap 5 (L) 8 - 16 mmol/L    eGFR if African American >60.0 >60 mL/min/1.73 m^2    eGFR if non African American >60.0 >60 mL/min/1.73 m^2   Magnesium    Collection Time: 03/21/22  4:43 AM   Result Value Ref Range    Magnesium 1.8 1.6 - 2.6 mg/dL   Phosphorus    Collection Time: 03/21/22  4:43 AM   Result Value Ref Range    Phosphorus 3.0 2.7 - 4.5 mg/dL   CBC Auto Differential    Collection Time: 03/21/22  4:44 AM   Result Value Ref Range    WBC 5.99 3.90 - 12.70 K/uL    RBC 4.12 4.00 - 5.40 M/uL    Hemoglobin 11.4 (L) 12.0 - 16.0 g/dL    Hematocrit 36.2 (L) 37.0 - 48.5 %    MCV 88 82 - 98 fL    MCH 27.7 27.0 - 31.0 pg    MCHC 31.5 (L) 32.0 - 36.0 g/dL    RDW 14.6 (H) 11.5 - 14.5 %    Platelets 271 150 - 450 K/uL    MPV 10.7 9.2 - 12.9 fL    Immature Granulocytes 0.2 0.0 - 0.5 %    Gran # (ANC) 4.2 1.8 - 7.7 K/uL    Immature Grans (Abs) 0.01 0.00 - 0.04 K/uL    Lymph # 1.0 1.0 - 4.8 K/uL    Mono # 0.6 0.3 - 1.0 K/uL    Eos # 0.2 0.0 - 0.5 K/uL    Baso # 0.05 0.00 - 0.20 K/uL    nRBC 0 0 /100 WBC    Gran % 69.4 38.0 - 73.0 %    Lymph % 16.4 (L) 18.0 - 48.0 %    Mono % 9.5 4.0 - 15.0 %    Eosinophil % 3.7 0.0 - 8.0 %    Basophil % 0.8 0.0 - 1.9 %    Differential Method Automated            Significant Imaging: I have reviewed all pertinent imaging results/findings within the past 24 hours.

## 2022-03-21 NOTE — PROGRESS NOTES
[facility] ([address] Medicine  Progress Note    Patient Name: Patricia Nye  MRN: [id number]  Patient Class: IP- Inpatient   Admission Date: 3/19/2022  Length of Stay: 2 days  Attending Physician: Adelia Murphy MD  Primary Care Provider: Cornell Britt MD        Subjective:     Principal Problem:Herpes zoster ophthalmicus of right eye        HPI:  Patricia Nye is a 46 y.o. female with a PMHx of HIV, insomnia, anxiety/depression, polysubstance abuse, and hypothyroidism who was admitted to [facility] on March 18th with right periorbital swelling. The patient reports a rash developed to her chest wall about 1.5-2 weeks ago accompanied by a sensation of pins and needles. She then developed swelling around her right eye and had a rash around her eye and the bridge of her nose beginning 3 days ago. She was seen at [facility] two nights ago but left AMA and then presented to [place]. She was admitted with concern for herpes zoster ophthalmicus and started on IV acyclovir. Eye is reportedly more swollen, and she c/o of stinging pain to the skin over her eye and the right side of her face. Denies any pain to her actual eye and denies any blurry vision. Endorses chills. Denies any fever, abdominal pain, chest pain, nausea, vomiting, diarrhea, shortness of breath, cough, or dysuria. The patient was transferred to [facility] for ophthalmology consult.    Labs were reviewed from OSH: COVID negative, sodium 138, potassium 3.6, chloride 103, CO2 23, BUN 9, creatinine 0.8, glucose 100, AST 20, ALT 15, white blood cells 6.82, hemoglobin 12.2, hematocrit 37.5, platelets 324      Overview/Hospital Course:  No notes on file    Interval History:   No acute events overnight.  Patient knows no chills or fever like symptoms overnight.  She also denies any milkly or pus like discharge from right eye; discharge is clear.  She has stinging around the eye that is ongoing, however denies  "eye pain with movement or visual changes.  She notes that the swelling just makes her eye difficult open on the right side.    Review of Systems   Constitutional:  Positive for chills (A couple days prior to presentation). Negative for activity change, appetite change, diaphoresis, fatigue and fever.   HENT:  Positive for facial swelling. Negative for congestion, postnasal drip, rhinorrhea, sore throat, tinnitus and trouble swallowing.    Eyes:  Negative for photophobia, pain, redness and visual disturbance.        Pain surrounding right eye (stinging pain "like pins and needles")  However no pain with eye movement    Respiratory:  Negative for cough, chest tightness, shortness of breath and wheezing.    Cardiovascular:  Negative for chest pain, palpitations and leg swelling.   Gastrointestinal:  Negative for abdominal distention, abdominal pain, diarrhea, nausea and vomiting.   Genitourinary:  Negative for difficulty urinating, dysuria, frequency and hematuria.   Musculoskeletal:  Negative for arthralgias, back pain, myalgias and neck pain.   Skin:  Positive for rash. Negative for pallor.   Allergic/Immunologic: Positive for immunocompromised state.   Neurological:  Negative for dizziness, syncope, weakness, light-headedness and headaches.   Psychiatric/Behavioral:  Negative for decreased concentration.      Objective:     Vital Signs (Most Recent):  Temp: 98.4 °F (36.9 °C) (03/21/22 1015)  Pulse: 82 (03/21/22 1015)  Resp: 19 (03/21/22 1015)  BP: 105/66 (03/21/22 1015)  SpO2: 99 % (03/21/22 1015) Vital Signs (24h Range):  Temp:  [97.8 °F (36.6 °C)-98.4 °F (36.9 °C)] 98.4 °F (36.9 °C)  Pulse:  [82-86] 82  Resp:  [18-20] 19  SpO2:  [99 %-100 %] 99 %  BP: ()/(55-72) 105/66     Weight: 53.7 kg (118 lb 6.2 oz)  Body mass index is 22.37 kg/m².  No intake or output data in the 24 hours ending 03/21/22 1310   Physical Exam  Vitals and nursing note reviewed.   Constitutional:       General: She is not in acute " distress.     Appearance: She is not toxic-appearing.   HENT:      Head: Normocephalic and atraumatic.      Right Ear: External ear normal.      Left Ear: External ear normal.      Nose: No congestion or rhinorrhea.      Mouth/Throat:      Mouth: Mucous membranes are moist.      Pharynx: No oropharyngeal exudate or posterior oropharyngeal erythema.   Eyes:      Comments: White periocular vesicles that are coalescing with surrounding erythema, involving eyelid and the nasal bridge. Tender to touch.    Cardiovascular:      Rate and Rhythm: Normal rate and regular rhythm.      Heart sounds: No murmur heard.  Pulmonary:      Effort: Pulmonary effort is normal. No respiratory distress.      Breath sounds: No wheezing, rhonchi or rales.   Abdominal:      General: Bowel sounds are normal. There is no distension.      Palpations: Abdomen is soft.      Tenderness: There is no abdominal tenderness. There is no guarding.   Musculoskeletal:         General: No swelling, tenderness or deformity. Normal range of motion.      Cervical back: Normal range of motion. No tenderness.   Skin:     General: Skin is warm and dry.      Capillary Refill: Capillary refill takes less than 2 seconds.   Neurological:      Mental Status: She is alert and oriented to person, place, and time.      Motor: No weakness.   Psychiatric:         Mood and Affect: Mood normal.         Behavior: Behavior normal.         Thought Content: Thought content normal.         Judgment: Judgment normal.       Significant Labs: All pertinent labs within the past 24 hours have been reviewed.    Recent Results (from the past 24 hour(s))   Comprehensive Metabolic Panel    Collection Time: 03/21/22  4:43 AM   Result Value Ref Range    Sodium 140 136 - 145 mmol/L    Potassium 4.1 3.5 - 5.1 mmol/L    Chloride 109 95 - 110 mmol/L    CO2 26 23 - 29 mmol/L    Glucose 92 70 - 110 mg/dL    BUN 9 6 - 20 mg/dL    Creatinine 0.7 0.5 - 1.4 mg/dL    Calcium 8.7 8.7 - 10.5 mg/dL     Total Protein 6.5 6.0 - 8.4 g/dL    Albumin 2.9 (L) 3.5 - 5.2 g/dL    Total Bilirubin 0.1 0.1 - 1.0 mg/dL    Alkaline Phosphatase 60 55 - 135 U/L    AST 13 10 - 40 U/L    ALT 10 10 - 44 U/L    Anion Gap 5 (L) 8 - 16 mmol/L    eGFR if African American >60.0 >60 mL/min/1.73 m^2    eGFR if non African American >60.0 >60 mL/min/1.73 m^2   Magnesium    Collection Time: 03/21/22  4:43 AM   Result Value Ref Range    Magnesium 1.8 1.6 - 2.6 mg/dL   Phosphorus    Collection Time: 03/21/22  4:43 AM   Result Value Ref Range    Phosphorus 3.0 2.7 - 4.5 mg/dL   CBC Auto Differential    Collection Time: 03/21/22  4:44 AM   Result Value Ref Range    WBC 5.99 3.90 - 12.70 K/uL    RBC 4.12 4.00 - 5.40 M/uL    Hemoglobin 11.4 (L) 12.0 - 16.0 g/dL    Hematocrit 36.2 (L) 37.0 - 48.5 %    MCV 88 82 - 98 fL    MCH 27.7 27.0 - 31.0 pg    MCHC 31.5 (L) 32.0 - 36.0 g/dL    RDW 14.6 (H) 11.5 - 14.5 %    Platelets 271 150 - 450 K/uL    MPV 10.7 9.2 - 12.9 fL    Immature Granulocytes 0.2 0.0 - 0.5 %    Gran # (ANC) 4.2 1.8 - 7.7 K/uL    Immature Grans (Abs) 0.01 0.00 - 0.04 K/uL    Lymph # 1.0 1.0 - 4.8 K/uL    Mono # 0.6 0.3 - 1.0 K/uL    Eos # 0.2 0.0 - 0.5 K/uL    Baso # 0.05 0.00 - 0.20 K/uL    nRBC 0 0 /100 WBC    Gran % 69.4 38.0 - 73.0 %    Lymph % 16.4 (L) 18.0 - 48.0 %    Mono % 9.5 4.0 - 15.0 %    Eosinophil % 3.7 0.0 - 8.0 %    Basophil % 0.8 0.0 - 1.9 %    Differential Method Automated            Significant Imaging: I have reviewed all pertinent imaging results/findings within the past 24 hours.      Assessment/Plan:      * Herpes zoster ophthalmicus of right eye  Patient reports rash developed to her chest wall about 1.5-2 weeks ago accompanied by a sensation of pins and needles. She then developed swelling around her right eye and had a rash around her eye and the bridge of her nose beginning 3 days ago. She was seen at [facility] two nights ago but left AMA and presented to [place]. She was admitted with concern for herpes  zoster ophthalmicus and started on IV acyclovir. Eye  is reportedly more swollen, and she c/o of stinging pain to the skin over her eye and the right side of her face. Denies any pain to her actual eye and denies any blurry vision.  The patient was transferred to [facility] for ophthalmology consult.    -Continue IV acyclovir 500mg q8hrs  -IVF qhs  -Contact, droplet, and airborne isolation  -Consult ophthalmology due to concern for possible acute retinal necrosis, appreciate recs. Discussed patient with resident. Plan to monitor overnight for any vision changes, and they will evaluate the patient in the morning.  -Add gabapentin 100mg tid  -per Ophthalmology: V1 dermatome distribution vesicular rash with exam revealing no  cell/flare, no KP, positive for pseudodendritic lesions Posterior exam without signs of ARN/PORN (no retinal whitening).  -appreciate recs  -erythromycin ointment to both the skin lesions and the eye b.i.d.  -lubrication with preservative-free tears q.i.d.  -ID consult, appreciate recs; Start IV vancomycin and ceftriaxone for superimposed bacterial infection.  symptomatic management of pain: increase gabapentin, tylenol prn    Anxiety disorder  -Chronic, stable.  reviewed.   -Continue home xanax PRN  -Continue buspar 10mg tid    Tobacco abuse  Assistance with smoking cessation was offered, including:  [x]  Medications  [x]  Counseling  []  Printed Information on Smoking Cessation  []  Referral to a Smoking Cessation Program    Patient was counseled regarding smoking for 3-10 minutes on admit.     Primary insomnia  -Continue remeron qhs    Major depressive disorder in remission  Bipolar 1 disorder  -Continue lithium    HIV disease  This patient in known to have AIDS (from CD4 count has been less than 200). Last CD4 21 (April 2021), new CD4 count pending. Patient is on HAART. Will continue HAART. Continued prophylaxis with atovaquone. Continue to monitor routine labs.     We will consult Infectious  disease at this time. Appreciate recs.   Continue Biktarvy and atovaquone ppx.  CD4 on admit 99    VTE Risk Mitigation (From admission, onward)         Ordered     IP VTE LOW RISK PATIENT  Once         03/19/22 1925     Place sequential compression device  Until discontinued         03/19/22 1925                Discharge Planning   RUFINA: 3/24/2022     Code Status: Full Code   Is the patient medically ready for discharge?: No    Reason for patient still in hospital (select all that apply): Patient trending condition, Treatment and Consult recommendations                     Adelia Murphy MD  Department of Hospital Medicine   [facility] - Intensive Care ([place]-)

## 2022-03-21 NOTE — PLAN OF CARE
[facility] ([place] [address])  Initial Discharge Assessment       Primary Care Provider: Cornell Britt MD    Admission Diagnosis: Herpes zoster [B02.9]    Admission Date: 3/19/2022  Expected Discharge Date: 3/24/2022    Discharge Barriers Identified: (P) None    Payor: MEDICAID / Plan: [place] [place]UE [place] / Product Type: Managed Medicaid /     Extended Emergency Contact Information  Primary Emergency Contact: Elder La  Mobile Phone: [phone]  Relation: Other  Preferred language: English   needed? No  Secondary Emergency Contact: TORRI NYE  Mobile Phone: [phone]  Relation: Daughter  Preferred language: English   needed? No    Discharge Plan A: (P) Home  Discharge Plan B: (P) Home      [facility] - [place], [place] - [address]  [address]  [facility] [address]  Phone: [phone] Fax: [phone]    [facility] - [place], [place] - [address]  [address] [facility]  [place] [address]  Phone: [phone] Fax: [phone]      Initial Assessment (most recent)       Adult Discharge Assessment - 03/21/22 1605          Discharge Assessment    Assessment Type Discharge Planning Assessment (P)      Confirmed/corrected address, phone number and insurance Yes (P)      Confirmed Demographics Correct on Facesheet (P)      Source of Information patient (P)      Does patient/caregiver understand observation status Yes (P)      Communicated RUFINA with patient/caregiver Yes (P)      Reason For Admission HOME (P)      Lives With parent(s) (P)      Do you expect to return to your current living situation? Yes (P)      Do you have help at home or someone to help you manage your care at home? Yes (P)      Who are your caregiver(s) and their phone number(s)? Torri Nye, daughter [phone] (P)      Prior to hospitilization cognitive status: Alert/Oriented (P)      Current cognitive status: Alert/Oriented (P)       Walking or Climbing Stairs Difficulty none (P)      Dressing/Bathing Difficulty none (P)      Home Layout Able to live on 1st floor (P)      Equipment Currently Used at Home none (P)      Readmission within 30 days? No (P)      Patient currently being followed by outpatient case management? No (P)      Do you currently have service(s) that help you manage your care at home? No (P)      Do you take prescription medications? Yes (P)      Do you have prescription coverage? Yes (P)      Coverage [employer] (P)      Do you have any problems affording any of your prescribed medications? No (P)      Is the patient taking medications as prescribed? yes (P)      Who is going to help you get home at discharge? Chelsea Nye, daughter [phone] (P)      How do you get to doctors appointments? car, drives self;family or friend will provide (P)      Are you on dialysis? No (P)      Do you take coumadin? No (P)      Discharge Plan A Home (P)      Discharge Plan B Home (P)      DME Needed Upon Discharge  none (P)      Discharge Plan discussed with: Patient (P)      Discharge Barriers Identified None (P)                    SW met with patient at the bedside to conduct the dc planning assessment. Patient reports that she admitted to [facility] [place] . She reports that she is currently residing in the home with her mother and has no steps to enter the home. Patient reports that she is not on dialysis nor coumadin and does not use any DME equipment. Patient states that she will have transportation home when stable .     PCP  Cornell Britt MD  [phone]    Emergency    Chelsea Nye, daughter  [phone]

## 2022-03-21 NOTE — ASSESSMENT & PLAN NOTE
Patient of Dr. Mendoza- Diagnosed 1996- last VL 1/2022 ~149     Recommendations:  -- CD 4 level 99.  HIV viral load 146 on 1/5/22.   -- Continue Biktarvy and atovaquone ppx.

## 2022-03-21 NOTE — ASSESSMENT & PLAN NOTE
Patient reports rash developed to her chest wall about 1.5-2 weeks ago accompanied by a sensation of pins and needles. She then developed swelling around her right eye and had a rash around her eye and the bridge of her nose beginning 3 days ago. She was seen at [facility] two nights ago but left AMA and presented to [place]. She was admitted with concern for herpes zoster ophthalmicus and started on IV acyclovir. Eye  is reportedly more swollen, and she c/o of stinging pain to the skin over her eye and the right side of her face. Denies any pain to her actual eye and denies any blurry vision.  The patient was transferred to [facility] for ophthalmology consult.    -Continue IV acyclovir 500mg q8hrs  -IVF qhs  -Contact, droplet, and airborne isolation  -Consult ophthalmology due to concern for possible acute retinal necrosis, appreciate recs. Discussed patient with resident. Plan to monitor overnight for any vision changes, and they will evaluate the patient in the morning.  -Add gabapentin 100mg tid  -per Ophthalmology: V1 dermatome distribution vesicular rash with exam revealing no  cell/flare, no KP, positive for pseudodendritic lesions Posterior exam without signs of ARN/PORN (no retinal whitening).  -appreciate recs  -erythromycin ointment to both the skin lesions and the eye b.i.d.  -lubrication with preservative-free tears q.i.d.  -ID consult, appreciate recs; Start IV vancomycin and ceftriaxone for superimposed bacterial infection.  symptomatic management of pain: increase gabapentin, tylenol prn

## 2022-03-22 LAB
ALBUMIN SERPL BCP-MCNC: 3 G/DL (ref 3.5–5.2)
ALP SERPL-CCNC: 66 U/L (ref 55–135)
ALT SERPL W/O P-5'-P-CCNC: 8 U/L (ref 10–44)
ANION GAP SERPL CALC-SCNC: 9 MMOL/L (ref 8–16)
ANISOCYTOSIS BLD QL SMEAR: SLIGHT
AST SERPL-CCNC: 11 U/L (ref 10–40)
BASOPHILS # BLD AUTO: 0.07 K/UL (ref 0–0.2)
BASOPHILS NFR BLD: 1.3 % (ref 0–1.9)
BILIRUB SERPL-MCNC: 0.2 MG/DL (ref 0.1–1)
BUN SERPL-MCNC: 13 MG/DL (ref 6–20)
CALCIUM SERPL-MCNC: 9.3 MG/DL (ref 8.7–10.5)
CHLORIDE SERPL-SCNC: 107 MMOL/L (ref 95–110)
CO2 SERPL-SCNC: 23 MMOL/L (ref 23–29)
CREAT SERPL-MCNC: 0.7 MG/DL (ref 0.5–1.4)
DIFFERENTIAL METHOD: ABNORMAL
EOSINOPHIL # BLD AUTO: 0.3 K/UL (ref 0–0.5)
EOSINOPHIL NFR BLD: 6.2 % (ref 0–8)
ERYTHROCYTE [DISTWIDTH] IN BLOOD BY AUTOMATED COUNT: 14.5 % (ref 11.5–14.5)
EST. GFR  (AFRICAN AMERICAN): >60 ML/MIN/1.73 M^2
EST. GFR  (NON AFRICAN AMERICAN): >60 ML/MIN/1.73 M^2
GLUCOSE SERPL-MCNC: 97 MG/DL (ref 70–110)
HCT VFR BLD AUTO: 35.4 % (ref 37–48.5)
HGB BLD-MCNC: 11.4 G/DL (ref 12–16)
HYPOCHROMIA BLD QL SMEAR: ABNORMAL
IMM GRANULOCYTES # BLD AUTO: 0.01 K/UL (ref 0–0.04)
IMM GRANULOCYTES NFR BLD AUTO: 0.2 % (ref 0–0.5)
LYMPHOCYTES # BLD AUTO: 1.3 K/UL (ref 1–4.8)
LYMPHOCYTES NFR BLD: 24.8 % (ref 18–48)
MAGNESIUM SERPL-MCNC: 1.8 MG/DL (ref 1.6–2.6)
MCH RBC QN AUTO: 27.6 PG (ref 27–31)
MCHC RBC AUTO-ENTMCNC: 32.2 G/DL (ref 32–36)
MCV RBC AUTO: 86 FL (ref 82–98)
MONOCYTES # BLD AUTO: 0.6 K/UL (ref 0.3–1)
MONOCYTES NFR BLD: 11 % (ref 4–15)
NEUTROPHILS # BLD AUTO: 2.9 K/UL (ref 1.8–7.7)
NEUTROPHILS NFR BLD: 56.5 % (ref 38–73)
NRBC BLD-RTO: 0 /100 WBC
OVALOCYTES BLD QL SMEAR: ABNORMAL
PHOSPHATE SERPL-MCNC: 4 MG/DL (ref 2.7–4.5)
PLATELET # BLD AUTO: 269 K/UL (ref 150–450)
PMV BLD AUTO: 10.3 FL (ref 9.2–12.9)
POIKILOCYTOSIS BLD QL SMEAR: SLIGHT
POLYCHROMASIA BLD QL SMEAR: ABNORMAL
POTASSIUM SERPL-SCNC: 4.4 MMOL/L (ref 3.5–5.1)
PROT SERPL-MCNC: 6.7 G/DL (ref 6–8.4)
RBC # BLD AUTO: 4.13 M/UL (ref 4–5.4)
SODIUM SERPL-SCNC: 139 MMOL/L (ref 136–145)
SPHEROCYTES BLD QL SMEAR: ABNORMAL
VANCOMYCIN TROUGH SERPL-MCNC: 4.9 UG/ML (ref 10–22)
WBC # BLD AUTO: 5.2 K/UL (ref 3.9–12.7)

## 2022-03-22 PROCEDURE — 85025 COMPLETE CBC W/AUTO DIFF WBC: CPT | Performed by: STUDENT IN AN ORGANIZED HEALTH CARE EDUCATION/TRAINING PROGRAM

## 2022-03-22 PROCEDURE — 83735 ASSAY OF MAGNESIUM: CPT | Performed by: STUDENT IN AN ORGANIZED HEALTH CARE EDUCATION/TRAINING PROGRAM

## 2022-03-22 PROCEDURE — 63600175 PHARM REV CODE 636 W HCPCS: Performed by: HOSPITALIST

## 2022-03-22 PROCEDURE — 63600175 PHARM REV CODE 636 W HCPCS: Performed by: STUDENT IN AN ORGANIZED HEALTH CARE EDUCATION/TRAINING PROGRAM

## 2022-03-22 PROCEDURE — 99232 SBSQ HOSP IP/OBS MODERATE 35: CPT | Mod: ,,, | Performed by: STUDENT IN AN ORGANIZED HEALTH CARE EDUCATION/TRAINING PROGRAM

## 2022-03-22 PROCEDURE — 84100 ASSAY OF PHOSPHORUS: CPT | Performed by: STUDENT IN AN ORGANIZED HEALTH CARE EDUCATION/TRAINING PROGRAM

## 2022-03-22 PROCEDURE — 63600175 PHARM REV CODE 636 W HCPCS: Performed by: NURSE PRACTITIONER

## 2022-03-22 PROCEDURE — 20600001 HC STEP DOWN PRIVATE ROOM

## 2022-03-22 PROCEDURE — 25000003 PHARM REV CODE 250: Performed by: NURSE PRACTITIONER

## 2022-03-22 PROCEDURE — S4991 NICOTINE PATCH NONLEGEND: HCPCS | Performed by: HOSPITALIST

## 2022-03-22 PROCEDURE — 27000207 HC ISOLATION

## 2022-03-22 PROCEDURE — 25000003 PHARM REV CODE 250: Performed by: HOSPITALIST

## 2022-03-22 PROCEDURE — 80053 COMPREHEN METABOLIC PANEL: CPT | Performed by: STUDENT IN AN ORGANIZED HEALTH CARE EDUCATION/TRAINING PROGRAM

## 2022-03-22 PROCEDURE — 99232 PR SUBSEQUENT HOSPITAL CARE,LEVL II: ICD-10-PCS | Mod: ,,, | Performed by: STUDENT IN AN ORGANIZED HEALTH CARE EDUCATION/TRAINING PROGRAM

## 2022-03-22 PROCEDURE — 80202 ASSAY OF VANCOMYCIN: CPT | Performed by: STUDENT IN AN ORGANIZED HEALTH CARE EDUCATION/TRAINING PROGRAM

## 2022-03-22 PROCEDURE — 25000003 PHARM REV CODE 250: Performed by: STUDENT IN AN ORGANIZED HEALTH CARE EDUCATION/TRAINING PROGRAM

## 2022-03-22 RX ORDER — VANCOMYCIN HCL IN 5 % DEXTROSE 1G/250ML
1000 PLASTIC BAG, INJECTION (ML) INTRAVENOUS
Status: DISCONTINUED | OUTPATIENT
Start: 2022-03-22 | End: 2022-03-23

## 2022-03-22 RX ORDER — SODIUM CHLORIDE 9 MG/ML
INJECTION, SOLUTION INTRAVENOUS CONTINUOUS
Status: ACTIVE | OUTPATIENT
Start: 2022-03-22 | End: 2022-03-23

## 2022-03-22 RX ADMIN — MORPHINE SULFATE 2 MG: 2 INJECTION, SOLUTION INTRAMUSCULAR; INTRAVENOUS at 05:03

## 2022-03-22 RX ADMIN — NICOTINE 1 PATCH: 7 PATCH TRANSDERMAL at 08:03

## 2022-03-22 RX ADMIN — MIRTAZAPINE 15 MG: 15 TABLET, FILM COATED ORAL at 09:03

## 2022-03-22 RX ADMIN — MUPIROCIN: 20 OINTMENT TOPICAL at 09:03

## 2022-03-22 RX ADMIN — SODIUM CHLORIDE: 0.9 INJECTION, SOLUTION INTRAVENOUS at 09:03

## 2022-03-22 RX ADMIN — GABAPENTIN 300 MG: 300 CAPSULE ORAL at 08:03

## 2022-03-22 RX ADMIN — ATOVAQUONE 1500 MG: 750 SUSPENSION ORAL at 08:03

## 2022-03-22 RX ADMIN — BICTEGRAVIR SODIUM, EMTRICITABINE, AND TENOFOVIR ALAFENAMIDE FUMARATE 1 TABLET: 50; 200; 25 TABLET ORAL at 08:03

## 2022-03-22 RX ADMIN — HYDROXYZINE HYDROCHLORIDE 25 MG: 25 TABLET ORAL at 09:03

## 2022-03-22 RX ADMIN — ACYCLOVIR SODIUM 480 MG: 1000 INJECTION, SOLUTION INTRAVENOUS at 05:03

## 2022-03-22 RX ADMIN — MORPHINE SULFATE 2 MG: 2 INJECTION, SOLUTION INTRAMUSCULAR; INTRAVENOUS at 09:03

## 2022-03-22 RX ADMIN — HYPROMELLOSE 2910 1 DROP: 5 SOLUTION OPHTHALMIC at 09:03

## 2022-03-22 RX ADMIN — BUSPIRONE HYDROCHLORIDE 10 MG: 10 TABLET ORAL at 08:03

## 2022-03-22 RX ADMIN — GABAPENTIN 300 MG: 300 CAPSULE ORAL at 02:03

## 2022-03-22 RX ADMIN — LEVOTHYROXINE SODIUM 25 MCG: 25 TABLET ORAL at 06:03

## 2022-03-22 RX ADMIN — FAMOTIDINE 20 MG: 20 TABLET ORAL at 08:03

## 2022-03-22 RX ADMIN — GABAPENTIN 300 MG: 300 CAPSULE ORAL at 09:03

## 2022-03-22 RX ADMIN — VANCOMYCIN HYDROCHLORIDE 750 MG: 750 INJECTION, POWDER, LYOPHILIZED, FOR SOLUTION INTRAVENOUS at 05:03

## 2022-03-22 RX ADMIN — BUSPIRONE HYDROCHLORIDE 10 MG: 10 TABLET ORAL at 02:03

## 2022-03-22 RX ADMIN — HYDROXYZINE HYDROCHLORIDE 25 MG: 25 TABLET ORAL at 03:03

## 2022-03-22 RX ADMIN — FAMOTIDINE 20 MG: 20 TABLET ORAL at 09:03

## 2022-03-22 RX ADMIN — ERYTHROMYCIN: 5 OINTMENT OPHTHALMIC at 09:03

## 2022-03-22 RX ADMIN — MORPHINE SULFATE 2 MG: 2 INJECTION, SOLUTION INTRAMUSCULAR; INTRAVENOUS at 03:03

## 2022-03-22 RX ADMIN — VANCOMYCIN HYDROCHLORIDE 250 MG: 1 INJECTION, POWDER, LYOPHILIZED, FOR SOLUTION INTRAVENOUS at 09:03

## 2022-03-22 RX ADMIN — LITHIUM CARBONATE 300 MG: 300 TABLET, FILM COATED, EXTENDED RELEASE ORAL at 08:03

## 2022-03-22 RX ADMIN — ACYCLOVIR SODIUM 480 MG: 1000 INJECTION, SOLUTION INTRAVENOUS at 09:03

## 2022-03-22 RX ADMIN — BUSPIRONE HYDROCHLORIDE 10 MG: 10 TABLET ORAL at 09:03

## 2022-03-22 RX ADMIN — ACYCLOVIR SODIUM 480 MG: 1000 INJECTION, SOLUTION INTRAVENOUS at 12:03

## 2022-03-22 RX ADMIN — LITHIUM CARBONATE 300 MG: 300 TABLET, FILM COATED, EXTENDED RELEASE ORAL at 09:03

## 2022-03-22 RX ADMIN — ACETAMINOPHEN 650 MG: 325 TABLET ORAL at 06:03

## 2022-03-22 RX ADMIN — HYPROMELLOSE 2910 1 DROP: 5 SOLUTION OPHTHALMIC at 12:03

## 2022-03-22 RX ADMIN — VANCOMYCIN HYDROCHLORIDE 1000 MG: 1 INJECTION, POWDER, LYOPHILIZED, FOR SOLUTION INTRAVENOUS at 06:03

## 2022-03-22 RX ADMIN — HYPROMELLOSE 2910 1 DROP: 5 SOLUTION OPHTHALMIC at 05:03

## 2022-03-22 NOTE — PROGRESS NOTES
[facility] ([address] Medicine  Progress Note    Patient Name: Patricia Nye  MRN: [id number]  Patient Class: IP- Inpatient   Admission Date: 3/19/2022  Length of Stay: 3 days  Attending Physician: Adelia Murphy MD  Primary Care Provider: Cornell Britt MD        Subjective:     Principal Problem:Herpes zoster ophthalmicus of right eye        HPI:  Patricia Nye is a 46 y.o. female with a PMHx of HIV, insomnia, anxiety/depression, polysubstance abuse, and hypothyroidism who was admitted to [facility] on March 18th with right periorbital swelling. The patient reports a rash developed to her chest wall about 1.5-2 weeks ago accompanied by a sensation of pins and needles. She then developed swelling around her right eye and had a rash around her eye and the bridge of her nose beginning 3 days ago. She was seen at [facility] two nights ago but left AMA and then presented to [place]. She was admitted with concern for herpes zoster ophthalmicus and started on IV acyclovir. Eye is reportedly more swollen, and she c/o of stinging pain to the skin over her eye and the right side of her face. Denies any pain to her actual eye and denies any blurry vision. Endorses chills. Denies any fever, abdominal pain, chest pain, nausea, vomiting, diarrhea, shortness of breath, cough, or dysuria. The patient was transferred to [facility] [place], [place] for ophthalmology consult.    Labs were reviewed from OSH: COVID negative, sodium 138, potassium 3.6, chloride 103, CO2 23, BUN 9, creatinine 0.8, glucose 100, AST 20, ALT 15, white blood cells 6.82, hemoglobin 12.2, hematocrit 37.5, platelets 324      Overview/Hospital Course:  No notes on file    Interval History:   No acute events overnight.  Patient denies chills or fever like symptoms overnight.    He notes that swelling seems to be improving.  New lesions still forming.  Pain around the right eye.  However, no pain with movement of right eye.   "    Review of Systems   Constitutional:  Positive for chills (A couple days prior to presentation). Negative for activity change, appetite change, diaphoresis, fatigue and fever.   HENT:  Positive for facial swelling. Negative for congestion, postnasal drip, rhinorrhea, sore throat, tinnitus and trouble swallowing.    Eyes:  Negative for photophobia, pain, redness and visual disturbance.        Pain surrounding right eye (stinging pain "like pins and needles")  However no pain with eye movement    Respiratory:  Negative for cough, chest tightness, shortness of breath and wheezing.    Cardiovascular:  Negative for chest pain, palpitations and leg swelling.   Gastrointestinal:  Negative for abdominal distention, abdominal pain, diarrhea, nausea and vomiting.   Genitourinary:  Negative for difficulty urinating, dysuria, frequency and hematuria.   Musculoskeletal:  Negative for arthralgias, back pain, myalgias and neck pain.   Skin:  Positive for rash. Negative for pallor.   Allergic/Immunologic: Positive for immunocompromised state.   Neurological:  Negative for dizziness, syncope, weakness, light-headedness and headaches.   Psychiatric/Behavioral:  Negative for decreased concentration.      Objective:     Vital Signs (Most Recent):  Temp: 98.9 °F (37.2 °C) (03/22/22 1600)  Pulse: 81 (03/22/22 1600)  Resp: 16 (03/22/22 1600)  BP: 120/75 (03/22/22 1600)  SpO2: 100 % (03/22/22 0030) Vital Signs (24h Range):  Temp:  [97.4 °F (36.3 °C)-99.2 °F (37.3 °C)] 98.9 °F (37.2 °C)  Pulse:  [76-82] 81  Resp:  [16-20] 16  SpO2:  [99 %-100 %] 100 %  BP: (104-144)/(54-81) 120/75     Weight: 53.7 kg (118 lb 6.2 oz)  Body mass index is 22.37 kg/m².    Intake/Output Summary (Last 24 hours) at 3/22/2022 1833  Last data filed at 3/22/2022 1600  Gross per 24 hour   Intake 1100 ml   Output 5 ml   Net 1095 ml        Physical Exam  Vitals and nursing note reviewed.   Constitutional:       General: She is not in acute distress.     Appearance: " She is not toxic-appearing.   HENT:      Head: Normocephalic and atraumatic.      Right Ear: External ear normal.      Left Ear: External ear normal.      Nose: No congestion or rhinorrhea.      Mouth/Throat:      Mouth: Mucous membranes are moist.      Pharynx: No oropharyngeal exudate or posterior oropharyngeal erythema.   Eyes:      Comments: White periocular vesicles that are coalescing with surrounding erythema, involving eyelid and the nasal bridge. Tender to touch.    Cardiovascular:      Rate and Rhythm: Normal rate and regular rhythm.      Heart sounds: No murmur heard.  Pulmonary:      Effort: Pulmonary effort is normal. No respiratory distress.      Breath sounds: No wheezing, rhonchi or rales.   Abdominal:      General: Bowel sounds are normal. There is no distension.      Palpations: Abdomen is soft.      Tenderness: There is no abdominal tenderness. There is no guarding.   Musculoskeletal:         General: No swelling, tenderness or deformity. Normal range of motion.      Cervical back: Normal range of motion. No tenderness.   Skin:     General: Skin is warm and dry.      Capillary Refill: Capillary refill takes less than 2 seconds.   Neurological:      Mental Status: She is alert and oriented to person, place, and time.      Motor: No weakness.   Psychiatric:         Mood and Affect: Mood normal.         Behavior: Behavior normal.         Thought Content: Thought content normal.         Judgment: Judgment normal.       Significant Labs: All pertinent labs within the past 24 hours have been reviewed.    Recent Results (from the past 24 hour(s))   CBC Auto Differential    Collection Time: 03/22/22  4:29 AM   Result Value Ref Range    WBC 5.20 3.90 - 12.70 K/uL    RBC 4.13 4.00 - 5.40 M/uL    Hemoglobin 11.4 (L) 12.0 - 16.0 g/dL    Hematocrit 35.4 (L) 37.0 - 48.5 %    MCV 86 82 - 98 fL    MCH 27.6 27.0 - 31.0 pg    MCHC 32.2 32.0 - 36.0 g/dL    RDW 14.5 11.5 - 14.5 %    Platelets 269 150 - 450 K/uL     MPV 10.3 9.2 - 12.9 fL    Immature Granulocytes 0.2 0.0 - 0.5 %    Gran # (ANC) 2.9 1.8 - 7.7 K/uL    Immature Grans (Abs) 0.01 0.00 - 0.04 K/uL    Lymph # 1.3 1.0 - 4.8 K/uL    Mono # 0.6 0.3 - 1.0 K/uL    Eos # 0.3 0.0 - 0.5 K/uL    Baso # 0.07 0.00 - 0.20 K/uL    nRBC 0 0 /100 WBC    Gran % 56.5 38.0 - 73.0 %    Lymph % 24.8 18.0 - 48.0 %    Mono % 11.0 4.0 - 15.0 %    Eosinophil % 6.2 0.0 - 8.0 %    Basophil % 1.3 0.0 - 1.9 %    Aniso Slight     Poik Slight     Poly Occasional     Hypo Occasional     Ovalocytes Occasional     Spherocytes Occasional     Differential Method Automated    Comprehensive Metabolic Panel    Collection Time: 03/22/22  4:29 AM   Result Value Ref Range    Sodium 139 136 - 145 mmol/L    Potassium 4.4 3.5 - 5.1 mmol/L    Chloride 107 95 - 110 mmol/L    CO2 23 23 - 29 mmol/L    Glucose 97 70 - 110 mg/dL    BUN 13 6 - 20 mg/dL    Creatinine 0.7 0.5 - 1.4 mg/dL    Calcium 9.3 8.7 - 10.5 mg/dL    Total Protein 6.7 6.0 - 8.4 g/dL    Albumin 3.0 (L) 3.5 - 5.2 g/dL    Total Bilirubin 0.2 0.1 - 1.0 mg/dL    Alkaline Phosphatase 66 55 - 135 U/L    AST 11 10 - 40 U/L    ALT 8 (L) 10 - 44 U/L    Anion Gap 9 8 - 16 mmol/L    eGFR if African American >60.0 >60 mL/min/1.73 m^2    eGFR if non African American >60.0 >60 mL/min/1.73 m^2   Magnesium    Collection Time: 03/22/22  4:29 AM   Result Value Ref Range    Magnesium 1.8 1.6 - 2.6 mg/dL   Phosphorus    Collection Time: 03/22/22  4:29 AM   Result Value Ref Range    Phosphorus 4.0 2.7 - 4.5 mg/dL   VANCOMYCIN, TROUGH    Collection Time: 03/22/22  4:29 AM   Result Value Ref Range    Vancomycin-Trough 4.9 (L) 10.0 - 22.0 ug/mL           Significant Imaging: I have reviewed all pertinent imaging results/findings within the past 24 hours.      Assessment/Plan:      * Herpes zoster ophthalmicus of right eye  Patient reports rash developed to her chest wall about 1.5-2 weeks ago accompanied by a sensation of pins and needles. She then developed swelling  around her right eye and had a rash around her eye and the bridge of her nose beginning 3 days ago. She was seen at [facility] two nights ago but left AMA and presented to [place]. She was admitted with concern for herpes zoster ophthalmicus and started on IV acyclovir. Eye  is reportedly more swollen, and she c/o of stinging pain to the skin over her eye and the right side of her face. Denies any pain to her actual eye and denies any blurry vision.  The patient was transferred to [facility] [place]mology consult.    -Continue IV acyclovir 500mg q8hrs  -IVF qhs  -Contact, droplet, and airborne isolation  -Consult ophthalmology due to concern for possible acute retinal necrosis, appreciate recs. Discussed patient with resident. Plan to monitor overnight for any vision changes, and they will evaluate the patient in the morning.  -Add gabapentin 100mg tid  -per Ophthalmology: V1 dermatome distribution vesicular rash with exam revealing no  cell/flare, no KP, positive for pseudodendritic lesions Posterior exam without signs of ARN/PORN (no retinal whitening).  -appreciate recs  -erythromycin ointment to both the skin lesions and the eye b.i.d.  -lubrication with preservative-free tears q.i.d.  -ID consult, appreciate recs; Start IV vancomycin and ceftriaxone for superimposed bacterial infection. Abx narrowed to vancomycin.  Anticipate transitioning to doxycycline once closer to discharge  (doxycycline 100 mg bid to complete 14 day course for superimposed bacterial infection.).  -ID signed off; re-consult prn  - continue IV acyclovir until no new lesions are forming or for at least 7 days;  then transition to oral therapy   symptomatic management of pain: increase gabapentin, tylenol prn    Anxiety disorder  -Chronic, stable.  reviewed.   -Continue home xanax PRN  -Continue buspar 10mg tid    Tobacco abuse  Assistance with smoking cessation was offered, including:  [x]  Medications  [x]  Counseling  []  Printed  Information on Smoking Cessation  []  Referral to a Smoking Cessation Program    Patient was counseled regarding smoking for 3-10 minutes on admit.     Primary insomnia  -Continue remeron qhs    Major depressive disorder in remission  Bipolar 1 disorder  -Continue lithium    HIV disease  This patient in known to have AIDS (from CD4 count has been less than 200). Last CD4 21 (April 2021), new CD4 count pending. Patient is on HAART. Will continue HAART. Continued prophylaxis with atovaquone. Continue to monitor routine labs.     We will consult Infectious disease at this time. Appreciate recs.   Continue Biktarvy and atovaquone ppx.  CD4 on admit 99      VTE Risk Mitigation (From admission, onward)         Ordered     IP VTE LOW RISK PATIENT  Once         03/19/22 1925     Place sequential compression device  Until discontinued         03/19/22 1925                Discharge Planning   RUFINA: 3/24/2022     Code Status: Full Code   Is the patient medically ready for discharge?: No    Reason for patient still in hospital (select all that apply): Patient trending condition and Consult recommendations  Discharge Plan A: Home                  Adelia Murphy MD  Department of Hospital Medicine   [facility] - Intensive Care ([facility]-)

## 2022-03-22 NOTE — PROGRESS NOTES
Pharmacokinetic Assessment Follow Up: IV Vancomycin    Vancomycin serum concentration assessment(s):    The trough level was drawn correctly and can be used to guide therapy at this time. The measurement is below the desired definitive target range of 10 to 20 mcg/mL.    Vancomycin Regimen Plan:    Patient received 750 mg this AM, will order vancomycin  mg to be given in addition this morning  Change regimen to Vancomycin 1000 mg IV every 12 hours starting at 1700 on 3/22 with next serum trough concentration measured at 1600 prior to 4th dose on 3/23    Drug levels (last 3 results):  Recent Labs   Lab Result Units 03/22/22  0429   Vancomycin-Trough ug/mL 4.9*       Pharmacy will continue to follow and monitor vancomycin.    Please contact pharmacy at extension [phone] for questions regarding this assessment.    Thank you for the consult,   Brook Cummings       Patient brief summary:  Patricia Nye is a 46 y.o. female initiated on antimicrobial therapy with IV Vancomycin for treatment of skin & soft tissue infection      Drug Allergies:   Review of patient's allergies indicates:   Allergen Reactions    Opioids - morphine analogues      Pt states she is not allergic to morphine      Pcn [penicillins] Other (See Comments)     Trouble swallowing and vomiting.     Sulfa (sulfonamide antibiotics)      Rash (skin)^       Actual Body Weight:   53.7 kg    Renal Function:   Estimated Creatinine Clearance: 76.6 mL/min (based on SCr of 0.7 mg/dL).     Dialysis Method (if applicable):  N/A    CBC (last 72 hours):  Recent Labs   Lab Result Units 03/20/22  0544 03/21/22  0444 03/22/22  0429   WBC K/uL 8.17 5.99 5.20   Hemoglobin g/dL 11.6* 11.4* 11.4*   Hematocrit % 35.9* 36.2* 35.4*   Platelets K/uL 277 271 269   Gran % % 78.6* 69.4 56.5   Lymph % % 11.0* 16.4* 24.8   Mono % % 8.4 9.5 11.0   Eosinophil % % 1.3 3.7 6.2   Basophil % % 0.5 0.8 1.3   Differential Method  Automated Automated Automated       Metabolic  Panel (last 72 hours):  Recent Labs   Lab Result Units 03/19/22  1953 03/21/22  0443 03/22/22  0429   Sodium mmol/L 138 140 139   Potassium mmol/L 3.4* 4.1 4.4   Chloride mmol/L 103 109 107   CO2 mmol/L 27 26 23   Glucose mg/dL 134* 92 97   BUN mg/dL 10 9 13   Creatinine mg/dL 0.9 0.7 0.7   Albumin g/dL 3.4* 2.9* 3.0*   Total Bilirubin mg/dL 0.2 0.1 0.2   Alkaline Phosphatase U/L 67 60 66   AST U/L 17 13 11   ALT U/L 13 10 8*   Magnesium mg/dL  --  1.8 1.8   Phosphorus mg/dL  --  3.0 4.0       Vancomycin Administrations:  vancomycin given in the last 96 hours                   vancomycin 750 mg in dextrose 5 % 250 mL IVPB (ready to mix system) (mg) 750 mg New Bag 03/22/22 0546     750 mg New Bag 03/21/22 1651     750 mg New Bag  0330     750 mg New Bag 03/20/22 1808                Microbiologic Results:  Microbiology Results (last 7 days)     ** No results found for the last 168 hours. **

## 2022-03-22 NOTE — PLAN OF CARE
Patient received alert and oriented, no indication of any pain and or discomfort at this time. White board updated on the plan of care . All safety interventions are in place, call bell in use. Will continue to observe

## 2022-03-22 NOTE — NURSING
Ambulating in room, able to make needs known verbally, stable without complaints at this time. Call light in reach, aware on use.POC reviewed with patient. Voices understanding.

## 2022-03-22 NOTE — ASSESSMENT & PLAN NOTE
Patient reports rash developed to her chest wall about 1.5-2 weeks ago accompanied by a sensation of pins and needles. She then developed swelling around her right eye and had a rash around her eye and the bridge of her nose beginning 3 days ago. She was seen at [facility] two nights ago but left AMA and presented to [place]. She was admitted with concern for herpes zoster ophthalmicus and started on IV acyclovir. Eye  is reportedly more swollen, and she c/o of stinging pain to the skin over her eye and the right side of her face. Denies any pain to her actual eye and denies any blurry vision.  The patient was transferred to [facility] [place] for ophthalmology consult.    -Continue IV acyclovir 500mg q8hrs  -IVF qhs  -Contact, droplet, and airborne isolation  -Consult ophthalmology due to concern for possible acute retinal necrosis, appreciate recs. Discussed patient with resident. Plan to monitor overnight for any vision changes, and they will evaluate the patient in the morning.  -Add gabapentin 100mg tid  -per Ophthalmology: V1 dermatome distribution vesicular rash with exam revealing no  cell/flare, no KP, positive for pseudodendritic lesions Posterior exam without signs of ARN/PORN (no retinal whitening).  -appreciate recs  -erythromycin ointment to both the skin lesions and the eye b.i.d.  -lubrication with preservative-free tears q.i.d.  -ID consult, appreciate recs; Start IV vancomycin and ceftriaxone for superimposed bacterial infection. Abx narrowed to vancomycin.  Anticipate transitioning to doxycycline once closer to discharge  (doxycycline 100 mg bid to complete 14 day course for superimposed bacterial infection.).  -ID signed off; re-consult prn  - continue IV acyclovir until no new lesions are forming or for at least 7 days;  then transition to oral therapy   symptomatic management of pain: increase gabapentin, tylenol prn

## 2022-03-22 NOTE — SUBJECTIVE & OBJECTIVE
"Interval History:   No acute events overnight.  Patient denies chills or fever like symptoms overnight.    He notes that swelling seems to be improving.  New lesions still forming.  Pain around the right eye.  However, no pain with movement of right eye.      Review of Systems   Constitutional:  Positive for chills (A couple days prior to presentation). Negative for activity change, appetite change, diaphoresis, fatigue and fever.   HENT:  Positive for facial swelling. Negative for congestion, postnasal drip, rhinorrhea, sore throat, tinnitus and trouble swallowing.    Eyes:  Negative for photophobia, pain, redness and visual disturbance.        Pain surrounding right eye (stinging pain "like pins and needles")  However no pain with eye movement    Respiratory:  Negative for cough, chest tightness, shortness of breath and wheezing.    Cardiovascular:  Negative for chest pain, palpitations and leg swelling.   Gastrointestinal:  Negative for abdominal distention, abdominal pain, diarrhea, nausea and vomiting.   Genitourinary:  Negative for difficulty urinating, dysuria, frequency and hematuria.   Musculoskeletal:  Negative for arthralgias, back pain, myalgias and neck pain.   Skin:  Positive for rash. Negative for pallor.   Allergic/Immunologic: Positive for immunocompromised state.   Neurological:  Negative for dizziness, syncope, weakness, light-headedness and headaches.   Psychiatric/Behavioral:  Negative for decreased concentration.      Objective:     Vital Signs (Most Recent):  Temp: 98.9 °F (37.2 °C) (03/22/22 1600)  Pulse: 81 (03/22/22 1600)  Resp: 16 (03/22/22 1600)  BP: 120/75 (03/22/22 1600)  SpO2: 100 % (03/22/22 0030) Vital Signs (24h Range):  Temp:  [97.4 °F (36.3 °C)-99.2 °F (37.3 °C)] 98.9 °F (37.2 °C)  Pulse:  [76-82] 81  Resp:  [16-20] 16  SpO2:  [99 %-100 %] 100 %  BP: (104-144)/(54-81) 120/75     Weight: 53.7 kg (118 lb 6.2 oz)  Body mass index is 22.37 kg/m².    Intake/Output Summary (Last 24 " hours) at 3/22/2022 1833  Last data filed at 3/22/2022 1600  Gross per 24 hour   Intake 1100 ml   Output 5 ml   Net 1095 ml        Physical Exam  Vitals and nursing note reviewed.   Constitutional:       General: She is not in acute distress.     Appearance: She is not toxic-appearing.   HENT:      Head: Normocephalic and atraumatic.      Right Ear: External ear normal.      Left Ear: External ear normal.      Nose: No congestion or rhinorrhea.      Mouth/Throat:      Mouth: Mucous membranes are moist.      Pharynx: No oropharyngeal exudate or posterior oropharyngeal erythema.   Eyes:      Comments: White periocular vesicles that are coalescing with surrounding erythema, involving eyelid and the nasal bridge. Tender to touch.    Cardiovascular:      Rate and Rhythm: Normal rate and regular rhythm.      Heart sounds: No murmur heard.  Pulmonary:      Effort: Pulmonary effort is normal. No respiratory distress.      Breath sounds: No wheezing, rhonchi or rales.   Abdominal:      General: Bowel sounds are normal. There is no distension.      Palpations: Abdomen is soft.      Tenderness: There is no abdominal tenderness. There is no guarding.   Musculoskeletal:         General: No swelling, tenderness or deformity. Normal range of motion.      Cervical back: Normal range of motion. No tenderness.   Skin:     General: Skin is warm and dry.      Capillary Refill: Capillary refill takes less than 2 seconds.   Neurological:      Mental Status: She is alert and oriented to person, place, and time.      Motor: No weakness.   Psychiatric:         Mood and Affect: Mood normal.         Behavior: Behavior normal.         Thought Content: Thought content normal.         Judgment: Judgment normal.       Significant Labs: All pertinent labs within the past 24 hours have been reviewed.    Recent Results (from the past 24 hour(s))   CBC Auto Differential    Collection Time: 03/22/22  4:29 AM   Result Value Ref Range    WBC 5.20 3.90 -  12.70 K/uL    RBC 4.13 4.00 - 5.40 M/uL    Hemoglobin 11.4 (L) 12.0 - 16.0 g/dL    Hematocrit 35.4 (L) 37.0 - 48.5 %    MCV 86 82 - 98 fL    MCH 27.6 27.0 - 31.0 pg    MCHC 32.2 32.0 - 36.0 g/dL    RDW 14.5 11.5 - 14.5 %    Platelets 269 150 - 450 K/uL    MPV 10.3 9.2 - 12.9 fL    Immature Granulocytes 0.2 0.0 - 0.5 %    Gran # (ANC) 2.9 1.8 - 7.7 K/uL    Immature Grans (Abs) 0.01 0.00 - 0.04 K/uL    Lymph # 1.3 1.0 - 4.8 K/uL    Mono # 0.6 0.3 - 1.0 K/uL    Eos # 0.3 0.0 - 0.5 K/uL    Baso # 0.07 0.00 - 0.20 K/uL    nRBC 0 0 /100 WBC    Gran % 56.5 38.0 - 73.0 %    Lymph % 24.8 18.0 - 48.0 %    Mono % 11.0 4.0 - 15.0 %    Eosinophil % 6.2 0.0 - 8.0 %    Basophil % 1.3 0.0 - 1.9 %    Aniso Slight     Poik Slight     Poly Occasional     Hypo Occasional     Ovalocytes Occasional     Spherocytes Occasional     Differential Method Automated    Comprehensive Metabolic Panel    Collection Time: 03/22/22  4:29 AM   Result Value Ref Range    Sodium 139 136 - 145 mmol/L    Potassium 4.4 3.5 - 5.1 mmol/L    Chloride 107 95 - 110 mmol/L    CO2 23 23 - 29 mmol/L    Glucose 97 70 - 110 mg/dL    BUN 13 6 - 20 mg/dL    Creatinine 0.7 0.5 - 1.4 mg/dL    Calcium 9.3 8.7 - 10.5 mg/dL    Total Protein 6.7 6.0 - 8.4 g/dL    Albumin 3.0 (L) 3.5 - 5.2 g/dL    Total Bilirubin 0.2 0.1 - 1.0 mg/dL    Alkaline Phosphatase 66 55 - 135 U/L    AST 11 10 - 40 U/L    ALT 8 (L) 10 - 44 U/L    Anion Gap 9 8 - 16 mmol/L    eGFR if African American >60.0 >60 mL/min/1.73 m^2    eGFR if non African American >60.0 >60 mL/min/1.73 m^2   Magnesium    Collection Time: 03/22/22  4:29 AM   Result Value Ref Range    Magnesium 1.8 1.6 - 2.6 mg/dL   Phosphorus    Collection Time: 03/22/22  4:29 AM   Result Value Ref Range    Phosphorus 4.0 2.7 - 4.5 mg/dL   VANCOMYCIN, TROUGH    Collection Time: 03/22/22  4:29 AM   Result Value Ref Range    Vancomycin-Trough 4.9 (L) 10.0 - 22.0 ug/mL           Significant Imaging: I have reviewed all pertinent imaging  results/findings within the past 24 hours.

## 2022-03-23 LAB
ALBUMIN SERPL BCP-MCNC: 3.2 G/DL (ref 3.5–5.2)
ALP SERPL-CCNC: 65 U/L (ref 55–135)
ALT SERPL W/O P-5'-P-CCNC: 7 U/L (ref 10–44)
ANION GAP SERPL CALC-SCNC: 9 MMOL/L (ref 8–16)
AST SERPL-CCNC: 12 U/L (ref 10–40)
BASOPHILS # BLD AUTO: 0.06 K/UL (ref 0–0.2)
BASOPHILS NFR BLD: 1.3 % (ref 0–1.9)
BILIRUB SERPL-MCNC: 0.2 MG/DL (ref 0.1–1)
BUN SERPL-MCNC: 12 MG/DL (ref 6–20)
CALCIUM SERPL-MCNC: 9.7 MG/DL (ref 8.7–10.5)
CHLORIDE SERPL-SCNC: 101 MMOL/L (ref 95–110)
CO2 SERPL-SCNC: 26 MMOL/L (ref 23–29)
CREAT SERPL-MCNC: 0.7 MG/DL (ref 0.5–1.4)
DIFFERENTIAL METHOD: ABNORMAL
EOSINOPHIL # BLD AUTO: 0.3 K/UL (ref 0–0.5)
EOSINOPHIL NFR BLD: 7.2 % (ref 0–8)
ERYTHROCYTE [DISTWIDTH] IN BLOOD BY AUTOMATED COUNT: 14.3 % (ref 11.5–14.5)
EST. GFR  (AFRICAN AMERICAN): >60 ML/MIN/1.73 M^2
EST. GFR  (NON AFRICAN AMERICAN): >60 ML/MIN/1.73 M^2
GLUCOSE SERPL-MCNC: 90 MG/DL (ref 70–110)
HCT VFR BLD AUTO: 40.8 % (ref 37–48.5)
HGB BLD-MCNC: 12.7 G/DL (ref 12–16)
IMM GRANULOCYTES # BLD AUTO: 0.01 K/UL (ref 0–0.04)
IMM GRANULOCYTES NFR BLD AUTO: 0.2 % (ref 0–0.5)
LYMPHOCYTES # BLD AUTO: 1.3 K/UL (ref 1–4.8)
LYMPHOCYTES NFR BLD: 29.1 % (ref 18–48)
MAGNESIUM SERPL-MCNC: 1.7 MG/DL (ref 1.6–2.6)
MCH RBC QN AUTO: 27.4 PG (ref 27–31)
MCHC RBC AUTO-ENTMCNC: 31.1 G/DL (ref 32–36)
MCV RBC AUTO: 88 FL (ref 82–98)
MONOCYTES # BLD AUTO: 0.4 K/UL (ref 0.3–1)
MONOCYTES NFR BLD: 8.5 % (ref 4–15)
NEUTROPHILS # BLD AUTO: 2.4 K/UL (ref 1.8–7.7)
NEUTROPHILS NFR BLD: 53.7 % (ref 38–73)
NRBC BLD-RTO: 0 /100 WBC
PHOSPHATE SERPL-MCNC: 4.1 MG/DL (ref 2.7–4.5)
PLATELET # BLD AUTO: 276 K/UL (ref 150–450)
PMV BLD AUTO: 10.8 FL (ref 9.2–12.9)
POTASSIUM SERPL-SCNC: 4.1 MMOL/L (ref 3.5–5.1)
PROT SERPL-MCNC: 7.4 G/DL (ref 6–8.4)
RBC # BLD AUTO: 4.63 M/UL (ref 4–5.4)
SODIUM SERPL-SCNC: 136 MMOL/L (ref 136–145)
VANCOMYCIN TROUGH SERPL-MCNC: 7.9 UG/ML (ref 10–22)
WBC # BLD AUTO: 4.47 K/UL (ref 3.9–12.7)

## 2022-03-23 PROCEDURE — S4991 NICOTINE PATCH NONLEGEND: HCPCS | Performed by: HOSPITALIST

## 2022-03-23 PROCEDURE — 63600175 PHARM REV CODE 636 W HCPCS: Performed by: STUDENT IN AN ORGANIZED HEALTH CARE EDUCATION/TRAINING PROGRAM

## 2022-03-23 PROCEDURE — 85025 COMPLETE CBC W/AUTO DIFF WBC: CPT | Performed by: STUDENT IN AN ORGANIZED HEALTH CARE EDUCATION/TRAINING PROGRAM

## 2022-03-23 PROCEDURE — 20600001 HC STEP DOWN PRIVATE ROOM

## 2022-03-23 PROCEDURE — 99232 PR SUBSEQUENT HOSPITAL CARE,LEVL II: ICD-10-PCS | Mod: ,,, | Performed by: STUDENT IN AN ORGANIZED HEALTH CARE EDUCATION/TRAINING PROGRAM

## 2022-03-23 PROCEDURE — 63600175 PHARM REV CODE 636 W HCPCS: Performed by: HOSPITALIST

## 2022-03-23 PROCEDURE — 25000003 PHARM REV CODE 250: Performed by: HOSPITALIST

## 2022-03-23 PROCEDURE — 83735 ASSAY OF MAGNESIUM: CPT | Performed by: STUDENT IN AN ORGANIZED HEALTH CARE EDUCATION/TRAINING PROGRAM

## 2022-03-23 PROCEDURE — 63600175 PHARM REV CODE 636 W HCPCS: Performed by: NURSE PRACTITIONER

## 2022-03-23 PROCEDURE — 99232 SBSQ HOSP IP/OBS MODERATE 35: CPT | Mod: ,,, | Performed by: STUDENT IN AN ORGANIZED HEALTH CARE EDUCATION/TRAINING PROGRAM

## 2022-03-23 PROCEDURE — 27000207 HC ISOLATION

## 2022-03-23 PROCEDURE — 80053 COMPREHEN METABOLIC PANEL: CPT | Performed by: STUDENT IN AN ORGANIZED HEALTH CARE EDUCATION/TRAINING PROGRAM

## 2022-03-23 PROCEDURE — 80202 ASSAY OF VANCOMYCIN: CPT | Performed by: STUDENT IN AN ORGANIZED HEALTH CARE EDUCATION/TRAINING PROGRAM

## 2022-03-23 PROCEDURE — 84100 ASSAY OF PHOSPHORUS: CPT | Performed by: STUDENT IN AN ORGANIZED HEALTH CARE EDUCATION/TRAINING PROGRAM

## 2022-03-23 PROCEDURE — 25000003 PHARM REV CODE 250: Performed by: STUDENT IN AN ORGANIZED HEALTH CARE EDUCATION/TRAINING PROGRAM

## 2022-03-23 PROCEDURE — 36415 COLL VENOUS BLD VENIPUNCTURE: CPT | Performed by: STUDENT IN AN ORGANIZED HEALTH CARE EDUCATION/TRAINING PROGRAM

## 2022-03-23 RX ORDER — METHOCARBAMOL 500 MG/1
500 TABLET, FILM COATED ORAL 4 TIMES DAILY
Status: DISCONTINUED | OUTPATIENT
Start: 2022-03-23 | End: 2022-03-29 | Stop reason: HOSPADM

## 2022-03-23 RX ADMIN — GABAPENTIN 300 MG: 300 CAPSULE ORAL at 04:03

## 2022-03-23 RX ADMIN — VANCOMYCIN HYDROCHLORIDE 1250 MG: 1.25 INJECTION, POWDER, LYOPHILIZED, FOR SOLUTION INTRAVENOUS at 10:03

## 2022-03-23 RX ADMIN — MUPIROCIN: 20 OINTMENT TOPICAL at 08:03

## 2022-03-23 RX ADMIN — BUSPIRONE HYDROCHLORIDE 10 MG: 10 TABLET ORAL at 04:03

## 2022-03-23 RX ADMIN — NICOTINE 1 PATCH: 7 PATCH TRANSDERMAL at 08:03

## 2022-03-23 RX ADMIN — METHOCARBAMOL TABLETS 500 MG: 500 TABLET, COATED ORAL at 04:03

## 2022-03-23 RX ADMIN — GABAPENTIN 300 MG: 300 CAPSULE ORAL at 08:03

## 2022-03-23 RX ADMIN — MORPHINE SULFATE 2 MG: 2 INJECTION, SOLUTION INTRAMUSCULAR; INTRAVENOUS at 01:03

## 2022-03-23 RX ADMIN — ERYTHROMYCIN 1 INCH: 5 OINTMENT OPHTHALMIC at 08:03

## 2022-03-23 RX ADMIN — MORPHINE SULFATE 2 MG: 2 INJECTION, SOLUTION INTRAMUSCULAR; INTRAVENOUS at 08:03

## 2022-03-23 RX ADMIN — VANCOMYCIN HYDROCHLORIDE 1000 MG: 1 INJECTION, POWDER, LYOPHILIZED, FOR SOLUTION INTRAVENOUS at 07:03

## 2022-03-23 RX ADMIN — HYPROMELLOSE 2910 1 DROP: 5 SOLUTION OPHTHALMIC at 08:03

## 2022-03-23 RX ADMIN — LEVOTHYROXINE SODIUM 25 MCG: 25 TABLET ORAL at 06:03

## 2022-03-23 RX ADMIN — METHOCARBAMOL TABLETS 500 MG: 500 TABLET, COATED ORAL at 08:03

## 2022-03-23 RX ADMIN — HYPROMELLOSE 2910 1 DROP: 5 SOLUTION OPHTHALMIC at 04:03

## 2022-03-23 RX ADMIN — FAMOTIDINE 20 MG: 20 TABLET ORAL at 08:03

## 2022-03-23 RX ADMIN — ATOVAQUONE 1500 MG: 750 SUSPENSION ORAL at 08:03

## 2022-03-23 RX ADMIN — ERYTHROMYCIN: 5 OINTMENT OPHTHALMIC at 08:03

## 2022-03-23 RX ADMIN — ACYCLOVIR SODIUM 480 MG: 1000 INJECTION, SOLUTION INTRAVENOUS at 09:03

## 2022-03-23 RX ADMIN — ACYCLOVIR SODIUM 480 MG: 1000 INJECTION, SOLUTION INTRAVENOUS at 06:03

## 2022-03-23 RX ADMIN — MIRTAZAPINE 15 MG: 15 TABLET, FILM COATED ORAL at 08:03

## 2022-03-23 RX ADMIN — ACYCLOVIR SODIUM 480 MG: 1000 INJECTION, SOLUTION INTRAVENOUS at 01:03

## 2022-03-23 RX ADMIN — BUSPIRONE HYDROCHLORIDE 10 MG: 10 TABLET ORAL at 08:03

## 2022-03-23 RX ADMIN — LITHIUM CARBONATE 300 MG: 300 TABLET, FILM COATED, EXTENDED RELEASE ORAL at 08:03

## 2022-03-23 RX ADMIN — HYPROMELLOSE 2910 1 DROP: 5 SOLUTION OPHTHALMIC at 01:03

## 2022-03-23 RX ADMIN — BICTEGRAVIR SODIUM, EMTRICITABINE, AND TENOFOVIR ALAFENAMIDE FUMARATE 1 TABLET: 50; 200; 25 TABLET ORAL at 08:03

## 2022-03-23 RX ADMIN — MORPHINE SULFATE 2 MG: 2 INJECTION, SOLUTION INTRAMUSCULAR; INTRAVENOUS at 07:03

## 2022-03-23 NOTE — SUBJECTIVE & OBJECTIVE
"Interval History:   No acute events overnight.  Patient denies chills or fever like symptoms overnight.    She notes that swelling seems to be improving.  New lesions still seem to be forming.  Pain around the right eye.  However, no pain with movement of right eye.      Review of Systems   Constitutional:  Positive for chills (A couple days prior to presentation). Negative for activity change, appetite change, diaphoresis, fatigue and fever.   HENT:  Positive for facial swelling. Negative for congestion, postnasal drip, rhinorrhea, sore throat, tinnitus and trouble swallowing.    Eyes:  Negative for photophobia, pain, redness and visual disturbance.        Pain surrounding right eye (stinging pain "like pins and needles")  However no pain with eye movement    Respiratory:  Negative for cough, chest tightness, shortness of breath and wheezing.    Cardiovascular:  Negative for chest pain, palpitations and leg swelling.   Gastrointestinal:  Negative for abdominal distention, abdominal pain, diarrhea, nausea and vomiting.   Genitourinary:  Negative for difficulty urinating, dysuria, frequency and hematuria.   Musculoskeletal:  Negative for arthralgias, back pain, myalgias and neck pain.   Skin:  Positive for rash. Negative for pallor.   Allergic/Immunologic: Positive for immunocompromised state.   Neurological:  Negative for dizziness, syncope, weakness, light-headedness and headaches.   Psychiatric/Behavioral:  Negative for decreased concentration.      Objective:     Vital Signs (Most Recent):  Temp: 97.8 °F (36.6 °C) (03/23/22 1110)  Pulse: 81 (03/23/22 1110)  Resp: 18 (03/23/22 1357)  BP: 120/77 (03/23/22 1110)  SpO2: 100 % (03/23/22 1110) Vital Signs (24h Range):  Temp:  [97.8 °F (36.6 °C)-99 °F (37.2 °C)] 97.8 °F (36.6 °C)  Pulse:  [74-86] 81  Resp:  [16-18] 18  SpO2:  [99 %-100 %] 100 %  BP: (120-137)/(75-86) 120/77     Weight: 53.7 kg (118 lb 6.2 oz)  Body mass index is 22.37 kg/m².    Intake/Output Summary " (Last 24 hours) at 3/23/2022 1511  Last data filed at 3/22/2022 2104  Gross per 24 hour   Intake 640 ml   Output 7 ml   Net 633 ml        Physical Exam  Vitals and nursing note reviewed.   Constitutional:       General: She is not in acute distress.     Appearance: She is not toxic-appearing.   HENT:      Head: Normocephalic and atraumatic.      Right Ear: External ear normal.      Left Ear: External ear normal.      Nose: No congestion or rhinorrhea.      Mouth/Throat:      Mouth: Mucous membranes are moist.      Pharynx: No oropharyngeal exudate or posterior oropharyngeal erythema.   Eyes:      Comments: White periocular vesicles that are coalescing with surrounding erythema, involving eyelid and the nasal bridge. Tender to touch.   Erythema and edema significantly improved.  Some conjunctival injection noted of right eye.     Cardiovascular:      Rate and Rhythm: Normal rate and regular rhythm.      Heart sounds: No murmur heard.  Pulmonary:      Effort: Pulmonary effort is normal. No respiratory distress.      Breath sounds: No wheezing, rhonchi or rales.   Abdominal:      General: Bowel sounds are normal. There is no distension.      Palpations: Abdomen is soft.      Tenderness: There is no abdominal tenderness. There is no guarding.   Musculoskeletal:         General: No swelling, tenderness or deformity. Normal range of motion.      Cervical back: Normal range of motion. No tenderness.   Skin:     General: Skin is warm and dry.      Capillary Refill: Capillary refill takes less than 2 seconds.   Neurological:      Mental Status: She is alert and oriented to person, place, and time.      Motor: No weakness.   Psychiatric:         Mood and Affect: Mood normal.         Behavior: Behavior normal.         Thought Content: Thought content normal.         Judgment: Judgment normal.       Significant Labs: All pertinent labs within the past 24 hours have been reviewed.    Recent Results (from the past 24 hour(s))    CBC Auto Differential    Collection Time: 03/23/22  7:28 AM   Result Value Ref Range    WBC 4.47 3.90 - 12.70 K/uL    RBC 4.63 4.00 - 5.40 M/uL    Hemoglobin 12.7 12.0 - 16.0 g/dL    Hematocrit 40.8 37.0 - 48.5 %    MCV 88 82 - 98 fL    MCH 27.4 27.0 - 31.0 pg    MCHC 31.1 (L) 32.0 - 36.0 g/dL    RDW 14.3 11.5 - 14.5 %    Platelets 276 150 - 450 K/uL    MPV 10.8 9.2 - 12.9 fL    Immature Granulocytes 0.2 0.0 - 0.5 %    Gran # (ANC) 2.4 1.8 - 7.7 K/uL    Immature Grans (Abs) 0.01 0.00 - 0.04 K/uL    Lymph # 1.3 1.0 - 4.8 K/uL    Mono # 0.4 0.3 - 1.0 K/uL    Eos # 0.3 0.0 - 0.5 K/uL    Baso # 0.06 0.00 - 0.20 K/uL    nRBC 0 0 /100 WBC    Gran % 53.7 38.0 - 73.0 %    Lymph % 29.1 18.0 - 48.0 %    Mono % 8.5 4.0 - 15.0 %    Eosinophil % 7.2 0.0 - 8.0 %    Basophil % 1.3 0.0 - 1.9 %    Differential Method Automated    Comprehensive Metabolic Panel    Collection Time: 03/23/22  7:28 AM   Result Value Ref Range    Sodium 136 136 - 145 mmol/L    Potassium 4.1 3.5 - 5.1 mmol/L    Chloride 101 95 - 110 mmol/L    CO2 26 23 - 29 mmol/L    Glucose 90 70 - 110 mg/dL    BUN 12 6 - 20 mg/dL    Creatinine 0.7 0.5 - 1.4 mg/dL    Calcium 9.7 8.7 - 10.5 mg/dL    Total Protein 7.4 6.0 - 8.4 g/dL    Albumin 3.2 (L) 3.5 - 5.2 g/dL    Total Bilirubin 0.2 0.1 - 1.0 mg/dL    Alkaline Phosphatase 65 55 - 135 U/L    AST 12 10 - 40 U/L    ALT 7 (L) 10 - 44 U/L    Anion Gap 9 8 - 16 mmol/L    eGFR if African American >60.0 >60 mL/min/1.73 m^2    eGFR if non African American >60.0 >60 mL/min/1.73 m^2   Magnesium    Collection Time: 03/23/22  7:28 AM   Result Value Ref Range    Magnesium 1.7 1.6 - 2.6 mg/dL   Phosphorus    Collection Time: 03/23/22  7:28 AM   Result Value Ref Range    Phosphorus 4.1 2.7 - 4.5 mg/dL           Significant Imaging: I have reviewed all pertinent imaging results/findings within the past 24 hours.

## 2022-03-23 NOTE — PROGRESS NOTES
Consultation Report  Ophthalmology Service    Date: 03/23/2022    Chief complaint/Reason for Consult: concern for zoster ophthalmicus     History of Present Illness: Patricia Nye is a 46 y.o. female with PMHx of HIV, insomnia, anxiety/depression, polysubstance abuse, and hypothyroidism who was admitted to [facility] on March 18th with right periorbital swelling. On admission w/o any visual changes, visual disturbances, such as flashes, floaters, or curtain-veil in visual field OU. Pain mostly overlying nasal skin, no eye pain.    Interval Hx: Ongoing pain, no acute events. No new blurry vision    POcularHx: Denies history of ocular problems or past ocular surgeries.    Current eye gtts: Denies     Family Hx: Denies family history of glaucoma, macular degeneration, or blindness. family history includes Heart disease in her mother.     PMHx:  has a past medical history of Allergy, Anxiety, Cervical dysplasia (1998 / 2016), Depression, HIV infection, Hyperthyroidism, and Insomnia.     PSurgHx:  has a past surgical history that includes Skull fracture elevation.     Home Medications:   Prior to Admission medications    Medication Sig Start Date End Date Taking? Authorizing Provider   ALPRAZolam (XANAX XR) 0.5 MG Tb24 Take one tablet PRN anxiety. 2/10/22   Cornell Britt MD   atovaquone (MEPRON) 750 mg/5 mL Susp Take 1,500 mg by mouth. 10/10/19   Historical Provider   azelastine (ASTELIN) 137 mcg (0.1 %) nasal spray 1 spray (137 mcg total) by Nasal route 2 (two) times daily. 2/19/21 6/29/21  Connie Lake DNP   BIKTARVY -25 mg per tablet Take 1 tablet by mouth once daily. 2/1/22   Pedro Lott MD   busPIRone (BUSPAR) 10 MG tablet Take 1 tablet (10 mg total) by mouth 3 (three) times daily. 2/10/22 3/12/22  Cornell Britt MD   cetirizine (ZYRTEC) 10 MG tablet Take 1 tablet (10 mg total) by mouth once daily. 2/10/22 3/12/22  Cornell Britt MD    diphenhydrAMINE-aluminum-magnesium hydroxide-simethicone-LIDOcaine HCl 2% Swish and spit 15 mLs every 4 (four) hours as needed (mouth pain). 2/10/22   Cornell Britt MD   famotidine (PEPCID) 20 MG tablet Take 1 tablet (20 mg total) by mouth 2 (two) times daily. 2/10/22 3/12/22  Cornell Britt MD   fluconazole (DIFLUCAN) 150 MG Tab One p.o. q.d. x7 days 12/6/21   Cornell Britt MD   fluticasone propionate (FLONASE) 50 mcg/actuation nasal spray 1 spray (50 mcg total) by Each Nostril route 2 (two) times daily as needed for Rhinitis. 2/10/22   Cornell Britt MD   hydrOXYzine HCL (ATARAX) 25 MG tablet Take 1 tablet (25 mg total) by mouth 3 (three) times daily as needed for Anxiety. 11/14/21   Raza Schmitz MD   ibuprofen (ADVIL,MOTRIN) 600 MG tablet Start after steroid complete 1 p.o. q.6 hours p.r.n. pain swell 2/10/22   Cornell Britt MD   ketotifen (ALAWAY) 0.025 % (0.035 %) ophthalmic solution Place 1 drop into both eyes once daily. 2/14/22 2/14/23  Kami Morgan NP   levothyroxine (SYNTHROID) 25 MCG tablet Take 1 tablet (25 mcg total) by mouth before breakfast. 12/1/21 12/31/21  West Christensen MD   LIDOcaine HCl 2% (LIDOCAINE VISCOUS) 2 % Soln TAKE ONE TEASPOONFULS right before eating EVERY 6 HOURS IF NEEDED FOR PAIN 3/18/22   Cornell Britt MD   lithium (LITHOTAB) 300 mg tablet Take 300 mg by mouth 2 (two) times a day. LITHIUM CARBONATE ER    Historical Provider   methocarbamoL (ROBAXIN) 500 MG Tab Take 2 tablets (1,000 mg total) by mouth 3 (three) times daily. for 5 days 3/14/22 3/19/22  Kelly Espino PA-C   mirtazapine (REMERON) 15 MG tablet Take 1 tablet (15 mg total) by mouth every evening. 12/1/21 12/31/21  West Christensen MD   naproxen (NAPROSYN) 500 MG tablet Take 1 tablet (500 mg total) by mouth 2 (two) times daily. 12/18/20   Ritchie Britt MD   predniSONE (DELTASONE) 5 MG tablet 4 po qd x 2, 3 po qd x2, 2 po qd x2, 1 po qd x2 2/10/22   Cornell Britt MD    promethazine (PHENERGAN) 25 MG tablet Take 1 tablet (25 mg total) by mouth every 6 (six) hours as needed for Nausea. 2/1/22   Pedro Lott MD   QUEtiapine (SEROQUEL) 50 MG tablet Take 1 tablet (50 mg total) by mouth every evening. 2/10/22 2/10/23  Cornell Britt MD        Medications this encounter:    acyclovir  480 mg Intravenous Q8H    artificial tears  1 drop Both Eyes QID    atovaquone  1,500 mg Oral Daily    sepmdhfhg-gauwgasp-lxvvmej ala  1 tablet Oral Daily    busPIRone  10 mg Oral TID    erythromycin   Right Eye BID    famotidine  20 mg Oral BID    gabapentin  300 mg Oral TID    levothyroxine  25 mcg Oral Before breakfast    lithium  300 mg Oral BID    mirtazapine  15 mg Oral QHS    mupirocin   Nasal BID    nicotine  1 patch Transdermal Daily    vancomycin (VANCOCIN) IVPB  1,000 mg Intravenous Q12H       Allergies: is allergic to opioids - morphine analogues, pcn [penicillins], and sulfa (sulfonamide antibiotics).     Social:  reports that she has been smoking vaping with nicotine. She has never used smokeless tobacco. She reports current alcohol use. She reports previous drug use.     ROS: As per HPI    Ocular examination/Dilated fundus examination:  Base Eye Exam     Visual Acuity (Snellen - Linear)       Right Left    Dist sc 20/25 20/20          Tonometry (Tonopen, 1:42 PM)       Right Left    Pressure 15 12          Pupils       Pupils Dark Light Shape React APD    Right PERRL 3 2 Round Brisk None    Left PERRL 3 2 Round Brisk None          Extraocular Movement       Right Left     Full Full            Slit Lamp and Fundus Exam     External Exam       Right Left    External V1 dermatome vesicular rash, different stages of healing mostly medially. +[place]ingson sign. Normal          Pen Light Exam       Right Left    Lids/Lashes vesicular rash of UL>LL. Mild swelling Normal    Conjunctiva/Sclera tr inf chemosis, tr injection White and quiet    Cornea inferior dendritiform  lesion healing. no kp Clear    Anterior Chamber deep and formed deep and formed    Iris Round and reactive Round and reactive    Lens Clear Clear    Anterior Vitreous Normal Normal          Fundus Exam       Right Left    Disc Normal Normal    C/D Ratio 0.4 0.4    Macula Normal Normal    Vessels arterial narrowing arterial narrowing    Periphery Normal Normal                  Assessment/Plan:     1. Zoster Ophthalmicus, Right Eye  - Patient presents with V1 dermatome distribution vesicular rash starting 2-3 days prior to ophthalmology evaluation on affected side. +[place]  - History of HIV (CD4 99, 11.2%)  - Patient without significantly decreased vision, good IOP.  - Exam revealing no cell/flare, no KP,+pseudodendritic lesions  - No signs of ARN/PORN on dfe  - continue Erythromycin to skin lesions and right eye BID  - continue Preservative Free Tears QID   - antiviral treatment per ID    Josue Barrera MD  [facility]  03/23/2022

## 2022-03-23 NOTE — ASSESSMENT & PLAN NOTE
Patient reports rash developed to her chest wall about 1.5-2 weeks ago accompanied by a sensation of pins and needles. She then developed swelling around her right eye and had a rash around her eye and the bridge of her nose beginning 3 days ago. She was seen at [facility] two nights ago but left AMA and presented to [facility]. She was admitted with concern for herpes zoster ophthalmicus and started on IV acyclovir. Eye  is reportedly more swollen, and she c/o of stinging pain to the skin over her eye and the right side of her face. Denies any pain to her actual eye and denies any blurry vision.  The patient was transferred to [facility] for ophthalmology consult.    -Continue IV acyclovir 500mg q8hrs  -IVF qhs  -Contact, droplet, and airborne isolation  -Consult ophthalmology due to concern for possible acute retinal necrosis, appreciate recs. Discussed patient with resident. Plan to monitor overnight for any vision changes, and they will evaluate the patient in the morning.  -Add gabapentin 300mg tid  -per Ophthalmology: V1 dermatome distribution vesicular rash with exam revealing no  cell/flare, no KP, positive for pseudodendritic lesions Posterior exam without signs of ARN/PORN (no retinal whitening).  -appreciate recs  -erythromycin ointment to both the skin lesions and the eye b.i.d.  -lubrication with preservative-free tears q.i.d.  -ID consult, appreciate recs; Start IV vancomycin and ceftriaxone for superimposed bacterial infection. Abx narrowed to vancomycin.  Anticipate transitioning to doxycycline once closer to discharge  (doxycycline 100 mg bid to complete 14 day course for superimposed bacterial infection.).  -ID signed off; re-consult prn  - continue IV acyclovir until no new lesions are forming or for at least 7 days;  then transition to oral therapy   symptomatic management of pain: increase gabapentin as clinically indicated, tylenol prn, IV morphine p.r.n. (allergies noted to other opioids)

## 2022-03-23 NOTE — PLAN OF CARE
Plan of care reviewed with patient. PRN pain meds given for right periorbital pain. Pt verbalizes understanding of plan of care. Bed in lowest position, call light in reach. WCTM      Problem: Adult Inpatient Plan of Care  Goal: Plan of Care Review  Outcome: Ongoing, Progressing  Goal: Patient-Specific Goal (Individualized)  Outcome: Ongoing, Progressing  Goal: Absence of Hospital-Acquired Illness or Injury  Outcome: Ongoing, Progressing  Goal: Optimal Comfort and Wellbeing  Outcome: Ongoing, Progressing  Goal: Readiness for Transition of Care  Outcome: Ongoing, Progressing     Problem: Infection  Goal: Absence of Infection Signs and Symptoms  Outcome: Ongoing, Progressing

## 2022-03-23 NOTE — HOSPITAL COURSE
Ophthalmology and Infectious Disease consulted on admission.  IV acyclovir continued with intermittent IV fluids for renal protection.  Vancomycin and Rocephin initiated as concern for superimposed bacterial infection which may been prompted by patient use of dexamethasone ointment from home.  Antibiotics narrowed to vancomycin.  Per Ophthalmology, no indication for imaging.  Recommended ophthalmic antibiotic ointment and lubrication in addition to IV acyclovir continuance.  Per ID, continue IV acyclovir for 7 days (she receive 8 days once lesions had crusted over) or until cessation of new lesion development.  Transition to p.o. Valtrex for 14 days thereafter.  Vanc was dc'd 03/24- pt transitioned to doxy 03/24. Patient complained of intermittent HA and posterior neck pain during stay but no nuchal rigidity or fever.  No change in activity with symptoms.  ID did evaluate and no suspicions for meningoencephalitis.  She will f/u with Ophthalmology.  Also encouraged patient to continue Biktarvy to treat her HIV disease as she expressed concern about the accuracy of her HIV diagnosis despite her low CD4 count and numerous previous positive serology in the past with viral load monitoring by her ID physician.  She will f/u with ID as outpatient which was arranged by case management.

## 2022-03-23 NOTE — PLAN OF CARE
Problem: Adult Inpatient Plan of Care  Goal: Plan of Care Review  Outcome: Ongoing, Not Progressing  Goal: Patient-Specific Goal (Individualized)  Outcome: Ongoing, Not Progressing  Goal: Absence of Hospital-Acquired Illness or Injury  Outcome: Ongoing, Not Progressing  Goal: Optimal Comfort and Wellbeing  Outcome: Ongoing, Not Progressing  Goal: Readiness for Transition of Care  Outcome: Ongoing, Not Progressing     Problem: Infection  Goal: Absence of Infection Signs and Symptoms  Outcome: Ongoing, Not Progressing

## 2022-03-23 NOTE — PROGRESS NOTES
[facility] - Int[facility] Care ([address] Medicine  Progress Note    Patient Name: Patricia Nye  MRN: [id number]  Patient Class: IP- Inpatient   Admission Date: 3/19/2022  Length of Stay: 4 days  Attending Physician: Adelia Murphy MD  Primary Care Provider: Cornell Britt MD        Subjective:     Principal Problem:Herpes zoster ophthalmicus of right eye        HPI:  Patricia Nye is a 46 y.o. female with a PMHx of HIV, insomnia, anxiety/depression, polysubstance abuse, and hypothyroidism who was admitted to [facility] on March 18th with right periorbital swelling. The patient reports a rash developed to her chest wall about 1.5-2 weeks ago accompanied by a sensation of pins and needles. She then developed swelling around her right eye and had a rash around her eye and the bridge of her nose beginning 3 days ago. She was seen at [facility] two nights ago but left AMA and then presented to [place]. She was admitted with concern for herpes zoster ophthalmicus and started on IV acyclovir. Eye is reportedly more swollen, and she c/o of stinging pain to the skin over her eye and the right side of her face. Denies any pain to her actual eye and denies any blurry vision. Endorses chills. Denies any fever, abdominal pain, chest pain, nausea, vomiting, diarrhea, shortness of breath, cough, or dysuria. The patient was transferred to [facility] for ophthalmology consult.    Labs were reviewed from OSH: COVID negative, sodium 138, potassium 3.6, chloride 103, CO2 23, BUN 9, creatinine 0.8, glucose 100, AST 20, ALT 15, white blood cells 6.82, hemoglobin 12.2, hematocrit 37.5, platelets 324      Overview/Hospital Course:  Ophthalmology and Infectious Disease consulted on admission.  IV acyclovir continued with intermittent IV fluids for renal protection.  Vancomycin and Rocephin initiated as concern for superimposed bacterial infection which may been prompted by patient use of dexamethasone  "ointment from home.  Antibiotics narrowed to vancomycin.  Per Ophthalmology, no indication for imaging.  Recommended ophthalmic antibiotic ointment and lubrication in addition to IV acyclovir continuance.  Per ID, continue IV acyclovir for 7 days (through 3/24) or until cessation of new lesion development.  There after transition to p.o. acyclovir.  Continue vancomycin in till near admission and plan to transition to doxycycline.      Interval History:   No acute events overnight.  Patient denies chills or fever like symptoms overnight.    She notes that swelling seems to be improving.  New lesions still seem to be forming.  Pain around the right eye.  However, no pain with movement of right eye.      Review of Systems   Constitutional:  Positive for chills (A couple days prior to presentation). Negative for activity change, appetite change, diaphoresis, fatigue and fever.   HENT:  Positive for facial swelling. Negative for congestion, postnasal drip, rhinorrhea, sore throat, tinnitus and trouble swallowing.    Eyes:  Negative for photophobia, pain, redness and visual disturbance.        Pain surrounding right eye (stinging pain "like pins and needles")  However no pain with eye movement    Respiratory:  Negative for cough, chest tightness, shortness of breath and wheezing.    Cardiovascular:  Negative for chest pain, palpitations and leg swelling.   Gastrointestinal:  Negative for abdominal distention, abdominal pain, diarrhea, nausea and vomiting.   Genitourinary:  Negative for difficulty urinating, dysuria, frequency and hematuria.   Musculoskeletal:  Negative for arthralgias, back pain, myalgias and neck pain.   Skin:  Positive for rash. Negative for pallor.   Allergic/Immunologic: Positive for immunocompromised state.   Neurological:  Negative for dizziness, syncope, weakness, light-headedness and headaches.   Psychiatric/Behavioral:  Negative for decreased concentration.      Objective:     Vital Signs (Most " Recent):  Temp: 97.8 °F (36.6 °C) (03/23/22 1110)  Pulse: 81 (03/23/22 1110)  Resp: 18 (03/23/22 1357)  BP: 120/77 (03/23/22 1110)  SpO2: 100 % (03/23/22 1110) Vital Signs (24h Range):  Temp:  [97.8 °F (36.6 °C)-99 °F (37.2 °C)] 97.8 °F (36.6 °C)  Pulse:  [74-86] 81  Resp:  [16-18] 18  SpO2:  [99 %-100 %] 100 %  BP: (120-137)/(75-86) 120/77     Weight: 53.7 kg (118 lb 6.2 oz)  Body mass index is 22.37 kg/m².    Intake/Output Summary (Last 24 hours) at 3/23/2022 1511  Last data filed at 3/22/2022 2104  Gross per 24 hour   Intake 640 ml   Output 7 ml   Net 633 ml        Physical Exam  Vitals and nursing note reviewed.   Constitutional:       General: She is not in acute distress.     Appearance: She is not toxic-appearing.   HENT:      Head: Normocephalic and atraumatic.      Right Ear: External ear normal.      Left Ear: External ear normal.      Nose: No congestion or rhinorrhea.      Mouth/Throat:      Mouth: Mucous membranes are moist.      Pharynx: No oropharyngeal exudate or posterior oropharyngeal erythema.   Eyes:      Comments: White periocular vesicles that are coalescing with surrounding erythema, involving eyelid and the nasal bridge. Tender to touch.   Erythema and edema significantly improved.  Some conjunctival injection noted of right eye.     Cardiovascular:      Rate and Rhythm: Normal rate and regular rhythm.      Heart sounds: No murmur heard.  Pulmonary:      Effort: Pulmonary effort is normal. No respiratory distress.      Breath sounds: No wheezing, rhonchi or rales.   Abdominal:      General: Bowel sounds are normal. There is no distension.      Palpations: Abdomen is soft.      Tenderness: There is no abdominal tenderness. There is no guarding.   Musculoskeletal:         General: No swelling, tenderness or deformity. Normal range of motion.      Cervical back: Normal range of motion. No tenderness.   Skin:     General: Skin is warm and dry.      Capillary Refill: Capillary refill takes less  than 2 seconds.   Neurological:      Mental Status: She is alert and oriented to person, place, and time.      Motor: No weakness.   Psychiatric:         Mood and Affect: Mood normal.         Behavior: Behavior normal.         Thought Content: Thought content normal.         Judgment: Judgment normal.       Significant Labs: All pertinent labs within the past 24 hours have been reviewed.    Recent Results (from the past 24 hour(s))   CBC Auto Differential    Collection Time: 03/23/22  7:28 AM   Result Value Ref Range    WBC 4.47 3.90 - 12.70 K/uL    RBC 4.63 4.00 - 5.40 M/uL    Hemoglobin 12.7 12.0 - 16.0 g/dL    Hematocrit 40.8 37.0 - 48.5 %    MCV 88 82 - 98 fL    MCH 27.4 27.0 - 31.0 pg    MCHC 31.1 (L) 32.0 - 36.0 g/dL    RDW 14.3 11.5 - 14.5 %    Platelets 276 150 - 450 K/uL    MPV 10.8 9.2 - 12.9 fL    Immature Granulocytes 0.2 0.0 - 0.5 %    Gran # (ANC) 2.4 1.8 - 7.7 K/uL    Immature Grans (Abs) 0.01 0.00 - 0.04 K/uL    Lymph # 1.3 1.0 - 4.8 K/uL    Mono # 0.4 0.3 - 1.0 K/uL    Eos # 0.3 0.0 - 0.5 K/uL    Baso # 0.06 0.00 - 0.20 K/uL    nRBC 0 0 /100 WBC    Gran % 53.7 38.0 - 73.0 %    Lymph % 29.1 18.0 - 48.0 %    Mono % 8.5 4.0 - 15.0 %    Eosinophil % 7.2 0.0 - 8.0 %    Basophil % 1.3 0.0 - 1.9 %    Differential Method Automated    Comprehensive Metabolic Panel    Collection Time: 03/23/22  7:28 AM   Result Value Ref Range    Sodium 136 136 - 145 mmol/L    Potassium 4.1 3.5 - 5.1 mmol/L    Chloride 101 95 - 110 mmol/L    CO2 26 23 - 29 mmol/L    Glucose 90 70 - 110 mg/dL    BUN 12 6 - 20 mg/dL    Creatinine 0.7 0.5 - 1.4 mg/dL    Calcium 9.7 8.7 - 10.5 mg/dL    Total Protein 7.4 6.0 - 8.4 g/dL    Albumin 3.2 (L) 3.5 - 5.2 g/dL    Total Bilirubin 0.2 0.1 - 1.0 mg/dL    Alkaline Phosphatase 65 55 - 135 U/L    AST 12 10 - 40 U/L    ALT 7 (L) 10 - 44 U/L    Anion Gap 9 8 - 16 mmol/L    eGFR if African American >60.0 >60 mL/min/1.73 m^2    eGFR if non African American >60.0 >60 mL/min/1.73 m^2   Magnesium     Collection Time: 03/23/22  7:28 AM   Result Value Ref Range    Magnesium 1.7 1.6 - 2.6 mg/dL   Phosphorus    Collection Time: 03/23/22  7:28 AM   Result Value Ref Range    Phosphorus 4.1 2.7 - 4.5 mg/dL           Significant Imaging: I have reviewed all pertinent imaging results/findings within the past 24 hours.      Assessment/Plan:      * Herpes zoster ophthalmicus of right eye  Patient reports rash developed to her chest wall about 1.5-2 weeks ago accompanied by a sensation of pins and needles. She then developed swelling around her right eye and had a rash around her eye and the bridge of her nose beginning 3 days ago. She was seen at [facility] two nights ago but left AMA and presented to [place]. She was admitted with concern for herpes zoster ophthalmicus and started on IV acyclovir. Eye  is reportedly more swollen, and she c/o of stinging pain to the skin over her eye and the right side of her face. Denies any pain to her actual eye and denies any blurry vision.  The patient was transferred to [facility] for ophthalmology consult.    -Continue IV acyclovir 500mg q8hrs  -IVF qhs  -Contact, droplet, and airborne isolation  -Consult ophthalmology due to concern for possible acute retinal necrosis, appreciate recs. Discussed patient with resident. Plan to monitor overnight for any vision changes, and they will evaluate the patient in the morning.  -Add gabapentin 300mg tid  -per Ophthalmology: V1 dermatome distribution vesicular rash with exam revealing no  cell/flare, no KP, positive for pseudodendritic lesions Posterior exam without signs of ARN/PORN (no retinal whitening).  -appreciate recs  -erythromycin ointment to both the skin lesions and the eye b.i.d.  -lubrication with preservative-free tears q.i.d.  -ID consult, appreciate recs; Start IV vancomycin and ceftriaxone for superimposed bacterial infection. Abx narrowed to vancomycin.  Anticipate transitioning to doxycycline once closer to discharge  (doxycycline  100 mg bid to complete 14 day course for superimposed bacterial infection.).  -ID signed off; re-consult prn  - continue IV acyclovir until no new lesions are forming or for at least 7 days;  then transition to oral therapy   symptomatic management of pain: increase gabapentin as clinically indicated, tylenol prn, IV morphine p.r.n. (allergies noted to other opioids)    Anxiety disorder  -Chronic, stable.  reviewed.   -Continue home xanax PRN  -Continue buspar 10mg tid    Tobacco abuse  Assistance with smoking cessation was offered on admission, including:  [x]  Medications  [x]  Counseling  []  Printed Information on Smoking Cessation  []  Referral to a Smoking Cessation Program    Patient was counseled regarding smoking for 3-10 minutes on admit.     Primary insomnia  -Continue remeron qhs    Major depressive disorder in remission  Bipolar 1 disorder  -Continue lithium    HIV disease  This patient in known to have AIDS (from CD4 count has been less than 200). Last CD4 21 (April 2021), new CD4 count pending. Patient is on HAART. Will continue HAART. Continued prophylaxis with atovaquone. Continue to monitor routine labs.     We will consult Infectious disease at this time. Appreciate recs.   Continue Biktarvy and atovaquone ppx.  CD4 on admit 99      VTE Risk Mitigation (From admission, onward)         Ordered     IP VTE LOW RISK PATIENT  Once         03/19/22 1925     Place sequential compression device  Until discontinued         03/19/22 1925                Discharge Planning   RUFINA: 3/25/2022     Code Status: Full Code   Is the patient medically ready for discharge?: No    Reason for patient still in hospital (select all that apply): Patient trending condition and Consult recommendations  Discharge Plan A: Home                  Adelia Murphy MD  Department of Hospital Medicine   [facility] - Intensive Care ([facility]

## 2022-03-23 NOTE — ASSESSMENT & PLAN NOTE
Assistance with smoking cessation was offered on admission, including:  [x]  Medications  [x]  Counseling  []  Printed Information on Smoking Cessation  []  Referral to a Smoking Cessation Program    Patient was counseled regarding smoking for 3-10 minutes on admit.

## 2022-03-24 LAB
ALBUMIN SERPL BCP-MCNC: 3 G/DL (ref 3.5–5.2)
ALP SERPL-CCNC: 65 U/L (ref 55–135)
ALT SERPL W/O P-5'-P-CCNC: 9 U/L (ref 10–44)
ANION GAP SERPL CALC-SCNC: 7 MMOL/L (ref 8–16)
AST SERPL-CCNC: 11 U/L (ref 10–40)
BASOPHILS # BLD AUTO: 0.05 K/UL (ref 0–0.2)
BASOPHILS NFR BLD: 0.9 % (ref 0–1.9)
BILIRUB SERPL-MCNC: 0.1 MG/DL (ref 0.1–1)
BUN SERPL-MCNC: 14 MG/DL (ref 6–20)
CALCIUM SERPL-MCNC: 9.1 MG/DL (ref 8.7–10.5)
CHLORIDE SERPL-SCNC: 103 MMOL/L (ref 95–110)
CO2 SERPL-SCNC: 24 MMOL/L (ref 23–29)
CREAT SERPL-MCNC: 0.8 MG/DL (ref 0.5–1.4)
DIFFERENTIAL METHOD: ABNORMAL
EOSINOPHIL # BLD AUTO: 0.3 K/UL (ref 0–0.5)
EOSINOPHIL NFR BLD: 6.2 % (ref 0–8)
ERYTHROCYTE [DISTWIDTH] IN BLOOD BY AUTOMATED COUNT: 14.1 % (ref 11.5–14.5)
EST. GFR  (AFRICAN AMERICAN): >60 ML/MIN/1.73 M^2
EST. GFR  (NON AFRICAN AMERICAN): >60 ML/MIN/1.73 M^2
GLUCOSE SERPL-MCNC: 99 MG/DL (ref 70–110)
HCT VFR BLD AUTO: 35.9 % (ref 37–48.5)
HGB BLD-MCNC: 11.6 G/DL (ref 12–16)
IMM GRANULOCYTES # BLD AUTO: 0.02 K/UL (ref 0–0.04)
IMM GRANULOCYTES NFR BLD AUTO: 0.4 % (ref 0–0.5)
LYMPHOCYTES # BLD AUTO: 1.2 K/UL (ref 1–4.8)
LYMPHOCYTES NFR BLD: 21.4 % (ref 18–48)
MAGNESIUM SERPL-MCNC: 1.7 MG/DL (ref 1.6–2.6)
MCH RBC QN AUTO: 27.4 PG (ref 27–31)
MCHC RBC AUTO-ENTMCNC: 32.3 G/DL (ref 32–36)
MCV RBC AUTO: 85 FL (ref 82–98)
MONOCYTES # BLD AUTO: 0.4 K/UL (ref 0.3–1)
MONOCYTES NFR BLD: 8 % (ref 4–15)
NEUTROPHILS # BLD AUTO: 3.5 K/UL (ref 1.8–7.7)
NEUTROPHILS NFR BLD: 63.1 % (ref 38–73)
NRBC BLD-RTO: 0 /100 WBC
PHOSPHATE SERPL-MCNC: 3.7 MG/DL (ref 2.7–4.5)
PLATELET # BLD AUTO: 309 K/UL (ref 150–450)
PMV BLD AUTO: 10.4 FL (ref 9.2–12.9)
POTASSIUM SERPL-SCNC: 3.9 MMOL/L (ref 3.5–5.1)
PROT SERPL-MCNC: 6.9 G/DL (ref 6–8.4)
RBC # BLD AUTO: 4.23 M/UL (ref 4–5.4)
SODIUM SERPL-SCNC: 134 MMOL/L (ref 136–145)
WBC # BLD AUTO: 5.52 K/UL (ref 3.9–12.7)

## 2022-03-24 PROCEDURE — 80053 COMPREHEN METABOLIC PANEL: CPT | Performed by: STUDENT IN AN ORGANIZED HEALTH CARE EDUCATION/TRAINING PROGRAM

## 2022-03-24 PROCEDURE — 25000003 PHARM REV CODE 250: Performed by: NURSE PRACTITIONER

## 2022-03-24 PROCEDURE — 36415 COLL VENOUS BLD VENIPUNCTURE: CPT | Performed by: STUDENT IN AN ORGANIZED HEALTH CARE EDUCATION/TRAINING PROGRAM

## 2022-03-24 PROCEDURE — 20600001 HC STEP DOWN PRIVATE ROOM

## 2022-03-24 PROCEDURE — 83735 ASSAY OF MAGNESIUM: CPT | Performed by: STUDENT IN AN ORGANIZED HEALTH CARE EDUCATION/TRAINING PROGRAM

## 2022-03-24 PROCEDURE — 63600175 PHARM REV CODE 636 W HCPCS: Performed by: STUDENT IN AN ORGANIZED HEALTH CARE EDUCATION/TRAINING PROGRAM

## 2022-03-24 PROCEDURE — 85025 COMPLETE CBC W/AUTO DIFF WBC: CPT | Performed by: STUDENT IN AN ORGANIZED HEALTH CARE EDUCATION/TRAINING PROGRAM

## 2022-03-24 PROCEDURE — 63600175 PHARM REV CODE 636 W HCPCS: Performed by: HOSPITALIST

## 2022-03-24 PROCEDURE — 27000207 HC ISOLATION

## 2022-03-24 PROCEDURE — 25000003 PHARM REV CODE 250: Performed by: STUDENT IN AN ORGANIZED HEALTH CARE EDUCATION/TRAINING PROGRAM

## 2022-03-24 PROCEDURE — S4991 NICOTINE PATCH NONLEGEND: HCPCS | Performed by: HOSPITALIST

## 2022-03-24 PROCEDURE — 99233 SBSQ HOSP IP/OBS HIGH 50: CPT | Mod: ,,, | Performed by: STUDENT IN AN ORGANIZED HEALTH CARE EDUCATION/TRAINING PROGRAM

## 2022-03-24 PROCEDURE — 84100 ASSAY OF PHOSPHORUS: CPT | Performed by: STUDENT IN AN ORGANIZED HEALTH CARE EDUCATION/TRAINING PROGRAM

## 2022-03-24 PROCEDURE — 25000003 PHARM REV CODE 250: Performed by: HOSPITALIST

## 2022-03-24 PROCEDURE — 63600175 PHARM REV CODE 636 W HCPCS: Performed by: NURSE PRACTITIONER

## 2022-03-24 PROCEDURE — 99233 PR SUBSEQUENT HOSPITAL CARE,LEVL III: ICD-10-PCS | Mod: ,,, | Performed by: STUDENT IN AN ORGANIZED HEALTH CARE EDUCATION/TRAINING PROGRAM

## 2022-03-24 RX ORDER — AMOXICILLIN 250 MG
1 CAPSULE ORAL 2 TIMES DAILY PRN
Status: DISCONTINUED | OUTPATIENT
Start: 2022-03-24 | End: 2022-03-29 | Stop reason: HOSPADM

## 2022-03-24 RX ORDER — POLYETHYLENE GLYCOL 3350 17 G/17G
17 POWDER, FOR SOLUTION ORAL DAILY
Status: DISCONTINUED | OUTPATIENT
Start: 2022-03-25 | End: 2022-03-29 | Stop reason: HOSPADM

## 2022-03-24 RX ORDER — CAMPHOR
SPIRIT TOPICAL 4 TIMES DAILY PRN
Status: DISCONTINUED | OUTPATIENT
Start: 2022-03-24 | End: 2022-03-29 | Stop reason: HOSPADM

## 2022-03-24 RX ORDER — DOXYCYCLINE HYCLATE 100 MG
100 TABLET ORAL EVERY 12 HOURS
Status: DISCONTINUED | OUTPATIENT
Start: 2022-03-24 | End: 2022-03-29 | Stop reason: HOSPADM

## 2022-03-24 RX ADMIN — MIRTAZAPINE 15 MG: 15 TABLET, FILM COATED ORAL at 09:03

## 2022-03-24 RX ADMIN — LITHIUM CARBONATE 300 MG: 300 TABLET, FILM COATED, EXTENDED RELEASE ORAL at 09:03

## 2022-03-24 RX ADMIN — BUSPIRONE HYDROCHLORIDE 10 MG: 10 TABLET ORAL at 09:03

## 2022-03-24 RX ADMIN — MELATONIN TAB 3 MG 6 MG: 3 TAB at 09:03

## 2022-03-24 RX ADMIN — HYDROXYZINE HYDROCHLORIDE 25 MG: 25 TABLET ORAL at 03:03

## 2022-03-24 RX ADMIN — HYPROMELLOSE 2910 1 DROP: 5 SOLUTION OPHTHALMIC at 12:03

## 2022-03-24 RX ADMIN — HYDROXYZINE HYDROCHLORIDE 25 MG: 25 TABLET ORAL at 09:03

## 2022-03-24 RX ADMIN — ACETAMINOPHEN 650 MG: 325 TABLET ORAL at 10:03

## 2022-03-24 RX ADMIN — METHOCARBAMOL TABLETS 500 MG: 500 TABLET, COATED ORAL at 09:03

## 2022-03-24 RX ADMIN — SIMETHICONE 80 MG: 80 TABLET, CHEWABLE ORAL at 10:03

## 2022-03-24 RX ADMIN — GABAPENTIN 300 MG: 300 CAPSULE ORAL at 08:03

## 2022-03-24 RX ADMIN — ERYTHROMYCIN 1 INCH: 5 OINTMENT OPHTHALMIC at 08:03

## 2022-03-24 RX ADMIN — DOXYCYCLINE HYCLATE 100 MG: 100 TABLET, COATED ORAL at 10:03

## 2022-03-24 RX ADMIN — DOXYCYCLINE HYCLATE 100 MG: 100 TABLET, COATED ORAL at 09:03

## 2022-03-24 RX ADMIN — SENNOSIDES AND DOCUSATE SODIUM 1 TABLET: 50; 8.6 TABLET ORAL at 04:03

## 2022-03-24 RX ADMIN — ACYCLOVIR SODIUM 480 MG: 1000 INJECTION, SOLUTION INTRAVENOUS at 10:03

## 2022-03-24 RX ADMIN — METHOCARBAMOL TABLETS 500 MG: 500 TABLET, COATED ORAL at 12:03

## 2022-03-24 RX ADMIN — METHOCARBAMOL TABLETS 500 MG: 500 TABLET, COATED ORAL at 04:03

## 2022-03-24 RX ADMIN — HYPROMELLOSE 2910 1 DROP: 5 SOLUTION OPHTHALMIC at 08:03

## 2022-03-24 RX ADMIN — HYDROXYZINE HYDROCHLORIDE 25 MG: 25 TABLET ORAL at 05:03

## 2022-03-24 RX ADMIN — VANCOMYCIN HYDROCHLORIDE 1250 MG: 1.25 INJECTION, POWDER, LYOPHILIZED, FOR SOLUTION INTRAVENOUS at 08:03

## 2022-03-24 RX ADMIN — GABAPENTIN 300 MG: 300 CAPSULE ORAL at 09:03

## 2022-03-24 RX ADMIN — MORPHINE SULFATE 2 MG: 2 INJECTION, SOLUTION INTRAMUSCULAR; INTRAVENOUS at 09:03

## 2022-03-24 RX ADMIN — FAMOTIDINE 20 MG: 20 TABLET ORAL at 09:03

## 2022-03-24 RX ADMIN — LITHIUM CARBONATE 300 MG: 300 TABLET, FILM COATED, EXTENDED RELEASE ORAL at 08:03

## 2022-03-24 RX ADMIN — HYPROMELLOSE 2910 1 DROP: 5 SOLUTION OPHTHALMIC at 09:03

## 2022-03-24 RX ADMIN — FAMOTIDINE 20 MG: 20 TABLET ORAL at 08:03

## 2022-03-24 RX ADMIN — ERYTHROMYCIN 1 INCH: 5 OINTMENT OPHTHALMIC at 09:03

## 2022-03-24 RX ADMIN — MORPHINE SULFATE 2 MG: 2 INJECTION, SOLUTION INTRAMUSCULAR; INTRAVENOUS at 11:03

## 2022-03-24 RX ADMIN — HYPROMELLOSE 2910 1 DROP: 5 SOLUTION OPHTHALMIC at 04:03

## 2022-03-24 RX ADMIN — ATOVAQUONE 1500 MG: 750 SUSPENSION ORAL at 08:03

## 2022-03-24 RX ADMIN — MORPHINE SULFATE 2 MG: 2 INJECTION, SOLUTION INTRAMUSCULAR; INTRAVENOUS at 05:03

## 2022-03-24 RX ADMIN — NICOTINE 1 PATCH: 7 PATCH TRANSDERMAL at 09:03

## 2022-03-24 RX ADMIN — MUPIROCIN: 20 OINTMENT TOPICAL at 08:03

## 2022-03-24 RX ADMIN — METHOCARBAMOL TABLETS 500 MG: 500 TABLET, COATED ORAL at 08:03

## 2022-03-24 RX ADMIN — ACYCLOVIR SODIUM 480 MG: 1000 INJECTION, SOLUTION INTRAVENOUS at 05:03

## 2022-03-24 RX ADMIN — LEVOTHYROXINE SODIUM 25 MCG: 25 TABLET ORAL at 05:03

## 2022-03-24 RX ADMIN — BUSPIRONE HYDROCHLORIDE 10 MG: 10 TABLET ORAL at 03:03

## 2022-03-24 RX ADMIN — BICTEGRAVIR SODIUM, EMTRICITABINE, AND TENOFOVIR ALAFENAMIDE FUMARATE 1 TABLET: 50; 200; 25 TABLET ORAL at 08:03

## 2022-03-24 RX ADMIN — GABAPENTIN 300 MG: 300 CAPSULE ORAL at 03:03

## 2022-03-24 RX ADMIN — ACYCLOVIR SODIUM 480 MG: 1000 INJECTION, SOLUTION INTRAVENOUS at 01:03

## 2022-03-24 RX ADMIN — SENNOSIDES AND DOCUSATE SODIUM 1 TABLET: 50; 8.6 TABLET ORAL at 10:03

## 2022-03-24 NOTE — PROGRESS NOTES
Pharmacokinetic Assessment Follow Up: IV Vancomycin    Vancomycin serum concentration assessment(s):    The trough level was drawn correctly and can be used to guide therapy at this time. The measurement is below the desired definitive target range of 10 to 20 mcg/mL.   - The trough was drawn ~11 hours after previous dose and resulted at 7.9.    Vancomycin Regimen Plan:    Change regimen to Vancomycin 1250 mg IV every 12 hours with next serum trough concentration measured at 0800 prior to 4th dose on 3/25.   - Please draw random level sooner than scheduled trough if renal function changes significantly.    Drug levels (last 3 results):  Recent Labs   Lab Result Units 03/22/22  0429 03/23/22  1820   Vancomycin-Trough ug/mL 4.9* 7.9*     Pharmacy will continue to follow and monitor vancomycin.    Please contact pharmacy for questions regarding this assessment.    Thank you for the consult,   Angela Gama       Patient brief summary:  Patricia Nye is a 46 y.o. female initiated on antimicrobial therapy with IV Vancomycin for treatment of skin & soft tissue infection    Actual Body Weight:   53.7 kg    Renal Function:   Estimated Creatinine Clearance: 76.6 mL/min (based on SCr of 0.7 mg/dL).     Dialysis Method (if applicable):  N/A

## 2022-03-24 NOTE — PLAN OF CARE
[facility] - Intensive Care ([facility])  Discharge Reassessment    Primary Care Provider: Cornell Britt MD    Expected Discharge Date: 3/25/2022    Reassessment (most recent)       Discharge Reassessment - 03/24/22 0932          Discharge Reassessment    Assessment Type Discharge Planning Reassessment (P)      Did the patient's condition or plan change since previous assessment? Yes (P)      Communicated RUFINA with patient/caregiver Yes (P)      Discharge Plan A Home with family (P)      Discharge Plan B Home with family (P)      DME Needed Upon Discharge  none (P)      Discharge Barriers Identified None (P)      Why the patient remains in the hospital Requires continued medical care (P)                    Patient not stable to dc, SW will continue to follow up for any post dc needs.        Connie Álvarez LMSW  [facility]   [phone]

## 2022-03-24 NOTE — PROGRESS NOTES
Therapy with Vancomycin complete and/or consult discontinued by provider.  Pharmacy will sign off, please re-consult as needed.     Tyree Schwartz Pharm.D  Ext: [phone]

## 2022-03-25 ENCOUNTER — TELEPHONE (OUTPATIENT)
Dept: PRIMARY CARE CLINIC | Facility: CLINIC | Age: 47
End: 2022-03-25
Payer: MEDICAID

## 2022-03-25 LAB
ALBUMIN SERPL BCP-MCNC: 2.9 G/DL (ref 3.5–5.2)
ALP SERPL-CCNC: 66 U/L (ref 55–135)
ALT SERPL W/O P-5'-P-CCNC: 12 U/L (ref 10–44)
ANION GAP SERPL CALC-SCNC: 7 MMOL/L (ref 8–16)
AST SERPL-CCNC: 14 U/L (ref 10–40)
BASOPHILS # BLD AUTO: 0.07 K/UL (ref 0–0.2)
BASOPHILS NFR BLD: 1.5 % (ref 0–1.9)
BILIRUB SERPL-MCNC: 0.1 MG/DL (ref 0.1–1)
BUN SERPL-MCNC: 12 MG/DL (ref 6–20)
CALCIUM SERPL-MCNC: 9.1 MG/DL (ref 8.7–10.5)
CHLORIDE SERPL-SCNC: 106 MMOL/L (ref 95–110)
CO2 SERPL-SCNC: 23 MMOL/L (ref 23–29)
CREAT SERPL-MCNC: 0.7 MG/DL (ref 0.5–1.4)
DIFFERENTIAL METHOD: ABNORMAL
EOSINOPHIL # BLD AUTO: 0.4 K/UL (ref 0–0.5)
EOSINOPHIL NFR BLD: 7.5 % (ref 0–8)
ERYTHROCYTE [DISTWIDTH] IN BLOOD BY AUTOMATED COUNT: 14.1 % (ref 11.5–14.5)
EST. GFR  (AFRICAN AMERICAN): >60 ML/MIN/1.73 M^2
EST. GFR  (NON AFRICAN AMERICAN): >60 ML/MIN/1.73 M^2
GLUCOSE SERPL-MCNC: 85 MG/DL (ref 70–110)
HCT VFR BLD AUTO: 36.6 % (ref 37–48.5)
HGB BLD-MCNC: 11.6 G/DL (ref 12–16)
IMM GRANULOCYTES # BLD AUTO: 0.01 K/UL (ref 0–0.04)
IMM GRANULOCYTES NFR BLD AUTO: 0.2 % (ref 0–0.5)
LYMPHOCYTES # BLD AUTO: 1.3 K/UL (ref 1–4.8)
LYMPHOCYTES NFR BLD: 28.6 % (ref 18–48)
MAGNESIUM SERPL-MCNC: 1.8 MG/DL (ref 1.6–2.6)
MCH RBC QN AUTO: 27.7 PG (ref 27–31)
MCHC RBC AUTO-ENTMCNC: 31.7 G/DL (ref 32–36)
MCV RBC AUTO: 87 FL (ref 82–98)
MONOCYTES # BLD AUTO: 0.5 K/UL (ref 0.3–1)
MONOCYTES NFR BLD: 9.8 % (ref 4–15)
NEUTROPHILS # BLD AUTO: 2.5 K/UL (ref 1.8–7.7)
NEUTROPHILS NFR BLD: 52.4 % (ref 38–73)
NRBC BLD-RTO: 0 /100 WBC
PHOSPHATE SERPL-MCNC: 3.8 MG/DL (ref 2.7–4.5)
PLATELET # BLD AUTO: 304 K/UL (ref 150–450)
PMV BLD AUTO: 10.5 FL (ref 9.2–12.9)
POTASSIUM SERPL-SCNC: 4.1 MMOL/L (ref 3.5–5.1)
PROT SERPL-MCNC: 7 G/DL (ref 6–8.4)
RBC # BLD AUTO: 4.19 M/UL (ref 4–5.4)
SODIUM SERPL-SCNC: 136 MMOL/L (ref 136–145)
WBC # BLD AUTO: 4.69 K/UL (ref 3.9–12.7)

## 2022-03-25 PROCEDURE — 63600175 PHARM REV CODE 636 W HCPCS: Performed by: NURSE PRACTITIONER

## 2022-03-25 PROCEDURE — 85025 COMPLETE CBC W/AUTO DIFF WBC: CPT | Performed by: STUDENT IN AN ORGANIZED HEALTH CARE EDUCATION/TRAINING PROGRAM

## 2022-03-25 PROCEDURE — 84100 ASSAY OF PHOSPHORUS: CPT | Performed by: STUDENT IN AN ORGANIZED HEALTH CARE EDUCATION/TRAINING PROGRAM

## 2022-03-25 PROCEDURE — 25000003 PHARM REV CODE 250: Performed by: STUDENT IN AN ORGANIZED HEALTH CARE EDUCATION/TRAINING PROGRAM

## 2022-03-25 PROCEDURE — 20600001 HC STEP DOWN PRIVATE ROOM

## 2022-03-25 PROCEDURE — 99232 PR SUBSEQUENT HOSPITAL CARE,LEVL II: ICD-10-PCS | Mod: ,,, | Performed by: STUDENT IN AN ORGANIZED HEALTH CARE EDUCATION/TRAINING PROGRAM

## 2022-03-25 PROCEDURE — 36415 COLL VENOUS BLD VENIPUNCTURE: CPT | Performed by: STUDENT IN AN ORGANIZED HEALTH CARE EDUCATION/TRAINING PROGRAM

## 2022-03-25 PROCEDURE — S4991 NICOTINE PATCH NONLEGEND: HCPCS | Performed by: HOSPITALIST

## 2022-03-25 PROCEDURE — 83735 ASSAY OF MAGNESIUM: CPT | Performed by: STUDENT IN AN ORGANIZED HEALTH CARE EDUCATION/TRAINING PROGRAM

## 2022-03-25 PROCEDURE — 99232 SBSQ HOSP IP/OBS MODERATE 35: CPT | Mod: ,,, | Performed by: STUDENT IN AN ORGANIZED HEALTH CARE EDUCATION/TRAINING PROGRAM

## 2022-03-25 PROCEDURE — 63600175 PHARM REV CODE 636 W HCPCS: Performed by: HOSPITALIST

## 2022-03-25 PROCEDURE — 25000003 PHARM REV CODE 250: Performed by: NURSE PRACTITIONER

## 2022-03-25 PROCEDURE — 27000207 HC ISOLATION

## 2022-03-25 PROCEDURE — 80053 COMPREHEN METABOLIC PANEL: CPT | Performed by: STUDENT IN AN ORGANIZED HEALTH CARE EDUCATION/TRAINING PROGRAM

## 2022-03-25 PROCEDURE — 25000003 PHARM REV CODE 250: Performed by: HOSPITALIST

## 2022-03-25 RX ADMIN — METHOCARBAMOL TABLETS 500 MG: 500 TABLET, COATED ORAL at 08:03

## 2022-03-25 RX ADMIN — LITHIUM CARBONATE 300 MG: 300 TABLET, FILM COATED, EXTENDED RELEASE ORAL at 08:03

## 2022-03-25 RX ADMIN — ACYCLOVIR SODIUM 480 MG: 1000 INJECTION, SOLUTION INTRAVENOUS at 02:03

## 2022-03-25 RX ADMIN — GABAPENTIN 300 MG: 300 CAPSULE ORAL at 08:03

## 2022-03-25 RX ADMIN — METHOCARBAMOL TABLETS 500 MG: 500 TABLET, COATED ORAL at 09:03

## 2022-03-25 RX ADMIN — MORPHINE SULFATE 2 MG: 2 INJECTION, SOLUTION INTRAMUSCULAR; INTRAVENOUS at 03:03

## 2022-03-25 RX ADMIN — MORPHINE SULFATE 2 MG: 2 INJECTION, SOLUTION INTRAMUSCULAR; INTRAVENOUS at 08:03

## 2022-03-25 RX ADMIN — NICOTINE 1 PATCH: 7 PATCH TRANSDERMAL at 09:03

## 2022-03-25 RX ADMIN — MORPHINE SULFATE 2 MG: 2 INJECTION, SOLUTION INTRAMUSCULAR; INTRAVENOUS at 09:03

## 2022-03-25 RX ADMIN — ERYTHROMYCIN 1 INCH: 5 OINTMENT OPHTHALMIC at 08:03

## 2022-03-25 RX ADMIN — BUSPIRONE HYDROCHLORIDE 10 MG: 10 TABLET ORAL at 08:03

## 2022-03-25 RX ADMIN — POLYETHYLENE GLYCOL 3350 17 G: 17 POWDER, FOR SOLUTION ORAL at 08:03

## 2022-03-25 RX ADMIN — BUSPIRONE HYDROCHLORIDE 10 MG: 10 TABLET ORAL at 03:03

## 2022-03-25 RX ADMIN — METHOCARBAMOL TABLETS 500 MG: 500 TABLET, COATED ORAL at 12:03

## 2022-03-25 RX ADMIN — DOXYCYCLINE HYCLATE 100 MG: 100 TABLET, COATED ORAL at 08:03

## 2022-03-25 RX ADMIN — HYPROMELLOSE 2910 1 DROP: 5 SOLUTION OPHTHALMIC at 08:03

## 2022-03-25 RX ADMIN — LEVOTHYROXINE SODIUM 25 MCG: 25 TABLET ORAL at 06:03

## 2022-03-25 RX ADMIN — HYPROMELLOSE 2910 1 DROP: 5 SOLUTION OPHTHALMIC at 04:03

## 2022-03-25 RX ADMIN — FAMOTIDINE 20 MG: 20 TABLET ORAL at 08:03

## 2022-03-25 RX ADMIN — GABAPENTIN 300 MG: 300 CAPSULE ORAL at 03:03

## 2022-03-25 RX ADMIN — HYDROXYZINE HYDROCHLORIDE 25 MG: 25 TABLET ORAL at 08:03

## 2022-03-25 RX ADMIN — ERYTHROMYCIN 1 INCH: 5 OINTMENT OPHTHALMIC at 09:03

## 2022-03-25 RX ADMIN — HYPROMELLOSE 2910 1 DROP: 5 SOLUTION OPHTHALMIC at 12:03

## 2022-03-25 RX ADMIN — ATOVAQUONE 1500 MG: 750 SUSPENSION ORAL at 08:03

## 2022-03-25 RX ADMIN — ACYCLOVIR SODIUM 480 MG: 1000 INJECTION, SOLUTION INTRAVENOUS at 04:03

## 2022-03-25 RX ADMIN — MIRTAZAPINE 15 MG: 15 TABLET, FILM COATED ORAL at 09:03

## 2022-03-25 RX ADMIN — LEVOTHYROXINE SODIUM 25 MCG: 25 TABLET ORAL at 05:03

## 2022-03-25 RX ADMIN — MORPHINE SULFATE 2 MG: 2 INJECTION, SOLUTION INTRAMUSCULAR; INTRAVENOUS at 04:03

## 2022-03-25 RX ADMIN — BICTEGRAVIR SODIUM, EMTRICITABINE, AND TENOFOVIR ALAFENAMIDE FUMARATE 1 TABLET: 50; 200; 25 TABLET ORAL at 08:03

## 2022-03-25 RX ADMIN — METHOCARBAMOL TABLETS 500 MG: 500 TABLET, COATED ORAL at 04:03

## 2022-03-25 RX ADMIN — ACYCLOVIR SODIUM 480 MG: 1000 INJECTION, SOLUTION INTRAVENOUS at 09:03

## 2022-03-25 NOTE — PROGRESS NOTES
[facility] - Intensive Care ([address] Medicine  Progress Note    Patient Name: Patricia Nye  MRN: [id number]  Patient Class: IP- Inpatient   Admission Date: 3/19/2022  Length of Stay: 5 days  Attending Physician: Jessenia Meneses MD  Primary Care Provider: Cornell Britt MD        Subjective:     Principal Problem:Herpes zoster ophthalmicus of right eye        HPI:  Patricia Nye is a 46 y.o. female with a PMHx of HIV, insomnia, anxiety/depression, polysubstance abuse, and hypothyroidism who was admitted to [facility] on March 18th with right periorbital swelling. The patient reports a rash developed to her chest wall about 1.5-2 weeks ago accompanied by a sensation of pins and needles. She then developed swelling around her right eye and had a rash around her eye and the bridge of her nose beginning 3 days ago. She was seen at [facility] two nights ago but left AMA and then presented to [place]. She was admitted with concern for herpes zoster ophthalmicus and started on IV acyclovir. Eye is reportedly more swollen, and she c/o of stinging pain to the skin over her eye and the right side of her face. Denies any pain to her actual eye and denies any blurry vision. Endorses chills. Denies any fever, abdominal pain, chest pain, nausea, vomiting, diarrhea, shortness of breath, cough, or dysuria. The patient was transferred to [facility] for ophthalmology consult.    Labs were reviewed from OSH: COVID negative, sodium 138, potassium 3.6, chloride 103, CO2 23, BUN 9, creatinine 0.8, glucose 100, AST 20, ALT 15, white blood cells 6.82, hemoglobin 12.2, hematocrit 37.5, platelets 324      Overview/Hospital Course:  Ophthalmology and Infectious Disease consulted on admission.  IV acyclovir continued with intermittent IV fluids for renal protection.  Vancomycin and Rocephin initiated as concern for superimposed bacterial infection which may been prompted by patient use of  dexamethasone ointment from home.  Antibiotics narrowed to vancomycin.  Per Ophthalmology, no indication for imaging.  Recommended ophthalmic antibiotic ointment and lubrication in addition to IV acyclovir continuance.  Per ID, continue IV acyclovir for 7 days (through 3/24) or until cessation of new lesion development.  There after transition to p.o. acyclovir.  Vanc was dc'd 03/24- pt transitioned to doxy.      Interval History: Patient was seen and examined this am. Reports persistent erythema of right eye and blurry vision- not worsening since admission. Still w/some vesicles present on nose and forehead. Itching of right vesicular rash. No fevers, chills, night sweats, abd pain, diarrhea, constipation, chest pain, sob, cough.     Objective:     Vital Signs (Most Recent):  Temp: 98.7 °F (37.1 °C) (03/24/22 1950)  Pulse: 92 (03/24/22 1950)  Resp: 16 (03/24/22 1950)  BP: (!) 131/97 (03/24/22 1950)  SpO2: 100 % (03/24/22 1950)   Vital Signs (24h Range):  Temp:  [97.5 °F (36.4 °C)-98.7 °F (37.1 °C)] 98.7 °F (37.1 °C)  Pulse:  [79-99] 92  Resp:  [12-18] 16  SpO2:  [97 %-100 %] 100 %  BP: (112-131)/(66-97) 131/97     Weight: 53.7 kg (118 lb 6.2 oz)  Body mass index is 22.37 kg/m².  No intake or output data in the 24 hours ending 03/24/22 2110     Physical Exam  Gen: in NAD, appears stated age  Neuro: AAOx3, motor, sensory, and strength grossly intact BL  HEENT: NTNC, EOMI, PERRL, MMM, right conjunctiva injected- clear discharge  CVS: RRR, no m/r/g; S1/S2 auscultated with no S3 or S4; capillary refill < 2 sec  Resp: lungs CTAB, no w/r/r; no belabored breathing or accessory muscle use appreciated   Abd: BS+ in all 4 quadrants; NTND, soft to palpation; no organomegaly appreciated   Extrem: pulses full, equal, and regular over all 4 extremities; no UE or LE edema BL  Skin: vesicles present on right-nasal bridge and one vesicle of left chest wall, some raised papules of BL shoulders                    Significant Labs: All  pertinent labs within the past 24 hours have been reviewed.  CBC:   Recent Labs   Lab 03/23/22  0728 03/24/22  0452   WBC 4.47 5.52   HGB 12.7 11.6*   HCT 40.8 35.9*    309     CMP:   Recent Labs   Lab 03/23/22  0728 03/24/22  0451    134*   K 4.1 3.9    103   CO2 26 24   GLU 90 99   BUN 12 14   CREATININE 0.7 0.8   CALCIUM 9.7 9.1   PROT 7.4 6.9   ALBUMIN 3.2* 3.0*   BILITOT 0.2 0.1   ALKPHOS 65 65   AST 12 11   ALT 7* 9*   ANIONGAP 9 7*   EGFRNONAA >60.0 >60.0       Significant Imaging: I have reviewed all pertinent imaging results/findings within the past 24 hours.      Assessment/Plan:      * Herpes zoster ophthalmicus of right eye  Patient reports rash developed to her chest wall about 1.5-2 weeks ago accompanied by a sensation of pins and needles. She then developed swelling around her right eye and had a rash around her eye and the bridge of her nose beginning 3 days ago. She was seen at [facility] two nights ago but left AMA and presented to [place]. She was admitted with concern for herpes zoster ophthalmicus and started on IV acyclovir. Eye  is reportedly more swollen, and she c/o of stinging pain to the skin over her eye and the right side of her face. Denies any pain to her actual eye and denies any blurry vision.  The patient was [place] [facility] [place].    - Continue IV acyclovir 500mg q8hrs  - IVF qhs  - Contact, droplet, and airborne isolation  - Ophthalmology consulted- continue Erythromycin to skin lesions and right eye BID, Preservative Free Tears QID, antiviral treatment per ID  - Continue gabapentin 300mg tid  - ID consulted, appreciate recs- continue IV acyclovir until no new lesions forming or for 7 days, transition to doxy prior to discharge  - Will transition to doxy today and monitor response  - symptomatic management of pain: increase gabapentin as clinically indicated, tylenol prn, IV morphine p.r.n. (allergies noted to other opioids)    Anxiety  disorder  - Chronic, stable.  reviewed.   - Continue home xanax PRN  - Continue buspar 10mg tid    Tobacco abuse  Assistance with smoking cessation was offered on admission, including:  [x]  Medications  [x]  Counseling  []  Printed Information on Smoking Cessation  []  Referral to a Smoking Cessation Program    Patient was counseled regarding smoking for 3-10 minutes on admit.     Primary insomnia  - Continue remeron qhs    Major depressive disorder in remission  - Bipolar 1 disorder  - Continue lithium    HIV disease  This patient in known to have AIDS (from CD4 count has been less than 200). Last CD4 21 (April 2021), new CD4 count pending. Patient is on HAART. Will continue HAART. Continued prophylaxis with atovaquone. Continue to monitor routine labs.     - We will consult Infectious disease at this time. Appreciate recs.   - Continue Biktarvy and atovaquone ppx.  - CD4 on admit 99      VTE Risk Mitigation (From admission, onward)         Ordered     IP VTE LOW RISK PATIENT  Once         03/19/22 1925     Place sequential compression device  Until discontinued         03/19/22 1925                Discharge Planning   RUFINA: 3/25/2022     Code Status: Full Code   Is the patient medically ready for discharge?: No    Reason for patient still in hospital (select all that apply): Patient trending condition  Discharge Plan A: Home with family              Jessenia Meneses MD  Department of Hospital Medicine   [facility] - Intensive Care ([facility]

## 2022-03-25 NOTE — ASSESSMENT & PLAN NOTE
This patient in known to have AIDS (from CD4 count has been less than 200). Last CD4 21 (April 2021), new CD4 count pending. Patient is on HAART. Will continue HAART. Continued prophylaxis with atovaquone. Continue to monitor routine labs.     - We will consult Infectious disease at this time. Appreciate recs.   - Continue Biktarvy and atovaquone ppx.  - CD4 on admit 99

## 2022-03-25 NOTE — SUBJECTIVE & OBJECTIVE
Interval History: Patient was seen and examined this am. Reports persistent erythema of right eye and blurry vision- not worsening since admission. Still w/some vesicles present on nose and forehead. Itching of right vesicular rash. No fevers, chills, night sweats, abd pain, diarrhea, constipation, chest pain, sob, cough.     Objective:     Vital Signs (Most Recent):  Temp: 98.7 °F (37.1 °C) (03/24/22 1950)  Pulse: 92 (03/24/22 1950)  Resp: 16 (03/24/22 1950)  BP: (!) 131/97 (03/24/22 1950)  SpO2: 100 % (03/24/22 1950)   Vital Signs (24h Range):  Temp:  [97.5 °F (36.4 °C)-98.7 °F (37.1 °C)] 98.7 °F (37.1 °C)  Pulse:  [79-99] 92  Resp:  [12-18] 16  SpO2:  [97 %-100 %] 100 %  BP: (112-131)/(66-97) 131/97     Weight: 53.7 kg (118 lb 6.2 oz)  Body mass index is 22.37 kg/m².  No intake or output data in the 24 hours ending 03/24/22 2110     Physical Exam  Gen: in NAD, appears stated age  Neuro: AAOx3, motor, sensory, and strength grossly intact BL  HEENT: NTNC, EOMI, PERRL, MMM, right conjunctiva injected- clear discharge  CVS: RRR, no m/r/g; S1/S2 auscultated with no S3 or S4; capillary refill < 2 sec  Resp: lungs CTAB, no w/r/r; no belabored breathing or accessory muscle use appreciated   Abd: BS+ in all 4 quadrants; NTND, soft to palpation; no organomegaly appreciated   Extrem: pulses full, equal, and regular over all 4 extremities; no UE or LE edema BL  Skin: vesicles present on right-nasal bridge and one vesicle of left chest wall, some raised papules of BL shoulders                    Significant Labs: All pertinent labs within the past 24 hours have been reviewed.  CBC:   Recent Labs   Lab 03/23/22  0728 03/24/22  0452   WBC 4.47 5.52   HGB 12.7 11.6*   HCT 40.8 35.9*    309     CMP:   Recent Labs   Lab 03/23/22  0728 03/24/22  0451    134*   K 4.1 3.9    103   CO2 26 24   GLU 90 99   BUN 12 14   CREATININE 0.7 0.8   CALCIUM 9.7 9.1   PROT 7.4 6.9   ALBUMIN 3.2* 3.0*   BILITOT 0.2 0.1   ALKPHOS  65 65   AST 12 11   ALT 7* 9*   ANIONGAP 9 7*   EGFRNONAA >60.0 >60.0       Significant Imaging: I have reviewed all pertinent imaging results/findings within the past 24 hours.

## 2022-03-25 NOTE — PLAN OF CARE
Patient a&ox4, vss, ambulated independently this shift. Prn pain med given x2. I.v rotated related to pain.       Problem: Adult Inpatient Plan of Care  Goal: Plan of Care Review  Outcome: Ongoing, Progressing  Goal: Optimal Comfort and Wellbeing  Outcome: Ongoing, Progressing  Goal: Readiness for Transition of Care  Outcome: Ongoing, Progressing     Problem: Infection  Goal: Absence of Infection Signs and Symptoms  Outcome: Ongoing, Progressing

## 2022-03-25 NOTE — TELEPHONE ENCOUNTER
----- Message from Madison Han sent at 3/25/2022  4:21 PM CDT -----  Contact: Connie with [facility] phone [phone]  No blue slot available to schedule an appointment for the patient.  Patient is established with which PCP: Dr ASHLEY Britt  Reason for the visit: Follow up [facility] discharged 03/27/2022 for herpes zoster  Would the patient like a call back, or a response through their [facility] portal?:  Please call the patient. Thanks.

## 2022-03-25 NOTE — PLAN OF CARE
SLIME attempted to schedule hospital follow up, SLIME informed that nurse for PCP will contact patient to schedule this appt. Patient will possibly dc on Sunday.        Connie Álvarez LMSW  [facility]   [phone]

## 2022-03-25 NOTE — PLAN OF CARE
PATIENT UP IN BED RESTING AT THIS TIME; NAD, RR EVEN UNLABORED.    Problem: Adult Inpatient Plan of Care  Goal: Plan of Care Review  Outcome: Ongoing, Progressing  Goal: Patient-Specific Goal (Individualized)  Outcome: Ongoing, Progressing  Goal: Absence of Hospital-Acquired Illness or Injury  Outcome: Ongoing, Progressing  Goal: Optimal Comfort and Wellbeing  Outcome: Ongoing, Progressing  Goal: Readiness for Transition of Care  Outcome: Ongoing, Progressing

## 2022-03-25 NOTE — PROGRESS NOTES
PATIENT STATES SHE DOES NOT LIKE HER MORPHINE DILUTED AND IF I'M GOING TO DILUTE IT IN THE FLUSH SHE IS GOING TO ASK FOR A DIFFERENT NURSE.  INSTRUCTED PATIENT ON MEDICATION MANAGEMENT AND MEASURES TO REQUEST A DIFFERENT NURSE.  PATIENT VERBALIZED UNDERSTANDING.

## 2022-03-25 NOTE — ASSESSMENT & PLAN NOTE
Patient reports rash developed to her chest wall about 1.5-2 weeks ago accompanied by a sensation of pins and needles. She then developed swelling around her right eye and had a rash around her eye and the bridge of her nose beginning 3 days ago. She was seen at [facility] two nights ago but left AMA and presented to [place]. She was admitted with concern for herpes zoster ophthalmicus and started on IV acyclovir. Eye  is reportedly more swollen, and she c/o of stinging pain to the skin over her eye and the right side of her face. Denies any pain to her actual eye and denies any blurry vision.  The patient was transferred to [facility] for ophthalmology consult.    - Continue IV acyclovir 500mg q8hrs  - IVF qhs  - Contact, droplet, and airborne isolation  - Ophthalmology consulted- continue Erythromycin to skin lesions and right eye BID, Preservative Free Tears QID, antiviral treatment per ID  - Continue gabapentin 300mg tid  - ID consulted, appreciate recs- continue IV acyclovir until no new lesions forming or for 7 days, transition to doxy prior to discharge  - Will transition to doxy today and monitor response  - symptomatic management of pain: increase gabapentin as clinically indicated, tylenol prn, IV morphine p.r.n. (allergies noted to other opioids)

## 2022-03-25 NOTE — PROGRESS NOTES
PATIENT REFUSED MIDNIGHT VITALS STATES THEY ALREADY TOOK HER VITALS TONIGHT AND IS NOT PUTTING IT IN THE CHART.  EXPLAINED TO PATIENT VITAL SIGNS ARE USUALLY TAKEN AT 7P, 12A, 4A, AND SHE STATES SHE HAS BEEN ON THIS FLOOR AND NOBODY HAS TAKEN HER VITALS THAT OFTEN.  SHE STATES SHE ALSO WANTS ANOTHER NURSE TO BRING HER NEXT DOSE OF MORPHINE AND SHE DOES NOT WANT IT DILUTED.  CHARGE NURSE NOTIFIED.

## 2022-03-25 NOTE — TELEPHONE ENCOUNTER
I spoke with the patient. She's still admitted. I let her know the soonest we can get her in is her current appt, April 25th. I added her to the wait list in case something opens sooner.

## 2022-03-26 PROBLEM — L30.8 PRURITIC DERMATITIS: Status: ACTIVE | Noted: 2022-03-26

## 2022-03-26 LAB
ALBUMIN SERPL BCP-MCNC: 3.1 G/DL (ref 3.5–5.2)
ALP SERPL-CCNC: 68 U/L (ref 55–135)
ALT SERPL W/O P-5'-P-CCNC: 11 U/L (ref 10–44)
ANION GAP SERPL CALC-SCNC: 8 MMOL/L (ref 8–16)
AST SERPL-CCNC: 14 U/L (ref 10–40)
BASOPHILS # BLD AUTO: 0.07 K/UL (ref 0–0.2)
BASOPHILS NFR BLD: 1.3 % (ref 0–1.9)
BILIRUB SERPL-MCNC: 0.2 MG/DL (ref 0.1–1)
BUN SERPL-MCNC: 16 MG/DL (ref 6–20)
CALCIUM SERPL-MCNC: 9.2 MG/DL (ref 8.7–10.5)
CHLORIDE SERPL-SCNC: 100 MMOL/L (ref 95–110)
CO2 SERPL-SCNC: 24 MMOL/L (ref 23–29)
CREAT SERPL-MCNC: 0.8 MG/DL (ref 0.5–1.4)
DIFFERENTIAL METHOD: ABNORMAL
EOSINOPHIL # BLD AUTO: 0.3 K/UL (ref 0–0.5)
EOSINOPHIL NFR BLD: 5.5 % (ref 0–8)
ERYTHROCYTE [DISTWIDTH] IN BLOOD BY AUTOMATED COUNT: 13.8 % (ref 11.5–14.5)
EST. GFR  (AFRICAN AMERICAN): >60 ML/MIN/1.73 M^2
EST. GFR  (NON AFRICAN AMERICAN): >60 ML/MIN/1.73 M^2
GLUCOSE SERPL-MCNC: 114 MG/DL (ref 70–110)
HCT VFR BLD AUTO: 34.1 % (ref 37–48.5)
HGB BLD-MCNC: 11 G/DL (ref 12–16)
IMM GRANULOCYTES # BLD AUTO: 0.01 K/UL (ref 0–0.04)
IMM GRANULOCYTES NFR BLD AUTO: 0.2 % (ref 0–0.5)
LYMPHOCYTES # BLD AUTO: 1.5 K/UL (ref 1–4.8)
LYMPHOCYTES NFR BLD: 28 % (ref 18–48)
MAGNESIUM SERPL-MCNC: 1.5 MG/DL (ref 1.6–2.6)
MCH RBC QN AUTO: 27.5 PG (ref 27–31)
MCHC RBC AUTO-ENTMCNC: 32.3 G/DL (ref 32–36)
MCV RBC AUTO: 85 FL (ref 82–98)
MONOCYTES # BLD AUTO: 0.4 K/UL (ref 0.3–1)
MONOCYTES NFR BLD: 8.1 % (ref 4–15)
NEUTROPHILS # BLD AUTO: 3 K/UL (ref 1.8–7.7)
NEUTROPHILS NFR BLD: 56.9 % (ref 38–73)
NRBC BLD-RTO: 0 /100 WBC
PHOSPHATE SERPL-MCNC: 3.5 MG/DL (ref 2.7–4.5)
PLATELET # BLD AUTO: 312 K/UL (ref 150–450)
PMV BLD AUTO: 10.2 FL (ref 9.2–12.9)
POTASSIUM SERPL-SCNC: 3.5 MMOL/L (ref 3.5–5.1)
PROT SERPL-MCNC: 7.2 G/DL (ref 6–8.4)
RBC # BLD AUTO: 4 M/UL (ref 4–5.4)
SODIUM SERPL-SCNC: 132 MMOL/L (ref 136–145)
WBC # BLD AUTO: 5.29 K/UL (ref 3.9–12.7)

## 2022-03-26 PROCEDURE — 25000003 PHARM REV CODE 250: Performed by: STUDENT IN AN ORGANIZED HEALTH CARE EDUCATION/TRAINING PROGRAM

## 2022-03-26 PROCEDURE — 84100 ASSAY OF PHOSPHORUS: CPT | Performed by: STUDENT IN AN ORGANIZED HEALTH CARE EDUCATION/TRAINING PROGRAM

## 2022-03-26 PROCEDURE — 63600175 PHARM REV CODE 636 W HCPCS: Performed by: INTERNAL MEDICINE

## 2022-03-26 PROCEDURE — 25000003 PHARM REV CODE 250: Performed by: INTERNAL MEDICINE

## 2022-03-26 PROCEDURE — 85025 COMPLETE CBC W/AUTO DIFF WBC: CPT | Performed by: STUDENT IN AN ORGANIZED HEALTH CARE EDUCATION/TRAINING PROGRAM

## 2022-03-26 PROCEDURE — 27000207 HC ISOLATION

## 2022-03-26 PROCEDURE — 94761 N-INVAS EAR/PLS OXIMETRY MLT: CPT

## 2022-03-26 PROCEDURE — S4991 NICOTINE PATCH NONLEGEND: HCPCS | Performed by: HOSPITALIST

## 2022-03-26 PROCEDURE — 83735 ASSAY OF MAGNESIUM: CPT | Performed by: STUDENT IN AN ORGANIZED HEALTH CARE EDUCATION/TRAINING PROGRAM

## 2022-03-26 PROCEDURE — 80053 COMPREHEN METABOLIC PANEL: CPT | Performed by: STUDENT IN AN ORGANIZED HEALTH CARE EDUCATION/TRAINING PROGRAM

## 2022-03-26 PROCEDURE — 63600175 PHARM REV CODE 636 W HCPCS: Performed by: NURSE PRACTITIONER

## 2022-03-26 PROCEDURE — 99233 PR SUBSEQUENT HOSPITAL CARE,LEVL III: ICD-10-PCS | Mod: 95,,, | Performed by: INTERNAL MEDICINE

## 2022-03-26 PROCEDURE — 25000003 PHARM REV CODE 250: Performed by: HOSPITALIST

## 2022-03-26 PROCEDURE — 20600001 HC STEP DOWN PRIVATE ROOM

## 2022-03-26 PROCEDURE — 36415 COLL VENOUS BLD VENIPUNCTURE: CPT | Performed by: STUDENT IN AN ORGANIZED HEALTH CARE EDUCATION/TRAINING PROGRAM

## 2022-03-26 PROCEDURE — 63600175 PHARM REV CODE 636 W HCPCS: Performed by: HOSPITALIST

## 2022-03-26 PROCEDURE — 99233 SBSQ HOSP IP/OBS HIGH 50: CPT | Mod: 95,,, | Performed by: INTERNAL MEDICINE

## 2022-03-26 RX ORDER — CAMPHOR
SPIRIT TOPICAL 3 TIMES DAILY
Status: DISCONTINUED | OUTPATIENT
Start: 2022-03-26 | End: 2022-03-27

## 2022-03-26 RX ORDER — MAGNESIUM SULFATE HEPTAHYDRATE 40 MG/ML
2 INJECTION, SOLUTION INTRAVENOUS ONCE
Status: COMPLETED | OUTPATIENT
Start: 2022-03-26 | End: 2022-03-26

## 2022-03-26 RX ADMIN — MIRTAZAPINE 15 MG: 15 TABLET, FILM COATED ORAL at 09:03

## 2022-03-26 RX ADMIN — ACYCLOVIR SODIUM 480 MG: 1000 INJECTION, SOLUTION INTRAVENOUS at 05:03

## 2022-03-26 RX ADMIN — BUSPIRONE HYDROCHLORIDE 10 MG: 10 TABLET ORAL at 09:03

## 2022-03-26 RX ADMIN — FAMOTIDINE 20 MG: 20 TABLET ORAL at 09:03

## 2022-03-26 RX ADMIN — MORPHINE SULFATE 2 MG: 2 INJECTION, SOLUTION INTRAMUSCULAR; INTRAVENOUS at 09:03

## 2022-03-26 RX ADMIN — LITHIUM CARBONATE 300 MG: 300 TABLET, FILM COATED, EXTENDED RELEASE ORAL at 09:03

## 2022-03-26 RX ADMIN — METHOCARBAMOL TABLETS 500 MG: 500 TABLET, COATED ORAL at 09:03

## 2022-03-26 RX ADMIN — HYPROMELLOSE 2910 1 DROP: 5 SOLUTION OPHTHALMIC at 05:03

## 2022-03-26 RX ADMIN — MAGNESIUM SULFATE HEPTAHYDRATE 2 G: 2 INJECTION, SOLUTION INTRAVENOUS at 04:03

## 2022-03-26 RX ADMIN — METHOCARBAMOL TABLETS 500 MG: 500 TABLET, COATED ORAL at 12:03

## 2022-03-26 RX ADMIN — GABAPENTIN 300 MG: 300 CAPSULE ORAL at 09:03

## 2022-03-26 RX ADMIN — ACYCLOVIR SODIUM 480 MG: 1000 INJECTION, SOLUTION INTRAVENOUS at 09:03

## 2022-03-26 RX ADMIN — HYPROMELLOSE 2910 1 DROP: 5 SOLUTION OPHTHALMIC at 09:03

## 2022-03-26 RX ADMIN — LEVOTHYROXINE SODIUM 25 MCG: 25 TABLET ORAL at 06:03

## 2022-03-26 RX ADMIN — ERYTHROMYCIN 1 INCH: 5 OINTMENT OPHTHALMIC at 09:03

## 2022-03-26 RX ADMIN — DOXYCYCLINE HYCLATE 100 MG: 100 TABLET, COATED ORAL at 09:03

## 2022-03-26 RX ADMIN — METHOCARBAMOL TABLETS 500 MG: 500 TABLET, COATED ORAL at 04:03

## 2022-03-26 RX ADMIN — GABAPENTIN 300 MG: 300 CAPSULE ORAL at 04:03

## 2022-03-26 RX ADMIN — BICTEGRAVIR SODIUM, EMTRICITABINE, AND TENOFOVIR ALAFENAMIDE FUMARATE 1 TABLET: 50; 200; 25 TABLET ORAL at 09:03

## 2022-03-26 RX ADMIN — BUSPIRONE HYDROCHLORIDE 10 MG: 10 TABLET ORAL at 04:03

## 2022-03-26 RX ADMIN — MORPHINE SULFATE 2 MG: 2 INJECTION, SOLUTION INTRAMUSCULAR; INTRAVENOUS at 11:03

## 2022-03-26 RX ADMIN — ERYTHROMYCIN: 5 OINTMENT OPHTHALMIC at 09:03

## 2022-03-26 RX ADMIN — ACYCLOVIR SODIUM 480 MG: 1000 INJECTION, SOLUTION INTRAVENOUS at 12:03

## 2022-03-26 RX ADMIN — MORPHINE SULFATE 2 MG: 2 INJECTION, SOLUTION INTRAMUSCULAR; INTRAVENOUS at 03:03

## 2022-03-26 RX ADMIN — ATOVAQUONE 1500 MG: 750 SUSPENSION ORAL at 09:03

## 2022-03-26 RX ADMIN — NICOTINE 1 PATCH: 7 PATCH TRANSDERMAL at 09:03

## 2022-03-26 RX ADMIN — MORPHINE SULFATE 2 MG: 2 INJECTION, SOLUTION INTRAMUSCULAR; INTRAVENOUS at 06:03

## 2022-03-26 RX ADMIN — FERRIC OXIDE RED: 8; 8 LOTION TOPICAL at 09:03

## 2022-03-26 RX ADMIN — HYPROMELLOSE 2910 1 DROP: 5 SOLUTION OPHTHALMIC at 12:03

## 2022-03-26 RX ADMIN — POLYETHYLENE GLYCOL 3350 17 G: 17 POWDER, FOR SOLUTION ORAL at 09:03

## 2022-03-26 NOTE — PLAN OF CARE
Care plan discussed with patient at bedside. Patient does not have any questions at this time. She has a good understanding of her disease process and the treatment plan for it. No significant events this shift. Patient requested to sleep most of the day bc she had not slept much the night before.

## 2022-03-26 NOTE — SUBJECTIVE & OBJECTIVE
Interval History: Patient was seen and examined this am. Reports persistent erythema of right eye and blurry vision- not worsening since admission. Still w/some vesicles present on nose and forehead. Itching of right vesicular rash. No fevers, chills, night sweats, abd pain, diarrhea, constipation, chest pain, sob, cough.     Objective:     Vital Signs (Most Recent):  Temp: 98.1 °F (36.7 °C) (03/25/22 1615)  Pulse: 80 (03/25/22 1615)  Resp: 18 (03/25/22 2033)  BP: 119/76 (03/25/22 1615)  SpO2: 99 % (03/25/22 1615)   Vital Signs (24h Range):  Temp:  [98.1 °F (36.7 °C)-98.3 °F (36.8 °C)] 98.1 °F (36.7 °C)  Pulse:  [80-85] 80  Resp:  [14-18] 18  SpO2:  [98 %-99 %] 99 %  BP: (111-123)/(67-84) 119/76     Weight: 53.7 kg (118 lb 6.2 oz)  Body mass index is 22.37 kg/m².    Intake/Output Summary (Last 24 hours) at 3/25/2022 2041  Last data filed at 3/25/2022 0500  Gross per 24 hour   Intake 480 ml   Output --   Net 480 ml        Physical Exam  Gen: in NAD, appears stated age  Neuro: AAOx3, motor, sensory, and strength grossly intact BL  HEENT: NTNC, EOMI, PERRL, MMM, right conjunctiva injected- clear discharge  CVS: RRR, no m/r/g; S1/S2 auscultated with no S3 or S4; capillary refill < 2 sec  Resp: lungs CTAB, no w/r/r; no belabored breathing or accessory muscle use appreciated   Abd: BS+ in all 4 quadrants; NTND, soft to palpation; no organomegaly appreciated   Extrem: pulses full, equal, and regular over all 4 extremities; no UE or LE edema BL  Skin: crusting of lesions on right nasal bridge, no new vesicles seen                           Significant Labs: All pertinent labs within the past 24 hours have been reviewed.  CBC:   Recent Labs   Lab 03/24/22  0452 03/25/22  0415   WBC 5.52 4.69   HGB 11.6* 11.6*   HCT 35.9* 36.6*    304     CMP:   Recent Labs   Lab 03/24/22  0451 03/25/22  0415   * 136   K 3.9 4.1    106   CO2 24 23   GLU 99 85   BUN 14 12   CREATININE 0.8 0.7   CALCIUM 9.1 9.1   PROT 6.9 7.0    ALBUMIN 3.0* 2.9*   BILITOT 0.1 0.1   ALKPHOS 65 66   AST 11 14   ALT 9* 12   ANIONGAP 7* 7*   EGFRNONAA >60.0 >60.0       Significant Imaging: I have reviewed all pertinent imaging results/findings within the past 24 hours.

## 2022-03-26 NOTE — PROGRESS NOTES
[facility] ([address] Medicine  Progress Note    Patient Name: Patricia yNe  MRN: [id number]  Patient Class: IP- Inpatient   Admission Date: 3/19/2022  Length of Stay: 6 days  Attending Physician: Jessenia Meneses MD  Primary Care Provider: Cornell Britt MD        Subjective:     Principal Problem:Herpes zoster ophthalmicus of right eye        HPI:  Patricia Nye is a 46 y.o. female with a PMHx of HIV, insomnia, anxiety/depression, polysubstance abuse, and hypothyroidism who was admitted to [facility] on March 18th with right periorbital swelling. The patient reports a rash developed to her chest wall about 1.5-2 weeks ago accompanied by a sensation of pins and needles. She then developed swelling around her right eye and had a rash around her eye and the bridge of her nose beginning 3 days ago. She was seen at [facility] two nights ago but left AMA and then presented to [place]. She was admitted with concern for herpes zoster ophthalmicus and started on IV acyclovir. Eye is reportedly more swollen, and she c/o of stinging pain to the skin over her eye and the right side of her face. Denies any pain to her actual eye and denies any blurry vision. Endorses chills. Denies any fever, abdominal pain, chest pain, nausea, vomiting, diarrhea, shortness of breath, cough, or dysuria. The patient was transferred to [facility] for ophthalmology consult.    Labs were reviewed from OSH: COVID negative, sodium 138, potassium 3.6, chloride 103, CO2 23, BUN 9, creatinine 0.8, glucose 100, AST 20, ALT 15, white blood cells 6.82, hemoglobin 12.2, hematocrit 37.5, platelets 324      Overview/Hospital Course:  Ophthalmology and Infectious Disease consulted on admission.  IV acyclovir continued with intermittent IV fluids for renal protection.  Vancomycin and Rocephin initiated as concern for superimposed bacterial infection which may been prompted by patient use of  dexamethasone ointment from home.  Antibiotics narrowed to vancomycin.  Per Ophthalmology, no indication for imaging.  Recommended ophthalmic antibiotic ointment and lubrication in addition to IV acyclovir continuance.  Per ID, continue IV acyclovir for 7 days (03/26) or until cessation of new lesion development.  There after transition to p.o. acyclovir.  Vanc was dc'd 03/24- pt transitioned to doxy 03/24.       Interval History: Patient was seen and examined this am. Reports persistent erythema of right eye and blurry vision- not worsening since admission. Still w/some vesicles present on nose and forehead. Itching of right vesicular rash. No fevers, chills, night sweats, abd pain, diarrhea, constipation, chest pain, sob, cough.     Objective:     Vital Signs (Most Recent):  Temp: 98.1 °F (36.7 °C) (03/25/22 1615)  Pulse: 80 (03/25/22 1615)  Resp: 18 (03/25/22 2033)  BP: 119/76 (03/25/22 1615)  SpO2: 99 % (03/25/22 1615)   Vital Signs (24h Range):  Temp:  [98.1 °F (36.7 °C)-98.3 °F (36.8 °C)] 98.1 °F (36.7 °C)  Pulse:  [80-85] 80  Resp:  [14-18] 18  SpO2:  [98 %-99 %] 99 %  BP: (111-123)/(67-84) 119/76     Weight: 53.7 kg (118 lb 6.2 oz)  Body mass index is 22.37 kg/m².    Intake/Output Summary (Last 24 hours) at 3/25/2022 2041  Last data filed at 3/25/2022 0500  Gross per 24 hour   Intake 480 ml   Output --   Net 480 ml        Physical Exam  Gen: in NAD, appears stated age  Neuro: AAOx3, motor, sensory, and strength grossly intact BL  HEENT: NTNC, EOMI, PERRL, MMM, right conjunctiva injected- clear discharge  CVS: RRR, no m/r/g; S1/S2 auscultated with no S3 or S4; capillary refill < 2 sec  Resp: lungs CTAB, no w/r/r; no belabored breathing or accessory muscle use appreciated   Abd: BS+ in all 4 quadrants; NTND, soft to palpation; no organomegaly appreciated   Extrem: pulses full, equal, and regular over all 4 extremities; no UE or LE edema BL  Skin: crusting of lesions on right nasal bridge, no new vesicles seen                            Significant Labs: All pertinent labs within the past 24 hours have been reviewed.  CBC:   Recent Labs   Lab 03/24/22  0452 03/25/22  0415   WBC 5.52 4.69   HGB 11.6* 11.6*   HCT 35.9* 36.6*    304     CMP:   Recent Labs   Lab 03/24/22  0451 03/25/22  0415   * 136   K 3.9 4.1    106   CO2 24 23   GLU 99 85   BUN 14 12   CREATININE 0.8 0.7   CALCIUM 9.1 9.1   PROT 6.9 7.0   ALBUMIN 3.0* 2.9*   BILITOT 0.1 0.1   ALKPHOS 65 66   AST 11 14   ALT 9* 12   ANIONGAP 7* 7*   EGFRNONAA >60.0 >60.0       Significant Imaging: I have reviewed all pertinent imaging results/findings within the past 24 hours.      Assessment/Plan:      * Herpes zoster ophthalmicus of right eye  Patient reports rash developed to her chest wall about 1.5-2 weeks ago accompanied by a sensation of pins and needles. She then developed swelling around her right eye and had a rash around her eye and the bridge of her nose beginning 3 days ago. She was seen at [facility] two nights ago but left AMA and presented to [place]. She was admitted with concern for herpes zoster ophthalmicus and started on IV acyclovir. Eye  is reportedly more swollen, and she c/o of stinging pain to the skin over her eye and the right side of her face. Denies any pain to her actual eye and denies any blurry vision.  The patient was transferred to [facility] for ophthalmology consult.    - Continue IV acyclovir 500mg q8hrs  - IVF qhs  - Contact, droplet, and airborne isolation  - Ophthalmology consulted- continue Erythromycin to skin lesions and right eye BID, Preservative Free Tears QID, antiviral treatment per ID  - Continue gabapentin 300mg tid  - ID consulted, appreciate recs- continue IV acyclovir until no new lesions forming or for 7 days  - Transitioned to doxy 03/24 and monitor response  - symptomatic management of pain: increase gabapentin as clinically indicated, tylenol prn, IV morphine p.r.n. (allergies noted to other  opioids)    Anxiety disorder  - Chronic, stable.  reviewed.   - Continue home xanax PRN  - Continue buspar 10mg tid    Tobacco abuse  Assistance with smoking cessation was offered on admission, including:  [x]  Medications  [x]  Counseling  []  Printed Information on Smoking Cessation  []  Referral to a Smoking Cessation Program    Patient was counseled regarding smoking for 3-10 minutes on admit.     Primary insomnia  - Continue remeron qhs    Major depressive disorder in remission  - Bipolar 1 disorder  - Continue lithium    HIV disease  This patient in known to have AIDS (from CD4 count has been less than 200). Last CD4 21 (April 2021), new CD4 count pending. Patient is on HAART. Will continue HAART. Continued prophylaxis with atovaquone. Continue to monitor routine labs.     - We will consult Infectious disease at this time. Appreciate recs.   - Continue Biktarvy and atovaquone ppx.  - CD4 on admit 99      VTE Risk Mitigation (From admission, onward)         Ordered     IP VTE LOW RISK PATIENT  Once         03/19/22 1925     Place sequential compression device  Until discontinued         03/19/22 1925                Discharge Planning   RUFINA: 3/27/2022     Code Status: Full Code   Is the patient medically ready for discharge?: No    Reason for patient still in hospital (select all that apply): Patient trending condition  Discharge Plan A: Home with family                    Jessenia Meneses MD  Department of Hospital Medicine   [facility] - Intensive Care ([place]-[facility])

## 2022-03-26 NOTE — ASSESSMENT & PLAN NOTE
Patient reports rash developed to her chest wall about 1.5-2 weeks ago accompanied by a sensation of pins and needles. She then developed swelling around her right eye and had a rash around her eye and the bridge of her nose beginning 3 days ago. She was seen at [facility] two nights ago but left AMA and presented to [place]. She was admitted with concern for herpes zoster ophthalmicus and started on IV acyclovir. Eye  is reportedly more swollen, and she c/o of stinging pain to the skin over her eye and the right side of her face. Denies any pain to her actual eye and denies any blurry vision.  The patient was transferred to [facility] for ophthalmology consult.    - Continue IV acyclovir 500mg q8hrs  - IVF qhs  - Contact, droplet, and airborne isolation  - Ophthalmology consulted- continue Erythromycin to skin lesions and right eye BID, Preservative Free Tears QID, antiviral treatment per ID  - Continue gabapentin 300mg tid  - ID consulted, appreciate recs- continue IV acyclovir until no new lesions forming or for 7 days  - Transitioned to doxy 03/24 and monitor response  - symptomatic management of pain: increase gabapentin as clinically indicated, tylenol prn, IV morphine p.r.n. (allergies noted to other opioids)

## 2022-03-26 NOTE — ASSESSMENT & PLAN NOTE
-present since 12/2021 and uses calamine at home when flares.  -no erythema but mild flesh colored papules to anterior chest  -continue calamine lotion to pruritic area  -derm referral

## 2022-03-26 NOTE — SUBJECTIVE & OBJECTIVE
This encounter was provided through telemedicine.  Patient was transferred to the telemedicine service on:  03/26/2022   The patient location is: [id number] A admitted 3/19/2022  7:21 PM.  Present with the patient at the time of the telemed/virtual assessment: Telepresenter    Interval History/Overnight Events:   Clinical record since admit reviewed.    Patient denies new lesions but remains with weeping from previous lesions which have not crusted. Facial lesions are improved overall.  She does complain of itching to a papular rash which has been intermittent since 12/2021  - no redness.      Review of Systems   Constitutional:  Positive for fatigue. Negative for activity change and fever.   Respiratory:  Negative for cough and shortness of breath.    Gastrointestinal:  Negative for diarrhea and vomiting.   Skin:  Positive for rash.      Inpatient Medications:  Scheduled Meds:   acyclovir  480 mg Intravenous Q8H    artificial tears  1 drop Both Eyes QID    atovaquone  1,500 mg Oral Daily    qusybiawi-thyuyhap-nkyimrf ala  1 tablet Oral Daily    busPIRone  10 mg Oral TID    calamine-zinc oxide 8-8%   Topical (Top) TID    doxycycline  100 mg Oral Q12H    erythromycin   Right Eye BID    famotidine  20 mg Oral BID    gabapentin  300 mg Oral TID    levothyroxine  25 mcg Oral Before breakfast    lithium  300 mg Oral BID    methocarbamoL  500 mg Oral QID    mirtazapine  15 mg Oral QHS    nicotine  1 patch Transdermal Daily    polyethylene glycol  17 g Oral Daily     Continuous Infusions:  PRN Meds:.acetaminophen, ALPRAZolam, aluminum & magnesium hydroxide-simethicone, calamine-zinc oxide 8-8%, dextrose 10%, dextrose 10%, glucagon (human recombinant), glucose, glucose, hydrOXYzine HCL, magic mouthwash (diphenhydrAMINE 12.5 mg/5 mL 20 mL, aluminum & magnesium hydroxide-simethicone (MYLANTA) 20 mL, LIDOcaine HCl 2% (XYLOCAINE) 20 mL) solution, melatonin, morphine, naloxone, ondansetron, senna-docusate 8.6-50 mg,  simethicone, sodium chloride 0.9%, sodium chloride 0.9%      Objective:     Temp:  [98.1 °F (36.7 °C)-98.8 °F (37.1 °C)] 98.5 °F (36.9 °C)  Pulse:  [72-81] 80  Resp:  [] 141  SpO2:  [98 %-100 %] 100 %  BP: (119-150)/(60-89) 120/60    No intake or output data in the 24 hours ending 03/26/22 1338     Body mass index is 22.37 kg/m².    Physical Exam  Vitals and nursing note reviewed.   Constitutional:       General: She is not in acute distress.     Appearance: Normal appearance.   HENT:      Head: Normocephalic and atraumatic.   Eyes:      General: No scleral icterus.        Right eye: No discharge.         Left eye: No discharge.      Extraocular Movements: Extraocular movements intact.   Cardiovascular:      Rate and Rhythm: Normal rate.   Pulmonary:      Effort: Pulmonary effort is normal. No tachypnea or respiratory distress.   Skin:     Findings: Rash present. Rash is vesicular.      Comments: To right medial eye.   Neurological:      General: No focal deficit present.      Mental Status: She is alert and oriented to person, place, and time.      Cranial Nerves: No cranial nerve deficit.      Motor: No weakness.   Psychiatric:         Attention and Perception: Attention normal.         Mood and Affect: Mood and affect normal.         Speech: Speech normal.         Behavior: Behavior is cooperative.        Labs:  Recent Results (from the past 24 hour(s))   CBC Auto Differential    Collection Time: 03/26/22  4:55 AM   Result Value Ref Range    WBC 5.29 3.90 - 12.70 K/uL    RBC 4.00 4.00 - 5.40 M/uL    Hemoglobin 11.0 (L) 12.0 - 16.0 g/dL    Hematocrit 34.1 (L) 37.0 - 48.5 %    MCV 85 82 - 98 fL    MCH 27.5 27.0 - 31.0 pg    MCHC 32.3 32.0 - 36.0 g/dL    RDW 13.8 11.5 - 14.5 %    Platelets 312 150 - 450 K/uL    MPV 10.2 9.2 - 12.9 fL    Immature Granulocytes 0.2 0.0 - 0.5 %    Gran # (ANC) 3.0 1.8 - 7.7 K/uL    Immature Grans (Abs) 0.01 0.00 - 0.04 K/uL    Lymph # 1.5 1.0 - 4.8 K/uL    Mono # 0.4 0.3 - 1.0  K/uL    Eos # 0.3 0.0 - 0.5 K/uL    Baso # 0.07 0.00 - 0.20 K/uL    nRBC 0 0 /100 WBC    Gran % 56.9 38.0 - 73.0 %    Lymph % 28.0 18.0 - 48.0 %    Mono % 8.1 4.0 - 15.0 %    Eosinophil % 5.5 0.0 - 8.0 %    Basophil % 1.3 0.0 - 1.9 %    Differential Method Automated    Comprehensive Metabolic Panel    Collection Time: 03/26/22  4:55 AM   Result Value Ref Range    Sodium 132 (L) 136 - 145 mmol/L    Potassium 3.5 3.5 - 5.1 mmol/L    Chloride 100 95 - 110 mmol/L    CO2 24 23 - 29 mmol/L    Glucose 114 (H) 70 - 110 mg/dL    BUN 16 6 - 20 mg/dL    Creatinine 0.8 0.5 - 1.4 mg/dL    Calcium 9.2 8.7 - 10.5 mg/dL    Total Protein 7.2 6.0 - 8.4 g/dL    Albumin 3.1 (L) 3.5 - 5.2 g/dL    Total Bilirubin 0.2 0.1 - 1.0 mg/dL    Alkaline Phosphatase 68 55 - 135 U/L    AST 14 10 - 40 U/L    ALT 11 10 - 44 U/L    Anion Gap 8 8 - 16 mmol/L    eGFR if African American >60.0 >60 mL/min/1.73 m^2    eGFR if non African American >60.0 >60 mL/min/1.73 m^2   Magnesium    Collection Time: 03/26/22  4:55 AM   Result Value Ref Range    Magnesium 1.5 (L) 1.6 - 2.6 mg/dL   Phosphorus    Collection Time: 03/26/22  4:55 AM   Result Value Ref Range    Phosphorus 3.5 2.7 - 4.5 mg/dL        Lab Results   Component Value Date    [id number] Negative 03/18/2022       Recent Labs   Lab 03/24/22  0452 03/25/22  0415 03/26/22  0455   WBC 5.52 4.69 5.29   LYMPH 21.4  1.2 28.6  1.3 28.0  1.5   HGB 11.6* 11.6* 11.0*   HCT 35.9* 36.6* 34.1*    304 312     Recent Labs   Lab 03/24/22  0451 03/25/22  0415 03/26/22  0455   * 136 132*   K 3.9 4.1 3.5    106 100   CO2 24 23 24   BUN 14 12 16   CREATININE 0.8 0.7 0.8   GLU 99 85 114*   CALCIUM 9.1 9.1 9.2   MG 1.7 1.8 1.5*   PHOS 3.7 3.8 3.5     Recent Labs   Lab 03/24/22 0451 03/25/22 0415 03/26/22 0455   ALKPHOS 65 66 68   ALT 9* 12 11   AST 11 14 14   ALBUMIN 3.0* 2.9* 3.1*   PROT 6.9 7.0 7.2   BILITOT 0.1 0.1 0.2        No results for input(s): DDIMER, FERRITIN, CRP, LDH, BNP,  TROPONINI, CPK in the last 72 hours.    Invalid input(s): PROCALCITONIN    All labs within the last 24 hours were reviewed.     Microbiology:  Microbiology Results (last 7 days)       ** No results found for the last 168 hours. **              Imaging      No results found for this or any previous visit.      CT Renal Stone Study ABD Pelvis WO  Narrative: EXAMINATION:  CT RENAL STONE STUDY ABD PELVIS WO    CLINICAL HISTORY:  Flank pain, kidney stone suspected;    TECHNIQUE:  Multiplanar images were obtained of the abdomen and pelvis from the hemidiaphragms through the symphysis pubis without intravenous contrast.    COMPARISON:  CT of the abdomen pelvis from 11/07/2019 and additional priors.    FINDINGS:  Lung Bases: Clear.    Heart: Heart size is normal.  No pericardial effusion.    Liver: There is an indeterminate hypodensity within the right hepatic lobe measuring 1.1 cm, stable in size compared with CT from 11/07/2019.  Liver is normal in size.    Biliary tract: No intrahepatic or extrahepatic biliary ductal dilatation.    Gallbladder: No radiodense gallstone. No wall thickening or pericholecystic fluid.    Pancreas: Normal. No pancreatic ductal dilatation.    Spleen: Normal size without focal lesion.    Adrenals: Normal.    Kidneys and urinary collecting systems: Normal.  No hydronephrosis or urolithiasis.    Lymph nodes: None enlarged.    Stomach and bowel: The stomach is normal.  Loops of small and large bowel are normal in caliber without evidence for inflammation or obstruction.  The appendix is visualized and is normal.    Peritoneum and mesentery: No ascites or free intraperitoneal air. No abdominal fluid collection.    Vasculature: Normal.    Urinary bladder: Normal.    Reproductive organs: The uterus and adnexae are unremarkable.    Body wall: There is a small fat containing umbilical hernia.    Musculoskeletal: No aggressive osseous lesion.  Impression: 1. No acute abnormality of the abdomen or  pelvis.  2. Stable size of the indeterminate lesion within the right hepatic lobe.  Consider definitive characterization with nonemergent MRI the abdomen with and without contrast.    Electronically signed by: Danny Ang  Date:    11/19/2021  Time:    14:47  X-Ray Chest PA And Lateral  Narrative: EXAMINATION:  XR CHEST PA AND LATERAL    CLINICAL HISTORY:  Chest Pain;    TECHNIQUE:  PA and lateral views of the chest were performed.    COMPARISON:  11/14/2021    FINDINGS:  Multiple overlying cardiac monitoring leads. The cardiomediastinal silhouette is normal in size and midline. Pulmonary vascularity appears within normal limits.    The lungs appear clear without confluent pulmonary parenchymal opacity. No pleural fluid.    Osseous structures appear intact.  Impression: No evidence of acute cardiopulmonary disease.    Electronically signed by: Dick Boyce MD  Date:    11/19/2021  Time:    11:26      All imaging within the last 24 hours was reviewed.       Discharge Planning   RUFINA: 3/27/2022     Code Status: Full Code   Is the patient medically ready for discharge?: No    Reason for patient still in hospital (select all that apply): Patient trending condition and Treatment  Discharge Plan A: Home with family

## 2022-03-26 NOTE — CONSULTS
Thank you for your consult to [facility]. We have reviewed the patient chart. This patient does meet criteria for [facility] service at this time. Will assume care on 03/26/22 at 7AM.    Marguerite Padron MD

## 2022-03-26 NOTE — PROGRESS NOTES
[facility] - [facility] ([address] Medicine  Telemedicine Progress Note    Patient Name: Patricia Nye  MRN: [id number]  Patient Class: IP- Inpatient   Admission Date: 3/19/2022  Length of Stay: 7 days  Attending Physician: Marguerite Padron MD  Primary Care Provider: Cornell Britt MD          Subjective:     Principal Problem:Herpes zoster ophthalmicus of right eye        HPI:  Patricia Nye is a 46 y.o. female with a PMHx of HIV, insomnia, anxiety/depression, polysubstance abuse, and hypothyroidism who was admitted to [facility] on March 18th with right periorbital swelling. The patient reports a rash developed to her chest wall about 1.5-2 weeks ago accompanied by a sensation of pins and needles. She then developed swelling around her right eye and had a rash around her eye and the bridge of her nose beginning 3 days ago. She was seen at [facility] two nights ago but left AMA and then presented to [place]. She was admitted with concern for herpes zoster ophthalmicus and started on IV acyclovir. Eye is reportedly more swollen, and she c/o of stinging pain to the skin over her eye and the right side of her face. Denies any pain to her actual eye and denies any blurry vision. Endorses chills. Denies any fever, abdominal pain, chest pain, nausea, vomiting, diarrhea, shortness of breath, cough, or dysuria. The patient was transferred to [facility] for ophthalmology consult.    Labs were reviewed from OSH: COVID negative, sodium 138, potassium 3.6, chloride 103, CO2 23, BUN 9, creatinine 0.8, glucose 100, AST 20, ALT 15, white blood cells 6.82, hemoglobin 12.2, hematocrit 37.5, platelets 324      Overview/Hospital Course:  Ophthalmology and Infectious Disease consulted on admission.  IV acyclovir continued with intermittent IV fluids for renal protection.  Vancomycin and Rocephin initiated as concern for superimposed bacterial infection which may been prompted by patient  use of dexamethasone ointment from home.  Antibiotics narrowed to vancomycin.  Per Ophthalmology, no indication for imaging.  Recommended ophthalmic antibiotic ointment and lubrication in addition to IV acyclovir continuance.  Per ID, continue IV acyclovir for 7 days (03/26) or until cessation of new lesion development.  There after transition to p.o. acyclovir.  Vanc was dc'd 03/24- pt transitioned to doxy 03/24.         This encounter was provided through telemedicine.  Patient was transferred to the telemedicine service on:  03/26/2022   The patient location is: [facility]/[id number] A admitted 3/19/2022  7:21 PM.  Present with the patient at the time of the telemed/virtual assessment: Telepresenter    Interval History/Overnight Events:   Clinical record since admit reviewed.    Patient denies new lesions but remains with weeping from previous lesions which have not crusted. Facial lesions are improved overall.  She does complain of itching to a papular rash which has been intermittent since 12/2021  - no redness.      Review of Systems   Constitutional:  Positive for fatigue. Negative for activity change and fever.   Respiratory:  Negative for cough and shortness of breath.    Gastrointestinal:  Negative for diarrhea and vomiting.   Skin:  Positive for rash.      Inpatient Medications:  Scheduled Meds:   acyclovir  480 mg Intravenous Q8H    artificial tears  1 drop Both Eyes QID    atovaquone  1,500 mg Oral Daily    mpnpwexfm-domkydeb-ormmlxj ala  1 tablet Oral Daily    busPIRone  10 mg Oral TID    calamine-zinc oxide 8-8%   Topical (Top) TID    doxycycline  100 mg Oral Q12H    erythromycin   Right Eye BID    famotidine  20 mg Oral BID    gabapentin  300 mg Oral TID    levothyroxine  25 mcg Oral Before breakfast    lithium  300 mg Oral BID    methocarbamoL  500 mg Oral QID    mirtazapine  15 mg Oral QHS    nicotine  1 patch Transdermal Daily    polyethylene glycol  17 g Oral Daily     Continuous  Infusions:  PRN Meds:.acetaminophen, ALPRAZolam, aluminum & magnesium hydroxide-simethicone, calamine-zinc oxide 8-8%, dextrose 10%, dextrose 10%, glucagon (human recombinant), glucose, glucose, hydrOXYzine HCL, magic mouthwash (diphenhydrAMINE 12.5 mg/5 mL 20 mL, aluminum & magnesium hydroxide-simethicone (MYLANTA) 20 mL, LIDOcaine HCl 2% (XYLOCAINE) 20 mL) solution, melatonin, morphine, naloxone, ondansetron, senna-docusate 8.6-50 mg, simethicone, sodium chloride 0.9%, sodium chloride 0.9%      Objective:     Temp:  [98.1 °F (36.7 °C)-98.8 °F (37.1 °C)] 98.5 °F (36.9 °C)  Pulse:  [72-81] 80  Resp:  [] 141  SpO2:  [98 %-100 %] 100 %  BP: (119-150)/(60-89) 120/60    No intake or output data in the 24 hours ending 03/26/22 1338     Body mass index is 22.37 kg/m².    Physical Exam  Vitals and nursing note reviewed.   Constitutional:       General: She is not in acute distress.     Appearance: Normal appearance.   HENT:      Head: Normocephalic and atraumatic.   Eyes:      General: No scleral icterus.        Right eye: No discharge.         Left eye: No discharge.      Extraocular Movements: Extraocular movements intact.   Cardiovascular:      Rate and Rhythm: Normal rate.   Pulmonary:      Effort: Pulmonary effort is normal. No tachypnea or respiratory distress.   Skin:     Findings: Rash present. Rash is vesicular.      Comments: To right medial eye.   Neurological:      General: No focal deficit present.      Mental Status: She is alert and oriented to person, place, and time.      Cranial Nerves: No cranial nerve deficit.      Motor: No weakness.   Psychiatric:         Attention and Perception: Attention normal.         Mood and Affect: Mood and affect normal.         Speech: Speech normal.         Behavior: Behavior is cooperative.        Labs:  Recent Results (from the past 24 hour(s))   CBC Auto Differential    Collection Time: 03/26/22  4:55 AM   Result Value Ref Range    WBC 5.29 3.90 - 12.70 K/uL    RBC  4.00 4.00 - 5.40 M/uL    Hemoglobin 11.0 (L) 12.0 - 16.0 g/dL    Hematocrit 34.1 (L) 37.0 - 48.5 %    MCV 85 82 - 98 fL    MCH 27.5 27.0 - 31.0 pg    MCHC 32.3 32.0 - 36.0 g/dL    RDW 13.8 11.5 - 14.5 %    Platelets 312 150 - 450 K/uL    MPV 10.2 9.2 - 12.9 fL    Immature Granulocytes 0.2 0.0 - 0.5 %    Gran # (ANC) 3.0 1.8 - 7.7 K/uL    Immature Grans (Abs) 0.01 0.00 - 0.04 K/uL    Lymph # 1.5 1.0 - 4.8 K/uL    Mono # 0.4 0.3 - 1.0 K/uL    Eos # 0.3 0.0 - 0.5 K/uL    Baso # 0.07 0.00 - 0.20 K/uL    nRBC 0 0 /100 WBC    Gran % 56.9 38.0 - 73.0 %    Lymph % 28.0 18.0 - 48.0 %    Mono % 8.1 4.0 - 15.0 %    Eosinophil % 5.5 0.0 - 8.0 %    Basophil % 1.3 0.0 - 1.9 %    Differential Method Automated    Comprehensive Metabolic Panel    Collection Time: 03/26/22  4:55 AM   Result Value Ref Range    Sodium 132 (L) 136 - 145 mmol/L    Potassium 3.5 3.5 - 5.1 mmol/L    Chloride 100 95 - 110 mmol/L    CO2 24 23 - 29 mmol/L    Glucose 114 (H) 70 - 110 mg/dL    BUN 16 6 - 20 mg/dL    Creatinine 0.8 0.5 - 1.4 mg/dL    Calcium 9.2 8.7 - 10.5 mg/dL    Total Protein 7.2 6.0 - 8.4 g/dL    Albumin 3.1 (L) 3.5 - 5.2 g/dL    Total Bilirubin 0.2 0.1 - 1.0 mg/dL    Alkaline Phosphatase 68 55 - 135 U/L    AST 14 10 - 40 U/L    ALT 11 10 - 44 U/L    Anion Gap 8 8 - 16 mmol/L    eGFR if African American >60.0 >60 mL/min/1.73 m^2    eGFR if non African American >60.0 >60 mL/min/1.73 m^2   Magnesium    Collection Time: 03/26/22  4:55 AM   Result Value Ref Range    Magnesium 1.5 (L) 1.6 - 2.6 mg/dL   Phosphorus    Collection Time: 03/26/22  4:55 AM   Result Value Ref Range    Phosphorus 3.5 2.7 - 4.5 mg/dL        Lab Results   Component Value Date    [id number] Negative 03/18/2022       Recent Labs   Lab 03/24/22  0452 03/25/22  0415 03/26/22  0455   WBC 5.52 4.69 5.29   LYMPH 21.4  1.2 28.6  1.3 28.0  1.5   HGB 11.6* 11.6* 11.0*   HCT 35.9* 36.6* 34.1*    304 312     Recent Labs   Lab 03/24/22  0451 03/25/22  0415 03/26/22  0455    * 136 132*   K 3.9 4.1 3.5    106 100   CO2 24 23 24   BUN 14 12 16   CREATININE 0.8 0.7 0.8   GLU 99 85 114*   CALCIUM 9.1 9.1 9.2   MG 1.7 1.8 1.5*   PHOS 3.7 3.8 3.5     Recent Labs   Lab 03/24/22  0451 03/25/22  0415 03/26/22  0455   ALKPHOS 65 66 68   ALT 9* 12 11   AST 11 14 14   ALBUMIN 3.0* 2.9* 3.1*   PROT 6.9 7.0 7.2   BILITOT 0.1 0.1 0.2        No results for input(s): DDIMER, FERRITIN, CRP, LDH, BNP, TROPONINI, CPK in the last 72 hours.    Invalid input(s): PROCALCITONIN    All labs within the last 24 hours were reviewed.     Microbiology:  Microbiology Results (last 7 days)       ** No results found for the last 168 hours. **              Imaging      No results found for this or any previous visit.      CT Renal Stone Study ABD Pelvis WO  Narrative: EXAMINATION:  CT RENAL STONE STUDY ABD PELVIS WO    CLINICAL HISTORY:  Flank pain, kidney stone suspected;    TECHNIQUE:  Multiplanar images were obtained of the abdomen and pelvis from the hemidiaphragms through the symphysis pubis without intravenous contrast.    COMPARISON:  CT of the abdomen pelvis from 11/07/2019 and additional priors.    FINDINGS:  Lung Bases: Clear.    Heart: Heart size is normal.  No pericardial effusion.    Liver: There is an indeterminate hypodensity within the right hepatic lobe measuring 1.1 cm, stable in size compared with CT from 11/07/2019.  Liver is normal in size.    Biliary tract: No intrahepatic or extrahepatic biliary ductal dilatation.    Gallbladder: No radiodense gallstone. No wall thickening or pericholecystic fluid.    Pancreas: Normal. No pancreatic ductal dilatation.    Spleen: Normal size without focal lesion.    Adrenals: Normal.    Kidneys and urinary collecting systems: Normal.  No hydronephrosis or urolithiasis.    Lymph nodes: None enlarged.    Stomach and bowel: The stomach is normal.  Loops of small and large bowel are normal in caliber without evidence for inflammation or obstruction.  The  appendix is visualized and is normal.    Peritoneum and mesentery: No ascites or free intraperitoneal air. No abdominal fluid collection.    Vasculature: Normal.    Urinary bladder: Normal.    Reproductive organs: The uterus and adnexae are unremarkable.    Body wall: There is a small fat containing umbilical hernia.    Musculoskeletal: No aggressive osseous lesion.  Impression: 1. No acute abnormality of the abdomen or pelvis.  2. Stable size of the indeterminate lesion within the right hepatic lobe.  Consider definitive characterization with nonemergent MRI the abdomen with and without contrast.    Electronically signed by: Danny Ang  Date:    11/19/2021  Time:    14:47  X-Ray Chest PA And Lateral  Narrative: EXAMINATION:  XR CHEST PA AND LATERAL    CLINICAL HISTORY:  Chest Pain;    TECHNIQUE:  PA and lateral views of the chest were performed.    COMPARISON:  11/14/2021    FINDINGS:  Multiple overlying cardiac monitoring leads. The cardiomediastinal silhouette is normal in size and midline. Pulmonary vascularity appears within normal limits.    The lungs appear clear without confluent pulmonary parenchymal opacity. No pleural fluid.    Osseous structures appear intact.  Impression: No evidence of acute cardiopulmonary disease.    Electronically signed by: Dick Boyce MD  Date:    11/19/2021  Time:    11:26      All imaging within the last 24 hours was reviewed.       Discharge Planning   RUFINA: 3/27/2022     Code Status: Full Code   Is the patient medically ready for discharge?: No    Reason for patient still in hospital (select all that apply): Patient trending condition and Treatment  Discharge Plan A: Home with family            Assessment/Plan:      * Herpes zoster ophthalmicus of right eye  Patient reports rash developed to her chest wall about 1.5-2 weeks ago accompanied by a sensation of pins and needles. She then developed swelling around her right eye and had a rash around her eye and the bridge of  her nose beginning 3 days ago. She was seen at [facility] two nights ago but left AMA and presented to [place]. She was admitted with concern for herpes zoster ophthalmicus and started on IV acyclovir. Eye  is reportedly more swollen, and she c/o of stinging pain to the skin over her eye and the right side of her face. Denies any pain to her actual eye and denies any blurry vision.  The patient was transferred to [facility] for ophthalmology consult.    - Continue IV acyclovir 500mg q8hrs  - IVF qhs  - Contact, droplet, and airborne isolation  - Ophthalmology consulted- continue Erythromycin to skin lesions and right eye BID, Preservative Free Tears QID, antiviral treatment per ID  - Continue gabapentin 300mg tid  - ID consulted, appreciate recs- continue IV acyclovir until no new lesions forming or for 7 days  - Transitioned to doxy 03/24 and monitor response  - symptomatic management of pain: increase gabapentin as clinically indicated, tylenol prn, IV morphine p.r.n. (allergies noted to other opioids)    Pruritic dermatitis  -present since 12/2021 and uses calamine at home when flares.  -no erythema but mild flesh colored papules to anterior chest  -continue calamine lotion to pruritic area  -derm referral      Anxiety disorder  - Chronic, stable.  reviewed.   - Continue home xanax PRN  - Continue buspar 10mg tid    Tobacco abuse  Assistance with smoking cessation was offered on admission, including:  [x]  Medications  [x]  Counseling  []  Printed Information on Smoking Cessation  []  Referral to a Smoking Cessation Program    Patient was counseled regarding smoking for 3-10 minutes on admit.     Primary insomnia  - Continue remeron qhs    Major depressive disorder in remission  - Bipolar 1 disorder  - Continue lithium, buspar, mirtazapine    HIV disease  This patient in known to have AIDS (from CD4 count has been less than 200). Last CD4 21 (April 2021), new CD4 count pending. Patient is on HAART. Will continue  HAART. Continued prophylaxis with atovaquone. Continue to monitor routine labs.     - We will consult Infectious disease at this time. Appreciate recs.   - Continue Biktarvy and atovaquone ppx.  - CD4 on admit 99      VTE Risk Mitigation (From admission, onward)         Ordered     IP VTE LOW RISK PATIENT  Once         03/19/22 1925     Place sequential compression device  Until discontinued         03/19/22 1925              High Risk Conditions:    Patient is currently receiving parenteral controlled substances: Morphine    I have assessed these findings virtually using a telemed platform and with assistance of the bedside nurse.            The attending portion of this evaluation, treatment, and documentation was performed per Marguerite Padron MD via Telemedicine AudioVisual using the secure [employer] software platform with 2 way audio/video. The provider was located off-site and the patient is located in the hospital. The aforementioned video software was utilized to document the relevant history and physical exam    Marguerite Padron MD  Department of Hospital Medicine   [facility] - Intensive Care ([place]-[address])

## 2022-03-26 NOTE — ASSESSMENT & PLAN NOTE
Patient reports rash developed to her chest wall about 1.5-2 weeks ago accompanied by a sensation of pins and needles. She then developed swelling around her right eye and had a rash around her eye and the bridge of her nose beginning 3 days ago. She was seen at [facility] two nights ago but left AMA and presented to [place]. She was admitted with concern for herpes zoster ophthalmicus and started on IV acyclovir. Eye  is reportedly more swollen, and she c/o of stinging pain to the skin over her eye and the right side of her face. Denies any pain to her actual eye and denies any blurry vision.  The patient was transferred to [facility] for ophthalmology consult.    - Continue IV acyclovir 500mg q8hrs  - IVF qhs  - Contact, droplet, and airborne isolation  - Ophthalmology consulted- continue Erythromycin to skin lesions and right eye BID, Preservative Free Tears QID, antiviral treatment per ID  - Continue gabapentin 300mg tid  - ID consulted, appreciate recs- continue IV acyclovir until no new lesions forming or for 7 days  - Transitioned to doxy 03/24 and monitor response  - symptomatic management of pain: increase gabapentin as clinically indicated, tylenol prn, IV morphine p.r.n. (allergies noted to other opioids)

## 2022-03-27 LAB
ALBUMIN SERPL BCP-MCNC: 2.9 G/DL (ref 3.5–5.2)
ALP SERPL-CCNC: 61 U/L (ref 55–135)
ALT SERPL W/O P-5'-P-CCNC: 10 U/L (ref 10–44)
ANION GAP SERPL CALC-SCNC: 7 MMOL/L (ref 8–16)
AST SERPL-CCNC: 14 U/L (ref 10–40)
BASOPHILS # BLD AUTO: ABNORMAL K/UL (ref 0–0.2)
BASOPHILS NFR BLD: 1 % (ref 0–1.9)
BILIRUB SERPL-MCNC: 0.1 MG/DL (ref 0.1–1)
BUN SERPL-MCNC: 15 MG/DL (ref 6–20)
CALCIUM SERPL-MCNC: 9.3 MG/DL (ref 8.7–10.5)
CHLORIDE SERPL-SCNC: 104 MMOL/L (ref 95–110)
CO2 SERPL-SCNC: 24 MMOL/L (ref 23–29)
CREAT SERPL-MCNC: 0.7 MG/DL (ref 0.5–1.4)
DIFFERENTIAL METHOD: ABNORMAL
EOSINOPHIL # BLD AUTO: ABNORMAL K/UL (ref 0–0.5)
EOSINOPHIL NFR BLD: 4 % (ref 0–8)
ERYTHROCYTE [DISTWIDTH] IN BLOOD BY AUTOMATED COUNT: 13.9 % (ref 11.5–14.5)
EST. GFR  (AFRICAN AMERICAN): >60 ML/MIN/1.73 M^2
EST. GFR  (NON AFRICAN AMERICAN): >60 ML/MIN/1.73 M^2
GLUCOSE SERPL-MCNC: 82 MG/DL (ref 70–110)
HCT VFR BLD AUTO: 34.4 % (ref 37–48.5)
HGB BLD-MCNC: 11.5 G/DL (ref 12–16)
IMM GRANULOCYTES # BLD AUTO: ABNORMAL K/UL (ref 0–0.04)
IMM GRANULOCYTES NFR BLD AUTO: ABNORMAL % (ref 0–0.5)
LYMPHOCYTES # BLD AUTO: ABNORMAL K/UL (ref 1–4.8)
LYMPHOCYTES NFR BLD: 29 % (ref 18–48)
MAGNESIUM SERPL-MCNC: 2 MG/DL (ref 1.6–2.6)
MCH RBC QN AUTO: 27.3 PG (ref 27–31)
MCHC RBC AUTO-ENTMCNC: 33.4 G/DL (ref 32–36)
MCV RBC AUTO: 82 FL (ref 82–98)
MONOCYTES # BLD AUTO: ABNORMAL K/UL (ref 0.3–1)
MONOCYTES NFR BLD: 10 % (ref 4–15)
NEUTROPHILS NFR BLD: 56 % (ref 38–73)
NRBC BLD-RTO: 0 /100 WBC
OVALOCYTES BLD QL SMEAR: ABNORMAL
PHOSPHATE SERPL-MCNC: 3.5 MG/DL (ref 2.7–4.5)
PLATELET # BLD AUTO: 267 K/UL (ref 150–450)
PLATELET BLD QL SMEAR: ABNORMAL
PMV BLD AUTO: 10.4 FL (ref 9.2–12.9)
POIKILOCYTOSIS BLD QL SMEAR: SLIGHT
POTASSIUM SERPL-SCNC: 4.3 MMOL/L (ref 3.5–5.1)
PROT SERPL-MCNC: 7.1 G/DL (ref 6–8.4)
RBC # BLD AUTO: 4.21 M/UL (ref 4–5.4)
SODIUM SERPL-SCNC: 135 MMOL/L (ref 136–145)
WBC # BLD AUTO: 4.64 K/UL (ref 3.9–12.7)

## 2022-03-27 PROCEDURE — 63600175 PHARM REV CODE 636 W HCPCS: Performed by: HOSPITALIST

## 2022-03-27 PROCEDURE — S4991 NICOTINE PATCH NONLEGEND: HCPCS | Performed by: HOSPITALIST

## 2022-03-27 PROCEDURE — 36415 COLL VENOUS BLD VENIPUNCTURE: CPT | Performed by: STUDENT IN AN ORGANIZED HEALTH CARE EDUCATION/TRAINING PROGRAM

## 2022-03-27 PROCEDURE — 84100 ASSAY OF PHOSPHORUS: CPT | Performed by: STUDENT IN AN ORGANIZED HEALTH CARE EDUCATION/TRAINING PROGRAM

## 2022-03-27 PROCEDURE — 83735 ASSAY OF MAGNESIUM: CPT | Performed by: STUDENT IN AN ORGANIZED HEALTH CARE EDUCATION/TRAINING PROGRAM

## 2022-03-27 PROCEDURE — 85007 BL SMEAR W/DIFF WBC COUNT: CPT | Performed by: STUDENT IN AN ORGANIZED HEALTH CARE EDUCATION/TRAINING PROGRAM

## 2022-03-27 PROCEDURE — 25000003 PHARM REV CODE 250: Performed by: INTERNAL MEDICINE

## 2022-03-27 PROCEDURE — 94761 N-INVAS EAR/PLS OXIMETRY MLT: CPT

## 2022-03-27 PROCEDURE — 85027 COMPLETE CBC AUTOMATED: CPT | Performed by: STUDENT IN AN ORGANIZED HEALTH CARE EDUCATION/TRAINING PROGRAM

## 2022-03-27 PROCEDURE — 99232 PR SUBSEQUENT HOSPITAL CARE,LEVL II: ICD-10-PCS | Mod: 95,,, | Performed by: INTERNAL MEDICINE

## 2022-03-27 PROCEDURE — 80053 COMPREHEN METABOLIC PANEL: CPT | Performed by: STUDENT IN AN ORGANIZED HEALTH CARE EDUCATION/TRAINING PROGRAM

## 2022-03-27 PROCEDURE — 20600001 HC STEP DOWN PRIVATE ROOM

## 2022-03-27 PROCEDURE — 25000003 PHARM REV CODE 250: Performed by: STUDENT IN AN ORGANIZED HEALTH CARE EDUCATION/TRAINING PROGRAM

## 2022-03-27 PROCEDURE — 63600175 PHARM REV CODE 636 W HCPCS: Performed by: NURSE PRACTITIONER

## 2022-03-27 PROCEDURE — 27000207 HC ISOLATION

## 2022-03-27 PROCEDURE — 25000003 PHARM REV CODE 250: Performed by: HOSPITALIST

## 2022-03-27 PROCEDURE — 99232 SBSQ HOSP IP/OBS MODERATE 35: CPT | Mod: 95,,, | Performed by: INTERNAL MEDICINE

## 2022-03-27 RX ORDER — HYDROXYZINE HYDROCHLORIDE 25 MG/1
25 TABLET, FILM COATED ORAL 3 TIMES DAILY PRN
Status: DISCONTINUED | OUTPATIENT
Start: 2022-03-27 | End: 2022-03-28

## 2022-03-27 RX ORDER — HYDROCORTISONE 1 %
CREAM (GRAM) TOPICAL 2 TIMES DAILY
Status: DISCONTINUED | OUTPATIENT
Start: 2022-03-27 | End: 2022-03-28

## 2022-03-27 RX ADMIN — BUSPIRONE HYDROCHLORIDE 10 MG: 10 TABLET ORAL at 08:03

## 2022-03-27 RX ADMIN — HYPROMELLOSE 2910 1 DROP: 5 SOLUTION OPHTHALMIC at 12:03

## 2022-03-27 RX ADMIN — SIMETHICONE 80 MG: 80 TABLET, CHEWABLE ORAL at 11:03

## 2022-03-27 RX ADMIN — ATOVAQUONE 1500 MG: 750 SUSPENSION ORAL at 08:03

## 2022-03-27 RX ADMIN — BICTEGRAVIR SODIUM, EMTRICITABINE, AND TENOFOVIR ALAFENAMIDE FUMARATE 1 TABLET: 50; 200; 25 TABLET ORAL at 08:03

## 2022-03-27 RX ADMIN — METHOCARBAMOL TABLETS 500 MG: 500 TABLET, COATED ORAL at 08:03

## 2022-03-27 RX ADMIN — MORPHINE SULFATE 2 MG: 2 INJECTION, SOLUTION INTRAMUSCULAR; INTRAVENOUS at 09:03

## 2022-03-27 RX ADMIN — POLYETHYLENE GLYCOL 3350 17 G: 17 POWDER, FOR SOLUTION ORAL at 08:03

## 2022-03-27 RX ADMIN — HYDROCORTISONE: 10 CREAM TOPICAL at 10:03

## 2022-03-27 RX ADMIN — GABAPENTIN 300 MG: 300 CAPSULE ORAL at 09:03

## 2022-03-27 RX ADMIN — NICOTINE 1 PATCH: 7 PATCH TRANSDERMAL at 08:03

## 2022-03-27 RX ADMIN — METHOCARBAMOL TABLETS 500 MG: 500 TABLET, COATED ORAL at 09:03

## 2022-03-27 RX ADMIN — FAMOTIDINE 20 MG: 20 TABLET ORAL at 08:03

## 2022-03-27 RX ADMIN — MORPHINE SULFATE 2 MG: 2 INJECTION, SOLUTION INTRAMUSCULAR; INTRAVENOUS at 11:03

## 2022-03-27 RX ADMIN — LEVOTHYROXINE SODIUM 25 MCG: 25 TABLET ORAL at 06:03

## 2022-03-27 RX ADMIN — SENNOSIDES AND DOCUSATE SODIUM 1 TABLET: 50; 8.6 TABLET ORAL at 11:03

## 2022-03-27 RX ADMIN — LITHIUM CARBONATE 300 MG: 300 TABLET, FILM COATED, EXTENDED RELEASE ORAL at 08:03

## 2022-03-27 RX ADMIN — ACYCLOVIR SODIUM 480 MG: 1000 INJECTION, SOLUTION INTRAVENOUS at 06:03

## 2022-03-27 RX ADMIN — FAMOTIDINE 20 MG: 20 TABLET ORAL at 09:03

## 2022-03-27 RX ADMIN — LITHIUM CARBONATE 300 MG: 300 TABLET, FILM COATED, EXTENDED RELEASE ORAL at 09:03

## 2022-03-27 RX ADMIN — HYPROMELLOSE 2910 1 DROP: 5 SOLUTION OPHTHALMIC at 05:03

## 2022-03-27 RX ADMIN — GABAPENTIN 300 MG: 300 CAPSULE ORAL at 03:03

## 2022-03-27 RX ADMIN — BUSPIRONE HYDROCHLORIDE 10 MG: 10 TABLET ORAL at 03:03

## 2022-03-27 RX ADMIN — DOXYCYCLINE HYCLATE 100 MG: 100 TABLET, COATED ORAL at 09:03

## 2022-03-27 RX ADMIN — ACYCLOVIR SODIUM 480 MG: 1000 INJECTION, SOLUTION INTRAVENOUS at 10:03

## 2022-03-27 RX ADMIN — MIRTAZAPINE 15 MG: 15 TABLET, FILM COATED ORAL at 09:03

## 2022-03-27 RX ADMIN — FERRIC OXIDE RED: 8; 8 LOTION TOPICAL at 09:03

## 2022-03-27 RX ADMIN — MORPHINE SULFATE 2 MG: 2 INJECTION, SOLUTION INTRAMUSCULAR; INTRAVENOUS at 03:03

## 2022-03-27 RX ADMIN — ACYCLOVIR SODIUM 480 MG: 1000 INJECTION, SOLUTION INTRAVENOUS at 12:03

## 2022-03-27 RX ADMIN — DOXYCYCLINE HYCLATE 100 MG: 100 TABLET, COATED ORAL at 08:03

## 2022-03-27 RX ADMIN — METHOCARBAMOL TABLETS 500 MG: 500 TABLET, COATED ORAL at 12:03

## 2022-03-27 RX ADMIN — GABAPENTIN 300 MG: 300 CAPSULE ORAL at 08:03

## 2022-03-27 RX ADMIN — BUSPIRONE HYDROCHLORIDE 10 MG: 10 TABLET ORAL at 09:03

## 2022-03-27 RX ADMIN — METHOCARBAMOL TABLETS 500 MG: 500 TABLET, COATED ORAL at 04:03

## 2022-03-27 RX ADMIN — HYPROMELLOSE 2910 1 DROP: 5 SOLUTION OPHTHALMIC at 10:03

## 2022-03-27 RX ADMIN — ERYTHROMYCIN 1 INCH: 5 OINTMENT OPHTHALMIC at 10:03

## 2022-03-27 RX ADMIN — ERYTHROMYCIN 1 INCH: 5 OINTMENT OPHTHALMIC at 08:03

## 2022-03-27 RX ADMIN — HYPROMELLOSE 2910 1 DROP: 5 SOLUTION OPHTHALMIC at 08:03

## 2022-03-27 RX ADMIN — MORPHINE SULFATE 2 MG: 2 INJECTION, SOLUTION INTRAMUSCULAR; INTRAVENOUS at 06:03
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
## 2022-03-27 NOTE — SUBJECTIVE & OBJECTIVE
This encounter was provided through telemedicine.  Patient was transferred to the telemedicine service on:  03/26/2022   The patient location is: [id number] A admitted 3/19/2022  7:21 PM.  Present with the patient at the time of the telemed/virtual assessment: Telepresenter    Interval History/Overnight Events:     Patient with improved rash to face but continuing to have eye irritation; no associated visual changes; associated draining from rash is improved; chest rash remains itchy - calamine not helping so changed to hydrocortisone    Some suspicious behaviors reported by al but patient appropriate and improved during visit.    Review of Systems   Constitutional:  Positive for fatigue. Negative for activity change and fever.   Respiratory:  Negative for cough and shortness of breath.    Gastrointestinal:  Negative for diarrhea and vomiting.   Skin:  Positive for rash.      Inpatient Medications:  Scheduled Meds:   acyclovir  480 mg Intravenous Q8H    artificial tears  1 drop Both Eyes QID    atovaquone  1,500 mg Oral Daily    tythubunf-ygvhvlsu-xkfqtfy ala  1 tablet Oral Daily    busPIRone  10 mg Oral TID    doxycycline  100 mg Oral Q12H    erythromycin   Right Eye BID    famotidine  20 mg Oral BID    gabapentin  300 mg Oral TID    hydrocortisone   Topical (Top) BID    levothyroxine  25 mcg Oral Before breakfast    lithium  300 mg Oral BID    methocarbamoL  500 mg Oral QID    mirtazapine  15 mg Oral QHS    nicotine  1 patch Transdermal Daily    polyethylene glycol  17 g Oral Daily     Continuous Infusions:  PRN Meds:.acetaminophen, ALPRAZolam, aluminum & magnesium hydroxide-simethicone, calamine-zinc oxide 8-8%, dextrose 10%, dextrose 10%, glucagon (human recombinant), glucose, glucose, hydrOXYzine HCL, melatonin, morphine, naloxone, ondansetron, senna-docusate 8.6-50 mg, simethicone, sodium chloride 0.9%      Objective:     Temp:  [97.8 °F (36.6 °C)-98.7 °F (37.1 °C)] 98.5 °F (36.9 °C)  Pulse:  [80-85]  80  Resp:  [15-18] 16  SpO2:  [98 %-100 %] 100 %  BP: (118-130)/(58-85) 119/78      Intake/Output Summary (Last 24 hours) at 3/27/2022 1251  Last data filed at 3/26/2022 1600  Gross per 24 hour   Intake 750 ml   Output --   Net 750 ml          Body mass index is 22.37 kg/m².    Physical Exam  Vitals and nursing note reviewed.   Constitutional:       General: She is not in acute distress.     Appearance: Normal appearance.   HENT:      Head: Normocephalic and atraumatic.   Eyes:      General: No scleral icterus.        Right eye: No discharge.         Left eye: No discharge.      Extraocular Movements: Extraocular movements intact.   Cardiovascular:      Rate and Rhythm: Normal rate.   Pulmonary:      Effort: Pulmonary effort is normal. No tachypnea or respiratory distress.   Skin:     Findings: Rash present. Rash is vesicular.      Comments: To right medial eye.   Neurological:      General: No focal deficit present.      Mental Status: She is alert and oriented to person, place, and time.      Cranial Nerves: No cranial nerve deficit.      Motor: No weakness.   Psychiatric:         Attention and Perception: Attention normal.         Mood and Affect: Mood and affect normal.         Speech: Speech normal.         Behavior: Behavior is cooperative.        Labs:  Recent Results (from the past 24 hour(s))   Comprehensive Metabolic Panel    Collection Time: 03/27/22  5:20 AM   Result Value Ref Range    Sodium 135 (L) 136 - 145 mmol/L    Potassium 4.3 3.5 - 5.1 mmol/L    Chloride 104 95 - 110 mmol/L    CO2 24 23 - 29 mmol/L    Glucose 82 70 - 110 mg/dL    BUN 15 6 - 20 mg/dL    Creatinine 0.7 0.5 - 1.4 mg/dL    Calcium 9.3 8.7 - 10.5 mg/dL    Total Protein 7.1 6.0 - 8.4 g/dL    Albumin 2.9 (L) 3.5 - 5.2 g/dL    Total Bilirubin 0.1 0.1 - 1.0 mg/dL    Alkaline Phosphatase 61 55 - 135 U/L    AST 14 10 - 40 U/L    ALT 10 10 - 44 U/L    Anion Gap 7 (L) 8 - 16 mmol/L    eGFR if African American >60.0 >60 mL/min/1.73 m^2    eGFR  if non African American >60.0 >60 mL/min/1.73 m^2   Magnesium    Collection Time: 03/27/22  5:20 AM   Result Value Ref Range    Magnesium 2.0 1.6 - 2.6 mg/dL   Phosphorus    Collection Time: 03/27/22  5:20 AM   Result Value Ref Range    Phosphorus 3.5 2.7 - 4.5 mg/dL   CBC Auto Differential    Collection Time: 03/27/22  5:21 AM   Result Value Ref Range    WBC 4.64 3.90 - 12.70 K/uL    RBC 4.21 4.00 - 5.40 M/uL    Hemoglobin 11.5 (L) 12.0 - 16.0 g/dL    Hematocrit 34.4 (L) 37.0 - 48.5 %    MCV 82 82 - 98 fL    MCH 27.3 27.0 - 31.0 pg    MCHC 33.4 32.0 - 36.0 g/dL    RDW 13.9 11.5 - 14.5 %    Platelets 267 150 - 450 K/uL    MPV 10.4 9.2 - 12.9 fL    Immature Granulocytes CANCELED 0.0 - 0.5 %    Immature Grans (Abs) CANCELED 0.00 - 0.04 K/uL    Lymph # CANCELED 1.0 - 4.8 K/uL    Mono # CANCELED 0.3 - 1.0 K/uL    Eos # CANCELED 0.0 - 0.5 K/uL    Baso # CANCELED 0.00 - 0.20 K/uL    nRBC 0 0 /100 WBC    Gran % 56.0 38.0 - 73.0 %    Lymph % 29.0 18.0 - 48.0 %    Mono % 10.0 4.0 - 15.0 %    Eosinophil % 4.0 0.0 - 8.0 %    Basophil % 1.0 0.0 - 1.9 %    Platelet Estimate Appears normal     Poik Slight     Ovalocytes Occasional     Differential Method Manual         Lab Results   Component Value Date    [id number] Negative 03/18/2022       Recent Labs   Lab 03/25/22  0415 03/26/22  0455 03/27/22  0521   WBC 4.69 5.29 4.64   LYMPH 28.6  1.3 28.0  1.5 29.0  CANCELED   HGB 11.6* 11.0* 11.5*   HCT 36.6* 34.1* 34.4*    312 267       Recent Labs   Lab 03/25/22  0415 03/26/22  0455 03/27/22  0520    132* 135*   K 4.1 3.5 4.3    100 104   CO2 23 24 24   BUN 12 16 15   CREATININE 0.7 0.8 0.7   GLU 85 114* 82   CALCIUM 9.1 9.2 9.3   MG 1.8 1.5* 2.0   PHOS 3.8 3.5 3.5       Recent Labs   Lab 03/25/22 0415 03/26/22  0455 03/27/22  0520   ALKPHOS 66 68 61   ALT 12 11 10   AST 14 14 14   ALBUMIN 2.9* 3.1* 2.9*   PROT 7.0 7.2 7.1   BILITOT 0.1 0.2 0.1          No results for input(s): DDIMER, FERRITIN, CRP, LDH, BNP,  TROPONINI, CPK in the last 72 hours.    Invalid input(s): PROCALCITONIN    All labs within the last 24 hours were reviewed.     Microbiology:  Microbiology Results (last 7 days)       ** No results found for the last 168 hours. **              Imaging      No results found for this or any previous visit.      CT Renal Stone Study ABD Pelvis WO  Narrative: EXAMINATION:  CT RENAL STONE STUDY ABD PELVIS WO    CLINICAL HISTORY:  Flank pain, kidney stone suspected;    TECHNIQUE:  Multiplanar images were obtained of the abdomen and pelvis from the hemidiaphragms through the symphysis pubis without intravenous contrast.    COMPARISON:  CT of the abdomen pelvis from 11/07/2019 and additional priors.    FINDINGS:  Lung Bases: Clear.    Heart: Heart size is normal.  No pericardial effusion.    Liver: There is an indeterminate hypodensity within the right hepatic lobe measuring 1.1 cm, stable in size compared with CT from 11/07/2019.  Liver is normal in size.    Biliary tract: No intrahepatic or extrahepatic biliary ductal dilatation.    Gallbladder: No radiodense gallstone. No wall thickening or pericholecystic fluid.    Pancreas: Normal. No pancreatic ductal dilatation.    Spleen: Normal size without focal lesion.    Adrenals: Normal.    Kidneys and urinary collecting systems: Normal.  No hydronephrosis or urolithiasis.    Lymph nodes: None enlarged.    Stomach and bowel: The stomach is normal.  Loops of small and large bowel are normal in caliber without evidence for inflammation or obstruction.  The appendix is visualized and is normal.    Peritoneum and mesentery: No ascites or free intraperitoneal air. No abdominal fluid collection.    Vasculature: Normal.    Urinary bladder: Normal.    Reproductive organs: The uterus and adnexae are unremarkable.    Body wall: There is a small fat containing umbilical hernia.    Musculoskeletal: No aggressive osseous lesion.  Impression: 1. No acute abnormality of the abdomen or  pelvis.  2. Stable size of the indeterminate lesion within the right hepatic lobe.  Consider definitive characterization with nonemergent MRI the abdomen with and without contrast.    Electronically signed by: Danny Ang  Date:    11/19/2021  Time:    14:47  X-Ray Chest PA And Lateral  Narrative: EXAMINATION:  XR CHEST PA AND LATERAL    CLINICAL HISTORY:  Chest Pain;    TECHNIQUE:  PA and lateral views of the chest were performed.    COMPARISON:  11/14/2021    FINDINGS:  Multiple overlying cardiac monitoring leads. The cardiomediastinal silhouette is normal in size and midline. Pulmonary vascularity appears within normal limits.    The lungs appear clear without confluent pulmonary parenchymal opacity. No pleural fluid.    Osseous structures appear intact.  Impression: No evidence of acute cardiopulmonary disease.    Electronically signed by: Dick Boyce MD  Date:    11/19/2021  Time:    11:26      All imaging within the last 24 hours was reviewed.       Discharge Planning   RUFINA: 3/27/2022     Code Status: Full Code   Is the patient medically ready for discharge?: No    Reason for patient still in hospital (select all that apply): Patient trending condition and Treatment  Discharge Plan A: Home with family

## 2022-03-28 ENCOUNTER — TELEPHONE (OUTPATIENT)
Dept: PRIMARY CARE CLINIC | Facility: CLINIC | Age: 47
End: 2022-03-28
Payer: MEDICAID

## 2022-03-28 PROCEDURE — 99233 SBSQ HOSP IP/OBS HIGH 50: CPT | Mod: 95,,, | Performed by: INTERNAL MEDICINE

## 2022-03-28 PROCEDURE — 27000207 HC ISOLATION

## 2022-03-28 PROCEDURE — 25000003 PHARM REV CODE 250: Performed by: STUDENT IN AN ORGANIZED HEALTH CARE EDUCATION/TRAINING PROGRAM

## 2022-03-28 PROCEDURE — 63600175 PHARM REV CODE 636 W HCPCS: Performed by: HOSPITALIST

## 2022-03-28 PROCEDURE — 63600175 PHARM REV CODE 636 W HCPCS: Performed by: NURSE PRACTITIONER

## 2022-03-28 PROCEDURE — 94761 N-INVAS EAR/PLS OXIMETRY MLT: CPT

## 2022-03-28 PROCEDURE — 99233 PR SUBSEQUENT HOSPITAL CARE,LEVL III: ICD-10-PCS | Mod: 95,,, | Performed by: INTERNAL MEDICINE

## 2022-03-28 PROCEDURE — S4991 NICOTINE PATCH NONLEGEND: HCPCS | Performed by: HOSPITALIST

## 2022-03-28 PROCEDURE — 25000003 PHARM REV CODE 250: Performed by: INTERNAL MEDICINE

## 2022-03-28 PROCEDURE — 25000003 PHARM REV CODE 250: Performed by: NURSE PRACTITIONER

## 2022-03-28 PROCEDURE — 20600001 HC STEP DOWN PRIVATE ROOM

## 2022-03-28 PROCEDURE — 25000003 PHARM REV CODE 250: Performed by: HOSPITALIST

## 2022-03-28 RX ORDER — MORPHINE SULFATE 15 MG/1
15 TABLET ORAL EVERY 4 HOURS PRN
Status: DISCONTINUED | OUTPATIENT
Start: 2022-03-28 | End: 2022-03-29 | Stop reason: HOSPADM

## 2022-03-28 RX ORDER — VALACYCLOVIR HYDROCHLORIDE 500 MG/1
1000 TABLET, FILM COATED ORAL 3 TIMES DAILY
Status: DISCONTINUED | OUTPATIENT
Start: 2022-03-28 | End: 2022-03-29 | Stop reason: HOSPADM

## 2022-03-28 RX ORDER — MORPHINE SULFATE 2 MG/ML
2 INJECTION, SOLUTION INTRAMUSCULAR; INTRAVENOUS EVERY 4 HOURS PRN
Status: DISCONTINUED | OUTPATIENT
Start: 2022-03-28 | End: 2022-03-29 | Stop reason: HOSPADM

## 2022-03-28 RX ORDER — DIPHENHYDRAMINE HCL 25 MG
25 CAPSULE ORAL EVERY 4 HOURS PRN
Status: DISCONTINUED | OUTPATIENT
Start: 2022-03-28 | End: 2022-03-29 | Stop reason: HOSPADM

## 2022-03-28 RX ORDER — TRIAMCINOLONE ACETONIDE 1 MG/G
CREAM TOPICAL 2 TIMES DAILY
Status: DISCONTINUED | OUTPATIENT
Start: 2022-03-29 | End: 2022-03-29 | Stop reason: HOSPADM

## 2022-03-28 RX ADMIN — HYPROMELLOSE 2910 1 DROP: 5 SOLUTION OPHTHALMIC at 09:03

## 2022-03-28 RX ADMIN — HYDROCORTISONE: 10 CREAM TOPICAL at 09:03

## 2022-03-28 RX ADMIN — HYPROMELLOSE 2910 1 DROP: 5 SOLUTION OPHTHALMIC at 01:03

## 2022-03-28 RX ADMIN — VALACYCLOVIR HYDROCHLORIDE 1000 MG: 500 TABLET, FILM COATED ORAL at 10:03

## 2022-03-28 RX ADMIN — MORPHINE SULFATE 2 MG: 2 INJECTION, SOLUTION INTRAMUSCULAR; INTRAVENOUS at 09:03

## 2022-03-28 RX ADMIN — HYPROMELLOSE 2910 1 DROP: 5 SOLUTION OPHTHALMIC at 05:03

## 2022-03-28 RX ADMIN — HYDROCORTISONE: 10 CREAM TOPICAL at 10:03

## 2022-03-28 RX ADMIN — ATOVAQUONE 1500 MG: 750 SUSPENSION ORAL at 09:03

## 2022-03-28 RX ADMIN — ALPRAZOLAM 0.25 MG: 0.25 TABLET ORAL at 06:03

## 2022-03-28 RX ADMIN — BUSPIRONE HYDROCHLORIDE 10 MG: 10 TABLET ORAL at 10:03

## 2022-03-28 RX ADMIN — GABAPENTIN 300 MG: 300 CAPSULE ORAL at 09:03

## 2022-03-28 RX ADMIN — VALACYCLOVIR HYDROCHLORIDE 1000 MG: 500 TABLET, FILM COATED ORAL at 04:03

## 2022-03-28 RX ADMIN — ERYTHROMYCIN 1 INCH: 5 OINTMENT OPHTHALMIC at 09:03

## 2022-03-28 RX ADMIN — BICTEGRAVIR SODIUM, EMTRICITABINE, AND TENOFOVIR ALAFENAMIDE FUMARATE 1 TABLET: 50; 200; 25 TABLET ORAL at 09:03

## 2022-03-28 RX ADMIN — LITHIUM CARBONATE 300 MG: 300 TABLET, FILM COATED, EXTENDED RELEASE ORAL at 10:03

## 2022-03-28 RX ADMIN — GABAPENTIN 300 MG: 300 CAPSULE ORAL at 10:03

## 2022-03-28 RX ADMIN — METHOCARBAMOL TABLETS 500 MG: 500 TABLET, COATED ORAL at 05:03

## 2022-03-28 RX ADMIN — FAMOTIDINE 20 MG: 20 TABLET ORAL at 10:03

## 2022-03-28 RX ADMIN — GABAPENTIN 300 MG: 300 CAPSULE ORAL at 04:03

## 2022-03-28 RX ADMIN — METHOCARBAMOL TABLETS 500 MG: 500 TABLET, COATED ORAL at 09:03

## 2022-03-28 RX ADMIN — BUSPIRONE HYDROCHLORIDE 10 MG: 10 TABLET ORAL at 04:03

## 2022-03-28 RX ADMIN — BUSPIRONE HYDROCHLORIDE 10 MG: 10 TABLET ORAL at 09:03

## 2022-03-28 RX ADMIN — ACYCLOVIR SODIUM 480 MG: 1000 INJECTION, SOLUTION INTRAVENOUS at 04:03

## 2022-03-28 RX ADMIN — POLYETHYLENE GLYCOL 3350 17 G: 17 POWDER, FOR SOLUTION ORAL at 09:03

## 2022-03-28 RX ADMIN — LITHIUM CARBONATE 300 MG: 300 TABLET, FILM COATED, EXTENDED RELEASE ORAL at 09:03

## 2022-03-28 RX ADMIN — DOXYCYCLINE HYCLATE 100 MG: 100 TABLET, COATED ORAL at 09:03

## 2022-03-28 RX ADMIN — HYPROMELLOSE 2910 1 DROP: 5 SOLUTION OPHTHALMIC at 10:03

## 2022-03-28 RX ADMIN — MORPHINE SULFATE 15 MG: 15 TABLET ORAL at 10:03

## 2022-03-28 RX ADMIN — METHOCARBAMOL TABLETS 500 MG: 500 TABLET, COATED ORAL at 01:03

## 2022-03-28 RX ADMIN — NICOTINE 1 PATCH: 7 PATCH TRANSDERMAL at 09:03

## 2022-03-28 RX ADMIN — ACETAMINOPHEN 650 MG: 325 TABLET ORAL at 06:03

## 2022-03-28 RX ADMIN — SIMETHICONE 80 MG: 80 TABLET, CHEWABLE ORAL at 11:03

## 2022-03-28 RX ADMIN — METHOCARBAMOL TABLETS 500 MG: 500 TABLET, COATED ORAL at 10:03

## 2022-03-28 RX ADMIN — DOXYCYCLINE HYCLATE 100 MG: 100 TABLET, COATED ORAL at 10:03

## 2022-03-28 RX ADMIN — MORPHINE SULFATE 2 MG: 2 INJECTION, SOLUTION INTRAMUSCULAR; INTRAVENOUS at 04:03

## 2022-03-28 RX ADMIN — LEVOTHYROXINE SODIUM 25 MCG: 25 TABLET ORAL at 06:03

## 2022-03-28 RX ADMIN — MORPHINE SULFATE 15 MG: 15 TABLET ORAL at 05:03

## 2022-03-28 RX ADMIN — ERYTHROMYCIN 1 INCH: 5 OINTMENT OPHTHALMIC at 10:03

## 2022-03-28 RX ADMIN — MIRTAZAPINE 15 MG: 15 TABLET, FILM COATED ORAL at 10:03

## 2022-03-28 RX ADMIN — FAMOTIDINE 20 MG: 20 TABLET ORAL at 09:03

## 2022-03-28 NOTE — TELEPHONE ENCOUNTER
----- Message from Marizol Gonzalez sent at 3/28/2022  1:59 PM CDT -----  Contact: [facility]  [phone]  Needs to know one patient discharges does she need to be sent to [facility] for further evaluation of eye

## 2022-03-28 NOTE — PLAN OF CARE
Problem: Adult Inpatient Plan of Care  Goal: Plan of Care Review  3/28/2022 0432 by Liza Reynolds RN  Outcome: Ongoing, Progressing  3/28/2022 0340 by Liza Reynolds RN  Outcome: Ongoing, Progressing  Goal: Patient-Specific Goal (Individualized)  3/28/2022 0432 by Liza Reynolds RN  Outcome: Ongoing, Progressing  3/28/2022 0340 by Liza Reynolds RN  Outcome: Ongoing, Progressing  Goal: Absence of Hospital-Acquired Illness or Injury  3/28/2022 0432 by Liza Reynolds RN  Outcome: Ongoing, Progressing  3/28/2022 0340 by Liza Reynolds RN  Outcome: Ongoing, Progressing  Goal: Optimal Comfort and Wellbeing  3/28/2022 0432 by Liza Reynolds RN  Outcome: Ongoing, Progressing  3/28/2022 0340 by Liza Reynolds RN  Outcome: Ongoing, Progressing  Goal: Readiness for Transition of Care  3/28/2022 0432 by Liza Reynolds RN  Outcome: Ongoing, Progressing  3/28/2022 0340 by Liza Reynolds RN  Outcome: Ongoing, Progressing     Problem: Infection  Goal: Absence of Infection Signs and Symptoms  3/28/2022 0432 by Liza Reynolds RN  Outcome: Ongoing, Progressing  3/28/2022 0340 by Liza Reynolds RN  Outcome: Ongoing, Progressing

## 2022-03-28 NOTE — ASSESSMENT & PLAN NOTE
47 yo F w/ hx of HIV on HARRT (last CD4 in 90s) and Herpes Zoster Opthalmicus now on PO acyclovir presenting with pruritic eruption. Rash non-specific but could be due to pruritic papule eruption which is common among HIV patients. Also on the differential could be brachioradialis pruritus vs eosinophilic folliculitis vs xerosis cutis other.     Recommendation:  -Discussed gentle and dry skin care with patient. Take only quick lukewarm showers and apply non-fragrance and gentle emollient as soon afterward.   -Start triamcinolone 0.1% cream BID to affected area for relief to with itching  -Start Sarna lotion PRN on affected area for relief of itching  -Refrain from scratching as this can exacerbate the itch-scratch cycle.

## 2022-03-28 NOTE — PROGRESS NOTES
[facility] ([address] Medicine  Telemedicine Progress Note    Patient Name: Patricia Nye  MRN: [id number]  Patient Class: IP- Inpatient   Admission Date: 3/19/2022  Length of Stay: 8 days  Attending Physician: Marguerite Padron MD  Primary Care Provider: Cornell Britt MD          Subjective:     Principal Problem:Herpes zoster ophthalmicus of right eye        HPI:  Patricia Nye is a 46 y.o. female with a PMHx of HIV, insomnia, anxiety/depression, polysubstance abuse, and hypothyroidism who was admitted to [facility] on March 18th with right periorbital swelling. The patient reports a rash developed to her chest wall about 1.5-2 weeks ago accompanied by a sensation of pins and needles. She then developed swelling around her right eye and had a rash around her eye and the bridge of her nose beginning 3 days ago. She was seen at [facility] two nights ago but left AMA and then presented to [facility]. She was admitted with concern for herpes zoster ophthalmicus and started on IV acyclovir. Eye is reportedly more swollen, and she c/o of stinging pain to the skin over her eye and the right side of her face. Denies any pain to her actual eye and denies any blurry vision. Endorses chills. Denies any fever, abdominal pain, chest pain, nausea, vomiting, diarrhea, shortness of breath, cough, or dysuria. The patient was transferred to [facility] for ophthalmology consult.    Labs were reviewed from OSH: COVID negative, sodium 138, potassium 3.6, chloride 103, CO2 23, BUN 9, creatinine 0.8, glucose 100, AST 20, ALT 15, white blood cells 6.82, hemoglobin 12.2, hematocrit 37.5, platelets 324      Overview/Hospital Course:  Ophthalmology and Infectious Disease consulted on admission.  IV acyclovir continued with intermittent IV fluids for renal protection.  Vancomycin and Rocephin initiated as concern for superimposed bacterial infection which may been prompted by patient  use of dexamethasone ointment from home.  Antibiotics narrowed to vancomycin.  Per Ophthalmology, no indication for imaging.  Recommended ophthalmic antibiotic ointment and lubrication in addition to IV acyclovir continuance.  Per ID, continue IV acyclovir for 7 days (03/26) or until cessation of new lesion development.  There after transition to p.o. acyclovir.  Vanc was dc'd 03/24- pt transitioned to doxy 03/24.         This encounter was provided through telemedicine.  Patient was transferred to the telemedicine service on:  03/26/2022   The patient location is: [facility]/[id number] A admitted 3/19/2022  7:21 PM.  Present with the patient at the time of the telemed/virtual assessment: Telepresenter    Interval History/Overnight Events:     Patient with improved rash to face but continuing to have eye irritation; no associated visual changes; associated draining from rash is improved; chest rash remains itchy - calamine not helping so changed to hydrocortisone    Some suspicious behaviors reported by al but patient appropriate and improved during visit.    Review of Systems   Constitutional:  Positive for fatigue. Negative for activity change and fever.   Respiratory:  Negative for cough and shortness of breath.    Gastrointestinal:  Negative for diarrhea and vomiting.   Skin:  Positive for rash.      Inpatient Medications:  Scheduled Meds:   acyclovir  480 mg Intravenous Q8H    artificial tears  1 drop Both Eyes QID    atovaquone  1,500 mg Oral Daily    tetitmerp-obflxmnc-oyfszbh ala  1 tablet Oral Daily    busPIRone  10 mg Oral TID    doxycycline  100 mg Oral Q12H    erythromycin   Right Eye BID    famotidine  20 mg Oral BID    gabapentin  300 mg Oral TID    hydrocortisone   Topical (Top) BID    levothyroxine  25 mcg Oral Before breakfast    lithium  300 mg Oral BID    methocarbamoL  500 mg Oral QID    mirtazapine  15 mg Oral QHS    nicotine  1 patch Transdermal Daily    polyethylene glycol  17 g  Oral Daily     Continuous Infusions:  PRN Meds:.acetaminophen, ALPRAZolam, aluminum & magnesium hydroxide-simethicone, calamine-zinc oxide 8-8%, dextrose 10%, dextrose 10%, glucagon (human recombinant), glucose, glucose, hydrOXYzine HCL, melatonin, morphine, naloxone, ondansetron, senna-docusate 8.6-50 mg, simethicone, sodium chloride 0.9%      Objective:     Temp:  [97.8 °F (36.6 °C)-98.7 °F (37.1 °C)] 98.5 °F (36.9 °C)  Pulse:  [80-85] 80  Resp:  [15-18] 16  SpO2:  [98 %-100 %] 100 %  BP: (118-130)/(58-85) 119/78      Intake/Output Summary (Last 24 hours) at 3/27/2022 1251  Last data filed at 3/26/2022 1600  Gross per 24 hour   Intake 750 ml   Output --   Net 750 ml          Body mass index is 22.37 kg/m².    Physical Exam  Vitals and nursing note reviewed.   Constitutional:       General: She is not in acute distress.     Appearance: Normal appearance.   HENT:      Head: Normocephalic and atraumatic.   Eyes:      General: No scleral icterus.        Right eye: No discharge.         Left eye: No discharge.      Extraocular Movements: Extraocular movements intact.   Cardiovascular:      Rate and Rhythm: Normal rate.   Pulmonary:      Effort: Pulmonary effort is normal. No tachypnea or respiratory distress.   Skin:     Findings: Rash present. Rash is vesicular.      Comments: To right medial eye.   Neurological:      General: No focal deficit present.      Mental Status: She is alert and oriented to person, place, and time.      Cranial Nerves: No cranial nerve deficit.      Motor: No weakness.   Psychiatric:         Attention and Perception: Attention normal.         Mood and Affect: Mood and affect normal.         Speech: Speech normal.         Behavior: Behavior is cooperative.        Labs:  Recent Results (from the past 24 hour(s))   Comprehensive Metabolic Panel    Collection Time: 03/27/22  5:20 AM   Result Value Ref Range    Sodium 135 (L) 136 - 145 mmol/L    Potassium 4.3 3.5 - 5.1 mmol/L    Chloride 104 95 -  110 mmol/L    CO2 24 23 - 29 mmol/L    Glucose 82 70 - 110 mg/dL    BUN 15 6 - 20 mg/dL    Creatinine 0.7 0.5 - 1.4 mg/dL    Calcium 9.3 8.7 - 10.5 mg/dL    Total Protein 7.1 6.0 - 8.4 g/dL    Albumin 2.9 (L) 3.5 - 5.2 g/dL    Total Bilirubin 0.1 0.1 - 1.0 mg/dL    Alkaline Phosphatase 61 55 - 135 U/L    AST 14 10 - 40 U/L    ALT 10 10 - 44 U/L    Anion Gap 7 (L) 8 - 16 mmol/L    eGFR if African American >60.0 >60 mL/min/1.73 m^2    eGFR if non African American >60.0 >60 mL/min/1.73 m^2   Magnesium    Collection Time: 03/27/22  5:20 AM   Result Value Ref Range    Magnesium 2.0 1.6 - 2.6 mg/dL   Phosphorus    Collection Time: 03/27/22  5:20 AM   Result Value Ref Range    Phosphorus 3.5 2.7 - 4.5 mg/dL   CBC Auto Differential    Collection Time: 03/27/22  5:21 AM   Result Value Ref Range    WBC 4.64 3.90 - 12.70 K/uL    RBC 4.21 4.00 - 5.40 M/uL    Hemoglobin 11.5 (L) 12.0 - 16.0 g/dL    Hematocrit 34.4 (L) 37.0 - 48.5 %    MCV 82 82 - 98 fL    MCH 27.3 27.0 - 31.0 pg    MCHC 33.4 32.0 - 36.0 g/dL    RDW 13.9 11.5 - 14.5 %    Platelets 267 150 - 450 K/uL    MPV 10.4 9.2 - 12.9 fL    Immature Granulocytes CANCELED 0.0 - 0.5 %    Immature Grans (Abs) CANCELED 0.00 - 0.04 K/uL    Lymph # CANCELED 1.0 - 4.8 K/uL    Mono # CANCELED 0.3 - 1.0 K/uL    Eos # CANCELED 0.0 - 0.5 K/uL    Baso # CANCELED 0.00 - 0.20 K/uL    nRBC 0 0 /100 WBC    Gran % 56.0 38.0 - 73.0 %    Lymph % 29.0 18.0 - 48.0 %    Mono % 10.0 4.0 - 15.0 %    Eosinophil % 4.0 0.0 - 8.0 %    Basophil % 1.0 0.0 - 1.9 %    Platelet Estimate Appears normal     Poik Slight     Ovalocytes Occasional     Differential Method Manual         Lab Results   Component Value Date    [id number] Negative 03/18/2022       Recent Labs   Lab 03/25/22 0415 03/26/22  0455 03/27/22  0521   WBC 4.69 5.29 4.64   LYMPH 28.6  1.3 28.0  1.5 29.0  CANCELED   HGB 11.6* 11.0* 11.5*   HCT 36.6* 34.1* 34.4*    312 267       Recent Labs   Lab 03/25/22 0415 03/26/22  0455  03/27/22  0520    132* 135*   K 4.1 3.5 4.3    100 104   CO2 23 24 24   BUN 12 16 15   CREATININE 0.7 0.8 0.7   GLU 85 114* 82   CALCIUM 9.1 9.2 9.3   MG 1.8 1.5* 2.0   PHOS 3.8 3.5 3.5       Recent Labs   Lab 03/25/22  0415 03/26/22  0455 03/27/22  0520   ALKPHOS 66 68 61   ALT 12 11 10   AST 14 14 14   ALBUMIN 2.9* 3.1* 2.9*   PROT 7.0 7.2 7.1   BILITOT 0.1 0.2 0.1          No results for input(s): DDIMER, FERRITIN, CRP, LDH, BNP, TROPONINI, CPK in the last 72 hours.    Invalid input(s): PROCALCITONIN    All labs within the last 24 hours were reviewed.     Microbiology:  Microbiology Results (last 7 days)       ** No results found for the last 168 hours. **              Imaging      No results found for this or any previous visit.      CT Renal Stone Study ABD Pelvis WO  Narrative: EXAMINATION:  CT RENAL STONE STUDY ABD PELVIS WO    CLINICAL HISTORY:  Flank pain, kidney stone suspected;    TECHNIQUE:  Multiplanar images were obtained of the abdomen and pelvis from the hemidiaphragms through the symphysis pubis without intravenous contrast.    COMPARISON:  CT of the abdomen pelvis from 11/07/2019 and additional priors.    FINDINGS:  Lung Bases: Clear.    Heart: Heart size is normal.  No pericardial effusion.    Liver: There is an indeterminate hypodensity within the right hepatic lobe measuring 1.1 cm, stable in size compared with CT from 11/07/2019.  Liver is normal in size.    Biliary tract: No intrahepatic or extrahepatic biliary ductal dilatation.    Gallbladder: No radiodense gallstone. No wall thickening or pericholecystic fluid.    Pancreas: Normal. No pancreatic ductal dilatation.    Spleen: Normal size without focal lesion.    Adrenals: Normal.    Kidneys and urinary collecting systems: Normal.  No hydronephrosis or urolithiasis.    Lymph nodes: None enlarged.    Stomach and bowel: The stomach is normal.  Loops of small and large bowel are normal in caliber without evidence for inflammation or  obstruction.  The appendix is visualized and is normal.    Peritoneum and mesentery: No ascites or free intraperitoneal air. No abdominal fluid collection.    Vasculature: Normal.    Urinary bladder: Normal.    Reproductive organs: The uterus and adnexae are unremarkable.    Body wall: There is a small fat containing umbilical hernia.    Musculoskeletal: No aggressive osseous lesion.  Impression: 1. No acute abnormality of the abdomen or pelvis.  2. Stable size of the indeterminate lesion within the right hepatic lobe.  Consider definitive characterization with nonemergent MRI the abdomen with and without contrast.    Electronically signed by: Danny Ang  Date:    11/19/2021  Time:    14:47  X-Ray Chest PA And Lateral  Narrative: EXAMINATION:  XR CHEST PA AND LATERAL    CLINICAL HISTORY:  Chest Pain;    TECHNIQUE:  PA and lateral views of the chest were performed.    COMPARISON:  11/14/2021    FINDINGS:  Multiple overlying cardiac monitoring leads. The cardiomediastinal silhouette is normal in size and midline. Pulmonary vascularity appears within normal limits.    The lungs appear clear without confluent pulmonary parenchymal opacity. No pleural fluid.    Osseous structures appear intact.  Impression: No evidence of acute cardiopulmonary disease.    Electronically signed by: Dikc Boyce MD  Date:    11/19/2021  Time:    11:26      All imaging within the last 24 hours was reviewed.       Discharge Planning   RUFINA: 3/27/2022     Code Status: Full Code   Is the patient medically ready for discharge?: No    Reason for patient still in hospital (select all that apply): Patient trending condition and Treatment  Discharge Plan A: Home with family            Assessment/Plan:      * Herpes zoster ophthalmicus of right eye  Patient reports rash developed to her chest wall about 1.5-2 weeks ago accompanied by a sensation of pins and needles. She then developed swelling around her right eye and had a rash around her eye  and the bridge of her nose beginning 3 days ago. She was seen at [facility] two nights ago but left AMA and presented to [facility]. She was admitted with concern for herpes zoster ophthalmicus and started on IV acyclovir. Eye  is reportedly more swollen, and she c/o of stinging pain to the skin over her eye and the right side of her face. Denies any pain to her actual eye and denies any blurry vision.  The patient was transferred to [facility] for ophthalmology consult.    - Continue IV acyclovir 500mg q8hrs  - IVF qhs  - Contact, droplet, and airborne isolation  - Ophthalmology consulted- continue Erythromycin to skin lesions and right eye BID, Preservative Free Tears QID, antiviral treatment per ID  - Continue gabapentin 300mg tid  - ID consulted, appreciate recs- continue IV acyclovir until no new lesions forming or for 7 days  - Transitioned to doxy 03/24 and monitor response  - symptomatic management of pain: increase gabapentin as clinically indicated, tylenol prn, IV morphine p.r.n. (allergies noted to other opioids)    Pruritic dermatitis  -present since 12/2021 and uses calamine at home when flares.  -no erythema but mild flesh colored papules to anterior chest  -discontinue calamine lotion to pruritic area; change to hydrocortisone for pruritis  -derm referral      Anxiety disorder  - Chronic, stable.  reviewed.   - Continue home xanax PRN  - Continue buspar 10mg tid    Tobacco abuse  Assistance with smoking cessation was offered on admission, including:  [x]  Medications  [x]  Counseling  []  Printed Information on Smoking Cessation  []  Referral to a Smoking Cessation Program    Patient was counseled regarding smoking for 3-10 minutes on admit.     Primary insomnia  - Continue remeron qhs    Major depressive disorder in remission  - Bipolar 1 disorder  - Continue lithium, buspar, mirtazapine    HIV disease  This patient in known to have AIDS (from CD4 count has been less than 200). Last CD4 21 (April 2021),  new CD4 count pending. Patient is on HAART. Will continue HAART. Continued prophylaxis with atovaquone. Continue to monitor routine labs.     - We will consult Infectious disease at this time. Appreciate recs.   - Continue Biktarvy and atovaquone ppx.  - CD4 on admit 99      VTE Risk Mitigation (From admission, onward)         Ordered     IP VTE LOW RISK PATIENT  Once         03/19/22 1925     Place sequential compression device  Until discontinued         03/19/22 1925                I have assessed these findings virtually using a telemed platform and with assistance of the bedside nurse.      The attending portion of this evaluation, treatment, and documentation was performed per Marguerite Padron MD via Telemedicine AudioVisual using the secure [employer] software platform with 2 way audio/video. The provider was located off-site and the patient is located in the hospital. The aforementioned video software was utilized to document the relevant history and physical exam    Marguerite Padron MD  Department of Hospital Medicine   [facility] - Intensive Care ([place]-)

## 2022-03-28 NOTE — ASSESSMENT & PLAN NOTE
-present since 12/2021 and uses calamine at home when flares.  -no erythema but mild flesh colored papules to anterior chest  -discontinue calamine lotion to pruritic area; change to hydrocortisone for pruritis  -derm referral

## 2022-03-28 NOTE — PLAN OF CARE
Patricia was cooperative, repeated request for medication and attention seeking. Supported with active listening and plan of care explained.   Heart rate sinus, BP soft MAP >65. On room air SPO2 98%.  Morphine given for pain in right eye. Comfort and safety maintained, call light in reach and personal belongings in reach, bed in low position and call light answered in person. Continue to monitor.

## 2022-03-28 NOTE — HPI
Patient is a 45 yo F w/ hx of HIV on HARRT (last CD4 in 90s) admitted for treatment of right sided Herpes Zoster Opthalmicus. Dermatology consulted for pruritic rash on patient's arms, chest and neck. Patient states this has been going on since at least December of 2021. States she has on and off flaring of itchy skin and red spots. Denies any drainage of these spots. Denies any exacerbating factors. Recent, she has been given hydrocortisone 1% cream and calamine lotion which helps with the itchiness. Reports a hx of back pain from trauma. States she takes very hot showers and bath often (sometimes 3 times a day). Denies any involvement in rest of body.     Does reports a hx of genital herpes.

## 2022-03-28 NOTE — PLAN OF CARE
[facility] ([facility])  Discharge Reassessment    Primary Care Provider: Cornell Britt MD    Expected Discharge Date: 3/29/2022    Reassessment (most recent)       Discharge Reassessment - 03/28/22 1120          Discharge Reassessment    Assessment Type Discharge Planning Reassessment (P)      Did the patient's condition or plan change since previous assessment? Yes (P)      Discharge Plan discussed with: Patient (P)      Communicated RUFINA with patient/caregiver Yes (P)      Discharge Plan A Home with family (P)      Discharge Plan B Home with family (P)      DME Needed Upon Discharge  none (P)      Discharge Barriers Identified None (P)      Why the patient remains in the hospital Requires continued medical care (P)                  Patient not stable to dc at this time, patient expected to be medically stable to dc tomorrow. Follow up appts scheduled.       Connie Álvarez LMSW  [facility]   [id number]

## 2022-03-28 NOTE — ASSESSMENT & PLAN NOTE
Patient reports rash developed to her chest wall about 1.5-2 weeks ago accompanied by a sensation of pins and needles. She then developed swelling around her right eye and had a rash around her eye and the bridge of her nose beginning 3 days ago. She was seen at [facility] two nights ago but left AMA and presented to [facility]. She was admitted with concern for herpes zoster ophthalmicus and started on IV acyclovir. Eye  is reportedly more swollen, and she c/o of stinging pain to the skin over her eye and the right side of her face. Denies any pain to her actual eye and denies any blurry vision.  The patient was transferred to [facility] for ophthalmology consult.    - Continue IV acyclovir 500mg q8hrs  - IVF qhs  - Contact, droplet, and airborne isolation  - Ophthalmology consulted- continue Erythromycin to skin lesions and right eye BID, Preservative Free Tears QID, antiviral treatment per ID  - Continue gabapentin 300mg tid  - ID consulted, appreciate recs- continue IV acyclovir until no new lesions forming or for 7 days  - Transitioned to doxy 03/24 and monitor response  - symptomatic management of pain: increase gabapentin as clinically indicated, tylenol prn, IV morphine p.r.n. (allergies noted to other opioids)

## 2022-03-28 NOTE — CONSULTS
[facility] - [facility] ([place] [address])  Dermatology  Consult Note    Patient Name: Patricia Nye  MRN: [id number]  Admission Date: 3/19/2022  Hospital Length of Stay: 9 days  Attending Physician: Marguerite Padron MD  Primary Care Provider: Cornell Britt MD     Inpatient consult to Dermatology  Consult performed by: Mateo Saldana MD  Consult ordered by: Marguerite Padron MD        Subjective:     Principal Problem:Herpes zoster ophthalmicus of right eye    HPI:  Patient is a 47 yo F w/ hx of HIV on HARRT (last CD4 in 90s) admitted for treatment of right sided Herpes Zoster Opthalmicus. Dermatology consulted for pruritic rash on patient's arms, chest and neck. Patient states this has been going on since at least December of 2021. States she has on and off flaring of itchy skin and red spots. Denies any drainage of these spots. Denies any exacerbating factors. Recent, she has been given hydrocortisone 1% cream and calamine lotion which helps with the itchiness. Reports a hx of back pain from trauma. States she takes very hot showers and bath often (sometimes 3 times a day). Denies any involvement in rest of body.     Does reports a hx of genital herpes.       Past Medical History:   Diagnosis Date    Allergy     Anxiety     Cervical dysplasia 1998 / 2016    Depression     HIV infection     Hyperthyroidism     Insomnia        Past Surgical History:   Procedure Laterality Date    SKULL FRACTURE ELEVATION       Family History       Problem Relation (Age of Onset)    Heart disease Mother          Tobacco Use    Smoking status: Current Some Day Smoker     Types: Vaping with nicotine    Smokeless tobacco: Never Used   Substance and Sexual Activity    Alcohol use: Yes     Comment: socially    Drug use: Not Currently    Sexual activity: Yes     Partners: Male     Birth control/protection: None       Review of patient's allergies indicates:   Allergen Reactions    Opioids - morphine analogues      Pt states  she is not allergic to morphine      Pcn [penicillins] Other (See Comments)     Trouble swallowing and vomiting.     Sulfa (sulfonamide antibiotics)      Rash (skin)^       Medications:  Continuous Infusions:  Scheduled Meds:   artificial tears  1 drop Both Eyes QID    atovaquone  1,500 mg Oral Daily    cqfhvzbvg-jeuczorg-cgojycb ala  1 tablet Oral Daily    busPIRone  10 mg Oral TID    doxycycline  100 mg Oral Q12H    erythromycin   Right Eye BID    famotidine  20 mg Oral BID    gabapentin  300 mg Oral TID    hydrocortisone   Topical (Top) BID    levothyroxine  25 mcg Oral Before breakfast    lithium  300 mg Oral BID    methocarbamoL  500 mg Oral QID    mirtazapine  15 mg Oral QHS    nicotine  1 patch Transdermal Daily    polyethylene glycol  17 g Oral Daily    valACYclovir  1,000 mg Oral TID     PRN Meds:acetaminophen, ALPRAZolam, aluminum & magnesium hydroxide-simethicone, calamine-zinc oxide 8-8%, dextrose 10%, dextrose 10%, diphenhydrAMINE, glucagon (human recombinant), glucose, glucose, melatonin, morphine, morphine, naloxone, ondansetron, senna-docusate 8.6-50 mg, simethicone, sodium chloride 0.9%    Review of Systems   Constitutional:  Negative for fever and chills.   Gastrointestinal:  Negative for nausea and vomiting.   Skin:  Positive for itching, rash and dry skin.   Objective:     Vital Signs (Most Recent):  Temp: 98.2 °F (36.8 °C) (03/28/22 0700)  Pulse: 71 (03/28/22 0700)  Resp: 18 (03/28/22 0900)  BP: 98/63 (03/28/22 0700)  SpO2: 98 % (03/28/22 0700) Vital Signs (24h Range):  Temp:  [98 °F (36.7 °C)-99 °F (37.2 °C)] 98.2 °F (36.8 °C)  Pulse:  [69-90] 71  Resp:  [15-22] 18  SpO2:  [97 %-100 %] 98 %  BP: ()/(57-89) 98/63     Weight: 53.7 kg (118 lb 6.2 oz)  Body mass index is 22.37 kg/m².    Physical Exam   Constitutional: She appears well-developed and well-nourished.   Skin:   Areas Examined (abnormalities noted in diagram):   Head / Face Inspection Performed  Neck Inspection  Performed  Chest / Axilla Inspection Performed  Abdomen Inspection Performed  Back Inspection Performed  RUE Inspected  LUE Inspection Performed  Skin exam notable for:   -few scattered erythematous papules on upper back/neck, chest and BL upper arms. Some papules with erosions 2/2 excoriation. Numerous linear excoriations.          Significant Labs: All pertinent labs within the past 24 hours have been reviewed.    Significant Imaging: None      Assessment/Plan:     Pruritic dermatitis  47 yo F w/ hx of HIV on HARRT (last CD4 in 90s) and Herpes Zoster Opthalmicus now on PO acyclovir presenting with pruritic eruption. Rash non-specific but could be due to pruritic papule eruption which is common among HIV patients. Also on the differential could be brachioradialis pruritus vs eosinophilic folliculitis vs xerosis cutis other.     Recommendation:  -Discussed gentle and dry skin care with patient. Take only quick lukewarm showers and apply non-fragrance and gentle emollient as soon afterward.   -Start triamcinolone 0.1% cream BID to affected area for relief to with itching  -Start Sarna lotion PRN on affected area for relief of itching  -Refrain from scratching as this can exacerbate the itch-scratch cycle.           Thank you for your consult. I will sign off. Please contact us if you have any additional questions.    Mateo Saldana MD  Dermatology  [facility] - Intensive Care ([place])

## 2022-03-28 NOTE — SUBJECTIVE & OBJECTIVE
Past Medical History:   Diagnosis Date    Allergy     Anxiety     Cervical dysplasia 1998 / 2016    Depression     HIV infection     Hyperthyroidism     Insomnia        Past Surgical History:   Procedure Laterality Date    SKULL FRACTURE ELEVATION       Family History       Problem Relation (Age of Onset)    Heart disease Mother          Tobacco Use    Smoking status: Current Some Day Smoker     Types: Vaping with nicotine    Smokeless tobacco: Never Used   Substance and Sexual Activity    Alcohol use: Yes     Comment: socially    Drug use: Not Currently    Sexual activity: Yes     Partners: Male     Birth control/protection: None       Review of patient's allergies indicates:   Allergen Reactions    Opioids - morphine analogues      Pt states she is not allergic to morphine      Pcn [penicillins] Other (See Comments)     Trouble swallowing and vomiting.     Sulfa (sulfonamide antibiotics)      Rash (skin)^       Medications:  Continuous Infusions:  Scheduled Meds:   artificial tears  1 drop Both Eyes QID    atovaquone  1,500 mg Oral Daily    lbxwbxcmj-bmuxolxk-kjokhng ala  1 tablet Oral Daily    busPIRone  10 mg Oral TID    doxycycline  100 mg Oral Q12H    erythromycin   Right Eye BID    famotidine  20 mg Oral BID    gabapentin  300 mg Oral TID    hydrocortisone   Topical (Top) BID    levothyroxine  25 mcg Oral Before breakfast    lithium  300 mg Oral BID    methocarbamoL  500 mg Oral QID    mirtazapine  15 mg Oral QHS    nicotine  1 patch Transdermal Daily    polyethylene glycol  17 g Oral Daily    valACYclovir  1,000 mg Oral TID     PRN Meds:acetaminophen, ALPRAZolam, aluminum & magnesium hydroxide-simethicone, calamine-zinc oxide 8-8%, dextrose 10%, dextrose 10%, diphenhydrAMINE, glucagon (human recombinant), glucose, glucose, melatonin, morphine, morphine, naloxone, ondansetron, senna-docusate 8.6-50 mg, simethicone, sodium chloride 0.9%    Review of Systems   Constitutional:  Negative for fever and chills.    Gastrointestinal:  Negative for nausea and vomiting.   Skin:  Positive for itching, rash and dry skin.   Objective:     Vital Signs (Most Recent):  Temp: 98.2 °F (36.8 °C) (03/28/22 0700)  Pulse: 71 (03/28/22 0700)  Resp: 18 (03/28/22 0900)  BP: 98/63 (03/28/22 0700)  SpO2: 98 % (03/28/22 0700) Vital Signs (24h Range):  Temp:  [98 °F (36.7 °C)-99 °F (37.2 °C)] 98.2 °F (36.8 °C)  Pulse:  [69-90] 71  Resp:  [15-22] 18  SpO2:  [97 %-100 %] 98 %  BP: ()/(57-89) 98/63     Weight: 53.7 kg (118 lb 6.2 oz)  Body mass index is 22.37 kg/m².    Physical Exam   Constitutional: She appears well-developed and well-nourished.   Skin:   Areas Examined (abnormalities noted in diagram):   Head / Face Inspection Performed  Neck Inspection Performed  Chest / Axilla Inspection Performed  Abdomen Inspection Performed  Back Inspection Performed  RUE Inspected  LUE Inspection Performed  Skin exam notable for:   -few scattered erythematous papules on upper back/neck, chest and BL upper arms. Some papules with erosions 2/2 excoriation. Numerous linear excoriations.          Significant Labs: All pertinent labs within the past 24 hours have been reviewed.    Significant Imaging: None

## 2022-03-29 VITALS
DIASTOLIC BLOOD PRESSURE: 97 MMHG | WEIGHT: 118.38 LBS | HEIGHT: 61 IN | OXYGEN SATURATION: 99 % | BODY MASS INDEX: 22.35 KG/M2 | HEART RATE: 67 BPM | SYSTOLIC BLOOD PRESSURE: 151 MMHG | RESPIRATION RATE: 18 BRPM | TEMPERATURE: 98 F

## 2022-03-29 PROCEDURE — 99233 PR SUBSEQUENT HOSPITAL CARE,LEVL III: ICD-10-PCS | Mod: ,,, | Performed by: INTERNAL MEDICINE

## 2022-03-29 PROCEDURE — 99233 SBSQ HOSP IP/OBS HIGH 50: CPT | Mod: ,,, | Performed by: INTERNAL MEDICINE

## 2022-03-29 PROCEDURE — 63600175 PHARM REV CODE 636 W HCPCS: Performed by: HOSPITALIST

## 2022-03-29 PROCEDURE — 25000003 PHARM REV CODE 250: Performed by: HOSPITALIST

## 2022-03-29 PROCEDURE — 99239 PR HOSPITAL DISCHARGE DAY,>30 MIN: ICD-10-PCS | Mod: 95,,, | Performed by: INTERNAL MEDICINE

## 2022-03-29 PROCEDURE — S4991 NICOTINE PATCH NONLEGEND: HCPCS | Performed by: HOSPITALIST

## 2022-03-29 PROCEDURE — 25000003 PHARM REV CODE 250: Performed by: STUDENT IN AN ORGANIZED HEALTH CARE EDUCATION/TRAINING PROGRAM

## 2022-03-29 PROCEDURE — 99239 HOSP IP/OBS DSCHRG MGMT >30: CPT | Mod: 95,,, | Performed by: INTERNAL MEDICINE

## 2022-03-29 PROCEDURE — 25000003 PHARM REV CODE 250: Performed by: INTERNAL MEDICINE

## 2022-03-29 RX ORDER — MORPHINE SULFATE 15 MG/1
15 TABLET ORAL EVERY 6 HOURS PRN
Qty: 21 TABLET | Refills: 0 | Status: ON HOLD | OUTPATIENT
Start: 2022-03-29 | End: 2022-07-29 | Stop reason: HOSPADM

## 2022-03-29 RX ORDER — TRIAMCINOLONE ACETONIDE 1 MG/G
CREAM TOPICAL 2 TIMES DAILY
Qty: 454 G | Refills: 1 | Status: SHIPPED | OUTPATIENT
Start: 2022-03-29 | End: 2022-06-06

## 2022-03-29 RX ORDER — ERYTHROMYCIN 5 MG/G
OINTMENT OPHTHALMIC 2 TIMES DAILY
Qty: 3.5 G | Refills: 1 | Status: SHIPPED | OUTPATIENT
Start: 2022-03-29 | End: 2022-04-05 | Stop reason: SDUPTHER

## 2022-03-29 RX ORDER — MORPHINE SULFATE 15 MG/1
15 TABLET ORAL EVERY 6 HOURS PRN
Qty: 21 TABLET | Refills: 0 | Status: SHIPPED | OUTPATIENT
Start: 2022-03-29 | End: 2022-03-29 | Stop reason: SDUPTHER

## 2022-03-29 RX ORDER — DOXYCYCLINE HYCLATE 100 MG
100 TABLET ORAL EVERY 12 HOURS
Qty: 14 TABLET | Refills: 0 | Status: SHIPPED | OUTPATIENT
Start: 2022-03-29 | End: 2022-04-05

## 2022-03-29 RX ORDER — GABAPENTIN 300 MG/1
300 CAPSULE ORAL 3 TIMES DAILY
Qty: 30 CAPSULE | Refills: 1 | Status: SHIPPED | OUTPATIENT
Start: 2022-03-29 | End: 2022-06-06

## 2022-03-29 RX ORDER — VALACYCLOVIR HYDROCHLORIDE 1 G/1
1000 TABLET, FILM COATED ORAL 3 TIMES DAILY
Qty: 39 TABLET | Refills: 0 | Status: SHIPPED | OUTPATIENT
Start: 2022-03-29 | End: 2022-06-06

## 2022-03-29 RX ADMIN — HYPROMELLOSE 2910 1 DROP: 5 SOLUTION OPHTHALMIC at 09:03

## 2022-03-29 RX ADMIN — NICOTINE 1 PATCH: 7 PATCH TRANSDERMAL at 09:03

## 2022-03-29 RX ADMIN — GABAPENTIN 300 MG: 300 CAPSULE ORAL at 09:03

## 2022-03-29 RX ADMIN — LITHIUM CARBONATE 300 MG: 300 TABLET, FILM COATED, EXTENDED RELEASE ORAL at 09:03

## 2022-03-29 RX ADMIN — ATOVAQUONE 1500 MG: 750 SUSPENSION ORAL at 09:03

## 2022-03-29 RX ADMIN — LEVOTHYROXINE SODIUM 25 MCG: 25 TABLET ORAL at 05:03

## 2022-03-29 RX ADMIN — FAMOTIDINE 20 MG: 20 TABLET ORAL at 09:03

## 2022-03-29 RX ADMIN — VALACYCLOVIR HYDROCHLORIDE 1000 MG: 500 TABLET, FILM COATED ORAL at 03:03

## 2022-03-29 RX ADMIN — BICTEGRAVIR SODIUM, EMTRICITABINE, AND TENOFOVIR ALAFENAMIDE FUMARATE 1 TABLET: 50; 200; 25 TABLET ORAL at 09:03

## 2022-03-29 RX ADMIN — MORPHINE SULFATE 15 MG: 15 TABLET ORAL at 04:03

## 2022-03-29 RX ADMIN — METHOCARBAMOL TABLETS 500 MG: 500 TABLET, COATED ORAL at 12:03

## 2022-03-29 RX ADMIN — METHOCARBAMOL TABLETS 500 MG: 500 TABLET, COATED ORAL at 04:03

## 2022-03-29 RX ADMIN — POLYETHYLENE GLYCOL 3350 17 G: 17 POWDER, FOR SOLUTION ORAL at 09:03

## 2022-03-29 RX ADMIN — BUSPIRONE HYDROCHLORIDE 10 MG: 10 TABLET ORAL at 09:03

## 2022-03-29 RX ADMIN — METHOCARBAMOL TABLETS 500 MG: 500 TABLET, COATED ORAL at 09:03

## 2022-03-29 RX ADMIN — VALACYCLOVIR HYDROCHLORIDE 1000 MG: 500 TABLET, FILM COATED ORAL at 09:03

## 2022-03-29 RX ADMIN — ERYTHROMYCIN 1 INCH: 5 OINTMENT OPHTHALMIC at 09:03

## 2022-03-29 RX ADMIN — SENNOSIDES AND DOCUSATE SODIUM 1 TABLET: 50; 8.6 TABLET ORAL at 03:03

## 2022-03-29 RX ADMIN — BUSPIRONE HYDROCHLORIDE 10 MG: 10 TABLET ORAL at 03:03

## 2022-03-29 RX ADMIN — TRIAMCINOLONE ACETONIDE: 1 CREAM TOPICAL at 09:03

## 2022-03-29 RX ADMIN — DOXYCYCLINE HYCLATE 100 MG: 100 TABLET, COATED ORAL at 09:03

## 2022-03-29 RX ADMIN — SIMETHICONE 80 MG: 80 TABLET, CHEWABLE ORAL at 03:03

## 2022-03-29 RX ADMIN — MORPHINE SULFATE 15 MG: 15 TABLET ORAL at 10:03

## 2022-03-29 RX ADMIN — GABAPENTIN 300 MG: 300 CAPSULE ORAL at 03:03

## 2022-03-29 RX ADMIN — HYPROMELLOSE 2910 1 DROP: 5 SOLUTION OPHTHALMIC at 12:03

## 2022-03-29 NOTE — SUBJECTIVE & OBJECTIVE
This encounter was provided through telemedicine.  Patient was transferred to the telemedicine service on:  03/26/2022   The patient location is: [id number] A admitted 3/19/2022  7:21 PM.  Present with the patient at the time of the telemed/virtual assessment: Telepresenter    Interval History/Overnight Events:     Patient with improved rash to face without drainage but continuing to have eye irritation and has been applying steroid cream to face which she will now discontinue   chest rash remains itchy despite hydrocortisone - Derm consulted to evaluate especially given immunocompromised status      Review of Systems   Constitutional:  Positive for fatigue. Negative for activity change and fever.   Respiratory:  Negative for cough and shortness of breath.    Gastrointestinal:  Negative for diarrhea and vomiting.   Skin:  Positive for rash.      Inpatient Medications:  Scheduled Meds:   artificial tears  1 drop Both Eyes QID    atovaquone  1,500 mg Oral Daily    eocrhsvyu-nyjatyxm-sikrpqb ala  1 tablet Oral Daily    busPIRone  10 mg Oral TID    doxycycline  100 mg Oral Q12H    erythromycin   Right Eye BID    famotidine  20 mg Oral BID    gabapentin  300 mg Oral TID    levothyroxine  25 mcg Oral Before breakfast    lithium  300 mg Oral BID    methocarbamoL  500 mg Oral QID    mirtazapine  15 mg Oral QHS    nicotine  1 patch Transdermal Daily    polyethylene glycol  17 g Oral Daily    [START ON 3/29/2022] triamcinolone acetonide 0.1%   Topical (Top) BID    valACYclovir  1,000 mg Oral TID     Continuous Infusions:  PRN Meds:.acetaminophen, ALPRAZolam, aluminum & magnesium hydroxide-simethicone, calamine-zinc oxide 8-8%, dextrose 10%, dextrose 10%, diphenhydrAMINE, glucagon (human recombinant), glucose, glucose, melatonin, morphine, morphine, naloxone, ondansetron, senna-docusate 8.6-50 mg, simethicone, sodium chloride 0.9%      Objective:     Temp:  [97.9 °F (36.6 °C)-98.4 °F (36.9 °C)] 98.4 °F (36.9 °C)  Pulse:   [69-88] 88  Resp:  [16-20] 20  SpO2:  [98 %-100 %] 99 %  BP: ()/() 145/85      Intake/Output Summary (Last 24 hours) at 3/28/2022 2314  Last data filed at 3/28/2022 0620  Gross per 24 hour   Intake 2299.38 ml   Output --   Net 2299.38 ml          Body mass index is 22.37 kg/m².    Physical Exam  Vitals and nursing note reviewed.   Constitutional:       General: She is not in acute distress.     Appearance: Normal appearance.   HENT:      Head: Normocephalic and atraumatic.   Eyes:      General: No scleral icterus.        Right eye: No discharge.         Left eye: No discharge.      Extraocular Movements: Extraocular movements intact.   Cardiovascular:      Rate and Rhythm: Normal rate.   Pulmonary:      Effort: Pulmonary effort is normal. No tachypnea or respiratory distress.   Skin:     Findings: Rash present. Rash is vesicular.      Comments: To right medial eye.   Neurological:      General: No focal deficit present.      Mental Status: She is alert and oriented to person, place, and time.      Cranial Nerves: No cranial nerve deficit.      Motor: No weakness.   Psychiatric:         Attention and Perception: Attention normal.         Mood and Affect: Mood and affect normal.         Speech: Speech normal.         Behavior: Behavior is cooperative.        Labs:  No results found for this or any previous visit (from the past 24 hour(s)).       Lab Results   Component Value Date    [id number] Negative 03/18/2022       Recent Labs   Lab 03/25/22 0415 03/26/22  0455 03/27/22  0521   WBC 4.69 5.29 4.64   LYMPH 28.6  1.3 28.0  1.5 29.0  CANCELED   HGB 11.6* 11.0* 11.5*   HCT 36.6* 34.1* 34.4*    312 267       Recent Labs   Lab 03/25/22 0415 03/26/22  0455 03/27/22  0520    132* 135*   K 4.1 3.5 4.3    100 104   CO2 23 24 24   BUN 12 16 15   CREATININE 0.7 0.8 0.7   GLU 85 114* 82   CALCIUM 9.1 9.2 9.3   MG 1.8 1.5* 2.0   PHOS 3.8 3.5 3.5       Recent Labs   Lab 03/25/22  0416  03/26/22  0455 03/27/22  0520   ALKPHOS 66 68 61   ALT 12 11 10   AST 14 14 14   ALBUMIN 2.9* 3.1* 2.9*   PROT 7.0 7.2 7.1   BILITOT 0.1 0.2 0.1          No results for input(s): DDIMER, FERRITIN, CRP, LDH, BNP, TROPONINI, CPK in the last 72 hours.    Invalid input(s): PROCALCITONIN    All labs within the last 24 hours were reviewed.     Microbiology:  Microbiology Results (last 7 days)       ** No results found for the last 168 hours. **              Imaging      No results found for this or any previous visit.      CT Renal Stone Study ABD Pelvis WO  Narrative: EXAMINATION:  CT RENAL STONE STUDY ABD PELVIS WO    CLINICAL HISTORY:  Flank pain, kidney stone suspected;    TECHNIQUE:  Multiplanar images were obtained of the abdomen and pelvis from the hemidiaphragms through the symphysis pubis without intravenous contrast.    COMPARISON:  CT of the abdomen pelvis from 11/07/2019 and additional priors.    FINDINGS:  Lung Bases: Clear.    Heart: Heart size is normal.  No pericardial effusion.    Liver: There is an indeterminate hypodensity within the right hepatic lobe measuring 1.1 cm, stable in size compared with CT from 11/07/2019.  Liver is normal in size.    Biliary tract: No intrahepatic or extrahepatic biliary ductal dilatation.    Gallbladder: No radiodense gallstone. No wall thickening or pericholecystic fluid.    Pancreas: Normal. No pancreatic ductal dilatation.    Spleen: Normal size without focal lesion.    Adrenals: Normal.    Kidneys and urinary collecting systems: Normal.  No hydronephrosis or urolithiasis.    Lymph nodes: None enlarged.    Stomach and bowel: The stomach is normal.  Loops of small and large bowel are normal in caliber without evidence for inflammation or obstruction.  The appendix is visualized and is normal.    Peritoneum and mesentery: No ascites or free intraperitoneal air. No abdominal fluid collection.    Vasculature: Normal.    Urinary bladder: Normal.    Reproductive organs: The  uterus and adnexae are unremarkable.    Body wall: There is a small fat containing umbilical hernia.    Musculoskeletal: No aggressive osseous lesion.  Impression: 1. No acute abnormality of the abdomen or pelvis.  2. Stable size of the indeterminate lesion within the right hepatic lobe.  Consider definitive characterization with nonemergent MRI the abdomen with and without contrast.    Electronically signed by: Danny Ang  Date:    11/19/2021  Time:    14:47  X-Ray Chest PA And Lateral  Narrative: EXAMINATION:  XR CHEST PA AND LATERAL    CLINICAL HISTORY:  Chest Pain;    TECHNIQUE:  PA and lateral views of the chest were performed.    COMPARISON:  11/14/2021    FINDINGS:  Multiple overlying cardiac monitoring leads. The cardiomediastinal silhouette is normal in size and midline. Pulmonary vascularity appears within normal limits.    The lungs appear clear without confluent pulmonary parenchymal opacity. No pleural fluid.    Osseous structures appear intact.  Impression: No evidence of acute cardiopulmonary disease.    Electronically signed by: Dick Boyce MD  Date:    11/19/2021  Time:    11:26      All imaging within the last 24 hours was reviewed.       Discharge Planning   RUFINA: 3/29/2022     Code Status: Full Code   Is the patient medically ready for discharge?: No    Reason for patient still in hospital (select all that apply): Patient trending condition and Treatment  Discharge Plan A: Home with family

## 2022-03-29 NOTE — PROGRESS NOTES
[facility] ([address] Medicine  Telemedicine Progress Note    Patient Name: Patricia Nye  MRN: [id number]  Patient Class: IP- Inpatient   Admission Date: 3/19/2022  Length of Stay: 9 days  Attending Physician: Marguerite Padron MD  Primary Care Provider: Cornell Britt MD          Subjective:     Principal Problem:Herpes zoster ophthalmicus of right eye        HPI:  Patricia Nye is a 46 y.o. female with a PMHx of HIV, insomnia, anxiety/depression, polysubstance abuse, and hypothyroidism who was [place] to [place] on March 18th with right periorbital swelling. The patient reports a rash developed to her chest wall about 1.5-2 weeks ago accompanied by a sensation of pins and needles. She then developed swelling around her right eye and had a rash around her eye and the bridge of her nose beginning 3 days ago. She was seen at [facility] two nights ago but left AMA and then presented to [facility]. She was admitted with concern for herpes zoster ophthalmicus and started on IV acyclovir. Eye is reportedly more swollen, and she c/o of stinging pain to the skin over her eye and the right side of her face. Denies any pain to her actual eye and denies any blurry vision. Endorses chills. Denies any fever, abdominal pain, chest pain, nausea, vomiting, diarrhea, shortness of breath, cough, or dysuria. The patient was transferred to [facility] for ophthalmology consult.    Labs were reviewed from OSH: COVID negative, sodium 138, potassium 3.6, chloride 103, CO2 23, BUN 9, creatinine 0.8, glucose 100, AST 20, ALT 15, white blood cells 6.82, hemoglobin 12.2, hematocrit 37.5, platelets 324      Overview/Hospital Course:  Ophthalmology and Infectious Disease consulted on admission.  IV acyclovir continued with intermittent IV fluids for renal protection.  Vancomycin and Rocephin initiated as concern for superimposed bacterial infection which may been prompted by patient  use of dexamethasone ointment from home.  Antibiotics narrowed to vancomycin.  Per Ophthalmology, no indication for imaging.  Recommended ophthalmic antibiotic ointment and lubrication in addition to IV acyclovir continuance.  Per ID, continue IV acyclovir for 7 days (03/26) or until cessation of new lesion development.  There after transition to p.o. acyclovir.  Vanc was dc'd 03/24- pt transitioned to doxy 03/24.         This encounter was provided through telemedicine.  Patient was transferred to the telemedicine service on:  03/26/2022   The patient location is: [facility]/[id number] A admitted 3/19/2022  7:21 PM.  Present with the patient at the time of the telemed/virtual assessment: Telepresenter    Interval History/Overnight Events:     Patient with improved rash to face without drainage but continuing to have eye irritation and has been applying steroid cream to face which she will now discontinue   chest rash remains itchy despite hydrocortisone - Derm consulted to evaluate especially given immunocompromised status      Review of Systems   Constitutional:  Positive for fatigue. Negative for activity change and fever.   Respiratory:  Negative for cough and shortness of breath.    Gastrointestinal:  Negative for diarrhea and vomiting.   Skin:  Positive for rash.      Inpatient Medications:  Scheduled Meds:   artificial tears  1 drop Both Eyes QID    atovaquone  1,500 mg Oral Daily    pjqmhnhwu-siikzrlc-cedqqyn ala  1 tablet Oral Daily    busPIRone  10 mg Oral TID    doxycycline  100 mg Oral Q12H    erythromycin   Right Eye BID    famotidine  20 mg Oral BID    gabapentin  300 mg Oral TID    levothyroxine  25 mcg Oral Before breakfast    lithium  300 mg Oral BID    methocarbamoL  500 mg Oral QID    mirtazapine  15 mg Oral QHS    nicotine  1 patch Transdermal Daily    polyethylene glycol  17 g Oral Daily    [START ON 3/29/2022] triamcinolone acetonide 0.1%   Topical (Top) BID    valACYclovir  1,000 mg Oral  TID     Continuous Infusions:  PRN Meds:.acetaminophen, ALPRAZolam, aluminum & magnesium hydroxide-simethicone, calamine-zinc oxide 8-8%, dextrose 10%, dextrose 10%, diphenhydrAMINE, glucagon (human recombinant), glucose, glucose, melatonin, morphine, morphine, naloxone, ondansetron, senna-docusate 8.6-50 mg, simethicone, sodium chloride 0.9%      Objective:     Temp:  [97.9 °F (36.6 °C)-98.4 °F (36.9 °C)] 98.4 °F (36.9 °C)  Pulse:  [69-88] 88  Resp:  [16-20] 20  SpO2:  [98 %-100 %] 99 %  BP: ()/() 145/85      Intake/Output Summary (Last 24 hours) at 3/28/2022 2314  Last data filed at 3/28/2022 0620  Gross per 24 hour   Intake 2299.38 ml   Output --   Net 2299.38 ml          Body mass index is 22.37 kg/m².    Physical Exam  Vitals and nursing note reviewed.   Constitutional:       General: She is not in acute distress.     Appearance: Normal appearance.   HENT:      Head: Normocephalic and atraumatic.   Eyes:      General: No scleral icterus.        Right eye: No discharge.         Left eye: No discharge.      Extraocular Movements: Extraocular movements intact.   Cardiovascular:      Rate and Rhythm: Normal rate.   Pulmonary:      Effort: Pulmonary effort is normal. No tachypnea or respiratory distress.   Skin:     Findings: Rash present. Rash is vesicular.      Comments: To right medial eye.   Neurological:      General: No focal deficit present.      Mental Status: She is alert and oriented to person, place, and time.      Cranial Nerves: No cranial nerve deficit.      Motor: No weakness.   Psychiatric:         Attention and Perception: Attention normal.         Mood and Affect: Mood and affect normal.         Speech: Speech normal.         Behavior: Behavior is cooperative.        Labs:  No results found for this or any previous visit (from the past 24 hour(s)).       Lab Results   Component Value Date    [id number] Negative 03/18/2022       Recent Labs   Lab 03/25/22  3075 03/26/22  2510  03/27/22  0521   WBC 4.69 5.29 4.64   LYMPH 28.6  1.3 28.0  1.5 29.0  CANCELED   HGB 11.6* 11.0* 11.5*   HCT 36.6* 34.1* 34.4*    312 267       Recent Labs   Lab 03/25/22 0415 03/26/22 0455 03/27/22  0520    132* 135*   K 4.1 3.5 4.3    100 104   CO2 23 24 24   BUN 12 16 15   CREATININE 0.7 0.8 0.7   GLU 85 114* 82   CALCIUM 9.1 9.2 9.3   MG 1.8 1.5* 2.0   PHOS 3.8 3.5 3.5       Recent Labs   Lab 03/25/22 0415 03/26/22 0455 03/27/22  0520   ALKPHOS 66 68 61   ALT 12 11 10   AST 14 14 14   ALBUMIN 2.9* 3.1* 2.9*   PROT 7.0 7.2 7.1   BILITOT 0.1 0.2 0.1          No results for input(s): DDIMER, FERRITIN, CRP, LDH, BNP, TROPONINI, CPK in the last 72 hours.    Invalid input(s): PROCALCITONIN    All labs within the last 24 hours were reviewed.     Microbiology:  Microbiology Results (last 7 days)       ** No results found for the last 168 hours. **              Imaging      No results found for this or any previous visit.      CT Renal Stone Study ABD Pelvis WO  Narrative: EXAMINATION:  CT RENAL STONE STUDY ABD PELVIS WO    CLINICAL HISTORY:  Flank pain, kidney stone suspected;    TECHNIQUE:  Multiplanar images were obtained of the abdomen and pelvis from the hemidiaphragms through the symphysis pubis without intravenous contrast.    COMPARISON:  CT of the abdomen pelvis from 11/07/2019 and additional priors.    FINDINGS:  Lung Bases: Clear.    Heart: Heart size is normal.  No pericardial effusion.    Liver: There is an indeterminate hypodensity within the right hepatic lobe measuring 1.1 cm, stable in size compared with CT from 11/07/2019.  Liver is normal in size.    Biliary tract: No intrahepatic or extrahepatic biliary ductal dilatation.    Gallbladder: No radiodense gallstone. No wall thickening or pericholecystic fluid.    Pancreas: Normal. No pancreatic ductal dilatation.    Spleen: Normal size without focal lesion.    Adrenals: Normal.    Kidneys and urinary collecting systems: Normal.   No hydronephrosis or urolithiasis.    Lymph nodes: None enlarged.    Stomach and bowel: The stomach is normal.  Loops of small and large bowel are normal in caliber without evidence for inflammation or obstruction.  The appendix is visualized and is normal.    Peritoneum and mesentery: No ascites or free intraperitoneal air. No abdominal fluid collection.    Vasculature: Normal.    Urinary bladder: Normal.    Reproductive organs: The uterus and adnexae are unremarkable.    Body wall: There is a small fat containing umbilical hernia.    Musculoskeletal: No aggressive osseous lesion.  Impression: 1. No acute abnormality of the abdomen or pelvis.  2. Stable size of the indeterminate lesion within the right hepatic lobe.  Consider definitive characterization with nonemergent MRI the abdomen with and without contrast.    Electronically signed by: Danny Ang  Date:    11/19/2021  Time:    14:47  X-Ray Chest PA And Lateral  Narrative: EXAMINATION:  XR CHEST PA AND LATERAL    CLINICAL HISTORY:  Chest Pain;    TECHNIQUE:  PA and lateral views of the chest were performed.    COMPARISON:  11/14/2021    FINDINGS:  Multiple overlying cardiac monitoring leads. The cardiomediastinal silhouette is normal in size and midline. Pulmonary vascularity appears within normal limits.    The lungs appear clear without confluent pulmonary parenchymal opacity. No pleural fluid.    Osseous structures appear intact.  Impression: No evidence of acute cardiopulmonary disease.    Electronically signed by: Dick Boyce MD  Date:    11/19/2021  Time:    11:26      All imaging within the last 24 hours was reviewed.       Discharge Planning   RUFINA: 3/29/2022     Code Status: Full Code   Is the patient medically ready for discharge?: No    Reason for patient still in hospital (select all that apply): Patient trending condition and Treatment  Discharge Plan A: Home with family            Assessment/Plan:      * Herpes zoster ophthalmicus of right  eye  Patient reports rash developed to her chest wall about 1.5-2 weeks ago accompanied by a sensation of pins and needles. She then developed swelling around her right eye and had a rash around her eye and the bridge of her nose beginning 3 days ago. She was seen at [facility] two nights ago but left AMA and presented to [place]. She was admitted with concern for herpes zoster ophthalmicus and started on IV acyclovir. Eye  is reportedly more swollen, and she c/o of stinging pain to the skin over her eye and the right side of her face. Denies any pain to her actual eye and denies any blurry vision.  The patient was transferred to [facility] for ophthalmology consult.    - Continue IV acyclovir 500mg q8hrs  - IVF qhs  - Contact, droplet, and airborne isolation  - Ophthalmology consulted- continue Erythromycin to skin lesions and right eye BID, Preservative Free Tears QID, antiviral treatment per ID  - Continue gabapentin 300mg tid  - ID consulted, appreciate recs- continue IV acyclovir until no new lesions forming or for 7 days; complete therapy with valtrex 1 gm po TID for 14 days  - Transitioned to doxy 03/24 and monitor response  - symptomatic management of pain: increase gabapentin as clinically indicated, tylenol prn, IV morphine p.r.n. (allergies noted to other opioids)    Pruritic dermatitis  -present since 12/2021 and uses calamine at home when flares.  -no erythema but mild flesh colored papules to anterior chest  -discontinue calamine lotion to pruritic area; changed to hydrocortisone for pruritis  -derm referral - recommend triamcinolone cream with sarna lotion      Anxiety disorder  - Chronic, stable.  reviewed.   - Continue home xanax PRN  - Continue buspar 10mg tid    Tobacco abuse  Assistance with smoking cessation was offered on admission, including:  [x]  Medications  [x]  Counseling  []  Printed Information on Smoking Cessation  []  Referral to a Smoking Cessation Program    Patient was counseled  regarding smoking for 3-10 minutes on admit.     Primary insomnia  - Continue remeron qhs    Major depressive disorder in remission  - Bipolar 1 disorder  - Continue lithium, buspar, mirtazapine    HIV disease  This patient in known to have AIDS (from CD4 count has been less than 200). Last CD4 21 (April 2021), new CD4 count pending. Patient is on HAART. Will continue HAART. Continued prophylaxis with atovaquone. Continue to monitor routine labs.     - We will consult Infectious disease at this time. Appreciate recs.   - Continue Biktarvy and atovaquone ppx.  - CD4 on admit 99      VTE Risk Mitigation (From admission, onward)         Ordered     IP VTE LOW RISK PATIENT  Once         03/19/22 1925     Place sequential compression device  Until discontinued         03/19/22 1925                High Risk Conditions:  Patient is currently receiving parenteral controlled substances: Morphine      I have assessed these findings virtually using a telemed platform and with assistance of the bedside nurse.        The attending portion of this evaluation, treatment, and documentation was performed per Marguerite Padron MD via Telemedicine AudioVisual using the secure [employer] software platform with 2 way audio/video. The provider was located off-site and the patient is located in the hospital. The aforementioned video software was utilized to document the relevant history and physical exam    Marguerite Padron MD  Department of Hospital Medicine   [facility] - Intensive Care ([place]

## 2022-03-29 NOTE — PROGRESS NOTES
Patient IV Right forearm infiltrated, Removed IV, patient refusing new IV as she is expecting to discharge home today. On call Haley Benson  Notified via secured message

## 2022-03-29 NOTE — ASSESSMENT & PLAN NOTE
-present since 12/2021 and uses calamine at home when flares.  -no erythema but mild flesh colored papules to anterior chest  -discontinue calamine lotion to pruritic area; changed to hydrocortisone for pruritis  -derm referral - recommend triamcinolone cream with sarna lotion

## 2022-03-29 NOTE — PROGRESS NOTES
"[facility] - Intensive Care ([place] [address])  Infectious Disease  Progress Note    Patient Name: Patricia Nye  MRN: [id number]  Admission Date: 3/19/2022  Length of Stay: 10 days  Attending Physician: Marguerite Padron MD  Primary Care Provider: Cornell Britt MD    Isolation Status: Contact and Airborne  Assessment/Plan:      * Herpes zoster ophthalmicus of right eye  47 y/o female with a hx of HIV (on biktarvy) presented 3/18 with right periorbital swelling and rash concerning for herpes zoster ophthalmicus started on IV acyclovir s/p ophtha exam 3/20 (Exam positive for pseudodendritic lesions (per exam) c/b by bacterial superinfection treated with IV vanc.    ID reconsulted 3/29 due to patient's concern for HSV meningitis. Currently without any symptoms of CNS infection including HA, neck pain or rigidity, fevers. Her herpes lesions have crusted over and her eye symptoms have improved. Do not suspect viral meningoencephalitis.    Recommendations:  1. Agree with valtrex 1g TID to complete a 14 day course.  2. Agree with continuing to doxy to complete a 14 day for superimposed bacterial infection.  3. Needs f/u with ophtha       HIV disease  Patient of Dr. Mendoza- Diagnosed 1997- last VL 1/2022 ~149     Recommendations:  -- CD 4 level 99.  HIV viral load 146 on 1/5/22.   -- Continue Biktarvy and atovaquone ppx.  -- needs f/u with outpatient HIV provider          Anticipated Disposition: tbd    Thank you for your consult. I will sign off. Please contact us if you have any additional questions.    Selina Valencia MD  Infectious Disease  [facility] - Intensive Care ([place] [address])    Subjective:     Principal Problem:Herpes zoster ophthalmicus of right eye    HPI: Patricia Nye is 47 y/o female with hx of HIV hypothyroidism, anxiety, depression, and insomnia presented to [facility] on 3/18 with right periorbital swelling.      Patient report having "chicken pox rash on chest and " "back" since her last admission ~12/2021 she was advised to take acyclovir which she has stopped end of feburary - now report rash appeared in her face and she noted a "pimple" on her upper eye led that she tried to "squeeze" and for past 2 days she had worsening swelling- denies any vision changes or eye pain- she does report some HA.    ID consulted for zoster ophthalmicus.         Interval History: ID reconsulted due to patient's concern that she may have viral meningitis. She denies fevers, headaches, neck pain/ rigidity. She is up, walking and conversant during our evaluation.    Review of Systems   Constitutional:  Negative for chills, fatigue and fever.   HENT:  Negative for congestion and sore throat.    Eyes:  Positive for visual disturbance.   Respiratory:  Negative for cough and shortness of breath.    Gastrointestinal:  Negative for abdominal pain and nausea.   Genitourinary:  Negative for difficulty urinating and enuresis.   Musculoskeletal:  Negative for arthralgias, back pain, gait problem, myalgias, neck pain and neck stiffness.   Skin:  Negative for wound.   Neurological:  Negative for dizziness, seizures and headaches.   Psychiatric/Behavioral:  Negative for agitation and confusion.    Objective:     Vital Signs (Most Recent):  Temp: 98.4 °F (36.9 °C) (03/29/22 1552)  Pulse: 67 (03/29/22 1146)  Resp: 18 (03/29/22 1552)  BP: (!) 151/97 (03/29/22 1552)  SpO2: 99 % (03/29/22 1552)   Vital Signs (24h Range):  Temp:  [97.4 °F (36.3 °C)-98.4 °F (36.9 °C)] 98.4 °F (36.9 °C)  Pulse:  [67-88] 67  Resp:  [15-20] 18  SpO2:  [99 %-100 %] 99 %  BP: ()/(67-97) 151/97     Weight: 53.7 kg (118 lb 6.2 oz)  Body mass index is 22.37 kg/m².    Estimated Creatinine Clearance: 76.6 mL/min (based on SCr of 0.7 mg/dL).    Physical Exam  Vitals reviewed.   Constitutional:       Appearance: Normal appearance.   HENT:      Head: Normocephalic and atraumatic.      Nose: Nose normal.      Mouth/Throat:      Mouth: Mucous " membranes are moist.   Eyes:      Pupils: Pupils are equal, round, and reactive to light.      Comments: Rt eye: injected conjunctiva   Pulmonary:      Effort: Pulmonary effort is normal.   Abdominal:      General: Abdomen is flat. There is no distension.      Palpations: Abdomen is soft.   Musculoskeletal:      Cervical back: Normal range of motion and neck supple. No rigidity.   Skin:     General: Skin is warm and dry.      Comments: Erythematous crusted lesion at tip of nose and medial to rt eye   Neurological:      General: No focal deficit present.      Mental Status: She is alert and oriented to person, place, and time.   Psychiatric:         Mood and Affect: Mood normal.         Behavior: Behavior normal.       Significant Labs: All pertinent labs within the past 24 hours have been reviewed.    Significant Imaging: I have reviewed all pertinent imaging results/findings within the past 24 hours.

## 2022-03-29 NOTE — SUBJECTIVE & OBJECTIVE
Interval History: ID reconsulted due to patient's concern that she may have viral meningitis. She denies fevers, headaches, neck pain/ rigidity. She is up, walking and conversant during our evaluation.    Review of Systems   Constitutional:  Negative for chills, fatigue and fever.   HENT:  Negative for congestion and sore throat.    Eyes:  Positive for visual disturbance.   Respiratory:  Negative for cough and shortness of breath.    Gastrointestinal:  Negative for abdominal pain and nausea.   Genitourinary:  Negative for difficulty urinating and enuresis.   Musculoskeletal:  Negative for arthralgias, back pain, gait problem, myalgias, neck pain and neck stiffness.   Skin:  Negative for wound.   Neurological:  Negative for dizziness, seizures and headaches.   Psychiatric/Behavioral:  Negative for agitation and confusion.    Objective:     Vital Signs (Most Recent):  Temp: 98.4 °F (36.9 °C) (03/29/22 1552)  Pulse: 67 (03/29/22 1146)  Resp: 18 (03/29/22 1552)  BP: (!) 151/97 (03/29/22 1552)  SpO2: 99 % (03/29/22 1552)   Vital Signs (24h Range):  Temp:  [97.4 °F (36.3 °C)-98.4 °F (36.9 °C)] 98.4 °F (36.9 °C)  Pulse:  [67-88] 67  Resp:  [15-20] 18  SpO2:  [99 %-100 %] 99 %  BP: ()/(67-97) 151/97     Weight: 53.7 kg (118 lb 6.2 oz)  Body mass index is 22.37 kg/m².    Estimated Creatinine Clearance: 76.6 mL/min (based on SCr of 0.7 mg/dL).    Physical Exam  Vitals reviewed.   Constitutional:       Appearance: Normal appearance.   HENT:      Head: Normocephalic and atraumatic.      Nose: Nose normal.      Mouth/Throat:      Mouth: Mucous membranes are moist.   Eyes:      Pupils: Pupils are equal, round, and reactive to light.      Comments: Rt eye: injected conjunctiva   Pulmonary:      Effort: Pulmonary effort is normal.   Abdominal:      General: Abdomen is flat. There is no distension.      Palpations: Abdomen is soft.   Musculoskeletal:      Cervical back: Normal range of motion and neck supple. No rigidity.    Skin:     General: Skin is warm and dry.      Comments: Erythematous crusted lesion at tip of nose and medial to rt eye   Neurological:      General: No focal deficit present.      Mental Status: She is alert and oriented to person, place, and time.   Psychiatric:         Mood and Affect: Mood normal.         Behavior: Behavior normal.       Significant Labs: All pertinent labs within the past 24 hours have been reviewed.    Significant Imaging: I have reviewed all pertinent imaging results/findings within the past 24 hours.

## 2022-03-29 NOTE — NURSING
Patient discharged by wheelchair into friend's auto.    Awake, alert and oriented. No PIV, has all medications (except eyewash) and discharge instructions have been reviewed.

## 2022-03-29 NOTE — PROGRESS NOTES
Consultation Report  Ophthalmology Service    Date: 03/29/2022    Chief complaint/Reason for Consult: concern for zoster ophthalmicus     History of Present Illness: Patricia Nye is a 46 y.o. female with PMHx of HIV, insomnia, anxiety/depression, polysubstance abuse, and hypothyroidism who was admitted to [facility] on March 18th with right periorbital swelling. On admission w/o any visual changes, visual disturbances, such as flashes, floaters, or curtain-veil in visual field OU. Pain mostly overlying nasal skin, no eye pain.    Interval Hx: Ongoing pain, no acute events. No new blurry vision    POcularHx: Denies history of ocular problems or past ocular surgeries.    Current eye gtts: Denies     Family Hx: Denies family history of glaucoma, macular degeneration, or blindness. family history includes Heart disease in her mother.     PMHx:  has a past medical history of Allergy, Anxiety, Cervical dysplasia (1998 / 2016), Depression, HIV infection, Hyperthyroidism, and Insomnia.     PSurgHx:  has a past surgical history that includes Skull fracture elevation.     Home Medications:   Prior to Admission medications    Medication Sig Start Date End Date Taking? Authorizing Provider   ALPRAZolam (XANAX XR) 0.5 MG Tb24 Take one tablet PRN anxiety. 2/10/22   Cornell Britt MD   atovaquone (MEPRON) 750 mg/5 mL Susp Take 1,500 mg by mouth. 10/10/19   Historical Provider   azelastine (ASTELIN) 137 mcg (0.1 %) nasal spray 1 spray (137 mcg total) by Nasal route 2 (two) times daily. 2/19/21 6/29/21  Connie Lake DNP   BIKTARVY -25 mg per tablet Take 1 tablet by mouth once daily. 2/1/22   Pedro Lott MD   busPIRone (BUSPAR) 10 MG tablet Take 1 tablet (10 mg total) by mouth 3 (three) times daily. 2/10/22 3/12/22  Cornell Britt MD   cetirizine (ZYRTEC) 10 MG tablet Take 1 tablet (10 mg total) by mouth once daily. 2/10/22 3/12/22  Cornell Britt MD    diphenhydrAMINE-aluminum-magnesium hydroxide-simethicone-LIDOcaine HCl 2% Swish and spit 15 mLs every 4 (four) hours as needed (mouth pain). 2/10/22   Cornell Britt MD   famotidine (PEPCID) 20 MG tablet Take 1 tablet (20 mg total) by mouth 2 (two) times daily. 2/10/22 3/12/22  Cornell Brtit MD   fluconazole (DIFLUCAN) 150 MG Tab One p.o. q.d. x7 days 12/6/21   Cornell Britt MD   fluticasone propionate (FLONASE) 50 mcg/actuation nasal spray 1 spray (50 mcg total) by Each Nostril route 2 (two) times daily as needed for Rhinitis. 2/10/22   Cornell Britt MD   hydrOXYzine HCL (ATARAX) 25 MG tablet Take 1 tablet (25 mg total) by mouth 3 (three) times daily as needed for Anxiety. 11/14/21   Raza Schmitz MD   ibuprofen (ADVIL,MOTRIN) 600 MG tablet Start after steroid complete 1 p.o. q.6 hours p.r.n. pain swell 2/10/22   Cornell Britt MD   ketotifen (ALAWAY) 0.025 % (0.035 %) ophthalmic solution Place 1 drop into both eyes once daily. 2/14/22 2/14/23  Kami Morgan NP   levothyroxine (SYNTHROID) 25 MCG tablet Take 1 tablet (25 mcg total) by mouth before breakfast. 12/1/21 12/31/21  West Christensen MD   LIDOcaine HCl 2% (LIDOCAINE VISCOUS) 2 % Soln TAKE ONE TEASPOONFULS right before eating EVERY 6 HOURS IF NEEDED FOR PAIN 3/18/22   Cornell Britt MD   lithium (LITHOTAB) 300 mg tablet Take 300 mg by mouth 2 (two) times a day. LITHIUM CARBONATE ER    Historical Provider   methocarbamoL (ROBAXIN) 500 MG Tab Take 2 tablets (1,000 mg total) by mouth 3 (three) times daily. for 5 days 3/14/22 3/19/22  Kelly Espino PA-C   mirtazapine (REMERON) 15 MG tablet Take 1 tablet (15 mg total) by mouth every evening. 12/1/21 12/31/21  West Christensen MD   naproxen (NAPROSYN) 500 MG tablet Take 1 tablet (500 mg total) by mouth 2 (two) times daily. 12/18/20   Ritchie Britt MD   predniSONE (DELTASONE) 5 MG tablet 4 po qd x 2, 3 po qd x2, 2 po qd x2, 1 po qd x2 2/10/22   Cornell Britt MD    promethazine (PHENERGAN) 25 MG tablet Take 1 tablet (25 mg total) by mouth every 6 (six) hours as needed for Nausea. 2/1/22   Pedro Lott MD   QUEtiapine (SEROQUEL) 50 MG tablet Take 1 tablet (50 mg total) by mouth every evening. 2/10/22 2/10/23  Cornell Britt MD        Medications this encounter:    artificial tears  1 drop Both Eyes QID    atovaquone  1,500 mg Oral Daily    vluvncfel-axqyznoy-uhgsyfk ala  1 tablet Oral Daily    busPIRone  10 mg Oral TID    doxycycline  100 mg Oral Q12H    erythromycin   Right Eye BID    famotidine  20 mg Oral BID    gabapentin  300 mg Oral TID    levothyroxine  25 mcg Oral Before breakfast    lithium  300 mg Oral BID    methocarbamoL  500 mg Oral QID    mirtazapine  15 mg Oral QHS    nicotine  1 patch Transdermal Daily    polyethylene glycol  17 g Oral Daily    triamcinolone acetonide 0.1%   Topical (Top) BID    valACYclovir  1,000 mg Oral TID       Allergies: is allergic to opioids - morphine analogues, pcn [penicillins], and sulfa (sulfonamide antibiotics).     Social:  reports that she has been smoking vaping with nicotine. She has never used smokeless tobacco. She reports current alcohol use. She reports previous drug use.     ROS: As per HPI    Ocular examination/Dilated fundus examination:  Base Eye Exam     Visual Acuity (Snellen - Linear)       Right Left    Dist sc 20/30 20/20          Tonometry (Tonopen, 11:18 AM)       Right Left    Pressure 9 9          Pupils       Pupils Dark Light Shape React APD    Right PERRL 4 2 Round Brisk None    Left PERRL 4 2 Round Brisk None          Visual Fields       Right Left     Full Full          Extraocular Movement       Right Left     Full, Ortho Full, Ortho            Slit Lamp and Fundus Exam     External Exam       Right Left    External V1 dermatome vesicular rash healing, no obvious new lesions Normal          Slit Lamp Exam       Right Left    Lids/Lashes Healing vesicular rash of UL>LL. Normal     Conjunctiva/Sclera tr inf chemosis, 1+ injection White and quiet    Cornea Coalesced healing epi defects inferiorly Clear    Anterior Chamber deep and formed deep and formed    Iris Round and reactive Round and reactive    Lens Clear Clear    Anterior Vitreous Normal Normal                  Assessment/Plan:     1. Zoster Ophthalmicus, Right Eye  - Patient presents with V1 dermatome distribution vesicular rash starting 2-3 days prior to ophthalmology evaluation on affected side. +[place]  - History of HIV (CD4 99, 11.2%)  - Patient without significantly decreased vision, good IOP.  - initial exam revealing no cell/flare, no KP,+pseudodendritic lesions  - No signs of ARN/PORN on previous dfe  - no new K lesions since last exam on 3/25/22; V1 skin lesions appear improved  - continue Erythromycin to skin lesions and right eye BID  - continue Preservative Free Tears QID   - antiviral treatment per ID      Josue Barrera MD  [facility] Ophthalmology  03/29/2022

## 2022-03-29 NOTE — PLAN OF CARE
Patricia was cooperative. Supported with active listening and plan of care explained.   Heart rate sinus, BP soft MAP >65. On room air SPO2 98%.  Oral morphine given for pain in right eye. Comfort and safety maintained, call light in reach and personal belongings in reach, bed in low position and call light answered in person. Continue to monitor.  Problem: Adult Inpatient Plan of Care  Goal: Plan of Care Review  Outcome: Ongoing, Progressing  Goal: Patient-Specific Goal (Individualized)  Outcome: Ongoing, Progressing  Goal: Absence of Hospital-Acquired Illness or Injury  Outcome: Ongoing, Progressing  Goal: Optimal Comfort and Wellbeing  Outcome: Ongoing, Progressing  Goal: Readiness for Transition of Care  Outcome: Ongoing, Progressing     Problem: Infection  Goal: Absence of Infection Signs and Symptoms  Outcome: Ongoing, Progressing

## 2022-03-29 NOTE — ASSESSMENT & PLAN NOTE
45 y/o female with a hx of HIV (on biktarvy) presented 3/18 with right periorbital swelling and rash concerning for herpes zoster ophthalmicus started on IV acyclovir s/p ophtha exam 3/20 (Exam positive for pseudodendritic lesions (per exam) c/b by bacterial superinfection treated with IV vanc.    ID reconsulted 3/29 due to patient's concern for HSV meningitis. Currently without any symptoms of CNS infection including HA, neck pain or rigidity, fevers. Her herpes lesions have crusted over and her eye symptoms have improved. Do not suspect viral meningoencephalitis.    Recommendations:  1. Agree with valtrex 1g TID to complete a 14 day course.  2. Agree with continuing to doxy to complete a 14 day for superimposed skin infection.  3. Needs f/u with ophtha

## 2022-03-29 NOTE — ASSESSMENT & PLAN NOTE
Patient reports rash developed to her chest wall about 1.5-2 weeks ago accompanied by a sensation of pins and needles. She then developed swelling around her right eye and had a rash around her eye and the bridge of her nose beginning 3 days ago. She was seen at [facility] two nights ago but left AMA and presented to [facility]. She was admitted with concern for herpes zoster ophthalmicus and started on IV acyclovir. Eye  is reportedly more swollen, and she c/o of stinging pain to the skin over her eye and the right side of her face. Denies any pain to her actual eye and denies any blurry vision.  The patient [place] [facility] [place].    - Continue IV acyclovir 500mg q8hrs  - IVF qhs  - Contact, droplet, and airborne isolation  - Ophthalmology consulted- continue Erythromycin to skin lesions and right eye BID, Preservative Free Tears QID, antiviral treatment per ID  - Continue gabapentin 300mg tid  - ID consulted, appreciate recs- continue IV acyclovir until no new lesions forming or for 7 days; complete therapy with valtrex 1 gm po TID for 14 days  - Transitioned to doxy 03/24 and monitor response  - symptomatic management of pain: increase gabapentin as clinically indicated, tylenol prn, IV morphine p.r.n. (allergies noted to other opioids)

## 2022-03-29 NOTE — PLAN OF CARE
[facility] ([facility])  Discharge Final Note    Primary Care Provider: Cornell Britt MD    Expected Discharge Date: 3/29/2022    Final Discharge Note (most recent)       Final Note - 03/29/22 1604          Final Note    Assessment Type Final Discharge Note (P)      Anticipated Discharge Disposition Home or Self Care (P)      Hospital Resources/Appts/Education Provided Provided patient/caregiver with written discharge plan information;Post-Acute resouces added to AVS;Community resources provided (P)         Post-Acute Status    Post-Acute Authorization Placement (P)                      Important Message from Medicare         Pt will dc home today with no post acute needs. Patient states that she will have transportation to get home today.     Future Appointments   Date Time Provider Department Center   4/5/2022  9:45 AM Елена De La Rosa MD [facility]   4/25/2022  8:20 AM Cornell Britt MD [facility]   5/12/2022  3:40 PM Cornell Britt MD [facility]   6/6/2022  3:00 PM Rd Virk MD [facility]           Connie Álvarez, LMSW [facility]   [id number]

## 2022-03-29 NOTE — ASSESSMENT & PLAN NOTE
Patient of Dr. Mendoza- Diagnosed 1997- last VL 1/2022 ~149     Recommendations:  -- CD 4 level 99.  HIV viral load 146 on 1/5/22.   -- Continue Biktarvy and atovaquone ppx.  -- needs f/u with outpatient HIV provider

## 2022-03-30 ENCOUNTER — TELEPHONE (OUTPATIENT)
Dept: PRIMARY CARE CLINIC | Facility: CLINIC | Age: 47
End: 2022-03-30
Payer: MEDICAID

## 2022-03-30 NOTE — TELEPHONE ENCOUNTER
----- Message from Marizol Gonzalez sent at 3/30/2022  9:19 AM CDT -----  Contact: [phone]  Patient would like to get medical advice.  Symptoms (please be specific):  fever  How long have you had these symptoms: x few days   Would you like a call back, or a response through your [facility] portal?:  call  Pharmacy name and phone # (copy from chart):    [facility] - [facility] [address]  [address] [place] [facility] [address]  Phone: [phone] Fax: [phone]    [facility] - [facility] [address]  [address] [place] [id number]  Phone: [phone] Fax: [phone]      Comments:  3/18 - 3/29 recent hospital stay

## 2022-04-01 DIAGNOSIS — B02.30 HERPES ZOSTER OPHTHALMICUS OF RIGHT EYE: Primary | ICD-10-CM

## 2022-04-01 NOTE — ASSESSMENT & PLAN NOTE
Patient reports rash developed to her chest wall about 1.5-2 weeks ago accompanied by a sensation of pins and needles. She then developed swelling around her right eye and had a rash around her eye and the bridge of her nose beginning 3 days ago. She was seen at [facility] two nights ago but left AMA and presented to [facility]. She was admitted with concern for herpes zoster ophthalmicus and started on IV acyclovir. Eye  is reportedly more swollen, and she c/o of stinging pain to the skin over her eye and the right side of her face. Denies any pain to her actual eye and denies any blurry vision.  The patient was transferred to [facility] for ophthalmology consult.    - Continue IV acyclovir 500mg q8hrs  - IVF qhs  - Contact, droplet, and airborne isolation  - Ophthalmology consulted- continue Erythromycin to skin lesions and right eye BID, Preservative Free Tears QID, antiviral treatment per ID  - Continue gabapentin 300mg tid  - ID consulted, appreciate recs- continue IV acyclovir until no new lesions forming or for 7 days; complete therapy with valtrex 1 gm po TID for 14 days  - Transitioned to doxy 03/24 and monitor response  - symptomatic management of pain: increase gabapentin as clinically indicated, tylenol prn, IV morphine p.r.n. (allergies noted to other opioids)

## 2022-04-01 NOTE — DISCHARGE SUMMARY
[facility] ([address] Medicine  Discharge Summary      Patient Name: Patricia Nye  MRN: [id number]  Patient Class: IP- Inpatient  Admission Date: 3/19/2022  Hospital Length of Stay: 10 days  Discharge Date and Time: 3/29/2022  5:50 PM  Attending Physician: No att. providers found   Discharging Provider: Marguerite Padron MD  Primary Care Provider: Cornell Britt MD      HPI:   Patricia Nye is a 46 y.o. female with a PMHx of HIV, insomnia, anxiety/depression, polysubstance abuse, and hypothyroidism who was admitted to [facility] on March 18th with right periorbital swelling. The patient reports a rash developed to her chest wall about 1.5-2 weeks ago accompanied by a sensation of pins and needles. She then developed swelling around her right eye and had a rash around her eye and the bridge of her nose beginning 3 days ago. She was seen at [facility] two nights ago but left AMA and then presented to [place]. She was admitted with concern for herpes zoster ophthalmicus and started on IV acyclovir. Eye is reportedly more swollen, and she c/o of stinging pain to the skin over her eye and the right side of her face. Denies any pain to her actual eye and denies any blurry vision. Endorses chills. Denies any fever, abdominal pain, chest pain, nausea, vomiting, diarrhea, shortness of breath, cough, or dysuria. [place] [facility] [place]    Labs were reviewed from OSH: COVID negative, sodium 138, potassium 3.6, chloride 103, CO2 23, BUN 9, creatinine 0.8, glucose 100, AST 20, ALT 15, white blood cells 6.82, hemoglobin 12.2, hematocrit 37.5, platelets 324      * No surgery found *      Hospital Course:   Ophthalmology and Infectious Disease consulted on admission.  IV acyclovir continued with intermittent IV fluids for renal protection.  Vancomycin and Rocephin initiated as concern for superimposed bacterial infection which may  been prompted by patient use of dexamethasone ointment from home.  Antibiotics narrowed to vancomycin.  Per Ophthalmology, no indication for imaging.  Recommended ophthalmic antibiotic ointment and lubrication in addition to IV acyclovir continuance.  Per ID, continue IV acyclovir for 7 days (she receive 8 days once lesions had crusted over) or until cessation of new lesion development.  Transition to p.o. Valtrex for 14 days thereafter.  Vanc was dc'd 03/24- pt transitioned to doxy 03/24. Patient complained of intermittent HA and posterior neck pain during stay but no nuchal rigidity or fever.  No change in activity with symptoms.  ID did evaluate and no suspicions for meningoencephalitis.  She will f/u with Ophthalmology.  Also encouraged patient to continue Biktarvy to treat her HIV disease as she expressed concern about the accuracy of her HIV diagnosis despite her low CD4 count and numerous previous positive serology in the past with viral load monitoring by her ID physician.  She will f/u with ID as outpatient which was arranged by case management.       Goals of Care Treatment Preferences:  Code Status: Full Code      Consults:   Consults (From admission, onward)        Status Ordering Provider     Inpatient consult to Infectious Diseases  Once        Provider:  (Not yet assigned)    Completed CAREY BARNETT     Inpatient consult to Dermatology  Once        Provider:  (Not yet assigned)    Completed CAREY BARNETT     Inpatient virtual consult to Hospital Medicine  Once        Provider:  (Not yet assigned)    Completed SREEDHAR MAC     Inpatient consult to Ophthalmology  Once        Provider:  (Not yet assigned)    Completed RYLEY HAYES     Inpatient consult to Infectious Diseases  Once        Provider:  (Not yet assigned)    Completed RYLEY HAYES          * Herpes zoster ophthalmicus of right eye  Patient reports rash developed to her chest wall about 1.5-2 weeks ago accompanied by a  sensation of pins and needles. She then developed swelling around her right eye and had a rash around her eye and the bridge of her nose beginning 3 days ago. She was seen at [facility] two nights ago but left AMA and presented to [place]. She was admitted with concern for herpes zoster ophthalmicus and started on IV acyclovir. Eye  is reportedly more swollen, and she c/o of stinging pain to the skin over her eye and the right side of her face. Denies any pain to her actual eye and denies any blurry vision.  [place] [facility] [place].    - Continue IV acyclovir 500mg q8hrs  - IVF qhs  - Contact, droplet, and airborne isolation  - Ophthalmology consulted- continue Erythromycin to skin lesions and right eye BID, Preservative Free Tears QID, antiviral treatment per ID  - Continue gabapentin 300mg tid  - ID consulted, appreciate recs- continue IV acyclovir until no new lesions forming or for 7 days; complete therapy with valtrex 1 gm po TID for 14 days  - Transitioned to doxy 03/24 and monitor response  - symptomatic management of pain: increase gabapentin as clinically indicated, tylenol prn, IV morphine p.r.n. (allergies noted to other opioids)    Pruritic dermatitis  -present since 12/2021 and uses calamine at home when flares.  -no erythema but mild flesh colored papules to anterior chest  -discontinue calamine lotion to pruritic area; changed to hydrocortisone for pruritis  -derm referral - recommend triamcinolone cream with sarna lotion      Anxiety disorder  - Chronic, stable.  reviewed.   - Continue home xanax PRN  - Continue buspar 10mg tid    Tobacco abuse  Assistance with smoking cessation was offered on admission, including:  [x]  Medications  [x]  Counseling  []  Printed Information on Smoking Cessation  []  Referral to a Smoking Cessation Program    Patient was counseled regarding smoking for 3-10 minutes on admit.     Primary insomnia  - Continue remeron qhs    Major  depressive disorder in remission  - Bipolar 1 disorder  - Continue lithium, buspar, mirtazapine    HIV disease  This patient in known to have AIDS (from CD4 count has been less than 200). Last CD4 21 (April 2021), new CD4 count pending. Patient is on HAART. Will continue HAART. Continued prophylaxis with atovaquone. Continue to monitor routine labs.     - We will consult Infectious disease at this time. Appreciate recs.   - Continue Biktarvy and atovaquone ppx.  - CD4 on admit 99      Final Active Diagnoses:    Diagnosis Date Noted POA    PRINCIPAL PROBLEM:  Herpes zoster ophthalmicus of right eye [B02.30] 03/19/2022 Yes    Pruritic dermatitis [L29.9] 03/26/2022 Yes    Anxiety disorder [F41.9]  Yes    Tobacco abuse [Z72.0] 12/11/2017 Yes    Primary insomnia [F51.01] 07/25/2017 Yes     Chronic    Major depressive disorder in remission [F32.5] 07/25/2017 Yes     Chronic    HIV disease [B20] 05/18/2012 Yes      Problems Resolved During this Admission:       Discharged Condition: stable    Disposition: Home or Self Care    Follow Up:    Patient Instructions:      Diet Adult Regular     Notify your health care provider if you experience any of the following:  temperature >100.4     Notify your health care provider if you experience any of the following:  persistent nausea and vomiting or diarrhea     Notify your health care provider if you experience any of the following:  severe uncontrolled pain     Notify your health care provider if you experience any of the following:  difficulty breathing or increased cough     Notify your health care provider if you experience any of the following:  severe persistent headache     Notify your health care provider if you experience any of the following:  worsening rash     Notify your health care provider if you experience any of the following:  persistent dizziness, light-headedness, or visual disturbances     Notify your health care provider if you experience any of the  following:  increased confusion or weakness     Activity as tolerated       Significant Diagnostic Studies: as above    Pending Diagnostic Studies:     None         Medications:  Reconciled Home Medications:      Medication List      START taking these medications    artificial tears 0.5 % ophthalmic solution  Commonly known as: ISOPTO TEARS  Place 2 drops into both eyes 3 (three) times daily.     doxycycline 100 MG tablet  Commonly known as: VIBRA-TABS  Take 1 tablet (100 mg total) by mouth every 12 (twelve) hours. for 7 days     erythromycin ophthalmic ointment  Commonly known as: ROMYCIN  Place into the right eye 2 (two) times a day. And to facial rash for 10 days     gabapentin 300 MG capsule  Commonly known as: NEURONTIN  Take 1 capsule (300 mg total) by mouth 3 (three) times daily. for 10 days     morphine 15 MG tablet  Commonly known as: MSIR  Take 1 tablet (15 mg total) by mouth every 6 (six) hours as needed for Pain.     triamcinolone acetonide 0.1% 0.1 % cream  Commonly known as: KENALOG  Apply topically 2 (two) times a day. To arm and chest/back lesions for 10 days     valACYclovir 1000 MG tablet  Commonly known as: VALTREX  Take 1 tablet (1,000 mg total) by mouth 3 (three) times daily. for 13 days        CONTINUE taking these medications    ALPRAZolam 0.5 MG Tb24  Commonly known as: XANAX XR  Take one tablet PRN anxiety.     atovaquone 750 mg/5 mL Susp  Commonly known as: MEPRON  Take 1,500 mg by mouth.     BIKTARVY -25 mg (25 kg or greater)  Generic drug: ppzarivee-tzgigfsn-tdyzyxi ala  Take 1 tablet by mouth once daily.     busPIRone 10 MG tablet  Commonly known as: BUSPAR  Take 1 tablet (10 mg total) by mouth 3 (three) times daily.     cetirizine 10 MG tablet  Commonly known as: ZYRTEC  Take 1 tablet (10 mg total) by mouth once daily.     diphenhydrAMINE-aluminum-magnesium hydroxide-simethicone-LIDOcaine HCl 2%  Swish and spit 15 mLs every 4 (four) hours as needed (mouth pain).     famotidine 20  MG tablet  Commonly known as: PEPCID  Take 1 tablet (20 mg total) by mouth 2 (two) times daily.     fluticasone propionate 50 mcg/actuation nasal spray  Commonly known as: FLONASE  1 spray (50 mcg total) by Each Nostril route 2 (two) times daily as needed for Rhinitis.     hydrOXYzine HCL 25 MG tablet  Commonly known as: ATARAX  Take 1 tablet (25 mg total) by mouth 3 (three) times daily as needed for Anxiety.     ibuprofen 600 MG tablet  Commonly known as: ADVIL,MOTRIN  Start after steroid complete 1 p.o. q.6 hours p.r.n. pain swell     levothyroxine 25 MCG tablet  Commonly known as: SYNTHROID  Take 1 tablet (25 mcg total) by mouth before breakfast.     lithium 300 mg tablet  Commonly known as: LITHOTAB  Take 300 mg by mouth 2 (two) times a day. LITHIUM CARBONATE ER     mirtazapine 15 MG tablet  Commonly known as: REMERON  Take 1 tablet (15 mg total) by mouth every evening.     promethazine 25 MG tablet  Commonly known as: PHENERGAN  Take 1 tablet (25 mg total) by mouth every 6 (six) hours as needed for Nausea.     QUEtiapine 50 MG tablet  Commonly known as: SEROQUEL  Take 1 tablet (50 mg total) by mouth every evening.        STOP taking these medications    azelastine 137 mcg (0.1 %) nasal spray  Commonly known as: ASTELIN     fluconazole 150 MG Tab  Commonly known as: DIFLUCAN     ketotifen 0.025 % (0.035 %) ophthalmic solution  Commonly known as: ALAWAY     LIDOcaine HCl 2% 2 % Soln  Commonly known as: LIDOcaine VISCOUS     methocarbamoL 500 MG Tab  Commonly known as: ROBAXIN     naproxen 500 MG tablet  Commonly known as: NAPROSYN     predniSONE 5 MG tablet  Commonly known as: DELTASONE            Indwelling Lines/Drains at time of discharge:   Lines/Drains/Airways     None                 Time spent on the discharge of patient: 38 minutes         The attending portion of this evaluation, treatment, and documentation was performed per Marguerite Padron MD via Telemedicine AudioVisual using the secure Vid[employer] software  platform with 2 way audio/video. The provider was located off-site and the patient is located in the hospital. The aforementioned video software was utilized to document the relevant history and physical exam    Marguerite Padron MD  Department of Hospital Medicine  [facility] - Intensive Care ([place])

## 2022-04-05 ENCOUNTER — OFFICE VISIT (OUTPATIENT)
Dept: INTERNAL MEDICINE | Facility: CLINIC | Age: 47
End: 2022-04-05
Payer: MEDICAID

## 2022-04-05 VITALS
HEIGHT: 62 IN | WEIGHT: 122.56 LBS | SYSTOLIC BLOOD PRESSURE: 102 MMHG | DIASTOLIC BLOOD PRESSURE: 80 MMHG | OXYGEN SATURATION: 96 % | HEART RATE: 78 BPM | BODY MASS INDEX: 22.55 KG/M2

## 2022-04-05 DIAGNOSIS — B20 HIV DISEASE: ICD-10-CM

## 2022-04-05 DIAGNOSIS — B02.30 HERPES ZOSTER OPHTHALMICUS OF RIGHT EYE: ICD-10-CM

## 2022-04-05 DIAGNOSIS — B96.89 SUPERFICIAL BACTERIAL INFECTION OF SKIN: ICD-10-CM

## 2022-04-05 DIAGNOSIS — Z09 HOSPITAL DISCHARGE FOLLOW-UP: Primary | ICD-10-CM

## 2022-04-05 DIAGNOSIS — L08.9 SUPERFICIAL BACTERIAL INFECTION OF SKIN: ICD-10-CM

## 2022-04-05 PROCEDURE — 99213 OFFICE O/P EST LOW 20 MIN: CPT | Mod: S$PBB,,, | Performed by: STUDENT IN AN ORGANIZED HEALTH CARE EDUCATION/TRAINING PROGRAM

## 2022-04-05 PROCEDURE — 1111F PR DISCHARGE MEDS RECONCILED W/ CURRENT OUTPATIENT MED LIST: ICD-10-PCS | Mod: CPTII,,, | Performed by: STUDENT IN AN ORGANIZED HEALTH CARE EDUCATION/TRAINING PROGRAM

## 2022-04-05 PROCEDURE — 99213 PR OFFICE/OUTPT VISIT, EST, LEVL III, 20-29 MIN: ICD-10-PCS | Mod: S$PBB,,, | Performed by: STUDENT IN AN ORGANIZED HEALTH CARE EDUCATION/TRAINING PROGRAM

## 2022-04-05 PROCEDURE — 1111F DSCHRG MED/CURRENT MED MERGE: CPT | Mod: CPTII,,, | Performed by: STUDENT IN AN ORGANIZED HEALTH CARE EDUCATION/TRAINING PROGRAM

## 2022-04-05 PROCEDURE — 99213 OFFICE O/P EST LOW 20 MIN: CPT | Mod: PBBFAC | Performed by: STUDENT IN AN ORGANIZED HEALTH CARE EDUCATION/TRAINING PROGRAM

## 2022-04-05 PROCEDURE — 99999 PR PBB SHADOW E&M-EST. PATIENT-LVL III: CPT | Mod: PBBFAC,,, | Performed by: STUDENT IN AN ORGANIZED HEALTH CARE EDUCATION/TRAINING PROGRAM

## 2022-04-05 PROCEDURE — 99999 PR PBB SHADOW E&M-EST. PATIENT-LVL III: ICD-10-PCS | Mod: PBBFAC,,, | Performed by: STUDENT IN AN ORGANIZED HEALTH CARE EDUCATION/TRAINING PROGRAM

## 2022-04-05 RX ORDER — ERYTHROMYCIN 5 MG/G
OINTMENT OPHTHALMIC 2 TIMES DAILY
Qty: 3.5 G | Refills: 1 | Status: SHIPPED | OUTPATIENT
Start: 2022-04-05 | End: 2022-04-15

## 2022-04-05 NOTE — PROGRESS NOTES
"INTERNAL MEDICINE RESIDENT CLINIC  CLINIC NOTE    Patient Name: Patricia Nye  YOB: 1975    PRESENTING HISTORY       History of Present Illness:  Ms. Patricia Nye is a 46 y.o. female w/ a PMHx of MDD, PTSD, bipolar 1 disorder, polysubstance abuse, HIV (CD4 99), and recent hospitalization for Herpes zoster ophthalmoplegia w/ superimposed bacterial infection who presents for hospital discharge follow up. She has been taking her medication as prescribed on discharge including the following:    - Valtrex TID x 14 day course   - Doxycyline x 14 day course   - Biktarvy   - Atovaquone Ppx  Her rash and symptoms are improving, although she does still have some eye pain. She has follow up with ID and ophthalmology in the coming months. Denies fevers, chills, cough, SOB, CP, abdominal pain, N/V/D/C, and new rashes.    Review of Systems   Constitutional: Negative for chills, fever, malaise/fatigue and weight loss.   HENT: Negative for congestion, hearing loss, sinus pain and tinnitus.    Eyes: Positive for blurred vision (2/2 eye ointment). Negative for double vision and redness.   Respiratory: Negative for cough, sputum production and shortness of breath.    Cardiovascular: Negative for chest pain, palpitations, orthopnea and leg swelling.   Gastrointestinal: Negative for abdominal pain, blood in stool, constipation, diarrhea, heartburn, nausea and vomiting.   Genitourinary: Negative for dysuria, frequency and urgency.   Musculoskeletal: Negative for falls, joint pain and myalgias.   Skin: Negative.    Neurological: Negative for dizziness, loss of consciousness, weakness and headaches.   Endo/Heme/Allergies: Does not bruise/bleed easily.   Psychiatric/Behavioral: Negative for depression and suicidal ideas. The patient is not nervous/anxious.        OBJECTIVE:   Vital Signs:  Vitals:    04/05/22 0959   BP: 102/80   Pulse: 78   SpO2: 96%   Weight: 55.6 kg (122 lb 9.2 oz)   Height: 5' 1.5" (1.562 " m)       No results found for this or any previous visit (from the past 24 hour(s)).      Physical Exam  Vitals reviewed.   Constitutional:       General: She is not in acute distress.     Appearance: Normal appearance. She is normal weight. She is not ill-appearing.   HENT:      Head: Normocephalic and atraumatic.      Mouth/Throat:      Mouth: Mucous membranes are moist.      Pharynx: Oropharynx is clear.   Eyes:      General: No scleral icterus.        Right eye: No discharge.         Left eye: No discharge.      Extraocular Movements: Extraocular movements intact.      Right eye: Normal extraocular motion.      Left eye: Normal extraocular motion.      Conjunctiva/sclera:      Right eye: Right conjunctiva is injected. No exudate.     Left eye: Left conjunctiva is not injected. No exudate.     Pupils: Pupils are equal, round, and reactive to light.   Cardiovascular:      Rate and Rhythm: Normal rate and regular rhythm.      Pulses: Normal pulses.   Pulmonary:      Effort: Pulmonary effort is normal. No respiratory distress.      Breath sounds: Normal breath sounds. No wheezing.   Abdominal:      General: Abdomen is flat. Bowel sounds are normal.      Palpations: Abdomen is soft.   Musculoskeletal:         General: No swelling. Normal range of motion.      Cervical back: Normal range of motion and neck supple. No rigidity.      Right lower leg: No edema.      Left lower leg: No edema.   Skin:     General: Skin is warm and dry.      Capillary Refill: Capillary refill takes less than 2 seconds.      Coloration: Skin is not jaundiced or pale.   Neurological:      General: No focal deficit present.      Mental Status: She is alert and oriented to person, place, and time. Mental status is at baseline.   Psychiatric:         Mood and Affect: Mood normal.         Thought Content: Thought content normal.         ASSESSMENT & PLAN:     Patricia was seen today for follow-up.    Diagnoses and all orders for this  visit:    Hospital discharge follow-up   - Pt reports she needs refills for her Erythromycin ophthalmic ointment, ordered.   - Pt has f/u w/ ID on 6/6.   - Pt last saw Ophtho 3/29 and has future appts scheduled.   - Pt has f/u with her PCP scheduled.    Herpes zoster ophthalmicus of right eye   - follow up scheduled w/ ophtho   - see above    HIV disease   - See above    Superficial bacterial infection of skin   - see above    Other orders  -     erythromycin (ROMYCIN) ophthalmic ointment; Place into the right eye 2 (two) times a day. And to facial rash for 10 days            Discussed with Dr. Zhu  Will contact w/ lab results and will No follow-ups on file.     Signed:    Елена De La Rosa M.D.  Internal Medicine PGY-2  04/05/2022 10:47 AM.

## 2022-04-25 ENCOUNTER — OFFICE VISIT (OUTPATIENT)
Dept: PRIMARY CARE CLINIC | Facility: CLINIC | Age: 47
End: 2022-04-25
Payer: MEDICAID

## 2022-04-25 VITALS
WEIGHT: 119.25 LBS | HEIGHT: 62 IN | SYSTOLIC BLOOD PRESSURE: 90 MMHG | RESPIRATION RATE: 18 BRPM | HEART RATE: 125 BPM | BODY MASS INDEX: 21.94 KG/M2 | OXYGEN SATURATION: 97 % | DIASTOLIC BLOOD PRESSURE: 70 MMHG

## 2022-04-25 DIAGNOSIS — F19.10 POLYSUBSTANCE ABUSE: ICD-10-CM

## 2022-04-25 DIAGNOSIS — F43.10 PTSD (POST-TRAUMATIC STRESS DISORDER): ICD-10-CM

## 2022-04-25 DIAGNOSIS — Z72.0 TOBACCO ABUSE: ICD-10-CM

## 2022-04-25 DIAGNOSIS — B02.31 HERPES ZOSTER CONJUNCTIVITIS OF RIGHT EYE: Primary | ICD-10-CM

## 2022-04-25 DIAGNOSIS — B02.30 HERPES ZOSTER OPHTHALMICUS OF RIGHT EYE: ICD-10-CM

## 2022-04-25 DIAGNOSIS — F32.5 MAJOR DEPRESSIVE DISORDER IN REMISSION, UNSPECIFIED WHETHER RECURRENT: Chronic | ICD-10-CM

## 2022-04-25 DIAGNOSIS — B20 HIV DISEASE: ICD-10-CM

## 2022-04-25 DIAGNOSIS — F25.0 SCHIZOAFFECTIVE DISORDER, BIPOLAR TYPE: ICD-10-CM

## 2022-04-25 DIAGNOSIS — F31.9 BIPOLAR 1 DISORDER: ICD-10-CM

## 2022-04-25 PROCEDURE — 1159F PR MEDICATION LIST DOCUMENTED IN MEDICAL RECORD: ICD-10-PCS | Mod: CPTII,,, | Performed by: FAMILY MEDICINE

## 2022-04-25 PROCEDURE — 3074F SYST BP LT 130 MM HG: CPT | Mod: CPTII,,, | Performed by: FAMILY MEDICINE

## 2022-04-25 PROCEDURE — 99214 OFFICE O/P EST MOD 30 MIN: CPT | Mod: S$PBB,,, | Performed by: FAMILY MEDICINE

## 2022-04-25 PROCEDURE — 1111F DSCHRG MED/CURRENT MED MERGE: CPT | Mod: CPTII,,, | Performed by: FAMILY MEDICINE

## 2022-04-25 PROCEDURE — 3078F PR MOST RECENT DIASTOLIC BLOOD PRESSURE < 80 MM HG: ICD-10-PCS | Mod: CPTII,,, | Performed by: FAMILY MEDICINE

## 2022-04-25 PROCEDURE — 99999 PR PBB SHADOW E&M-EST. PATIENT-LVL III: CPT | Mod: PBBFAC,,, | Performed by: FAMILY MEDICINE

## 2022-04-25 PROCEDURE — 3078F DIAST BP <80 MM HG: CPT | Mod: CPTII,,, | Performed by: FAMILY MEDICINE

## 2022-04-25 PROCEDURE — 3008F PR BODY MASS INDEX (BMI) DOCUMENTED: ICD-10-PCS | Mod: CPTII,,, | Performed by: FAMILY MEDICINE

## 2022-04-25 PROCEDURE — 99213 OFFICE O/P EST LOW 20 MIN: CPT | Mod: PBBFAC,PN | Performed by: FAMILY MEDICINE

## 2022-04-25 PROCEDURE — 3008F BODY MASS INDEX DOCD: CPT | Mod: CPTII,,, | Performed by: FAMILY MEDICINE

## 2022-04-25 PROCEDURE — 1111F PR DISCHARGE MEDS RECONCILED W/ CURRENT OUTPATIENT MED LIST: ICD-10-PCS | Mod: CPTII,,, | Performed by: FAMILY MEDICINE

## 2022-04-25 PROCEDURE — 99214 PR OFFICE/OUTPT VISIT, EST, LEVL IV, 30-39 MIN: ICD-10-PCS | Mod: S$PBB,,, | Performed by: FAMILY MEDICINE

## 2022-04-25 PROCEDURE — 1159F MED LIST DOCD IN RCRD: CPT | Mod: CPTII,,, | Performed by: FAMILY MEDICINE

## 2022-04-25 PROCEDURE — 3074F PR MOST RECENT SYSTOLIC BLOOD PRESSURE < 130 MM HG: ICD-10-PCS | Mod: CPTII,,, | Performed by: FAMILY MEDICINE

## 2022-04-25 PROCEDURE — 99999 PR PBB SHADOW E&M-EST. PATIENT-LVL III: ICD-10-PCS | Mod: PBBFAC,,, | Performed by: FAMILY MEDICINE
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
## 2022-04-26 NOTE — PROGRESS NOTES
I have reviewed the notes, assessments, and/or procedures performed by Dr De La Rosa, I concur with her documentation of Patricia Nye.

## 2022-05-05 ENCOUNTER — PATIENT MESSAGE (OUTPATIENT)
Dept: SMOKING CESSATION | Facility: CLINIC | Age: 47
End: 2022-05-05
Payer: MEDICAID

## 2022-06-01 PROBLEM — R30.0 DYSURIA: Status: ACTIVE | Noted: 2022-06-01

## 2022-06-06 ENCOUNTER — LAB VISIT (OUTPATIENT)
Dept: PRIMARY CARE CLINIC | Facility: CLINIC | Age: 47
End: 2022-06-06
Payer: MEDICAID

## 2022-06-06 ENCOUNTER — OFFICE VISIT (OUTPATIENT)
Dept: PRIMARY CARE CLINIC | Facility: CLINIC | Age: 47
End: 2022-06-06
Payer: MEDICAID

## 2022-06-06 VITALS
SYSTOLIC BLOOD PRESSURE: 117 MMHG | WEIGHT: 122.69 LBS | DIASTOLIC BLOOD PRESSURE: 75 MMHG | HEIGHT: 61 IN | OXYGEN SATURATION: 99 % | HEART RATE: 93 BPM | BODY MASS INDEX: 23.16 KG/M2

## 2022-06-06 DIAGNOSIS — Z12.31 SCREENING MAMMOGRAM, ENCOUNTER FOR: ICD-10-CM

## 2022-06-06 DIAGNOSIS — F31.9 BIPOLAR 1 DISORDER: ICD-10-CM

## 2022-06-06 DIAGNOSIS — E03.9 ACQUIRED HYPOTHYROIDISM: ICD-10-CM

## 2022-06-06 DIAGNOSIS — B20 HIV DISEASE: Primary | ICD-10-CM

## 2022-06-06 DIAGNOSIS — Z12.11 COLON CANCER SCREENING: ICD-10-CM

## 2022-06-06 PROCEDURE — 99999 PR PBB SHADOW E&M-EST. PATIENT-LVL V: ICD-10-PCS | Mod: PBBFAC,,, | Performed by: FAMILY MEDICINE

## 2022-06-06 PROCEDURE — 36415 COLL VENOUS BLD VENIPUNCTURE: CPT | Mod: PBBFAC,PN

## 2022-06-06 PROCEDURE — 3008F BODY MASS INDEX DOCD: CPT | Mod: CPTII,,, | Performed by: FAMILY MEDICINE

## 2022-06-06 PROCEDURE — 3078F PR MOST RECENT DIASTOLIC BLOOD PRESSURE < 80 MM HG: ICD-10-PCS | Mod: CPTII,,, | Performed by: FAMILY MEDICINE

## 2022-06-06 PROCEDURE — 1160F RVW MEDS BY RX/DR IN RCRD: CPT | Mod: CPTII,,, | Performed by: FAMILY MEDICINE

## 2022-06-06 PROCEDURE — 99214 PR OFFICE/OUTPT VISIT, EST, LEVL IV, 30-39 MIN: ICD-10-PCS | Mod: S$PBB,,, | Performed by: FAMILY MEDICINE

## 2022-06-06 PROCEDURE — 1159F PR MEDICATION LIST DOCUMENTED IN MEDICAL RECORD: ICD-10-PCS | Mod: CPTII,,, | Performed by: FAMILY MEDICINE

## 2022-06-06 PROCEDURE — 1159F MED LIST DOCD IN RCRD: CPT | Mod: CPTII,,, | Performed by: FAMILY MEDICINE

## 2022-06-06 PROCEDURE — 3008F PR BODY MASS INDEX (BMI) DOCUMENTED: ICD-10-PCS | Mod: CPTII,,, | Performed by: FAMILY MEDICINE

## 2022-06-06 PROCEDURE — 3074F PR MOST RECENT SYSTOLIC BLOOD PRESSURE < 130 MM HG: ICD-10-PCS | Mod: CPTII,,, | Performed by: FAMILY MEDICINE

## 2022-06-06 PROCEDURE — 1160F PR REVIEW ALL MEDS BY PRESCRIBER/CLIN PHARMACIST DOCUMENTED: ICD-10-PCS | Mod: CPTII,,, | Performed by: FAMILY MEDICINE

## 2022-06-06 PROCEDURE — 84443 ASSAY THYROID STIM HORMONE: CPT | Performed by: FAMILY MEDICINE

## 2022-06-06 PROCEDURE — 3074F SYST BP LT 130 MM HG: CPT | Mod: CPTII,,, | Performed by: FAMILY MEDICINE

## 2022-06-06 PROCEDURE — 99215 OFFICE O/P EST HI 40 MIN: CPT | Mod: PBBFAC,PN | Performed by: FAMILY MEDICINE

## 2022-06-06 PROCEDURE — 99999 PR PBB SHADOW E&M-EST. PATIENT-LVL V: CPT | Mod: PBBFAC,,, | Performed by: FAMILY MEDICINE

## 2022-06-06 PROCEDURE — 3078F DIAST BP <80 MM HG: CPT | Mod: CPTII,,, | Performed by: FAMILY MEDICINE

## 2022-06-06 PROCEDURE — 99214 OFFICE O/P EST MOD 30 MIN: CPT | Mod: S$PBB,,, | Performed by: FAMILY MEDICINE

## 2022-06-06 RX ORDER — ATOVAQUONE 750 MG/5ML
1500 SUSPENSION ORAL DAILY
Qty: 210 ML | Refills: 1 | Status: SHIPPED | OUTPATIENT
Start: 2022-06-06 | End: 2022-07-15

## 2022-06-06 RX ORDER — NYSTATIN 100000 [USP'U]/ML
SUSPENSION ORAL
COMMUNITY
Start: 2021-12-06 | End: 2022-06-06

## 2022-06-06 RX ORDER — LITHIUM CARBONATE 300 MG
300 TABLET ORAL EVERY 8 HOURS
Qty: 90 TABLET | Refills: 3 | Status: ON HOLD | OUTPATIENT
Start: 2022-06-06 | End: 2022-07-29 | Stop reason: SDUPTHER

## 2022-06-06 RX ORDER — AZITHROMYCIN 250 MG/1
TABLET, FILM COATED ORAL
COMMUNITY
Start: 2022-02-11 | End: 2022-06-06

## 2022-06-06 RX ORDER — BISMUTH SUBCITRATE POTASSIUM, METRONIDAZOLE, TETRACYCLINE HYDROCHLORIDE 140; 125; 125 MG/1; MG/1; MG/1
CAPSULE ORAL
COMMUNITY
Start: 2021-12-14 | End: 2022-06-06

## 2022-06-06 RX ORDER — FLUCONAZOLE 200 MG/1
200 TABLET ORAL DAILY
COMMUNITY
Start: 2021-12-14 | End: 2022-06-06

## 2022-06-06 RX ORDER — ACETAMINOPHEN 500 MG
TABLET ORAL
COMMUNITY
Start: 2021-11-15 | End: 2022-11-11 | Stop reason: SDUPTHER

## 2022-06-06 RX ORDER — ERYTHROMYCIN 5 MG/G
OINTMENT OPHTHALMIC
COMMUNITY
Start: 2022-04-20 | End: 2023-05-06

## 2022-06-06 RX ORDER — BICTEGRAVIR SODIUM, EMTRICITABINE, AND TENOFOVIR ALAFENAMIDE FUMARATE 50; 200; 25 MG/1; MG/1; MG/1
1 TABLET ORAL DAILY
Qty: 60 TABLET | Refills: 2 | Status: ON HOLD | OUTPATIENT
Start: 2022-06-06 | End: 2023-05-12 | Stop reason: SDUPTHER

## 2022-06-06 RX ORDER — PANTOPRAZOLE SODIUM 40 MG/1
40 TABLET, DELAYED RELEASE ORAL 2 TIMES DAILY
COMMUNITY
Start: 2021-12-14 | End: 2022-06-06

## 2022-06-06 RX ORDER — ACYCLOVIR 800 MG/1
800 TABLET ORAL
COMMUNITY
Start: 2022-03-18 | End: 2022-06-06

## 2022-06-06 RX ORDER — HYDROCODONE BITARTRATE AND ACETAMINOPHEN 10; 325 MG/1; MG/1
TABLET ORAL
COMMUNITY
Start: 2022-03-18 | End: 2022-06-06

## 2022-06-06 RX ORDER — BUSPIRONE HYDROCHLORIDE 10 MG/1
10 TABLET ORAL 3 TIMES DAILY
Qty: 90 TABLET | Refills: 5 | Status: ON HOLD | OUTPATIENT
Start: 2022-06-06 | End: 2022-07-29

## 2022-06-06 RX ORDER — POLYETHYLENE GLYCOL 3350, SODIUM SULFATE ANHYDROUS, SODIUM BICARBONATE, SODIUM CHLORIDE, POTASSIUM CHLORIDE 236; 22.74; 6.74; 5.86; 2.97 G/4L; G/4L; G/4L; G/4L; G/4L
4000 POWDER, FOR SOLUTION ORAL
COMMUNITY
Start: 2022-06-08 | End: 2022-06-06

## 2022-06-06 NOTE — PROGRESS NOTES
Clinic Note  6/6/2022      Subjective:       Patient ID:  Patricia is a 46 y.o. female being seen for an new visit.      Chief Complaint: Establish Care    Establish care - pt with hx of HIV recent CD4 99, 1 disorder, polysubstance abuse, insomnia, hypothyroidism, and recent hospitalization for herpes zoster ophthalmicus of right eye.    HIV-previously going to [facility] clinic and noted to have AIDS, previous on Genvoya but now on Biktarvy and atovaquone prophylaxis.  Has been out of Biktarvy over the last week.  Has not been seen by infectious disease doctor since hospitalization.  Recent CD4 count 99    Bipolar 1 disorder-doing well on lithium, BuSpar, Remeron.  Has not seen a psychiatrist recently.  Needs refills of her medications.    Hypothyroidism-has been out of her Synthroid      Family History   Problem Relation Age of Onset    Heart disease Mother     Breast cancer Neg Hx     Colon cancer Neg Hx     Ovarian cancer Neg Hx      Social History     Socioeconomic History    Marital status:    Occupational History    Occupation: unemployed    Tobacco Use    Smoking status: Current Some Day Smoker     Types: Vaping with nicotine    Smokeless tobacco: Never Used   Substance and Sexual Activity    Alcohol use: Yes     Comment: socially    Drug use: Not Currently    Sexual activity: Yes     Partners: Male     Birth control/protection: None     Past Surgical History:   Procedure Laterality Date    SKULL FRACTURE ELEVATION       Medication List with Changes/Refills   Current Medications    ACETAMINOPHEN (TYLENOL) 500 MG TABLET        ARTIFICIAL TEARS (ISOPTO TEARS) 0.5 % OPHTHALMIC SOLUTION    Place 2 drops into both eyes 3 (three) times daily.    CIPROFLOXACIN HCL (CIPRO) 500 MG TABLET    Take 1 tablet (500 mg total) by mouth 2 (two) times daily. for 10 days    ERYTHROMYCIN (ROMYCIN) OPHTHALMIC OINTMENT    place IN right eye TWICE DAILY AND facial TO RASH FOR 10 DAYS    LEVOTHYROXINE (SYNTHROID) 25 MCG  TABLET    Take 1 tablet (25 mcg total) by mouth before breakfast.    METRONIDAZOLE (FLAGYL) 500 MG TABLET    Take 1 tablet (500 mg total) by mouth 3 (three) times daily. for 7 days    MORPHINE (MSIR) 15 MG TABLET    Take 1 tablet (15 mg total) by mouth every 6 (six) hours as needed for Pain.   Changed and/or Refilled Medications    Modified Medication Previous Medication    ATOVAQUONE (MEPRON) 750 MG/5 ML SUSP atovaquone (MEPRON) 750 mg/5 mL Susp       Take 10 mLs (1,500 mg total) by mouth once daily.    Take 1,500 mg by mouth.    BIKTARVY -25 MG (25 KG OR GREATER) BIKTARVY -25 mg per tablet       Take 1 tablet by mouth once daily.    Take 1 tablet by mouth once daily.    BUSPIRONE (BUSPAR) 10 MG TABLET busPIRone (BUSPAR) 10 MG tablet       Take 1 tablet (10 mg total) by mouth 3 (three) times daily.    Take 1 tablet (10 mg total) by mouth 3 (three) times daily.    LITHIUM (LITHOTAB) 300 MG TABLET lithium (LITHOTAB) 300 mg tablet       Take 1 tablet (300 mg total) by mouth every 8 (eight) hours. LITHIUM CARBONATE ER    Take 300 mg by mouth 2 (two) times a day. LITHIUM CARBONATE ER   Discontinued Medications    ACYCLOVIR (ZOVIRAX) 800 MG TAB    800 mg.    ALPRAZOLAM (XANAX XR) 0.5 MG TB24    Take one tablet PRN anxiety.    AZITHROMYCIN (Z-RUBÉN) 250 MG TABLET    TAKE 2 TABLETS BY MOUTH TODAY THEN 1 TABLET DAILY UNTIL GONE.    CETIRIZINE (ZYRTEC) 10 MG TABLET    Take 1 tablet (10 mg total) by mouth once daily.    DIPHENHYDRAMINE-ALUMINUM-MAGNESIUM HYDROXIDE-SIMETHICONE-LIDOCAINE HCL 2%    Swish and spit 15 mLs every 4 (four) hours as needed (mouth pain).    FAMOTIDINE (PEPCID) 20 MG TABLET    Take 1 tablet (20 mg total) by mouth 2 (two) times daily.    FLUCONAZOLE (DIFLUCAN) 200 MG TAB    Take 200 mg by mouth once daily.    FLUTICASONE PROPIONATE (FLONASE) 50 MCG/ACTUATION NASAL SPRAY    1 spray (50 mcg total) by Each Nostril route 2 (two) times daily as needed for Rhinitis.    GABAPENTIN (NEURONTIN) 300 MG  CAPSULE    Take 1 capsule (300 mg total) by mouth 3 (three) times daily. for 10 days    HYDROCODONE-ACETAMINOPHEN (NORCO)  MG PER TABLET    EVERY 6 HOURS AS NEEDED as needed for breakthrough pain    HYDROXYZINE HCL (ATARAX) 25 MG TABLET    Take 1 tablet (25 mg total) by mouth 3 (three) times daily as needed for Anxiety.    IBUPROFEN (ADVIL,MOTRIN) 600 MG TABLET    Start after steroid complete 1 p.o. q.6 hours p.r.n. pain swell    KETOTIFEN (ALAWAY) 0.025 % (0.035 %) OPHTHALMIC SOLUTION    Place 1 drop into both eyes once daily.    MIRTAZAPINE (REMERON) 15 MG TABLET    Take 1 tablet (15 mg total) by mouth every evening.    NYSTATIN (MYCOSTATIN) 100,000 UNIT/ML SUSPENSION    TAKE 5 MLS (500,000 UNITS TOTAL) BY MOUTH 4 (FOUR) TIMES DAILY. FOR 10 DAYS    PANTOPRAZOLE (PROTONIX) 40 MG TABLET    Take 40 mg by mouth 2 (two) times daily.    POLYETHYLENE GLYCOL (GOLYTELY) 236-22.74-6.74 -5.86 GRAM SUSPENSION    Take 4,000 mLs by mouth.    PROMETHAZINE (PHENERGAN) 25 MG TABLET    Take 1 tablet (25 mg total) by mouth every 6 (six) hours as needed for Nausea.    PYLERA 140-125-125 MG PER CAPSULE    SMARTSI Capsule(s) By Mouth 4 Times Daily    QUETIAPINE (SEROQUEL) 50 MG TABLET    Take 1 tablet (50 mg total) by mouth every evening.    TRIAMCINOLONE ACETONIDE 0.1% (KENALOG) 0.1 % CREAM    Apply topically 2 (two) times a day. To arm and chest/back lesions for 10 days    VALACYCLOVIR (VALTREX) 1000 MG TABLET    Take 1 tablet (1,000 mg total) by mouth 3 (three) times daily. for 13 days     Patient Active Problem List   Diagnosis    HIV disease    Major depressive disorder in remission    Primary insomnia    Cervical strain    Thoracic myofascial strain    PTSD (post-traumatic stress disorder)    Tobacco abuse    Headache, unspecified headache type    Bipolar 1 disorder    Excessive crying    Intractable acute post-traumatic headache    Tachycardia    Bacterial vaginosis    Insomnia    UTI (urinary tract  "infection)    Folliculitis    Bipolar affective disorder, current episode manic    Schizoaffective disorder, bipolar type    Anxiety disorder    Polysubstance abuse    Trauma and stressor-related disorder    Sore throat    Herpes zoster ophthalmicus of right eye    Pruritic dermatitis    Dysuria     Review of Systems   Constitutional: Negative for chills, fever, malaise/fatigue and weight loss.   HENT: Negative for congestion, sinus pain and sore throat.    Eyes: Positive for redness.   Respiratory: Negative for cough, shortness of breath and wheezing.    Cardiovascular: Negative for chest pain and palpitations.   Gastrointestinal: Negative for constipation, diarrhea, nausea and vomiting.   Genitourinary: Negative for dysuria, frequency and urgency.   Musculoskeletal: Negative for myalgias.   Skin: Negative for rash.   Neurological: Negative for headaches.         Objective:      /75 (BP Location: Right arm, Patient Position: Sitting, BP Method: Medium (Automatic))   Pulse 93   Ht 5' 1" (1.549 m)   Wt 55.7 kg (122 lb 11 oz)   LMP 05/17/2022 (Exact Date)   SpO2 99%   BMI 23.18 kg/m²   Estimated body mass index is 23.18 kg/m² as calculated from the following:    Height as of this encounter: 5' 1" (1.549 m).    Weight as of this encounter: 55.7 kg (122 lb 11 oz).  Physical Exam  Vitals reviewed.   Constitutional:       General: She is not in acute distress.     Appearance: She is not diaphoretic.   HENT:      Head: Normocephalic and atraumatic.   Eyes:      Conjunctiva/sclera: Conjunctivae normal.      Comments: Erythematous R conjunctiva   Cardiovascular:      Rate and Rhythm: Normal rate and regular rhythm.      Heart sounds: Normal heart sounds.   Pulmonary:      Effort: Pulmonary effort is normal. No respiratory distress.      Breath sounds: Normal breath sounds. No wheezing.   Abdominal:      General: Bowel sounds are normal.      Palpations: Abdomen is soft.   Musculoskeletal:         " General: Normal range of motion.      Cervical back: Normal range of motion.   Skin:     General: Skin is warm and dry.      Findings: No erythema or rash.   Neurological:      Mental Status: She is alert and oriented to person, place, and time.   Psychiatric:         Mood and Affect: Mood and affect normal.         Behavior: Behavior normal.         Thought Content: Thought content normal.         Judgment: Judgment normal.           Assessment and Plan:     1. HIV disease / AIDs  - these establish care with new infectious disease doctor, continue Biktarvy, atovaquone, recent CD4 count 99  - Ambulatory referral/consult to Infectious Disease; Future  - atovaquone (MEPRON) 750 mg/5 mL Susp; Take 10 mLs (1,500 mg total) by mouth once daily.  Dispense: 210 mL; Refill: 1  - BIKTARVY -25 mg (25 kg or greater); Take 1 tablet by mouth once daily.  Dispense: 60 tablet; Refill: 2    3. Bipolar 1 disorder  - the [facility] psychiatrist, referral placed.  Continue lithium, BuSpar, Remeron  - busPIRone (BUSPAR) 10 MG tablet; Take 1 tablet (10 mg total) by mouth 3 (three) times daily.  Dispense: 90 tablet; Refill: 5  - lithium (LITHOTAB) 300 mg tablet; Take 1 tablet (300 mg total) by mouth every 8 (eight) hours. LITHIUM CARBONATE ER  Dispense: 90 tablet; Refill: 3  - Ambulatory referral/consult to Psychiatry; Future  - Ambulatory referral/consult to Psychiatry; Future    4. Acquired hypothyroidism  - previously on Synthroid 25 mcg daily, rechecking TSH before restarting  - TSH; Future    5. Screening mammogram, encounter for  - Mammo Digital Screening Bilat w/ [facility]; Future    6. Colon cancer screening  - Fecal Immunochemical Test (iFOBT); Future        Follow up:   Follow up in about 3 months (around 9/6/2022) for follow-up.     Other Orders Placed This Visit:  Orders Placed This Encounter   Procedures    Mammo Digital Screening Bilat w/ [facility]     Standing Status:   Future     Standing Expiration Date:   6/6/2024     Order  Specific Question:   May the Radiologist modify the order per protocol to meet the clinical needs of the patient?     Answer:   Yes     Order Specific Question:   Release to patient     Answer:   Immediate    TSH           Standing Status:   Future     Number of Occurrences:   1     Standing Expiration Date:   8/5/2023    Fecal Immunochemical Test (iFOBT)     Standing Status:   Future     Standing Expiration Date:   8/5/2023    Ambulatory referral/consult to Infectious Disease     Standing Status:   Future     Standing Expiration Date:   7/6/2023     Referral Priority:   Routine     Referral Type:   Consultation     Referral Reason:   Specialty Services Required     Requested Specialty:   Infectious Diseases     Number of Visits Requested:   1    Ambulatory referral/consult to Psychiatry     Standing Status:   Future     Standing Expiration Date:   7/6/2023     Referral Priority:   Routine     Referral Type:   Psychiatric     Referral Reason:   Specialty Services Required     Requested Specialty:   Psychiatry     Number of Visits Requested:   1    Ambulatory referral/consult to Psychiatry     Standing Status:   Future     Standing Expiration Date:   7/6/2023     Referral Priority:   Routine     Referral Type:   Psychiatric     Referral Reason:   Specialty Services Required     Requested Specialty:   Psychiatry     Number of Visits Requested:   1           Rd Virk MD        This note is dictated on M*Modal word recognition program.  There are word recognition mistakes that are occasionally missed on review.

## 2022-06-07 ENCOUNTER — PATIENT MESSAGE (OUTPATIENT)
Dept: PRIMARY CARE CLINIC | Facility: CLINIC | Age: 47
End: 2022-06-07
Payer: MEDICAID

## 2022-06-07 LAB — TSH SERPL DL<=0.005 MIU/L-ACNC: 3.37 UIU/ML (ref 0.4–4)

## 2022-06-08 ENCOUNTER — TELEPHONE (OUTPATIENT)
Dept: PRIMARY CARE CLINIC | Facility: CLINIC | Age: 47
End: 2022-06-08
Payer: MEDICAID

## 2022-06-13 ENCOUNTER — PATIENT MESSAGE (OUTPATIENT)
Dept: SMOKING CESSATION | Facility: CLINIC | Age: 47
End: 2022-06-13
Payer: MEDICAID

## 2022-07-20 ENCOUNTER — TELEPHONE (OUTPATIENT)
Dept: PRIMARY CARE CLINIC | Facility: CLINIC | Age: 47
End: 2022-07-20
Payer: MEDICAID

## 2022-07-26 ENCOUNTER — HOSPITAL ENCOUNTER (OUTPATIENT)
Facility: HOSPITAL | Age: 47
Discharge: HOME | End: 2022-07-29
Attending: EMERGENCY MEDICINE | Admitting: INTERNAL MEDICINE
Payer: MEDICAID

## 2022-07-26 DIAGNOSIS — F25.0 SCHIZOAFFECTIVE DISORDER, BIPOLAR TYPE: ICD-10-CM

## 2022-07-26 DIAGNOSIS — Z91.148 DIFFICULTY TAKING MEDICATION: ICD-10-CM

## 2022-07-26 DIAGNOSIS — Z91.148 NON COMPLIANCE W MEDICATION REGIMEN: ICD-10-CM

## 2022-07-26 DIAGNOSIS — N89.8 VAGINAL DISCHARGE: ICD-10-CM

## 2022-07-26 DIAGNOSIS — B20 AIDS: ICD-10-CM

## 2022-07-26 DIAGNOSIS — N94.9 ADNEXAL CYST: ICD-10-CM

## 2022-07-26 DIAGNOSIS — N93.9 VAGINAL BLEEDING: ICD-10-CM

## 2022-07-26 DIAGNOSIS — N12 PYELONEPHRITIS: Primary | ICD-10-CM

## 2022-07-26 DIAGNOSIS — F31.9 BIPOLAR 1 DISORDER: ICD-10-CM

## 2022-07-26 DIAGNOSIS — B20 HIV INFECTION, UNSPECIFIED SYMPTOM STATUS: ICD-10-CM

## 2022-07-26 PROBLEM — N94.89 ADNEXAL MASS: Status: ACTIVE | Noted: 2022-07-26

## 2022-07-26 PROBLEM — Z91.89 AT RISK FOR OPPORTUNISTIC INFECTIONS: Status: ACTIVE | Noted: 2022-07-26

## 2022-07-26 LAB
ALBUMIN SERPL BCP-MCNC: 3.9 G/DL (ref 3.5–5.2)
ALP SERPL-CCNC: 85 U/L (ref 55–135)
ALT SERPL W/O P-5'-P-CCNC: 15 U/L (ref 10–44)
ANION GAP SERPL CALC-SCNC: 10 MMOL/L (ref 8–16)
AST SERPL-CCNC: 23 U/L (ref 10–40)
BACTERIA #/AREA URNS AUTO: ABNORMAL /HPF
BASOPHILS # BLD AUTO: 0.07 K/UL (ref 0–0.2)
BASOPHILS NFR BLD: 1.2 % (ref 0–1.9)
BILIRUB SERPL-MCNC: 0.2 MG/DL (ref 0.1–1)
BILIRUB UR QL STRIP: NEGATIVE
BUN SERPL-MCNC: 8 MG/DL (ref 6–20)
CALCIUM SERPL-MCNC: 9 MG/DL (ref 8.7–10.5)
CHLORIDE SERPL-SCNC: 104 MMOL/L (ref 95–110)
CLARITY UR REFRACT.AUTO: ABNORMAL
CO2 SERPL-SCNC: 22 MMOL/L (ref 23–29)
COLOR UR AUTO: YELLOW
CREAT SERPL-MCNC: 0.7 MG/DL (ref 0.5–1.4)
DIFFERENTIAL METHOD: ABNORMAL
EOSINOPHIL # BLD AUTO: 0.1 K/UL (ref 0–0.5)
EOSINOPHIL NFR BLD: 2.1 % (ref 0–8)
ERYTHROCYTE [DISTWIDTH] IN BLOOD BY AUTOMATED COUNT: 14.6 % (ref 11.5–14.5)
EST. GFR  (AFRICAN AMERICAN): >60 ML/MIN/1.73 M^2
EST. GFR  (NON AFRICAN AMERICAN): >60 ML/MIN/1.73 M^2
GLUCOSE SERPL-MCNC: 87 MG/DL (ref 70–110)
GLUCOSE UR QL STRIP: NEGATIVE
HCG INTACT+B SERPL-ACNC: <2.4 MIU/ML
HCT VFR BLD AUTO: 40.4 % (ref 37–48.5)
HGB BLD-MCNC: 13.5 G/DL (ref 12–16)
HGB UR QL STRIP: ABNORMAL
IMM GRANULOCYTES # BLD AUTO: 0.04 K/UL (ref 0–0.04)
IMM GRANULOCYTES NFR BLD AUTO: 0.7 % (ref 0–0.5)
KETONES UR QL STRIP: NEGATIVE
LACTATE SERPL-SCNC: 1.3 MMOL/L (ref 0.5–2.2)
LEUKOCYTE ESTERASE UR QL STRIP: ABNORMAL
LYMPHOCYTES # BLD AUTO: 1.8 K/UL (ref 1–4.8)
LYMPHOCYTES NFR BLD: 32.4 % (ref 18–48)
MCH RBC QN AUTO: 28.6 PG (ref 27–31)
MCHC RBC AUTO-ENTMCNC: 33.4 G/DL (ref 32–36)
MCV RBC AUTO: 86 FL (ref 82–98)
MICROSCOPIC COMMENT: ABNORMAL
MONOCYTES # BLD AUTO: 0.5 K/UL (ref 0.3–1)
MONOCYTES NFR BLD: 9.6 % (ref 4–15)
NEUTROPHILS # BLD AUTO: 3 K/UL (ref 1.8–7.7)
NEUTROPHILS NFR BLD: 54 % (ref 38–73)
NITRITE UR QL STRIP: NEGATIVE
NRBC BLD-RTO: 0 /100 WBC
PH UR STRIP: 5 [PH] (ref 5–8)
PLATELET # BLD AUTO: 304 K/UL (ref 150–450)
PMV BLD AUTO: 10.7 FL (ref 9.2–12.9)
POCT GLUCOSE: 97 MG/DL (ref 70–110)
POTASSIUM SERPL-SCNC: 4.2 MMOL/L (ref 3.5–5.1)
PROT SERPL-MCNC: 8.7 G/DL (ref 6–8.4)
PROT UR QL STRIP: NEGATIVE
RBC # BLD AUTO: 4.72 M/UL (ref 4–5.4)
RBC #/AREA URNS AUTO: 17 /HPF (ref 0–4)
SODIUM SERPL-SCNC: 136 MMOL/L (ref 136–145)
SP GR UR STRIP: 1.01 (ref 1–1.03)
SQUAMOUS #/AREA URNS AUTO: 10 /HPF
URN SPEC COLLECT METH UR: ABNORMAL
WBC # BLD AUTO: 5.62 K/UL (ref 3.9–12.7)
WBC #/AREA URNS AUTO: >100 /HPF (ref 0–5)

## 2022-07-26 PROCEDURE — 87040 BLOOD CULTURE FOR BACTERIA: CPT | Mod: 59 | Performed by: HOSPITALIST

## 2022-07-26 PROCEDURE — 87591 N.GONORRHOEAE DNA AMP PROB: CPT | Performed by: PHYSICIAN ASSISTANT

## 2022-07-26 PROCEDURE — 96365 THER/PROPH/DIAG IV INF INIT: CPT | Mod: 59

## 2022-07-26 PROCEDURE — 86803 HEPATITIS C AB TEST: CPT | Performed by: PHYSICIAN ASSISTANT

## 2022-07-26 PROCEDURE — G0378 HOSPITAL OBSERVATION PER HR: HCPCS

## 2022-07-26 PROCEDURE — 99284 EMERGENCY DEPT VISIT MOD MDM: CPT | Mod: ,,, | Performed by: PHYSICIAN ASSISTANT

## 2022-07-26 PROCEDURE — 25500020 PHARM REV CODE 255: Performed by: EMERGENCY MEDICINE

## 2022-07-26 PROCEDURE — 87186 SC STD MICRODIL/AGAR DIL: CPT | Performed by: EMERGENCY MEDICINE

## 2022-07-26 PROCEDURE — 87210 SMEAR WET MOUNT SALINE/INK: CPT | Performed by: PHYSICIAN ASSISTANT

## 2022-07-26 PROCEDURE — 87491 CHLMYD TRACH DNA AMP PROBE: CPT | Performed by: PHYSICIAN ASSISTANT

## 2022-07-26 PROCEDURE — 96375 TX/PRO/DX INJ NEW DRUG ADDON: CPT

## 2022-07-26 PROCEDURE — 99285 EMERGENCY DEPT VISIT HI MDM: CPT | Mod: 25

## 2022-07-26 PROCEDURE — S4991 NICOTINE PATCH NONLEGEND: HCPCS | Performed by: PHYSICIAN ASSISTANT

## 2022-07-26 PROCEDURE — 87077 CULTURE AEROBIC IDENTIFY: CPT | Performed by: EMERGENCY MEDICINE

## 2022-07-26 PROCEDURE — 81001 URINALYSIS AUTO W/SCOPE: CPT | Performed by: EMERGENCY MEDICINE

## 2022-07-26 PROCEDURE — 83605 ASSAY OF LACTIC ACID: CPT | Performed by: HOSPITALIST

## 2022-07-26 PROCEDURE — 85025 COMPLETE CBC W/AUTO DIFF WBC: CPT | Performed by: EMERGENCY MEDICINE

## 2022-07-26 PROCEDURE — 87086 URINE CULTURE/COLONY COUNT: CPT | Performed by: EMERGENCY MEDICINE

## 2022-07-26 PROCEDURE — 99284 PR EMERGENCY DEPT VISIT,LEVEL IV: ICD-10-PCS | Mod: ,,, | Performed by: PHYSICIAN ASSISTANT

## 2022-07-26 PROCEDURE — 87088 URINE BACTERIA CULTURE: CPT | Performed by: EMERGENCY MEDICINE

## 2022-07-26 PROCEDURE — 82962 GLUCOSE BLOOD TEST: CPT

## 2022-07-26 PROCEDURE — 63600175 PHARM REV CODE 636 W HCPCS: Performed by: PHYSICIAN ASSISTANT

## 2022-07-26 PROCEDURE — 25000003 PHARM REV CODE 250: Performed by: PHYSICIAN ASSISTANT

## 2022-07-26 PROCEDURE — 80053 COMPREHEN METABOLIC PANEL: CPT | Performed by: EMERGENCY MEDICINE

## 2022-07-26 PROCEDURE — 84702 CHORIONIC GONADOTROPIN TEST: CPT | Performed by: EMERGENCY MEDICINE

## 2022-07-26 RX ORDER — GUAIFENESIN 600 MG/1
600 TABLET, EXTENDED RELEASE ORAL
Status: COMPLETED | OUTPATIENT
Start: 2022-07-26 | End: 2022-07-26

## 2022-07-26 RX ORDER — TRAMADOL HYDROCHLORIDE 50 MG/1
50 TABLET ORAL EVERY 6 HOURS PRN
Status: DISCONTINUED | OUTPATIENT
Start: 2022-07-27 | End: 2022-07-29 | Stop reason: HOSPADM

## 2022-07-26 RX ORDER — IBUPROFEN 200 MG
1 TABLET ORAL
Status: COMPLETED | OUTPATIENT
Start: 2022-07-26 | End: 2022-07-27

## 2022-07-26 RX ORDER — ACETAMINOPHEN 500 MG
1000 TABLET ORAL
Status: COMPLETED | OUTPATIENT
Start: 2022-07-26 | End: 2022-07-26

## 2022-07-26 RX ORDER — KETOROLAC TROMETHAMINE 30 MG/ML
10 INJECTION, SOLUTION INTRAMUSCULAR; INTRAVENOUS
Status: COMPLETED | OUTPATIENT
Start: 2022-07-26 | End: 2022-07-26

## 2022-07-26 RX ADMIN — KETOROLAC TROMETHAMINE 10 MG: 30 INJECTION, SOLUTION INTRAMUSCULAR; INTRAVENOUS at 08:07

## 2022-07-26 RX ADMIN — ACETAMINOPHEN 1000 MG: 500 TABLET ORAL at 11:07

## 2022-07-26 RX ADMIN — GUAIFENESIN 600 MG: 600 TABLET, EXTENDED RELEASE ORAL at 11:07

## 2022-07-26 RX ADMIN — IOHEXOL 75 ML: 350 INJECTION, SOLUTION INTRAVENOUS at 09:07

## 2022-07-26 RX ADMIN — Medication 1 PATCH: at 11:07

## 2022-07-26 RX ADMIN — SODIUM CHLORIDE, SODIUM LACTATE, POTASSIUM CHLORIDE, AND CALCIUM CHLORIDE 1000 ML: .6; .31; .03; .02 INJECTION, SOLUTION INTRAVENOUS at 07:07

## 2022-07-26 RX ADMIN — CEFTRIAXONE 1 G: 1 INJECTION, SOLUTION INTRAVENOUS at 07:07

## 2022-07-26 NOTE — FIRST PROVIDER EVALUATION
"Medical screening exam completed.  I have conducted a focused provider triage encounter, findings are as follows:    Brief history of present illness:  46-year-old female presents with left lower abdominal pain and heavy periods.  For the last several months she has been having 2 periods every month    Vitals:    07/26/22 1618   BP: 123/70   Pulse: 110   Resp: 15   Temp: 98.1 °F (36.7 °C)   TempSrc: Oral   SpO2: 100%   Weight: 55.3 kg (122 lb)   Height: 5' 1" (1.549 m)       Pertinent physical exam:  Nontoxic    Brief workup plan:  Labs ordered    Preliminary workup initiated; this workup will be continued and followed by the physician or advanced practice provider that is assigned to the patient when roomed.  "

## 2022-07-26 NOTE — ED NOTES
Patient states she still has herpes like symptoms from last admissions, reports no relief since discharge

## 2022-07-26 NOTE — ED PROVIDER NOTES
Encounter Date: 7/26/2022       History     Chief Complaint   Patient presents with    Abdominal Pain     States irregular period and lower abdominal pain x several months     The history is provided by the patient and medical records. No  was used.      Patricia Nye is a 46 y.o. female with medical history of HIV (last CD4 4 months ago 99), Bipolar 1 d/o, Polysubstance Abuse, Hypothyroidism, HSV presenting to the ED with the chief complaint of abdominal pain.     Patient reports lower abdominal pain with dysuria and urinary frequency for the past few days. Expresses concern for UTI and STD, although she reports she is not currently sexually active. Additionally reports having irregular menstrual cycles for a few months. No fever, chest pain, SOB, diarrhea, constipation, vaginal discharge. Reports chronic R eye redness since HSV infection 4 months ago. Denies new eye pain, eye discharge, blurry vision. She is prescribed Biktarvy for HIV that she reports taking her last dose over a week ago. Reports she has refills, but unsure why the pharmacy will not fill them. She is in the process of establishing care with a new infectious disease provider.    Review of patient's allergies indicates:   Allergen Reactions    Penicillins Other (See Comments) and Nausea And Vomiting     Trouble swallowing and vomiting.     Sulfa (sulfonamide antibiotics) Swelling     Rash (skin)^    Opioids - morphine analogues      Pt states she is not allergic to morphine      Bactrim [sulfamethoxazole-trimethoprim] Rash     Past Medical History:   Diagnosis Date    Allergy     Anxiety     Cervical dysplasia 1998 / 2016    Depression     HIV infection     Hyperthyroidism     Insomnia      Past Surgical History:   Procedure Laterality Date    SKULL FRACTURE ELEVATION       Family History   Problem Relation Age of Onset    Heart disease Mother     Breast cancer Neg Hx     Colon cancer Neg Hx     Ovarian  cancer Neg Hx      Social History     Tobacco Use    Smoking status: Current Some Day Smoker     Types: Vaping with nicotine    Smokeless tobacco: Never Used   Substance Use Topics    Alcohol use: Yes     Comment: socially    Drug use: Not Currently     Review of Systems   Constitutional: Negative for fever.   HENT: Negative for sore throat.    Eyes: Negative for pain.   Respiratory: Negative for shortness of breath.    Cardiovascular: Negative for chest pain.   Gastrointestinal: Positive for abdominal pain. Negative for nausea.   Genitourinary: Positive for dysuria and vaginal bleeding.   Musculoskeletal: Negative for back pain.   Skin: Negative for rash.   Allergic/Immunologic: Positive for immunocompromised state.   Neurological: Negative for weakness.   Hematological: Does not bruise/bleed easily.       Physical Exam     Initial Vitals [07/26/22 1618]   BP Pulse Resp Temp SpO2   123/70 110 15 98.1 °F (36.7 °C) 100 %      MAP       --         Physical Exam    Constitutional: She appears well-developed and well-nourished. She is not diaphoretic. No distress.   HENT:   Head: Normocephalic and atraumatic.   Mouth/Throat: Oropharynx is clear and moist. No oropharyngeal exudate.   Eyes: EOM are normal. Pupils are equal, round, and reactive to light. Right conjunctiva is injected. No scleral icterus.   Scarring to L nasal bridge. No vesicular lesions.   Neck: Neck supple.   Normal range of motion.  Cardiovascular: Normal rate and regular rhythm.   Pulmonary/Chest: Breath sounds normal. No respiratory distress. She has no wheezes.   Abdominal: Abdomen is soft. She exhibits no distension. There is abdominal tenderness (suprapubic, RLQ). There is no rebound.   Genitourinary:    Genitourinary Comments: Pelvic - Small amount of dark blood in vaginal vault. Small amount of light green discharge from cervical os. No CMT or adnexal tenderness. Chaperone present.      Musculoskeletal:         General: No tenderness or  edema. Normal range of motion.      Cervical back: Normal range of motion and neck supple.     Neurological: She is alert and oriented to person, place, and time. She has normal strength. No sensory deficit.   Skin: Skin is warm and dry. No rash noted. No erythema.   Psychiatric: She has a normal mood and affect.       ED Course   Procedures  Labs Reviewed   CBC W/ AUTO DIFFERENTIAL - Abnormal; Notable for the following components:       Result Value    RDW 14.6 (*)     Immature Granulocytes 0.7 (*)     All other components within normal limits    Narrative:     Release to patient->Immediate   COMPREHENSIVE METABOLIC PANEL - Abnormal; Notable for the following components:    CO2 22 (*)     Total Protein 8.7 (*)     All other components within normal limits    Narrative:     Release to patient->Immediate   URINALYSIS, REFLEX TO URINE CULTURE - Abnormal; Notable for the following components:    Appearance, UA Cloudy (*)     Occult Blood UA 3+ (*)     Leukocytes, UA 3+ (*)     All other components within normal limits    Narrative:     Specimen Source->Urine   URINALYSIS MICROSCOPIC - Abnormal; Notable for the following components:    RBC, UA 17 (*)     WBC, UA >100 (*)     Bacteria Moderate (*)     All other components within normal limits    Narrative:     Specimen Source->Urine   CULTURE, URINE   VAGINAL SCREEN   C. TRACHOMATIS/N. GONORRHOEAE BY AMP DNA   CULTURE, BLOOD   CULTURE, BLOOD   HCG, QUANTITATIVE    Narrative:     Release to patient->Immediate   HEPATITIS C ANTIBODY   LACTIC ACID, PLASMA   POCT GLUCOSE   POCT GLUCOSE MONITORING CONTINUOUS          Imaging Results          CT Abdomen Pelvis With Contrast (In process)  Result time 07/26/22 21:21:37               X-Ray Chest 1 View (Final result)  Result time 07/26/22 21:48:19    Final result by Alexandre Wise MD (07/26/22 21:48:19)                 Impression:      No radiographic evidence of acute intrathoracic process on this single  "view..      Electronically signed by: Alexandre Wise MD  Date:    07/26/2022  Time:    21:48             Narrative:    EXAMINATION:  XR CHEST 1 VIEW    CLINICAL HISTORY:  Sepsis;    TECHNIQUE:  Single frontal view of the chest was performed.    COMPARISON:  11/19/2021    FINDINGS:  The cardiac silhouette appears within normal limits.  The lungs are symmetrically expanded without evidence of confluent airspace consolidation.  No significant volume of pleural fluid or pneumothorax identified.  The visualized osseous structures appear intact.                                 Medications   cefTRIAXone (ROCEPHIN) 1 g/50 mL D5W IVPB (0 g Intravenous Stopped 7/26/22 2023)   lactated ringers bolus 1,000 mL (0 mLs Intravenous Stopped 7/26/22 2039)   ketorolac injection 9.999 mg (9.999 mg Intravenous Given 7/26/22 2001)   iohexoL (OMNIPAQUE 350) injection 75 mL (75 mLs Intravenous Given 7/26/22 2149)     Medical Decision Making:   History:   Old Medical Records: I decided to obtain old medical records.  Old Records Summarized: records from clinic visits.  Clinical Tests:   Lab Tests: Ordered and Reviewed  Radiological Study: Ordered and Reviewed  Other:   I have discussed this case with another health care provider.       <> Summary of the Discussion: Hospital Medicine       APC / Resident Notes:   46 y.o. female with medical history of HIV (last CD4 4 months ago 99), Bipolar 1 d/o, Polysubstance Abuse, Hypothyroidism, HSV presenting to the ED c/o lower abdominal pain, irregular menstrual cycles, dysuria. Not currently compliant with antiviral regimen. DDx includes but not limited to UTI, pyelonephritis, nephrolithiasis, septic stone, intra-abdominal abscess, cervicitis, PID, DUB.     7:30 PM - Damien Melvin PA-C  Discussed having social work see the patient regarding assistance with establishing new ID follow-up and obtaining Biktarvy prescription from the pharmacy. Patient declines social work assistance stating "Patricia " "Popeye does not need social work. I have numbers in my phonebook I can use for assistance. I'm here for my stomach pain." Discussed concerns for increased risk for infection, abscess, AIDs progression, other complications with HIV being unmanaged. Patient acknowledged this and does not want social work to be involved at this time.     10:13 PM - Damien Melvin PA-C  Work-up reviewed. UA consistent with UTI. Rocephin ordered for coverage. Concerned for pyelonephritis given her abdominal pain. CT abd/pelv ordered and in process. Additionally will plan for admission given her immunosuppressed status and not currently taking antivirals. Last CD4 99 4 months ago and concerned for possible AIDs advancement. Patient signed out to my colleague at the end of my shift pending CT results. Patient expresses understanding and agreeable to the plan. I have discussed the care of this patient with my supervising physician.           ED Course as of 07/26/22 2214 Tue Jul 26, 2022 2115 HCG Quant: <2.4 [BA]      ED Course User Index  [BA] Damien Melvin PA-C               Clinical Impression:   Final diagnoses:  [N12] Pyelonephritis (Primary)  [B20] HIV infection, unspecified symptom status  [Z91.14] Non compliance w medication regimen  [N93.9] Vaginal bleeding          ED Disposition Condition    Admit               Damien Melvin PA-C  07/26/22 2214    "

## 2022-07-26 NOTE — ED NOTES
Patient identifiers verified and correct for Ms Nye  C/C: Urinary symptoms SEE NN  APPEARANCE: awake and alert in NAD.  SKIN: warm, dry and intact. No breakdown or bruising.  MUSCULOSKELETAL: Patient moving all extremities spontaneously, no obvious swelling or deformities noted. Ambulates independently.  RESPIRATORY: Denies shortness of breath.Respirations unlabored. Deniesfevers  CARDIAC: Denies CP, 2+ distal pulses; no peripheral edema  ABDOMEN: S/ND/NT, Denies nausea  : voids spontaneously, reports painadn foul smell  Neurologic: AAO x 4; follows commands equal strength in all extremities; denies numbness/tingling. Denies dizziness  Deneis weakness, reports sores in mouth

## 2022-07-27 PROBLEM — N89.8 VAGINAL DISCHARGE: Status: ACTIVE | Noted: 2022-07-27

## 2022-07-27 LAB
ANION GAP SERPL CALC-SCNC: 6 MMOL/L (ref 8–16)
BACTERIA GENITAL QL WET PREP: ABNORMAL
BASOPHILS # BLD AUTO: 0.05 K/UL (ref 0–0.2)
BASOPHILS NFR BLD: 1.4 % (ref 0–1.9)
BUN SERPL-MCNC: 8 MG/DL (ref 6–20)
CALCIUM SERPL-MCNC: 8.7 MG/DL (ref 8.7–10.5)
CHLORIDE SERPL-SCNC: 107 MMOL/L (ref 95–110)
CLUE CELLS VAG QL WET PREP: ABNORMAL
CO2 SERPL-SCNC: 22 MMOL/L (ref 23–29)
CREAT SERPL-MCNC: 0.6 MG/DL (ref 0.5–1.4)
DIFFERENTIAL METHOD: ABNORMAL
EOSINOPHIL # BLD AUTO: 0.1 K/UL (ref 0–0.5)
EOSINOPHIL NFR BLD: 3.2 % (ref 0–8)
ERYTHROCYTE [DISTWIDTH] IN BLOOD BY AUTOMATED COUNT: 14.7 % (ref 11.5–14.5)
EST. GFR  (AFRICAN AMERICAN): >60 ML/MIN/1.73 M^2
EST. GFR  (NON AFRICAN AMERICAN): >60 ML/MIN/1.73 M^2
FILAMENT FUNGI VAG WET PREP-#/AREA: ABNORMAL
GLUCOSE SERPL-MCNC: 102 MG/DL (ref 70–110)
HCT VFR BLD AUTO: 35.2 % (ref 37–48.5)
HGB BLD-MCNC: 11.4 G/DL (ref 12–16)
IMM GRANULOCYTES # BLD AUTO: 0.01 K/UL (ref 0–0.04)
IMM GRANULOCYTES NFR BLD AUTO: 0.3 % (ref 0–0.5)
LYMPHOCYTES # BLD AUTO: 1 K/UL (ref 1–4.8)
LYMPHOCYTES NFR BLD: 30.1 % (ref 18–48)
MCH RBC QN AUTO: 28.1 PG (ref 27–31)
MCHC RBC AUTO-ENTMCNC: 32.4 G/DL (ref 32–36)
MCV RBC AUTO: 87 FL (ref 82–98)
MONOCYTES # BLD AUTO: 0.4 K/UL (ref 0.3–1)
MONOCYTES NFR BLD: 11.3 % (ref 4–15)
NEUTROPHILS # BLD AUTO: 1.9 K/UL (ref 1.8–7.7)
NEUTROPHILS NFR BLD: 53.7 % (ref 38–73)
NRBC BLD-RTO: 0 /100 WBC
PLATELET # BLD AUTO: 229 K/UL (ref 150–450)
PMV BLD AUTO: 10.4 FL (ref 9.2–12.9)
POTASSIUM SERPL-SCNC: 3.5 MMOL/L (ref 3.5–5.1)
RBC # BLD AUTO: 4.06 M/UL (ref 4–5.4)
SODIUM SERPL-SCNC: 135 MMOL/L (ref 136–145)
SPECIMEN SOURCE: ABNORMAL
T VAGINALIS GENITAL QL WET PREP: ABNORMAL
WBC # BLD AUTO: 3.45 K/UL (ref 3.9–12.7)
WBC #/AREA VAG WET PREP: ABNORMAL
YEAST GENITAL QL WET PREP: ABNORMAL

## 2022-07-27 PROCEDURE — 99215 PR OFFICE/OUTPT VISIT, EST, LEVL V, 40-54 MIN: ICD-10-PCS | Mod: ,,, | Performed by: STUDENT IN AN ORGANIZED HEALTH CARE EDUCATION/TRAINING PROGRAM

## 2022-07-27 PROCEDURE — 25000003 PHARM REV CODE 250

## 2022-07-27 PROCEDURE — 86361 T CELL ABSOLUTE COUNT: CPT

## 2022-07-27 PROCEDURE — 85025 COMPLETE CBC W/AUTO DIFF WBC: CPT | Performed by: HOSPITALIST

## 2022-07-27 PROCEDURE — 99219 PR INITIAL OBSERVATION CARE,LEVL II: ICD-10-PCS | Mod: ,,, | Performed by: HOSPITALIST

## 2022-07-27 PROCEDURE — 36415 COLL VENOUS BLD VENIPUNCTURE: CPT | Performed by: HOSPITALIST

## 2022-07-27 PROCEDURE — 86592 SYPHILIS TEST NON-TREP QUAL: CPT

## 2022-07-27 PROCEDURE — 63600175 PHARM REV CODE 636 W HCPCS: Performed by: HOSPITALIST

## 2022-07-27 PROCEDURE — 99215 OFFICE O/P EST HI 40 MIN: CPT | Mod: ,,, | Performed by: STUDENT IN AN ORGANIZED HEALTH CARE EDUCATION/TRAINING PROGRAM

## 2022-07-27 PROCEDURE — 80048 BASIC METABOLIC PNL TOTAL CA: CPT | Performed by: HOSPITALIST

## 2022-07-27 PROCEDURE — G0378 HOSPITAL OBSERVATION PER HR: HCPCS

## 2022-07-27 PROCEDURE — 25000003 PHARM REV CODE 250: Performed by: HOSPITALIST

## 2022-07-27 PROCEDURE — 96366 THER/PROPH/DIAG IV INF ADDON: CPT | Performed by: EMERGENCY MEDICINE

## 2022-07-27 PROCEDURE — 99219 PR INITIAL OBSERVATION CARE,LEVL II: CPT | Mod: ,,, | Performed by: HOSPITALIST

## 2022-07-27 PROCEDURE — 87536 HIV-1 QUANT&REVRSE TRNSCRPJ: CPT

## 2022-07-27 PROCEDURE — 86706 HEP B SURFACE ANTIBODY: CPT

## 2022-07-27 RX ORDER — PROCHLORPERAZINE EDISYLATE 5 MG/ML
5 INJECTION INTRAMUSCULAR; INTRAVENOUS EVERY 6 HOURS PRN
Status: DISCONTINUED | OUTPATIENT
Start: 2022-07-27 | End: 2022-07-29 | Stop reason: HOSPADM

## 2022-07-27 RX ORDER — NALOXONE HCL 0.4 MG/ML
0.02 VIAL (ML) INJECTION
Status: DISCONTINUED | OUTPATIENT
Start: 2022-07-27 | End: 2022-07-29 | Stop reason: HOSPADM

## 2022-07-27 RX ORDER — SODIUM CHLORIDE 0.9 % (FLUSH) 0.9 %
10 SYRINGE (ML) INJECTION
Status: DISCONTINUED | OUTPATIENT
Start: 2022-07-27 | End: 2022-07-29 | Stop reason: HOSPADM

## 2022-07-27 RX ORDER — ERYTHROMYCIN 5 MG/G
OINTMENT OPHTHALMIC 2 TIMES DAILY
Status: DISCONTINUED | OUTPATIENT
Start: 2022-07-27 | End: 2022-07-29 | Stop reason: HOSPADM

## 2022-07-27 RX ORDER — IPRATROPIUM BROMIDE AND ALBUTEROL SULFATE 2.5; .5 MG/3ML; MG/3ML
3 SOLUTION RESPIRATORY (INHALATION) EVERY 6 HOURS PRN
Status: DISCONTINUED | OUTPATIENT
Start: 2022-07-27 | End: 2022-07-29 | Stop reason: HOSPADM

## 2022-07-27 RX ORDER — TALC
6 POWDER (GRAM) TOPICAL NIGHTLY PRN
Status: DISCONTINUED | OUTPATIENT
Start: 2022-07-27 | End: 2022-07-27 | Stop reason: SDUPTHER

## 2022-07-27 RX ORDER — ONDANSETRON 2 MG/ML
4 INJECTION INTRAMUSCULAR; INTRAVENOUS EVERY 8 HOURS PRN
Status: DISCONTINUED | OUTPATIENT
Start: 2022-07-27 | End: 2022-07-29 | Stop reason: HOSPADM

## 2022-07-27 RX ORDER — BUSPIRONE HYDROCHLORIDE 10 MG/1
10 TABLET ORAL 3 TIMES DAILY
Status: DISCONTINUED | OUTPATIENT
Start: 2022-07-27 | End: 2022-07-29 | Stop reason: HOSPADM

## 2022-07-27 RX ORDER — ATOVAQUONE 750 MG/5ML
1500 SUSPENSION ORAL DAILY
Status: DISCONTINUED | OUTPATIENT
Start: 2022-07-27 | End: 2022-07-29 | Stop reason: HOSPADM

## 2022-07-27 RX ORDER — LITHIUM CARBONATE 300 MG/1
300 TABLET, FILM COATED, EXTENDED RELEASE ORAL 3 TIMES DAILY
Refills: 3 | Status: DISCONTINUED | OUTPATIENT
Start: 2022-07-27 | End: 2022-07-29 | Stop reason: HOSPADM

## 2022-07-27 RX ORDER — ACETAMINOPHEN 325 MG/1
650 TABLET ORAL EVERY 4 HOURS PRN
Status: DISCONTINUED | OUTPATIENT
Start: 2022-07-27 | End: 2022-07-29 | Stop reason: HOSPADM

## 2022-07-27 RX ORDER — TALC
6 POWDER (GRAM) TOPICAL NIGHTLY PRN
Status: DISCONTINUED | OUTPATIENT
Start: 2022-07-27 | End: 2022-07-29 | Stop reason: HOSPADM

## 2022-07-27 RX ORDER — IBUPROFEN 200 MG
24 TABLET ORAL
Status: DISCONTINUED | OUTPATIENT
Start: 2022-07-27 | End: 2022-07-29 | Stop reason: HOSPADM

## 2022-07-27 RX ORDER — IBUPROFEN 200 MG
16 TABLET ORAL
Status: DISCONTINUED | OUTPATIENT
Start: 2022-07-27 | End: 2022-07-29 | Stop reason: HOSPADM

## 2022-07-27 RX ORDER — DOXYCYCLINE HYCLATE 100 MG
100 TABLET ORAL EVERY 12 HOURS
Status: DISCONTINUED | OUTPATIENT
Start: 2022-07-27 | End: 2022-07-29 | Stop reason: HOSPADM

## 2022-07-27 RX ADMIN — LITHIUM CARBONATE 300 MG: 300 TABLET, FILM COATED, EXTENDED RELEASE ORAL at 12:07

## 2022-07-27 RX ADMIN — ATOVAQUONE 1500 MG: 750 SUSPENSION ORAL at 12:07

## 2022-07-27 RX ADMIN — TRAMADOL HYDROCHLORIDE 50 MG: 50 TABLET, COATED ORAL at 02:07

## 2022-07-27 RX ADMIN — HYPROMELLOSE 2910 1 DROP: 5 SOLUTION OPHTHALMIC at 05:07

## 2022-07-27 RX ADMIN — GUAIFENESIN AND DEXTROMETHORPHAN HYDROBROMIDE 1 TABLET: 600; 30 TABLET, EXTENDED RELEASE ORAL at 05:07

## 2022-07-27 RX ADMIN — LITHIUM CARBONATE 300 MG: 300 TABLET, FILM COATED, EXTENDED RELEASE ORAL at 08:07

## 2022-07-27 RX ADMIN — BUSPIRONE HYDROCHLORIDE 10 MG: 10 TABLET ORAL at 12:07

## 2022-07-27 RX ADMIN — ERYTHROMYCIN: 5 OINTMENT OPHTHALMIC at 08:07

## 2022-07-27 RX ADMIN — ACETAMINOPHEN 650 MG: 325 TABLET ORAL at 08:07

## 2022-07-27 RX ADMIN — CEFTRIAXONE 1 G: 1 INJECTION, SOLUTION INTRAVENOUS at 08:07

## 2022-07-27 RX ADMIN — HYPROMELLOSE 2910 1 DROP: 5 SOLUTION OPHTHALMIC at 02:07

## 2022-07-27 RX ADMIN — DOXYCYCLINE HYCLATE 100 MG: 100 TABLET, COATED ORAL at 02:07

## 2022-07-27 RX ADMIN — BUSPIRONE HYDROCHLORIDE 10 MG: 10 TABLET ORAL at 08:07

## 2022-07-27 RX ADMIN — TRAMADOL HYDROCHLORIDE 50 MG: 50 TABLET, COATED ORAL at 12:07

## 2022-07-27 RX ADMIN — BUSPIRONE HYDROCHLORIDE 10 MG: 10 TABLET ORAL at 02:07

## 2022-07-27 RX ADMIN — HYPROMELLOSE 2910 1 DROP: 5 SOLUTION OPHTHALMIC at 08:07

## 2022-07-27 RX ADMIN — DOXYCYCLINE HYCLATE 100 MG: 100 TABLET, COATED ORAL at 08:07

## 2022-07-27 RX ADMIN — LITHIUM CARBONATE 300 MG: 300 TABLET, FILM COATED, EXTENDED RELEASE ORAL at 05:07

## 2022-07-27 RX ADMIN — ERYTHROMYCIN 1 INCH: 5 OINTMENT OPHTHALMIC at 05:07

## 2022-07-27 NOTE — H&P
[facility] Tele[facility] ([address] Medicine  History & Physical    Patient Name: Patricia Nye  MRN: [id number]  Patient Class: OP- Observation  Admission Date: 7/26/2022  Attending Physician: Rizwan Hameed MD  Primary Care Provider: Rd Vrik MD         Patient information was obtained from patient, past medical records and ER records.     Subjective:     Principal Problem:UTI (urinary tract infection)    Chief Complaint:   Chief Complaint   Patient presents with    Abdominal Pain     States irregular period and lower abdominal pain x several months        HPI: 45 yo F with PMHX HIV/AIDS (CD 99 3/2022) and bipolar 1 disorder who presents to the ED complaining of abdominal pain na dvaginal discharge x a few days. Pt. States that she has been having increased urinary frequency and dyuria with vaginal discharge/foul odor. She has associated supapubic and RLQ pain that have been going on for around the same time. She denies any fevers, chills, cough, nausea, or vomiting. Pt. Does report that her menses have been abnormal with seemingly 2 cylces per month for the last several months as well. She is not sure if they are related. Additionally, the patient reports persistent R eye dryness and redness since her diagnosis of herpes zoster ophthalmicus. She states that the eye bother her all the time without relief. She never followed up with ophthalmology after her recent discharge 6/2022 because she states she was never contacted about an appointment. Pt. Also reports that she has not taken any of her medications including biktarvy, lithium, or buspar in 3 weeks because she has not gotten them refilled. Pt. Mentions that she is home alone and feels she has no one to take care of her, so she hasn't cared for herself. She denies any SI or HI, however.      Past Medical History:   Diagnosis Date    Allergy     Anxiety     Cervical dysplasia 1998 / 2016    Depression     HIV infection      Hyperthyroidism     Insomnia        Past Surgical History:   Procedure Laterality Date    SKULL FRACTURE ELEVATION         Review of patient's allergies indicates:   Allergen Reactions    Penicillins Other (See Comments) and Nausea And Vomiting     Trouble swallowing and vomiting.     Sulfa (sulfonamide antibiotics) Swelling     Rash (skin)^    Opioids - morphine analogues      Pt states she is not allergic to morphine      Bactrim [sulfamethoxazole-trimethoprim] Rash       No current facility-administered medications on file prior to encounter.     Current Outpatient Medications on File Prior to Encounter   Medication Sig    acetaminophen (TYLENOL) 500 MG tablet     artificial tears (ISOPTO TEARS) 0.5 % ophthalmic solution Place 2 drops into both eyes 3 (three) times daily.    atovaquone (MEPRON) 750 mg/5 mL Susp TAKE 10 MLS (1,500 MG TOTAL) BY MOUTH ONCE DAILY.    BIKTARVY -25 mg (25 kg or greater) Take 1 tablet by mouth once daily.    busPIRone (BUSPAR) 10 MG tablet Take 1 tablet (10 mg total) by mouth 3 (three) times daily.    erythromycin (ROMYCIN) ophthalmic ointment place IN right eye TWICE DAILY AND facial TO RASH FOR 10 DAYS    lithium (LITHOTAB) 300 mg tablet Take 1 tablet (300 mg total) by mouth every 8 (eight) hours. LITHIUM CARBONATE ER    morphine (MSIR) 15 MG tablet Take 1 tablet (15 mg total) by mouth every 6 (six) hours as needed for Pain. (Patient not taking: No sig reported)    [DISCONTINUED] azelastine (ASTELIN) 137 mcg (0.1 %) nasal spray 1 spray (137 mcg total) by Nasal route 2 (two) times daily.     Family History       Problem Relation (Age of Onset)    Heart disease Mother          Tobacco Use    Smoking status: Current Some Day Smoker     Types: Vaping with nicotine    Smokeless tobacco: Never Used   Substance and Sexual Activity    Alcohol use: Yes     Comment: socially    Drug use: Not Currently    Sexual activity: Yes     Partners: Male     Birth  control/protection: None     Review of Systems   Constitutional:  Negative for activity change, appetite change, chills, fever and unexpected weight change.   HENT:  Negative for congestion and sore throat.    Respiratory:  Negative for cough and shortness of breath.    Cardiovascular:  Negative for chest pain, palpitations and leg swelling.   Gastrointestinal:  Positive for abdominal pain. Negative for abdominal distention, blood in stool, constipation, diarrhea, nausea and vomiting.   Genitourinary:  Positive for dysuria, frequency, vaginal bleeding and vaginal discharge. Negative for difficulty urinating and hematuria.   Musculoskeletal:  Negative for arthralgias and myalgias.   Skin:  Negative for color change and rash.   Neurological:  Negative for dizziness, tremors and seizures.   Objective:     Vital Signs (Most Recent):  Temp: 98.5 °F (36.9 °C) (07/26/22 2133)  Pulse: 100 (07/26/22 2133)  Resp: 18 (07/26/22 2133)  BP: 107/83 (07/26/22 2133)  SpO2: 98 % (07/26/22 2133) Vital Signs (24h Range):  Temp:  [98.1 °F (36.7 °C)-98.5 °F (36.9 °C)] 98.5 °F (36.9 °C)  Pulse:  [100-110] 100  Resp:  [15-18] 18  SpO2:  [98 %-100 %] 98 %  BP: (107-123)/(70-83) 107/83     Weight: 55.3 kg (122 lb)  Body mass index is 23.05 kg/m².    Physical Exam  Vitals reviewed.   Constitutional:       General: She is not in acute distress.     Appearance: She is well-developed.   HENT:      Head: Normocephalic and atraumatic.   Eyes:      Extraocular Movements: Extraocular movements intact.      Pupils: Pupils are equal, round, and reactive to light.   Neck:      Vascular: No JVD.      Trachea: No tracheal deviation.   Cardiovascular:      Rate and Rhythm: Normal rate and regular rhythm.      Heart sounds: No murmur heard.    No friction rub. No gallop.   Pulmonary:      Effort: No respiratory distress.      Breath sounds: Normal breath sounds. No wheezing or rales.   Abdominal:      General: Bowel sounds are normal. There is no  distension.      Palpations: Abdomen is soft. There is no mass.      Tenderness: There is abdominal tenderness.      Comments: Mild RLQ tenderness   Musculoskeletal:         General: No deformity.      Cervical back: Neck supple.   Lymphadenopathy:      Cervical: No cervical adenopathy.   Skin:     General: Skin is warm and dry.      Findings: No rash.   Neurological:      Mental Status: She is alert and oriented to person, place, and time.         CRANIAL NERVES     CN III, IV, VI   Pupils are equal, round, and reactive to light.     Significant Labs: All pertinent labs within the past 24 hours have been reviewed.    Significant Imaging: I have reviewed all pertinent imaging results/findings within the past 24 hours.    Assessment/Plan:     * UTI (urinary tract infection)  -Pt. With dysuria, increased frequency and discharge  -U/A >100 WBC many bacteria consistent with UTI  -IV ceftriaxone, f/u urine cultures      Adnexal mass  -Suspected adnexal cyst along the posterior left aspect of the uterus measuring 3.7 x 3.2 cm, new compared to CT renal stone study from 11/19/2021 noted on abdominal CT  -May be contributing to pain, but no prompt intervention required as findings not consistent with ectopic, ruptured cyst, or torsion. Further investigation with U/S ordered. Consider Gyn consult in am based on results of U/S      At risk for opportunistic infections  -CD4 count 99 consistent with AIDS, pt. At risk for opportunistic infections  -Continue atovaquone daily. F/u ID rec's      Herpes zoster ophthalmicus of right eye  -Pt. With persistent erythema and pain to R eye since recent herpes zoster opthalmicus infection. Reports competing treatment but did not f/u with opthalmology and seems to have poor compliance history  -Will consult ophthalmology for evaluation while admited      Bipolar 1 disorder  -Will resume buspar and lithium, but consult psychiatry given patients expressed depression during interview and  intermittent compliance with meds      HIV disease  This patient in known to have AIDS (from CD4 count has been less than 200).   Lab Results   Component Value Date    ABSOLUTECD4 99 (L) 03/19/2022     -Pt. On biktarvy but with intermittent compliance, has not taken in last week. Has not established care with ID here. ID consulted for further assistance and decision-making regarding restarting ART    VTE Risk Mitigation (From admission, onward)         Ordered     IP VTE LOW RISK PATIENT  Once         07/27/22 0001     Place ALEJANDRO hose  Until discontinued         07/27/22 0001     Place sequential compression device  Until discontinued         07/27/22 0001                   Rizwan Hameed MD  Department of Hospital Medicine   [facility] - Telemetry Stepdown ([place])

## 2022-07-27 NOTE — SUBJECTIVE & OBJECTIVE
Past Medical History:   Diagnosis Date    Allergy     Anxiety     Cervical dysplasia 1998 / 2016    Depression     HIV infection     Hyperthyroidism     Insomnia        Past Surgical History:   Procedure Laterality Date    SKULL FRACTURE ELEVATION         Review of patient's allergies indicates:   Allergen Reactions    Penicillins Other (See Comments) and Nausea And Vomiting     Trouble swallowing and vomiting.     Sulfa (sulfonamide antibiotics) Swelling     Rash (skin)^    Opioids - morphine analogues      Pt states she is not allergic to morphine      Bactrim [sulfamethoxazole-trimethoprim] Rash       Medications:  Medications Prior to Admission   Medication Sig    acetaminophen (TYLENOL) 500 MG tablet     artificial tears (ISOPTO TEARS) 0.5 % ophthalmic solution Place 2 drops into both eyes 3 (three) times daily.    atovaquone (MEPRON) 750 mg/5 mL Susp TAKE 10 MLS (1,500 MG TOTAL) BY MOUTH ONCE DAILY.    BIKTARVY -25 mg (25 kg or greater) Take 1 tablet by mouth once daily.    busPIRone (BUSPAR) 10 MG tablet Take 1 tablet (10 mg total) by mouth 3 (three) times daily.    erythromycin (ROMYCIN) ophthalmic ointment place IN right eye TWICE DAILY AND facial TO RASH FOR 10 DAYS    lithium (LITHOTAB) 300 mg tablet Take 1 tablet (300 mg total) by mouth every 8 (eight) hours. LITHIUM CARBONATE ER    morphine (MSIR) 15 MG tablet Take 1 tablet (15 mg total) by mouth every 6 (six) hours as needed for Pain. (Patient not taking: No sig reported)     Antibiotics (From admission, onward)                Start     Stop Route Frequency Ordered    07/27/22 2000  cefTRIAXone (ROCEPHIN) 1 g/50 mL D5W IVPB         -- IV Every 24 hours (non-standard times) 07/27/22 0214          Antifungals (From admission, onward)                None          Antivirals (From admission, onward)      None             Immunization History   Administered Date(s) Administered    COVID-19 MRNA, LN-S PF (MODERNA HALF 0.25 ML DOSE) 02/10/2022     COVID-19, MRNA, LN-S, PF (Pfizer) (Gray Cap) 02/04/2022    COVID-19, MRNA, LN-S, PF (Pfizer) (Purple Cap) 01/14/2022, 02/04/2022    Influenza - Quadrivalent - MDCK - PF 10/10/2019, 01/05/2022    Influenza - Quadrivalent - PF *Preferred* (6 months and older) 11/09/2016, 11/20/2020    Pneumococcal Conjugate - 13 Valent 08/23/2017    Pneumococcal Polysaccharide - 23 Valent 02/06/2020    Td - PF (ADULT) 02/06/2020    Tdap 11/09/2016       Family History       Problem Relation (Age of Onset)    Heart disease Mother          Social History     Socioeconomic History    Marital status:    Occupational History    Occupation: unemployed    Tobacco Use    Smoking status: Current Some Day Smoker     Types: Vaping with nicotine    Smokeless tobacco: Never Used   Substance and Sexual Activity    Alcohol use: Yes     Comment: socially    Drug use: Not Currently    Sexual activity: Yes     Partners: Male     Birth control/protection: None     Review of Systems   Constitutional:  Negative for chills and fever.   HENT:  Negative for mouth sores and sore throat.    Eyes:  Positive for pain (R) and redness (R).   Respiratory:  Negative for cough and shortness of breath.    Cardiovascular:  Negative for chest pain and palpitations.   Gastrointestinal:  Positive for abdominal pain. Negative for blood in stool, diarrhea and nausea.   Genitourinary:  Positive for dysuria, frequency, menstrual problem and vaginal discharge. Negative for difficulty urinating and genital sores.   Musculoskeletal:  Negative for joint swelling and myalgias.   Skin:  Negative for rash and wound.   Neurological:  Negative for dizziness and headaches.   Psychiatric/Behavioral:  Positive for dysphoric mood. Negative for agitation.    Objective:     Vital Signs (Most Recent):  Temp: 96.5 °F (35.8 °C) (07/27/22 1122)  Pulse: 80 (07/27/22 1122)  Resp: 19 (07/27/22 1122)  BP: (!) 150/87 (07/27/22 1122)  SpO2: 100 % (07/27/22 1122)   Vital Signs (24h  Range):  Temp:  [96.5 °F (35.8 °C)-98.5 °F (36.9 °C)] 96.5 °F (35.8 °C)  Pulse:  [] 80  Resp:  [15-19] 19  SpO2:  [95 %-100 %] 100 %  BP: (107-150)/(65-87) 150/87     Weight: 56.8 kg (125 lb 3.5 oz)  Body mass index is 23.28 kg/m².    Estimated Creatinine Clearance: 91.6 mL/min (based on SCr of 0.6 mg/dL).    Physical Exam  Vitals and nursing note reviewed.   Constitutional:       General: She is not in acute distress.     Appearance: She is normal weight. She is not toxic-appearing or diaphoretic.   HENT:      Head: Normocephalic and atraumatic.      Comments: No mouth or facial ulcerations noted.  Eyes:      General: No scleral icterus.        Right eye: No discharge.         Left eye: No discharge.      Comments: R eye erythema.   Cardiovascular:      Rate and Rhythm: Normal rate and regular rhythm.      Heart sounds:     No friction rub. No gallop.   Pulmonary:      Effort: Pulmonary effort is normal. No respiratory distress.      Breath sounds: No stridor. No wheezing.   Abdominal:      General: Abdomen is flat. There is no distension.      Palpations: Abdomen is soft.      Tenderness: There is no abdominal tenderness.   Musculoskeletal:      Right lower leg: No edema.      Left lower leg: No edema.   Skin:     General: Skin is warm and dry.      Findings: No bruising or lesion.   Neurological:      General: No focal deficit present.      Mental Status: She is alert and oriented to person, place, and time.   Psychiatric:         Mood and Affect: Affect is not flat.         Thought Content: Thought content does not include suicidal plan.      Comments: Expresses dysphoric mood. Speech intact, although insight poor.        Significant Labs: Blood Culture:   Recent Labs   Lab 07/26/22  2231   LABBLOO No Growth to date  No Growth to date     C4 Count: No results for input(s): C4 in the last 48 hours.  CBC:   Recent Labs   Lab 07/26/22  1801 07/27/22  0423   WBC 5.62 3.45*   HGB 13.5 11.4*   HCT 40.4 35.2*     229     CMP:   Recent Labs   Lab 07/26/22  1801 07/27/22  0423    135*   K 4.2 3.5    107   CO2 22* 22*   GLU 87 102   BUN 8 8   CREATININE 0.7 0.6   CALCIUM 9.0 8.7   PROT 8.7*  --    ALBUMIN 3.9  --    BILITOT 0.2  --    ALKPHOS 85  --    AST 23  --    ALT 15  --    ANIONGAP 10 6*   EGFRNONAA >60.0 >60.0       All pertinent labs within the past 24 hours have been reviewed.    Significant Imaging: I have reviewed all pertinent imaging results/findings within the past 24 hours.

## 2022-07-27 NOTE — ASSESSMENT & PLAN NOTE
PTSD  Schizoaffective disorder  -Will resume buspar and lithium, but consult psychiatry given patients expressed depression during interview and intermittent compliance with meds  - lithium level pending in am  - utox pending  - psychology consulted, appreciate recs

## 2022-07-27 NOTE — HPI
46 y.o. F with PMHx of bipolar 1 disorder, HIV/AIDS, recurrent UTIs, herpes zoster othalmicus (3/88853) presenting to [facility] for suprapubic abdominal pain (R>L) with associated dysuria and urinary frequency for the past few days. Patient also reports a white vaginal discharge followed by bloody discharge which she believes is from her menstrual cycle. Reports of irregular menses for the last several months. In terms of sexual history, patient states she has been dxed with chlamydia and gonorrhea multiple times in the past but unsure of timeline.    Per chart review, pt last seen by infectious disease team on 3/29 for herpes zoster ophtalmicus and HIV. At that time, CD4 count was 99 with decrease in viral load. Pt was instructed to continue Biktarcy and atovoquone with close follow-up, states she has been off medications for at least 3 months due to inability to refill medication. Pt also was d/keesha with instructions to finish 14 day course of Valtrex. Pt states she finished course but has still been having R eye redness, irritation, and dryness since HZO episode. She was supposed to f/u with ophthalmology but reports she was never contacted about a f/u appointment.     ID consulted for assistance with management of HIV.
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
## 2022-07-27 NOTE — ASSESSMENT & PLAN NOTE
This patient in known to have AIDS (from CD4 count has been less than 200).   Lab Results   Component Value Date    ABSOLUTECD4 99 (L) 03/19/2022     -Pt. On biktarvy but with intermittent compliance, has not taken in last week. Has not established care with ID here. ID consulted for further assistance and decision-making regarding restarting ART

## 2022-07-27 NOTE — ASSESSMENT & PLAN NOTE
Dxed in 3/2022. S/p 14 day course of Valtrex per patient. Reports of intermittent R eye pain, dryness and erythema wit occasional stabbing pain in jaw, but oes not appear like there is new rashes/lesions. Post-herpetic neuralgia?    - ophthalmology consulted, no vision changes, old corneal scars noted with mild sclera ejection  - would not rec additional course of valtrex at this time

## 2022-07-27 NOTE — HPI
47 yo F with PMHX HIV/AIDS (CD 99 3/2022) and bipolar 1 disorder who presents to the ED complaining of abdominal pain na dvaginal discharge x a few days. Pt. States that she has been having increased urinary frequency and dyuria with vaginal discharge/foul odor. She has associated supapubic and RLQ pain that have been going on for around the same time. She denies any fevers, chills, cough, nausea, or vomiting. Pt. Does report that her menses have been abnormal with seemingly 2 cylces per month for the last several months as well. She is not sure if they are related. Additionally, the patient reports persistent R eye dryness and redness since her diagnosis of herpes zoster ophthalmicus. She states that the eye bother her all the time without relief. She never followed up with ophthalmology after her recent discharge 6/2022 because she states she was never contacted about an appointment. Pt. Also reports that she has not taken any of her medications including biktarvy, lithium, or buspar in 3 weeks because she has not gotten them refilled. Pt. Mentions that she is home alone and feels she has no one to take care of her, so she hasn't cared for herself. She denies any SI or HI, however.

## 2022-07-27 NOTE — CARE UPDATE
Patricia Nye was placed in observation for treatment of UTI. Started on rocephin, will continue. Ophthalmology consulted for R eye irritation s/p herpes zoster ophthalmicus; do not suspect active infection. Recommend erythromycin ointment, artificial tears, and warm compresses. ID consulted for assistance given AIDS diagnosis with noncomplicance. Recommend holding home biktarvy and establishing with ID clinic for management. Also recommend empiric tx for suspected STI. Doxycycline started. Follow up pending infectious labs. Ct obtained yesterday with suspected adnexal cyst along the posterior left aspect of the uterus. Transvaginal US obtained, reveals simple cyst without recommendations for further intervention or follow up.

## 2022-07-27 NOTE — ASSESSMENT & PLAN NOTE
-Pt. With persistent erythema and pain to R eye since recent herpes zoster opthalmicus infection. Reports competing treatment but did not f/u with opthalmology and seems to have poor compliance history  - ophthalmology consulted, do not suspect active infection  - Recommend erythromycin ointment, artificial tears, and warm compresses

## 2022-07-27 NOTE — ASSESSMENT & PLAN NOTE
-Pt. With dysuria, increased frequency and discharge  -U/A >100 WBC many bacteria consistent with UTi  -IV ceftriaxone, f/u urine cultures

## 2022-07-27 NOTE — ASSESSMENT & PLAN NOTE
-Pt. With dysuria, increased frequency and discharge  -U/A >100 WBC but sample dirty with squam   - CT abdo without evidence of pyelo or bladder wall thickening. No acute source of infection noted   - given IV ceftriaxone, discontinued per ID given dirty sample and no evidence of infection  - repeat obtained given continued symptoms, negative for infection- see STI

## 2022-07-27 NOTE — ASSESSMENT & PLAN NOTE
-Suspected adnexal cyst along the posterior left aspect of the uterus measuring 3.7 x 3.2 cm, new compared to CT renal stone study from 11/19/2021 noted on abdominal CT  -May be contributing to pain, but no prompt intervention required as findings not consistent with ectopic, ruptured cyst, or torsion. Further investigation with U/S ordered. Consider Gyn consult in am based on results of U/S
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
## 2022-07-27 NOTE — CONSULTS
"Consultation Report  Ophthalmology Service    Date: 07/27/2022    Reason for Consult: "HZO persistent eye pain"     History of Present Illness: Patricia Nye is a 46 y.o. female with PMHx of HIV/AIDS and bipolar disorder who is admitted to [facility] for UTI. Ophthalmology was consulted for persistent eye dryness/redness since her diagnosis of Herpes Zoster Ophthalmicus in 3/2022 (photos and further notes in chart from 3/19/2022 hospitalization). She was first started on IV acyclovir at OSH and then transferred to [facility] for further evaluation and transitioned to valtrex. Patient reports finishing course of Valtrex 1 g TID x14 days, but since that time she has continued to experience eye pain and redness. She was evaluated by both ID and ophthalmology at that time. Denies any visual changes.     POcularHx: HZO    Current eye gtts: ATs    Family Hx: Denies family history of glaucoma, macular degeneration, or blindness. family history includes Heart disease in her mother.     PMHx:  has a past medical history of Allergy, Anxiety, Cervical dysplasia (1998 / 2016), Depression, HIV infection, Hyperthyroidism, and Insomnia.     PSurgHx:  has a past surgical history that includes Skull fracture elevation.     Home Medications:   Prior to Admission medications    Medication Sig Start Date End Date Taking? Authorizing Provider   acetaminophen (TYLENOL) 500 MG tablet  11/15/21   Historical Provider   artificial tears (ISOPTO TEARS) 0.5 % ophthalmic solution Place 2 drops into both eyes 3 (three) times daily. 3/29/22   Marguerite Padron MD   atovaquone (MEPRON) 750 mg/5 mL Susp TAKE 10 MLS (1,500 MG TOTAL) BY MOUTH ONCE DAILY. 7/15/22   Rd Virk MD   BIKTARVY -25 mg (25 kg or greater) Take 1 tablet by mouth once daily. 6/6/22   Rd Virk MD   busPIRone (BUSPAR) 10 MG tablet Take 1 tablet (10 mg total) by mouth 3 (three) times daily. 6/6/22 7/26/22  Rd Virk MD   erythromycin (ROMYCIN) ophthalmic ointment " place IN right eye TWICE DAILY AND facial TO RASH FOR 10 DAYS 4/20/22   Historical Provider   lithium (LITHOTAB) 300 mg tablet Take 1 tablet (300 mg total) by mouth every 8 (eight) hours. LITHIUM CARBONATE ER 6/6/22   Rd Virk MD   morphine (MSIR) 15 MG tablet Take 1 tablet (15 mg total) by mouth every 6 (six) hours as needed for Pain.  Patient not taking: No sig reported 3/29/22   Marguerite Padron MD   azelastine (ASTELIN) 137 mcg (0.1 %) nasal spray 1 spray (137 mcg total) by Nasal route 2 (two) times daily. 2/19/21 3/29/22  Connie Lake DNP        Medications this encounter:    atovaquone  1,500 mg Oral Daily    busPIRone  10 mg Oral TID    cefTRIAXone (ROCEPHIN) IVPB  1 g Intravenous Q24H    lithium  300 mg Oral TID    nicotine  1 patch Transdermal ED 1 Time       Allergies: is allergic to penicillins, sulfa (sulfonamide antibiotics), opioids - morphine analogues, and bactrim [sulfamethoxazole-trimethoprim].     Social:  reports that she has been smoking vaping with nicotine. She has never used smokeless tobacco. She reports current alcohol use. She reports previous drug use.     ROS: As per HPI    Ocular examination/Dilated fundus examination:  Base Eye Exam       Visual Acuity (Snellen - Linear)         Right Left    Dist sc 20/20 -2 20/20              Tonometry (Tonopen, 9:18 AM)         Right Left    Pressure 18 17              Pupils         Dark Light Shape React APD    Right 4 3 Round reactive None    Left 4 3 Round reactive None              Visual Fields         Right Left     Full Full              Extraocular Movement         Right Left     Full, Ortho Full, Ortho              Neuro/Psych       Oriented x3: Yes              Dilation       Both eyes: 2.5% Phenylephrine @ 9:18 AM                  Slit Lamp and Fundus Exam       External Exam         Right Left    External no lesions noted on forehead or nose Normal              Slit Lamp Exam         Right Left    Lids/Lashes erythema Normal     Conjunctiva/Sclera injection temporally White and quiet    Cornea Inferior phlyctenule Clear    Anterior Chamber Deep and quiet Deep and quiet    Iris Round and reactive Round and reactive    Lens Clear Clear    Anterior Vitreous Normal Normal              Fundus Exam         Right Left    Disc pink/sharp pink/sharp    C/D Ratio 0.5 0.5    Macula flat flat    Vessels WNL WNL    Periphery no h/t/d no h/t/d                      Assessment/Plan:     1. Blepharitis, OD  2. Staphylococcal Marginal Keratitis, OD  - Pt w/ hx HZO of right eye in 3/2022 s/p treatment with Valtrex x14 days. Pain resolved briefly, but has had continuous right eye pain since 6/2022   - Base exam: VA 20/20 OU, IOP WNL, pupils round and equally reactive, EOM full  - Anterior exam OD: injection temporally of scleral; inferior phlyctenule w/ old corneal scars inferiorly; no cell/flare  - DFE WNL  - Likely issue of chronic blepharitis symptoms contributing to irritation of OD. No concern for active infection or ulcer. No concern for active herpes zoster   Recommendations:  - Start erythromycin ointment BID OD  - Start artificial tears QID OU  - Start warm compresses TID OU  - Upon discharge, can gently scrub eyelids with baby shampoo nightly     Patient's Best Contact Number: [phone]    Patient seen with Dr. Escobar and Dr. Sundar Hough MD   [employer] Ophthalmology PGY2  07/27/2022  8:47 AM        Common Ophthalmologic Abbreviations  OD: right eye  OS: left eye  OU: both eyes  IOP: intraocular pressure  VA: visual acuity  PH: pinhole  HM: hand motion  LP: light perception  NLP: no light perception  DFE: dilated fundus examination  SLE: slit lamp examination  RD: retinal detachment   AT: artificial tears  PFAT: preservative free artificial tears    I have personally interviewed the patient, reviewed the history and examined the patient and agree with the resident's exam, assessment and plan.

## 2022-07-27 NOTE — ASSESSMENT & PLAN NOTE
This patient in known to have AIDS (from CD4 count has been less than 200).   Lab Results   Component Value Date    ABSOLUTECD4 134 (L) 07/27/2022     -Pt. On biktarvy but with intermittent compliance, has not taken in last week. Has not established care with ID here. ID consulted for further assistance and decision-making regarding restarting ART  - continue to hold ART until follow up in [facility]  - continue atovaquone for PCP ppx

## 2022-07-27 NOTE — CONSULTS
[facility] - Tele[facility] ([place] [address])  Infectious Disease  Consult Note    Patient Name: Patricia Nye  MRN: [id number]  Admission Date: 7/26/2022  Hospital Length of Stay: 0 days  Attending Physician: Valentino Hill MD  Primary Care Provider: Rd Virk MD     Isolation Status: No active isolations    Patient information was obtained from patient, past medical records and ER records.      Inpatient consult to Infectious Diseases  Consult performed by: Louise Lea MD  Consult ordered by: Jayashree Islas PA-C  Reason for consult: HAART therapy in patient with questionable adherence        Assessment/Plan:     * AIDS  Last CD4 count in 3/2022 was 99, has not established care with ID clinic upon last hospital discharge. On Biktarvy but issues with non-adherence, states she was unable to refill it and has been off for about 3 months. Her judgement/insight upon disease processes at this time appears limited, patient also expressing dysphoric mood although no SI.    - continue Atovaquone for prophylaxis  - pending RPR, Hep B, HIV viral load, CD4 count  - would not restart HAART at time, will establish care OP if patient interested  - recommend OP psych follow-up to help ensure success with medication adherence     Vaginal discharge  Reports of white vaginal discharge followed by mixed bloody discharge that coincides with menstrual cycle. Transvaginal US with simple cyst, no follow-up recommended. Pt does report of hx of STIs - unable to confirm via chart review. Vaginal screen w/o yeast, clue, or trichomonas.     - pending chlamydia/gonorrhoa, would start doxycycline empirically in meantime    At risk for opportunistic infections  See AIDS.     Herpes zoster ophthalmicus of right eye  Dxed in 3/2022. S/p 14 day course of Valtrex per patient. Reports of intermittent R eye pain, dryness and erythema wit occasional stabbing pain in jaw, but oes not appear like there is new rashes/lesions. Post-herpetic  neuralgia?    - ophthalmology consulted, no vision changes, old corneal scars noted with mild sclera ejection  - would not rec additional course of valtrex at this time        UTI (urinary tract infection)  Symptomatic with dysuria and abdominal pain. UA with moderate bacteria, + leukocytes, although concerned for sub-optimal sample as there was 10+ epithelial cells.     - pt s/p ceftriaxone while in ED      Thank you for your consult. I will follow-up with patient. Please contact us if you have any additional questions.    Louise Lea MD  Infectious Disease  [facility] - Telemetry Stepdown ([place])    Subjective:     Principal Problem: AIDS    HPI: 46 y.o. F with PMHx of bipolar 1 disorder, HIV/AIDS, recurrent UTIs, herpes zoster othalmicus (3/87540) presenting to C for suprapubic abdominal pain (R>L) with associated dysuria and urinary frequency for the past few days. Patient also reports a white vaginal discharge followed by bloody discharge which she believes is from her menstrual cycle. Reports of irregular menses for the last several months. In terms of sexual history, patient states she has been dxed with chlamydia and gonorrhea multiple times in the past but unsure of timeline.    Per chart review, pt last seen by infectious disease team on 3/29 for herpes zoster ophtalmicus and HIV. At that time, CD4 count was 99 with decrease in viral load. Pt was instructed to continue Biktarcy and atovoquone with close follow-up, states she has been off medications for at least 3 months due to inability to refill medication. Pt also was d/keesha with instructions to finish 14 day course of Valtrex. Pt states she finished course but has still been having R eye redness, irritation, and dryness since HZO episode. She was supposed to f/u with ophthalmology but reports she was never contacted about a f/u appointment.     ID consulted for assistance with management of HIV.       Past Medical History:   Diagnosis Date     Allergy     Anxiety     Cervical dysplasia 1998 / 2016    Depression     HIV infection     Hyperthyroidism     Insomnia        Past Surgical History:   Procedure Laterality Date    SKULL FRACTURE ELEVATION         Review of patient's allergies indicates:   Allergen Reactions    Penicillins Other (See Comments) and Nausea And Vomiting     Trouble swallowing and vomiting.     Sulfa (sulfonamide antibiotics) Swelling     Rash (skin)^    Opioids - morphine analogues      Pt states she is not allergic to morphine      Bactrim [sulfamethoxazole-trimethoprim] Rash       Medications:  Medications Prior to Admission   Medication Sig    acetaminophen (TYLENOL) 500 MG tablet     artificial tears (ISOPTO TEARS) 0.5 % ophthalmic solution Place 2 drops into both eyes 3 (three) times daily.    atovaquone (MEPRON) 750 mg/5 mL Susp TAKE 10 MLS (1,500 MG TOTAL) BY MOUTH ONCE DAILY.    BIKTARVY -25 mg (25 kg or greater) Take 1 tablet by mouth once daily.    busPIRone (BUSPAR) 10 MG tablet Take 1 tablet (10 mg total) by mouth 3 (three) times daily.    erythromycin (ROMYCIN) ophthalmic ointment place IN right eye TWICE DAILY AND facial TO RASH FOR 10 DAYS    lithium (LITHOTAB) 300 mg tablet Take 1 tablet (300 mg total) by mouth every 8 (eight) hours. LITHIUM CARBONATE ER    morphine (MSIR) 15 MG tablet Take 1 tablet (15 mg total) by mouth every 6 (six) hours as needed for Pain. (Patient not taking: No sig reported)     Antibiotics (From admission, onward)                Start     Stop Route Frequency Ordered    07/27/22 2000  cefTRIAXone (ROCEPHIN) 1 g/50 mL D5W IVPB         -- IV Every 24 hours (non-standard times) 07/27/22 0214          Antifungals (From admission, onward)                None          Antivirals (From admission, onward)      None             Immunization History   Administered Date(s) Administered    COVID-19 MRNA, LN-S PF (MODERNA HALF 0.25 ML DOSE) 02/10/2022    COVID-19, MRNA, LN-S, PF  (Pfizer) (Gray Cap) 02/04/2022    COVID-19, MRNA, LN-S, PF (Pfizer) (Purple Cap) 01/14/2022, 02/04/2022    Influenza - Quadrivalent - MDCK - PF 10/10/2019, 01/05/2022    Influenza - Quadrivalent - PF *Preferred* (6 months and older) 11/09/2016, 11/20/2020    Pneumococcal Conjugate - 13 Valent 08/23/2017    Pneumococcal Polysaccharide - 23 Valent 02/06/2020    Td - PF (ADULT) 02/06/2020    Tdap 11/09/2016       Family History       Problem Relation (Age of Onset)    Heart disease Mother          Social History     Socioeconomic History    Marital status:    Occupational History    Occupation: unemployed    Tobacco Use    Smoking status: Current Some Day Smoker     Types: Vaping with nicotine    Smokeless tobacco: Never Used   Substance and Sexual Activity    Alcohol use: Yes     Comment: socially    Drug use: Not Currently    Sexual activity: Yes     Partners: Male     Birth control/protection: None     Review of Systems   Constitutional:  Negative for chills and fever.   HENT:  Negative for mouth sores and sore throat.    Eyes:  Positive for pain (R) and redness (R).   Respiratory:  Negative for cough and shortness of breath.    Cardiovascular:  Negative for chest pain and palpitations.   Gastrointestinal:  Positive for abdominal pain. Negative for blood in stool, diarrhea and nausea.   Genitourinary:  Positive for dysuria, frequency, menstrual problem and vaginal discharge. Negative for difficulty urinating and genital sores.   Musculoskeletal:  Negative for joint swelling and myalgias.   Skin:  Negative for rash and wound.   Neurological:  Negative for dizziness and headaches.   Psychiatric/Behavioral:  Positive for dysphoric mood. Negative for agitation.    Objective:     Vital Signs (Most Recent):  Temp: 96.5 °F (35.8 °C) (07/27/22 1122)  Pulse: 80 (07/27/22 1122)  Resp: 19 (07/27/22 1122)  BP: (!) 150/87 (07/27/22 1122)  SpO2: 100 % (07/27/22 1122)   Vital Signs (24h Range):  Temp:  [96.5  °F (35.8 °C)-98.5 °F (36.9 °C)] 96.5 °F (35.8 °C)  Pulse:  [] 80  Resp:  [15-19] 19  SpO2:  [95 %-100 %] 100 %  BP: (107-150)/(65-87) 150/87     Weight: 56.8 kg (125 lb 3.5 oz)  Body mass index is 23.28 kg/m².    Estimated Creatinine Clearance: 91.6 mL/min (based on SCr of 0.6 mg/dL).    Physical Exam  Vitals and nursing note reviewed.   Constitutional:       General: She is not in acute distress.     Appearance: She is normal weight. She is not toxic-appearing or diaphoretic.   HENT:      Head: Normocephalic and atraumatic.      Comments: No mouth or facial ulcerations noted.  Eyes:      General: No scleral icterus.        Right eye: No discharge.         Left eye: No discharge.      Comments: R eye erythema.   Cardiovascular:      Rate and Rhythm: Normal rate and regular rhythm.      Heart sounds:     No friction rub. No gallop.   Pulmonary:      Effort: Pulmonary effort is normal. No respiratory distress.      Breath sounds: No stridor. No wheezing.   Abdominal:      General: Abdomen is flat. There is no distension.      Palpations: Abdomen is soft.      Tenderness: There is no abdominal tenderness.   Musculoskeletal:      Right lower leg: No edema.      Left lower leg: No edema.   Skin:     General: Skin is warm and dry.      Findings: No bruising or lesion.   Neurological:      General: No focal deficit present.      Mental Status: She is alert and oriented to person, place, and time.   Psychiatric:         Mood and Affect: Affect is not flat.         Thought Content: Thought content does not include suicidal plan.      Comments: Expresses dysphoric mood. Speech intact, although insight poor.        Significant Labs: Blood Culture:   Recent Labs   Lab 07/26/22  2231   LABBLOO No Growth to date  No Growth to date     C4 Count: No results for input(s): C4 in the last 48 hours.  CBC:   Recent Labs   Lab 07/26/22  1801 07/27/22  0423   WBC 5.62 3.45*   HGB 13.5 11.4*   HCT 40.4 35.2*    229     CMP:    Recent Labs   Lab 07/26/22  1801 07/27/22  0423    135*   K 4.2 3.5    107   CO2 22* 22*   GLU 87 102   BUN 8 8   CREATININE 0.7 0.6   CALCIUM 9.0 8.7   PROT 8.7*  --    ALBUMIN 3.9  --    BILITOT 0.2  --    ALKPHOS 85  --    AST 23  --    ALT 15  --    ANIONGAP 10 6*   EGFRNONAA >60.0 >60.0       All pertinent labs within the past 24 hours have been reviewed.    Significant Imaging: I have reviewed all pertinent imaging results/findings within the past 24 hours.

## 2022-07-27 NOTE — ASSESSMENT & PLAN NOTE
Reports of white vaginal discharge followed by mixed bloody discharge that coincides with menstrual cycle. Transvaginal US with simple cyst, no follow-up recommended. Pt does report of hx of STIs - unable to confirm via chart review. Vaginal screen w/o yeast, clue, or trichomonas.     - pending chlamydia/gonorrhoa, would start doxycycline empirically in meantime

## 2022-07-27 NOTE — PLAN OF CARE
[facility] - Tele[facility]down ([place]  Initial Discharge Assessment       Primary Care Provider: Rd Virk MD    Admission Diagnosis: Vaginal bleeding [N93.9]  Pyelonephritis [N12]  Adnexal cyst [N94.9]  Non compliance w medication regimen [Z91.14]  HIV infection, unspecified symptom status [B20]    Admission Date: 7/26/2022  Expected Discharge Date: 7/29/2022         Payor: MEDICAID / Plan: [place]UE [place] / Product Type: Managed Medicaid /     Extended Emergency Contact Information  Primary Emergency Contact: Africa,JERMAINE  Home Phone: [phone]  Mobile Phone: [phone]  Relation: Friend  Preferred language: English   needed? No    Discharge Plan A: (P) Home with family  Discharge Plan B: (P) Home with family      [facility] - [facility] [address]  [facility]  [address] [facility] [address]  Phone: [phone] Fax: [phone]    [facility] - [facility] [address] [facility]  [address] [facility] [address]  Phone: [phone] Fax: [phone]    [facility] - [place], [place] - [address]  [address]  [facility] [address]  Phone: [phone] Fax: [phone]             SW completed Discharge Planning Assessment with patient via bedside. Discharge planning booklet given to patient/family and whiteboard updated with RUFINA and phone #. All questions answered.    Patient reported that she may need assistance with transportation upon discharge.     Patient reported that she lives at home with her mother, and prior to hospitalization she was independent with her ADL's. Patient reported that she is not on dialysis and does not go to a Coumadin clinic.      Patient lives in a [facility] with three steps to enter.       Sasha Rollins LMSW  [facility]  Ext. [phone]

## 2022-07-27 NOTE — PLAN OF CARE
Problem: UTI (Urinary Tract Infection)  Goal: Improved Infection Symptoms  Outcome: Ongoing, Progressing     Problem: Adult Inpatient Plan of Care  Goal: Optimal Comfort and Wellbeing  Outcome: Ongoing, Progressing

## 2022-07-27 NOTE — ASSESSMENT & PLAN NOTE
Symptomatic with dysuria and abdominal pain. UA with moderate bacteria, + leukocytes, although concerned for sub-optimal sample as there was 10+ epithelial cells.     - pt s/p ceftriaxone while in ED

## 2022-07-27 NOTE — ASSESSMENT & PLAN NOTE
-CD4 count 99 consistent with AIDS, pt. At risk for opportunistic infections  -Continue atovaquone daily. F/u ID rec's

## 2022-07-27 NOTE — PROVIDER PROGRESS NOTES - EMERGENCY DEPT.
Encounter Date: 7/26/2022    ED Physician Progress Notes           ED Physician Hand-off Note:    ED Course: I assumed care of patient from off-going ED physician team. Briefly, Patient is a 46-year-old female with HIV presenting for abdominal pain.    At the time of signout plan was pending CT abdomen pelvis.    Medications given in the ED:    Medications   sodium chloride 0.9% flush 10 mL (has no administration in time range)   melatonin tablet 6 mg (has no administration in time range)   nicotine 21 mg/24 hr 1 patch (1 patch Transdermal Patch Applied 7/26/22 2344)   traMADoL tablet 50 mg (50 mg Oral Given 7/27/22 0002)   atovaquone 750 mg/5 mL oral liquid 1,500 mg (has no administration in time range)   busPIRone tablet 10 mg (has no administration in time range)   lithium CR tablet 300 mg (has no administration in time range)   sodium chloride 0.9% flush 10 mL (has no administration in time range)   albuterol-ipratropium 2.5 mg-0.5 mg/3 mL nebulizer solution 3 mL (has no administration in time range)   ondansetron injection 4 mg (has no administration in time range)   prochlorperazine injection Soln 5 mg (has no administration in time range)   acetaminophen tablet 650 mg (has no administration in time range)   naloxone 0.4 mg/mL injection 0.02 mg (has no administration in time range)   glucose chewable tablet 16 g (has no administration in time range)   glucose chewable tablet 24 g (has no administration in time range)   cefTRIAXone (ROCEPHIN) 1 g/50 mL D5W IVPB (0 g Intravenous Stopped 7/26/22 2023)   lactated ringers bolus 1,000 mL (0 mLs Intravenous Stopped 7/26/22 2039)   ketorolac injection 9.999 mg (9.999 mg Intravenous Given 7/26/22 2001)   iohexoL (OMNIPAQUE 350) injection 75 mL (75 mLs Intravenous Given 7/26/22 2149)   acetaminophen tablet 1,000 mg (1,000 mg Oral Given 7/26/22 2338)   guaiFENesin 12 hr tablet 600 mg (600 mg Oral Given 7/26/22 2344)     CT shows ovarian cyst, no other acute findings.   Pelvic ultrasound ordered.  Patient will be admitted to Hospital Medicine for further management.  Patient comfortable with admission.

## 2022-07-27 NOTE — ASSESSMENT & PLAN NOTE
Last CD4 count in 3/2022 was 99, has not established care with ID clinic upon last hospital discharge. On Biktarvy but issues with non-adherence, states she was unable to refill it and has been off for about 3 months. Her judgement/insight upon disease processes at this time appears limited, patient also expressing dysphoric mood although no SI.    - continue Atovaquone for prophylaxis  - pending RPR, Hep B, HIV viral load, CD4 count  - would not restart HAART at time, will establish care OP if patient interested  - recommend OP psych follow-up to help ensure success with medication adherence

## 2022-07-27 NOTE — SUBJECTIVE & OBJECTIVE
Past Medical History:   Diagnosis Date    Allergy     Anxiety     Cervical dysplasia 1998 / 2016    Depression     HIV infection     Hyperthyroidism     Insomnia        Past Surgical History:   Procedure Laterality Date    SKULL FRACTURE ELEVATION         Review of patient's allergies indicates:   Allergen Reactions    Penicillins Other (See Comments) and Nausea And Vomiting     Trouble swallowing and vomiting.     Sulfa (sulfonamide antibiotics) Swelling     Rash (skin)^    Opioids - morphine analogues      Pt states she is not allergic to morphine      Bactrim [sulfamethoxazole-trimethoprim] Rash       No current facility-administered medications on file prior to encounter.     Current Outpatient Medications on File Prior to Encounter   Medication Sig    acetaminophen (TYLENOL) 500 MG tablet     artificial tears (ISOPTO TEARS) 0.5 % ophthalmic solution Place 2 drops into both eyes 3 (three) times daily.    atovaquone (MEPRON) 750 mg/5 mL Susp TAKE 10 MLS (1,500 MG TOTAL) BY MOUTH ONCE DAILY.    BIKTARVY -25 mg (25 kg or greater) Take 1 tablet by mouth once daily.    busPIRone (BUSPAR) 10 MG tablet Take 1 tablet (10 mg total) by mouth 3 (three) times daily.    erythromycin (ROMYCIN) ophthalmic ointment place IN right eye TWICE DAILY AND facial TO RASH FOR 10 DAYS    lithium (LITHOTAB) 300 mg tablet Take 1 tablet (300 mg total) by mouth every 8 (eight) hours. LITHIUM CARBONATE ER    morphine (MSIR) 15 MG tablet Take 1 tablet (15 mg total) by mouth every 6 (six) hours as needed for Pain. (Patient not taking: No sig reported)    [DISCONTINUED] azelastine (ASTELIN) 137 mcg (0.1 %) nasal spray 1 spray (137 mcg total) by Nasal route 2 (two) times daily.     Family History       Problem Relation (Age of Onset)    Heart disease Mother          Tobacco Use    Smoking status: Current Some Day Smoker     Types: Vaping with nicotine    Smokeless tobacco: Never Used   Substance and Sexual Activity    Alcohol use: Yes      Comment: socially    Drug use: Not Currently    Sexual activity: Yes     Partners: Male     Birth control/protection: None     Review of Systems   Constitutional:  Negative for activity change, appetite change, chills, fever and unexpected weight change.   HENT:  Negative for congestion and sore throat.    Respiratory:  Negative for cough and shortness of breath.    Cardiovascular:  Negative for chest pain, palpitations and leg swelling.   Gastrointestinal:  Positive for abdominal pain. Negative for abdominal distention, blood in stool, constipation, diarrhea, nausea and vomiting.   Genitourinary:  Positive for dysuria, frequency, vaginal bleeding and vaginal discharge. Negative for difficulty urinating and hematuria.   Musculoskeletal:  Negative for arthralgias and myalgias.   Skin:  Negative for color change and rash.   Neurological:  Negative for dizziness, tremors and seizures.   Objective:     Vital Signs (Most Recent):  Temp: 98.5 °F (36.9 °C) (07/26/22 2133)  Pulse: 100 (07/26/22 2133)  Resp: 18 (07/26/22 2133)  BP: 107/83 (07/26/22 2133)  SpO2: 98 % (07/26/22 2133) Vital Signs (24h Range):  Temp:  [98.1 °F (36.7 °C)-98.5 °F (36.9 °C)] 98.5 °F (36.9 °C)  Pulse:  [100-110] 100  Resp:  [15-18] 18  SpO2:  [98 %-100 %] 98 %  BP: (107-123)/(70-83) 107/83     Weight: 55.3 kg (122 lb)  Body mass index is 23.05 kg/m².    Physical Exam  Vitals reviewed.   Constitutional:       General: She is not in acute distress.     Appearance: She is well-developed.   HENT:      Head: Normocephalic and atraumatic.   Eyes:      Extraocular Movements: Extraocular movements intact.      Pupils: Pupils are equal, round, and reactive to light.   Neck:      Vascular: No JVD.      Trachea: No tracheal deviation.   Cardiovascular:      Rate and Rhythm: Normal rate and regular rhythm.      Heart sounds: No murmur heard.    No friction rub. No gallop.   Pulmonary:      Effort: No respiratory distress.      Breath sounds: Normal breath  sounds. No wheezing or rales.   Abdominal:      General: Bowel sounds are normal. There is no distension.      Palpations: Abdomen is soft. There is no mass.      Tenderness: There is abdominal tenderness.      Comments: Mild RLQ tenderness   Musculoskeletal:         General: No deformity.      Cervical back: Neck supple.   Lymphadenopathy:      Cervical: No cervical adenopathy.   Skin:     General: Skin is warm and dry.      Findings: No rash.   Neurological:      Mental Status: She is alert and oriented to person, place, and time.         CRANIAL NERVES     CN III, IV, VI   Pupils are equal, round, and reactive to light.     Significant Labs: All pertinent labs within the past 24 hours have been reviewed.    Significant Imaging: I have reviewed all pertinent imaging results/findings within the past 24 hours.

## 2022-07-28 PROBLEM — Z78.9 DIFFICULTY TAKING MEDICATION: Status: ACTIVE | Noted: 2022-07-28

## 2022-07-28 PROBLEM — Z91.148 DIFFICULTY TAKING MEDICATION: Status: ACTIVE | Noted: 2022-07-28

## 2022-07-28 PROBLEM — A64 STI (SEXUALLY TRANSMITTED INFECTION): Status: ACTIVE | Noted: 2022-07-28

## 2022-07-28 LAB
BACTERIA #/AREA URNS AUTO: ABNORMAL /HPF
BACTERIA UR CULT: ABNORMAL
BILIRUB UR QL STRIP: NEGATIVE
C TRACH DNA SPEC QL NAA+PROBE: NOT DETECTED
CD3+CD4+ CELLS # BLD: 134 CELLS/UL (ref 300–1400)
CD3+CD4+ CELLS NFR BLD: 14 % (ref 28–57)
CLARITY UR REFRACT.AUTO: CLEAR
COLOR UR AUTO: YELLOW
GLUCOSE UR QL STRIP: NEGATIVE
HBV SURFACE AB SER-ACNC: POSITIVE M[IU]/ML
HCV AB SERPL QL IA: NEGATIVE
HGB UR QL STRIP: ABNORMAL
HIV1 RNA # PLAS NAA DL=20: ABNORMAL COPIES/ML
KETONES UR QL STRIP: NEGATIVE
LEUKOCYTE ESTERASE UR QL STRIP: NEGATIVE
MICROSCOPIC COMMENT: ABNORMAL
N GONORRHOEA DNA SPEC QL NAA+PROBE: NOT DETECTED
NITRITE UR QL STRIP: NEGATIVE
PH UR STRIP: 7 [PH] (ref 5–8)
PROT UR QL STRIP: NEGATIVE
RBC #/AREA URNS AUTO: 1 /HPF (ref 0–4)
RPR SER QL: NORMAL
SP GR UR STRIP: 1.01 (ref 1–1.03)
SQUAMOUS #/AREA URNS AUTO: 4 /HPF
URN SPEC COLLECT METH UR: ABNORMAL
WBC #/AREA URNS AUTO: 7 /HPF (ref 0–5)

## 2022-07-28 PROCEDURE — 25000003 PHARM REV CODE 250: Performed by: HOSPITALIST

## 2022-07-28 PROCEDURE — 99226 PR SUBSEQUENT OBSERVATION CARE,LEVEL III: ICD-10-PCS | Mod: ,,, | Performed by: PHYSICIAN ASSISTANT

## 2022-07-28 PROCEDURE — G0378 HOSPITAL OBSERVATION PER HR: HCPCS

## 2022-07-28 PROCEDURE — 81001 URINALYSIS AUTO W/SCOPE: CPT | Performed by: PHYSICIAN ASSISTANT

## 2022-07-28 PROCEDURE — 99219 PR INITIAL OBSERVATION CARE,LEVL II: CPT | Mod: AF,HB,, | Performed by: PSYCHIATRY & NEUROLOGY

## 2022-07-28 PROCEDURE — S4991 NICOTINE PATCH NONLEGEND: HCPCS | Performed by: PHYSICIAN ASSISTANT

## 2022-07-28 PROCEDURE — 94761 N-INVAS EAR/PLS OXIMETRY MLT: CPT

## 2022-07-28 PROCEDURE — 25000003 PHARM REV CODE 250

## 2022-07-28 PROCEDURE — 99219 PR INITIAL OBSERVATION CARE,LEVL II: ICD-10-PCS | Mod: AF,HB,, | Performed by: PSYCHIATRY & NEUROLOGY

## 2022-07-28 PROCEDURE — 25000003 PHARM REV CODE 250: Performed by: PHYSICIAN ASSISTANT

## 2022-07-28 PROCEDURE — 99226 PR SUBSEQUENT OBSERVATION CARE,LEVEL III: CPT | Mod: ,,, | Performed by: PHYSICIAN ASSISTANT

## 2022-07-28 RX ORDER — IBUPROFEN 200 MG
1 TABLET ORAL DAILY
Status: DISCONTINUED | OUTPATIENT
Start: 2022-07-28 | End: 2022-07-29 | Stop reason: HOSPADM

## 2022-07-28 RX ADMIN — ATOVAQUONE 1500 MG: 750 SUSPENSION ORAL at 10:07

## 2022-07-28 RX ADMIN — Medication 1 PATCH: at 02:07

## 2022-07-28 RX ADMIN — BUSPIRONE HYDROCHLORIDE 10 MG: 10 TABLET ORAL at 09:07

## 2022-07-28 RX ADMIN — ERYTHROMYCIN: 5 OINTMENT OPHTHALMIC at 09:07

## 2022-07-28 RX ADMIN — GUAIFENESIN AND DEXTROMETHORPHAN HYDROBROMIDE 1 TABLET: 600; 30 TABLET, EXTENDED RELEASE ORAL at 02:07

## 2022-07-28 RX ADMIN — HYPROMELLOSE 2910 1 DROP: 5 SOLUTION OPHTHALMIC at 05:07

## 2022-07-28 RX ADMIN — HYPROMELLOSE 2910 1 DROP: 5 SOLUTION OPHTHALMIC at 09:07

## 2022-07-28 RX ADMIN — LITHIUM CARBONATE 300 MG: 300 TABLET, FILM COATED, EXTENDED RELEASE ORAL at 09:07

## 2022-07-28 RX ADMIN — HYPROMELLOSE 2910 1 DROP: 5 SOLUTION OPHTHALMIC at 01:07

## 2022-07-28 RX ADMIN — LITHIUM CARBONATE 300 MG: 300 TABLET, FILM COATED, EXTENDED RELEASE ORAL at 10:07

## 2022-07-28 RX ADMIN — DOXYCYCLINE HYCLATE 100 MG: 100 TABLET, COATED ORAL at 09:07

## 2022-07-28 RX ADMIN — LITHIUM CARBONATE 300 MG: 300 TABLET, FILM COATED, EXTENDED RELEASE ORAL at 02:07

## 2022-07-28 RX ADMIN — BUSPIRONE HYDROCHLORIDE 10 MG: 10 TABLET ORAL at 02:07

## 2022-07-28 RX ADMIN — BUSPIRONE HYDROCHLORIDE 10 MG: 10 TABLET ORAL at 10:07

## 2022-07-28 RX ADMIN — DOXYCYCLINE HYCLATE 100 MG: 100 TABLET, COATED ORAL at 10:07
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
## 2022-07-28 NOTE — SUBJECTIVE & OBJECTIVE
Patient History               Medical as of 7/28/2022       Past Medical History       Diagnosis Date Comments Source    Allergy -- -- Provider    Anxiety -- -- Provider    Cervical dysplasia 1998 / 2016 -- Provider    Depression -- -- Provider    HIV infection -- -- Provider    Hyperthyroidism -- -- Provider    Insomnia -- -- Provider                          Surgical as of 7/28/2022       Past Surgical History       Procedure Laterality Date Comments Source    SKULL FRACTURE ELEVATION -- -- -- Provider                          Family as of 7/28/2022       Problem Relation Name Age of Onset Comments Source    Heart disease Mother -- -- -- Provider    Breast cancer Neg Hx -- -- -- Provider    Colon cancer Neg Hx -- -- -- Provider    Ovarian cancer Neg Hx -- -- -- Provider                  Tobacco Use as of 7/28/2022       Smoking Status Smoking Start Date Smoking Quit Date Packs/Day Years Used    Current Some Day Smoker -- -- -- --      Types Comments Smokeless Tobacco Status Smokeless Tobacco Quit Date Source     Vaping with nicotine -- Never Used -- Provider                  Alcohol Use as of 7/28/2022       Alcohol Use Drinks/Week Alcohol/Week Comments Source    Yes   -- socially Provider                  Drug Use as of 7/28/2022       Drug Use Types Frequency Comments Source    Not Currently -- -- -- Provider                  Sexual Activity as of 7/28/2022       Sexually Active Birth Control Partners Comments Source    Yes None Male -- Provider                  Activities of Daily Living as of 7/28/2022    None               Social Documentation as of 7/28/2022    None               Occupational as of 7/28/2022       Occupation Employer Comments Source    unemployed  -- -- Provider                  Socioeconomic as of 7/28/2022       Marital Status Spouse Name Number of Children Years Education Education Level Preferred Language Ethnicity Race Source     -- -- -- -- English Not  or /a  Christy Provider                  Pertinent History       Question Response Comments    Lives with -- --    Place in Birth Order -- --    Lives in -- --    Number of Siblings -- --    Raised by -- --    Legal Involvement -- --    Childhood Trauma -- --    Criminal History of -- --    Financial Status -- --    Highest Level of Education -- --    Does patient have access to a firearm? -- --     Service -- --    Primary Leisure Activity -- --    Spirituality -- --          Past Medical History:   Diagnosis Date    Allergy     Anxiety     Cervical dysplasia 1998 / 2016    Depression     HIV infection     Hyperthyroidism     Insomnia      Past Surgical History:   Procedure Laterality Date    SKULL FRACTURE ELEVATION       Family History       Problem Relation (Age of Onset)    Heart disease Mother          Tobacco Use    Smoking status: Current Some Day Smoker     Types: Vaping with nicotine    Smokeless tobacco: Never Used   Substance and Sexual Activity    Alcohol use: Yes     Comment: socially    Drug use: Not Currently    Sexual activity: Yes     Partners: Male     Birth control/protection: None     Review of patient's allergies indicates:   Allergen Reactions    Penicillins Other (See Comments) and Nausea And Vomiting     Trouble swallowing and vomiting.     Sulfa (sulfonamide antibiotics) Swelling     Rash (skin)^    Opioids - morphine analogues      Pt states she is not allergic to morphine      Bactrim [sulfamethoxazole-trimethoprim] Rash       No current facility-administered medications on file prior to encounter.     Current Outpatient Medications on File Prior to Encounter   Medication Sig    acetaminophen (TYLENOL) 500 MG tablet     artificial tears (ISOPTO TEARS) 0.5 % ophthalmic solution Place 2 drops into both eyes 3 (three) times daily.    atovaquone (MEPRON) 750 mg/5 mL Susp TAKE 10 MLS (1,500 MG TOTAL) BY MOUTH ONCE DAILY.    BIKTARVY -25 mg (25 kg or greater) Take 1 tablet by mouth once  "daily.    busPIRone (BUSPAR) 10 MG tablet Take 1 tablet (10 mg total) by mouth 3 (three) times daily.    erythromycin (ROMYCIN) ophthalmic ointment place IN right eye TWICE DAILY AND facial TO RASH FOR 10 DAYS    lithium (LITHOTAB) 300 mg tablet Take 1 tablet (300 mg total) by mouth every 8 (eight) hours. LITHIUM CARBONATE ER    morphine (MSIR) 15 MG tablet Take 1 tablet (15 mg total) by mouth every 6 (six) hours as needed for Pain. (Patient not taking: No sig reported)    [DISCONTINUED] azelastine (ASTELIN) 137 mcg (0.1 %) nasal spray 1 spray (137 mcg total) by Nasal route 2 (two) times daily.     Psychotherapeutics (From admission, onward)                Start     Stop Route Frequency Ordered    07/27/22 0900  busPIRone tablet 10 mg         -- Oral 3 times daily 07/27/22 0001    07/27/22 0900  lithium CR tablet 300 mg         -- Oral 3 times daily 07/27/22 0001            Strengths and Liabilities: Strength: Patient accepts guidance/feedback, Strength: Patient is stable.    Objective:     Vital Signs (Most Recent):  Temp: 98.9 °F (37.2 °C) (07/28/22 0757)  Pulse: 95 (07/28/22 0757)  Resp: 18 (07/28/22 0757)  BP: 129/77 (07/28/22 0757)  SpO2: 95 % (07/28/22 0757) Vital Signs (24h Range):  Temp:  [96.5 °F (35.8 °C)-99.3 °F (37.4 °C)] 98.9 °F (37.2 °C)  Pulse:  [69-96] 95  Resp:  [16-19] 18  SpO2:  [95 %-100 %] 95 %  BP: (112-150)/(73-92) 129/77     Height: 5' 1.5" (156.2 cm)  Weight: 56.8 kg (125 lb 3.5 oz)  Body mass index is 23.28 kg/m².    No intake or output data in the 24 hours ending 07/28/22 1049    Mental Status Exam:  Appearance: age appropriate, normal weight, casually dressed, mildly disheveled  Behavior/Cooperation: friendly and cooperative  Speech: normal tone, normal rate, normal pitch, normal volume  Mood: neutral  Affect: normal  Thought Process: goal-directed  Thought Content: normal, no suicidality, no homicidality, delusions, or paranoia   Orientation: grossly intact  Memory: Intact  Attention " Span/Concentration: Normal  Insight: fair  Judgment:  appropriate for setting      Significant Labs: Last 24 Hours:   Recent Lab Results       None            Significant Imaging: I have reviewed all pertinent imaging results/findings within the past 24 hours.

## 2022-07-28 NOTE — HOSPITAL COURSE
Mrs. Nye was admitted to observation for acute cystitis with hematuria. Patient started on ceftriaxone. Urine culture E. Coli. CT with suspected adnexal cyst along the posterior left aspect of the uterus. Transvaginal US obtained, reveals simple cyst without recommendations for further intervention or follow up. Ophthalmology consulted for R eye irritation s/p herpes zoster ophthalmicus; do not suspect active infection. Recommend erythromycin ointment, artificial tears, and warm compresses. ID consulted for assistance given AIDS diagnosis with noncomplicance. Recommend holding home biktarvy and establishing with ID clinic for management. Do not recommend treatment for UTI as sample dirty, ceftriaxone discontinued. Also recommend empiric tx for suspected STI. S/p ceftriaxone for gonorrhea, started on doxycycline for chlamydia, G/C negative and doxycycline discontinued. Vaginal screen without clue cells, trichomonas, yeast. RPR negative. HIV labs reviewed. Psychiatry consulted for medication management in setting of non-compliance, recommend continuing current doses of lithium and buspar, will need to follow up given history of non-compliance. Patient given refills of psychiatric medications. Care at home ordered for patient for close follow up. Patient set up for follow up in HIV clinic. Patient verbalized understanding, all questions answered.
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
## 2022-07-28 NOTE — ASSESSMENT & PLAN NOTE
Vaginal discharge  - G/C pending  - suspect cause of urinary symptoms  - s/p ceftriaxone for gonorrhea  - Empirically start doxycycline 100 mg bid x 7d to cover chlamydia    - RPR pending

## 2022-07-28 NOTE — SUBJECTIVE & OBJECTIVE
Interval History: Patient continues to endorse urinary symptoms, repeat UA negative for infection. Psychiatry evaluated, utox pending, lithium pending. Absolute CD4 134. Psychology consulted. G/C pending. Continue doxycycline.    Review of Systems   Constitutional:  Negative for chills, fatigue and fever.   HENT:  Negative for congestion and sore throat.    Respiratory:  Negative for cough, shortness of breath and wheezing.    Cardiovascular:  Negative for chest pain, palpitations and leg swelling.   Gastrointestinal:  Positive for abdominal pain. Negative for abdominal distention, blood in stool, constipation, diarrhea, nausea and vomiting.   Genitourinary:  Positive for dysuria, frequency, vaginal bleeding and vaginal discharge. Negative for difficulty urinating, flank pain, hematuria and urgency.   Musculoskeletal:  Negative for arthralgias, back pain and myalgias.   Skin:  Negative for color change and rash.   Neurological:  Negative for dizziness, tremors and seizures.   Psychiatric/Behavioral:  Positive for dysphoric mood.    Objective:     Vital Signs (Most Recent):  Temp: 99 °F (37.2 °C) (07/28/22 1619)  Pulse: 88 (07/28/22 1619)  Resp: 17 (07/28/22 1619)  BP: 109/77 (07/28/22 1619)  SpO2: 100 % (07/28/22 1619) Vital Signs (24h Range):  Temp:  [97.9 °F (36.6 °C)-99.3 °F (37.4 °C)] 99 °F (37.2 °C)  Pulse:  [69-96] 88  Resp:  [17-18] 17  SpO2:  [95 %-100 %] 100 %  BP: (109-135)/(71-92) 109/77     Weight: 56.8 kg (125 lb 3.5 oz)  Body mass index is 23.28 kg/m².  No intake or output data in the 24 hours ending 07/28/22 1630   Physical Exam  Vitals reviewed.   Constitutional:       General: She is not in acute distress.     Appearance: She is well-developed.   HENT:      Head: Normocephalic and atraumatic.   Eyes:      Extraocular Movements: Extraocular movements intact.      Pupils: Pupils are equal, round, and reactive to light.   Neck:      Vascular: No JVD.      Trachea: No tracheal deviation.    Cardiovascular:      Rate and Rhythm: Normal rate and regular rhythm.      Heart sounds: No murmur heard.    No friction rub. No gallop.   Pulmonary:      Effort: No respiratory distress.      Breath sounds: Normal breath sounds. No wheezing or rales.   Abdominal:      General: Bowel sounds are normal. There is no distension.      Palpations: Abdomen is soft. There is no mass.      Tenderness: There is abdominal tenderness. There is no right CVA tenderness, left CVA tenderness, guarding or rebound.      Comments: Mild RLQ tenderness   Musculoskeletal:         General: No deformity.      Cervical back: Neck supple.   Lymphadenopathy:      Cervical: No cervical adenopathy.   Skin:     General: Skin is warm and dry.      Findings: No lesion or rash.   Neurological:      General: No focal deficit present.      Mental Status: She is alert and oriented to person, place, and time.   Psychiatric:         Mood and Affect: Mood normal.         Behavior: Behavior normal.       Significant Labs: All pertinent labs within the past 24 hours have been reviewed.  CBC:   Recent Labs   Lab 07/26/22  1801 07/27/22  0423   WBC 5.62 3.45*   HGB 13.5 11.4*   HCT 40.4 35.2*    229     CMP:   Recent Labs   Lab 07/26/22  1801 07/27/22  0423    135*   K 4.2 3.5    107   CO2 22* 22*   GLU 87 102   BUN 8 8   CREATININE 0.7 0.6   CALCIUM 9.0 8.7   PROT 8.7*  --    ALBUMIN 3.9  --    BILITOT 0.2  --    ALKPHOS 85  --    AST 23  --    ALT 15  --    ANIONGAP 10 6*   EGFRNONAA >60.0 >60.0     Magnesium: No results for input(s): MG in the last 48 hours.  Urine Culture:   Recent Labs   Lab 07/26/22  1804   LABURIN GRAM NEGATIVE CARMENCITA  >100,000 cfu/ml  Identification and susceptibility pending  *       Significant Imaging: I have reviewed all pertinent imaging results/findings within the past 24 hours.

## 2022-07-28 NOTE — ASSESSMENT & PLAN NOTE
-Suspected adnexal cyst along the posterior left aspect of the uterus measuring 3.7 x 3.2 cm, new compared to CT renal stone study from 11/19/2021 noted on abdominal CT  -May be contributing to pain, but no prompt intervention required as findings not consistent with ectopic, ruptured cyst, or torsion.  - transvaginal ultrasound Left ovarian simple cyst measuring 3.6 cm.  Benign finding in the physiological size range.  No follow-up recommended.

## 2022-07-28 NOTE — ASSESSMENT & PLAN NOTE
Assessment:  Unspecified Mood Disorder  Hx of Anxiety  Chronic PTSD  AIDS  Hx of Cannabis Use   Hx of Methamphetamine Use   Unspecified Personality Traits      Plan:  -Continue home meds: Lithium 300mg TID, buspar 10mg TID  -F/u Lithium level tomorrow morning prior to AM lithium dose  -Recommend obtaining a Utox  -Consult CM/SW for assistance with follow-up appt compliance.  Pt was established with [facility] clinic in the past.

## 2022-07-28 NOTE — PROGRESS NOTES
[facility] - Telemetry [facility] ([address] Medicine  Progress Note    Patient Name: Patricia Nye  MRN: [id number]  Patient Class: OP- Observation   Admission Date: 7/26/2022  Length of Stay: 0 days  Attending Physician: Faviola Burns MD  Primary Care Provider: Rd Virk MD        Subjective:     Principal Problem:AIDS        HPI:  47 yo F with PMHX HIV/AIDS (CD 99 3/2022) and bipolar 1 disorder who presents to the ED complaining of abdominal pain na dvaginal discharge x a few days. Pt. States that she has been having increased urinary frequency and dyuria with vaginal discharge/foul odor. She has associated supapubic and RLQ pain that have been going on for around the same time. She denies any fevers, chills, cough, nausea, or vomiting. Pt. Does report that her menses have been abnormal with seemingly 2 cylces per month for the last several months as well. She is not sure if they are related. Additionally, the patient reports persistent R eye dryness and redness since her diagnosis of herpes zoster ophthalmicus. She states that the eye bother her all the time without relief. She never followed up with ophthalmology after her recent discharge 6/2022 because she states she was never contacted about an appointment. Pt. Also reports that she has not taken any of her medications including biktarvy, lithium, or buspar in 3 weeks because she has not gotten them refilled. Pt. Mentions that she is home alone and feels she has no one to take care of her, so she hasn't cared for herself. She denies any SI or HI, however.      Overview/Hospital Course:  Mrs. Nye was admitted to observation for acute cystitis with hematuria. Patient started on ceftriaxone. Urine culture pending. Ophthalmology consulted for R eye irritation s/p herpes zoster ophthalmicus; do not suspect active infection. Recommend erythromycin ointment, artificial tears, and warm compresses. ID consulted for assistance given AIDS  diagnosis with noncomplicance. Recommend holding home biktarvy and establishing with ID clinic for management. Do not recommend treatment for UTI as sample dirty, ceftriaxone discontinued. Also recommend empiric tx for suspected STI. S/p ceftriaxone for gonorrhea, started on doxycycline for chlamydia, G/C pending.  Follow up pending infectious labs. Ct obtained yesterday with suspected adnexal cyst along the posterior left aspect of the uterus. Transvaginal US obtained, reveals simple cyst without recommendations for further intervention or follow up.      Interval History: Patient continues to endorse urinary symptoms, repeat UA negative for infection. Psychiatry evaluated, utox pending, lithium pending. Absolute CD4 134. Psychology consulted. G/C pending. Continue doxycycline.    Review of Systems   Constitutional:  Negative for chills, fatigue and fever.   HENT:  Negative for congestion and sore throat.    Respiratory:  Negative for cough, shortness of breath and wheezing.    Cardiovascular:  Negative for chest pain, palpitations and leg swelling.   Gastrointestinal:  Positive for abdominal pain. Negative for abdominal distention, blood in stool, constipation, diarrhea, nausea and vomiting.   Genitourinary:  Positive for dysuria, frequency, vaginal bleeding and vaginal discharge. Negative for difficulty urinating, flank pain, hematuria and urgency.   Musculoskeletal:  Negative for arthralgias, back pain and myalgias.   Skin:  Negative for color change and rash.   Neurological:  Negative for dizziness, tremors and seizures.   Psychiatric/Behavioral:  Positive for dysphoric mood.    Objective:     Vital Signs (Most Recent):  Temp: 99 °F (37.2 °C) (07/28/22 1619)  Pulse: 88 (07/28/22 1619)  Resp: 17 (07/28/22 1619)  BP: 109/77 (07/28/22 1619)  SpO2: 100 % (07/28/22 1619) Vital Signs (24h Range):  Temp:  [97.9 °F (36.6 °C)-99.3 °F (37.4 °C)] 99 °F (37.2 °C)  Pulse:  [69-96] 88  Resp:  [17-18] 17  SpO2:  [95 %-100 %]  100 %  BP: (109-135)/(71-92) 109/77     Weight: 56.8 kg (125 lb 3.5 oz)  Body mass index is 23.28 kg/m².  No intake or output data in the 24 hours ending 07/28/22 1630   Physical Exam  Vitals reviewed.   Constitutional:       General: She is not in acute distress.     Appearance: She is well-developed.   HENT:      Head: Normocephalic and atraumatic.   Eyes:      Extraocular Movements: Extraocular movements intact.      Pupils: Pupils are equal, round, and reactive to light.   Neck:      Vascular: No JVD.      Trachea: No tracheal deviation.   Cardiovascular:      Rate and Rhythm: Normal rate and regular rhythm.      Heart sounds: No murmur heard.    No friction rub. No gallop.   Pulmonary:      Effort: No respiratory distress.      Breath sounds: Normal breath sounds. No wheezing or rales.   Abdominal:      General: Bowel sounds are normal. There is no distension.      Palpations: Abdomen is soft. There is no mass.      Tenderness: There is abdominal tenderness. There is no right CVA tenderness, left CVA tenderness, guarding or rebound.      Comments: Mild RLQ tenderness   Musculoskeletal:         General: No deformity.      Cervical back: Neck supple.   Lymphadenopathy:      Cervical: No cervical adenopathy.   Skin:     General: Skin is warm and dry.      Findings: No lesion or rash.   Neurological:      General: No focal deficit present.      Mental Status: She is alert and oriented to person, place, and time.   Psychiatric:         Mood and Affect: Mood normal.         Behavior: Behavior normal.       Significant Labs: All pertinent labs within the past 24 hours have been reviewed.  CBC:   Recent Labs   Lab 07/26/22  1801 07/27/22  0423   WBC 5.62 3.45*   HGB 13.5 11.4*   HCT 40.4 35.2*    229     CMP:   Recent Labs   Lab 07/26/22  1801 07/27/22  0423    135*   K 4.2 3.5    107   CO2 22* 22*   GLU 87 102   BUN 8 8   CREATININE 0.7 0.6   CALCIUM 9.0 8.7   PROT 8.7*  --    ALBUMIN 3.9  --     BILITOT 0.2  --    ALKPHOS 85  --    AST 23  --    ALT 15  --    ANIONGAP 10 6*   EGFRNONAA >60.0 >60.0     Magnesium: No results for input(s): MG in the last 48 hours.  Urine Culture:   Recent Labs   Lab 07/26/22  1804   LABURIN GRAM NEGATIVE CARMENCITA  >100,000 cfu/ml  Identification and susceptibility pending  *       Significant Imaging: I have reviewed all pertinent imaging results/findings within the past 24 hours.      Assessment/Plan:      * AIDS  This patient in known to have AIDS (from CD4 count has been less than 200).   Lab Results   Component Value Date    ABSOLUTECD4 134 (L) 07/27/2022     -Pt. On biktarvy but with intermittent compliance, has not taken in last week. Has not established care with ID here. ID consulted for further assistance and decision-making regarding restarting ART  - continue to hold ART until follow up in [facility]  - continue atovaquone for PCP ppx    STI (sexually transmitted infection)  Vaginal discharge  - G/C pending  - suspect cause of urinary symptoms  - s/p ceftriaxone for gonorrhea  - Empirically start doxycycline 100 mg bid x 7d to cover chlamydia    - RPR pending    Adnexal mass  -Suspected adnexal cyst along the posterior left aspect of the uterus measuring 3.7 x 3.2 cm, new compared to CT renal stone study from 11/19/2021 noted on abdominal CT  -May be contributing to pain, but no prompt intervention required as findings not consistent with ectopic, ruptured cyst, or torsion.  - transvaginal ultrasound Left ovarian simple cyst measuring 3.6 cm.  Benign finding in the physiological size range.  No follow-up recommended.    At risk for opportunistic infections  -CD4 count 99 consistent with AIDS, pt. At risk for opportunistic infections  -Continue atovaquone daily. F/u ID rec's      Herpes zoster ophthalmicus of right eye  -Pt. With persistent erythema and pain to R eye since recent herpes zoster opthalmicus infection. Reports competing treatment but did not f/u with  opthalmology and seems to have poor compliance history  - ophthalmology consulted, do not suspect active infection  - Recommend erythromycin ointment, artificial tears, and warm compresses    UTI (urinary tract infection)  -Pt. With dysuria, increased frequency and discharge  -U/A >100 WBC but sample dirty with squam   - CT abdo without evidence of pyelo or bladder wall thickening. No acute source of infection noted   - given IV ceftriaxone, discontinued per ID given dirty sample and no evidence of infection  - repeat obtained given continued symptoms, negative for infection- see STI    Bipolar 1 disorder  PTSD  Schizoaffective disorder  -Will resume buspar and lithium, but consult psychiatry given patients expressed depression during interview and intermittent compliance with meds  - lithium level pending in am  - utox pending  - psychology consulted, appreciate recs      VTE Risk Mitigation (From admission, onward)         Ordered     IP VTE LOW RISK PATIENT  Once         07/27/22 0001     Place ALEJANDRO hose  Until discontinued         07/27/22 0001     Place sequential compression device  Until discontinued         07/27/22 0001                Discharge Planning   RUFINA: 7/29/2022     Code Status: Full Code   Is the patient medically ready for discharge?: No    Reason for patient still in hospital (select all that apply): Patient trending condition, Laboratory test, Treatment and Consult recommendations  Discharge Plan A: Home with family                  Day Blankenship PA-C  Department of Hospital Medicine   [facility] - Telemetry Stepdown ([place]-[facility])

## 2022-07-29 VITALS
BODY MASS INDEX: 23.05 KG/M2 | HEART RATE: 81 BPM | OXYGEN SATURATION: 96 % | RESPIRATION RATE: 17 BRPM | DIASTOLIC BLOOD PRESSURE: 77 MMHG | SYSTOLIC BLOOD PRESSURE: 114 MMHG | HEIGHT: 62 IN | WEIGHT: 125.25 LBS | TEMPERATURE: 99 F

## 2022-07-29 LAB
ANION GAP SERPL CALC-SCNC: 6 MMOL/L (ref 8–16)
ANISOCYTOSIS BLD QL SMEAR: SLIGHT
BASOPHILS # BLD AUTO: 0.04 K/UL (ref 0–0.2)
BASOPHILS NFR BLD: 1.3 % (ref 0–1.9)
BUN SERPL-MCNC: 9 MG/DL (ref 6–20)
CALCIUM SERPL-MCNC: 8.7 MG/DL (ref 8.7–10.5)
CHLORIDE SERPL-SCNC: 109 MMOL/L (ref 95–110)
CO2 SERPL-SCNC: 23 MMOL/L (ref 23–29)
CREAT SERPL-MCNC: 0.7 MG/DL (ref 0.5–1.4)
DIFFERENTIAL METHOD: ABNORMAL
EOSINOPHIL # BLD AUTO: 0.1 K/UL (ref 0–0.5)
EOSINOPHIL NFR BLD: 4.4 % (ref 0–8)
ERYTHROCYTE [DISTWIDTH] IN BLOOD BY AUTOMATED COUNT: 14.8 % (ref 11.5–14.5)
EST. GFR  (AFRICAN AMERICAN): >60 ML/MIN/1.73 M^2
EST. GFR  (NON AFRICAN AMERICAN): >60 ML/MIN/1.73 M^2
GLUCOSE SERPL-MCNC: 99 MG/DL (ref 70–110)
HCT VFR BLD AUTO: 34.4 % (ref 37–48.5)
HGB BLD-MCNC: 11.5 G/DL (ref 12–16)
HYPOCHROMIA BLD QL SMEAR: ABNORMAL
IMM GRANULOCYTES # BLD AUTO: 0.01 K/UL (ref 0–0.04)
IMM GRANULOCYTES NFR BLD AUTO: 0.3 % (ref 0–0.5)
LYMPHOCYTES # BLD AUTO: 1.3 K/UL (ref 1–4.8)
LYMPHOCYTES NFR BLD: 41.3 % (ref 18–48)
MCH RBC QN AUTO: 27.4 PG (ref 27–31)
MCHC RBC AUTO-ENTMCNC: 33.4 G/DL (ref 32–36)
MCV RBC AUTO: 82 FL (ref 82–98)
MONOCYTES # BLD AUTO: 0.4 K/UL (ref 0.3–1)
MONOCYTES NFR BLD: 11.9 % (ref 4–15)
NEUTROPHILS # BLD AUTO: 1.3 K/UL (ref 1.8–7.7)
NEUTROPHILS NFR BLD: 40.8 % (ref 38–73)
NRBC BLD-RTO: 0 /100 WBC
OVALOCYTES BLD QL SMEAR: ABNORMAL
PLATELET # BLD AUTO: 211 K/UL (ref 150–450)
PLATELET BLD QL SMEAR: ABNORMAL
PMV BLD AUTO: 9.5 FL (ref 9.2–12.9)
POCT GLUCOSE: 136 MG/DL (ref 70–110)
POIKILOCYTOSIS BLD QL SMEAR: SLIGHT
POTASSIUM SERPL-SCNC: 3.8 MMOL/L (ref 3.5–5.1)
RBC # BLD AUTO: 4.19 M/UL (ref 4–5.4)
SODIUM SERPL-SCNC: 138 MMOL/L (ref 136–145)
WBC # BLD AUTO: 3.2 K/UL (ref 3.9–12.7)

## 2022-07-29 PROCEDURE — 96366 THER/PROPH/DIAG IV INF ADDON: CPT | Performed by: EMERGENCY MEDICINE

## 2022-07-29 PROCEDURE — G0378 HOSPITAL OBSERVATION PER HR: HCPCS

## 2022-07-29 PROCEDURE — 99224 PR SUBSEQUENT OBSERVATION CARE,LEVEL I: ICD-10-PCS | Mod: ,,, | Performed by: PHYSICIAN ASSISTANT

## 2022-07-29 PROCEDURE — 25000003 PHARM REV CODE 250: Performed by: PHYSICIAN ASSISTANT

## 2022-07-29 PROCEDURE — 36415 COLL VENOUS BLD VENIPUNCTURE: CPT | Performed by: PHYSICIAN ASSISTANT

## 2022-07-29 PROCEDURE — 80048 BASIC METABOLIC PNL TOTAL CA: CPT | Performed by: PHYSICIAN ASSISTANT

## 2022-07-29 PROCEDURE — 90832 PSYTX W PT 30 MINUTES: CPT | Mod: AH,HB,, | Performed by: STUDENT IN AN ORGANIZED HEALTH CARE EDUCATION/TRAINING PROGRAM

## 2022-07-29 PROCEDURE — S4991 NICOTINE PATCH NONLEGEND: HCPCS | Performed by: PHYSICIAN ASSISTANT

## 2022-07-29 PROCEDURE — 99224 PR SUBSEQUENT OBSERVATION CARE,LEVEL I: CPT | Mod: ,,, | Performed by: PHYSICIAN ASSISTANT

## 2022-07-29 PROCEDURE — 85025 COMPLETE CBC W/AUTO DIFF WBC: CPT | Performed by: PHYSICIAN ASSISTANT

## 2022-07-29 PROCEDURE — 63600175 PHARM REV CODE 636 W HCPCS: Performed by: HOSPITALIST

## 2022-07-29 PROCEDURE — 94761 N-INVAS EAR/PLS OXIMETRY MLT: CPT

## 2022-07-29 PROCEDURE — 25000003 PHARM REV CODE 250

## 2022-07-29 PROCEDURE — 25000003 PHARM REV CODE 250: Performed by: HOSPITALIST

## 2022-07-29 PROCEDURE — 90832 PR PSYCHOTHERAPY W/PATIENT, 30 MIN: ICD-10-PCS | Mod: AH,HB,, | Performed by: STUDENT IN AN ORGANIZED HEALTH CARE EDUCATION/TRAINING PROGRAM

## 2022-07-29 RX ORDER — LITHIUM CARBONATE 300 MG
300 TABLET ORAL EVERY 8 HOURS
Qty: 90 TABLET | Refills: 3 | Status: ON HOLD | OUTPATIENT
Start: 2022-07-29 | End: 2023-05-12 | Stop reason: HOSPADM

## 2022-07-29 RX ORDER — BUSPIRONE HYDROCHLORIDE 10 MG/1
10 TABLET ORAL 3 TIMES DAILY
Qty: 90 TABLET | Refills: 5 | Status: ON HOLD | OUTPATIENT
Start: 2022-07-29 | End: 2023-05-12 | Stop reason: HOSPADM

## 2022-07-29 RX ADMIN — ERYTHROMYCIN: 5 OINTMENT OPHTHALMIC at 09:07

## 2022-07-29 RX ADMIN — Medication 1 PATCH: at 10:07

## 2022-07-29 RX ADMIN — LITHIUM CARBONATE 300 MG: 300 TABLET, FILM COATED, EXTENDED RELEASE ORAL at 10:07

## 2022-07-29 RX ADMIN — Medication 6 MG: at 02:07

## 2022-07-29 RX ADMIN — CEFTRIAXONE 1 G: 1 INJECTION, SOLUTION INTRAVENOUS at 01:07

## 2022-07-29 RX ADMIN — HYPROMELLOSE 2910 1 DROP: 5 SOLUTION OPHTHALMIC at 09:07

## 2022-07-29 RX ADMIN — TRAMADOL HYDROCHLORIDE 50 MG: 50 TABLET, COATED ORAL at 02:07

## 2022-07-29 RX ADMIN — ATOVAQUONE 1500 MG: 750 SUSPENSION ORAL at 10:07

## 2022-07-29 RX ADMIN — BUSPIRONE HYDROCHLORIDE 10 MG: 10 TABLET ORAL at 10:07

## 2022-07-29 RX ADMIN — DOXYCYCLINE HYCLATE 100 MG: 100 TABLET, COATED ORAL at 10:07

## 2022-07-29 NOTE — ASSESSMENT & PLAN NOTE
- concerning for STI though work up unremarkable, empirically tx for G/C  - UA dirty, completed 3 day tx for ceftriaxone  - OBGYN referral

## 2022-07-29 NOTE — CONSULTS
[facility] - Telemetry Stepdown ([place] [id number])  Psychology  Consult Note    Patient Name: Patricia Nye  MRN: [id number]   Patient Class: OP- Observation  Admission Date: 7/26/2022  Hospital Length of Stay: 0 days  Attending Physician: Faviola Burns MD  Primary Care Provider: Rd Virk MD    Consults  History of Present Illness: 45 yo F with PMHX HIV/AIDS (CD 99 3/2022) and bipolar 1 disorder who presents to the ED complaining of abdominal pain na dvaginal discharge x a few days.    Symptoms  · Mood: Occasional brief periods of low mood. Endorsed irritability, frustration, and anger. Denied anhedonia. Denied suicidal ideation.  · Anxiety: Denied anxiety symptoms.  · Pain: Rated current pain as 6/10. Denied concerns regarding pain management.  · Appetite: No concerns.  · Sleep: Reported she was unable to sleep last night but noted that she has been sleeping well overall. Also clarified that she wanted to sleep last night but could not.    Psychotherapeutics (From admission, onward)            Start     Stop Route Frequency Ordered    07/27/22 0900  busPIRone tablet 10 mg         -- Oral 3 times daily 07/27/22 0001    07/27/22 0900  lithium CR tablet 300 mg         -- Oral 3 times daily 07/27/22 0001        Intervention: Engaged patient in brief Motivational Interviewing intervention focused on medication adherence. Patient described good commitment to taking meds as prescribed. She stated that she has difficulty obtaining refills. Patient was not able to explain barriers to obtaining refills. Discussed seeking social support as a strategy (ask family, call ). Patient also expressed an interest in transferring care to [facility].    Mental Status Exam  General Appearance:  unremarkable, age appropriate, good grooming and hygiene, dressed in hospital gown   Speech: Normal rate, volume, and prosody      Level of Cooperation: cooperative      Thought Processes: loose associations, tangential    Mood: euthymic      Thought Content: normal, no suicidality, no homicidality, delusions, or paranoia   Affect: congruent and appropriate   Orientation: Oriented x 4   Memory: No deficits noted.   Attention & Concentration: intact   Fund of General Knowledge: intact and appropriate to age and level of education   Abstract Reasoning: No deficits noted.   Judgment & Insight: good   Language: intact   Behavioral Observations: Laying with head of bed elevated. Good eye contact. No signs of psychomotor agitation or retardation.       Diagnostic Impression - Plan:     Active Diagnoses:    Diagnosis Date Noted POA    PRINCIPAL PROBLEM:  AIDS [B20] 05/18/2012 Yes    Difficulty taking medication [Z91.14] 07/28/2022 Not Applicable    Vaginal discharge [N89.8] 07/27/2022 Yes    At risk for opportunistic infections [Z91.89] 07/26/2022 Yes    Adnexal mass [N94.89] 07/26/2022 Yes    Herpes zoster ophthalmicus of right eye [B02.30] 03/19/2022 Yes    Schizoaffective disorder, bipolar type [F25.0]  Yes    UTI (urinary tract infection) [N39.0] 09/01/2020 Yes    Bipolar 1 disorder [F31.9] 12/28/2017 Yes    PTSD (post-traumatic stress disorder) [F43.10] 12/11/2017 Yes      Problems Resolved During this Admission:     Impression: 47 yo F with PMHX HIV/AIDS (CD 99 3/2022) and bipolar 1 disorder who presents to the ED complaining of abdominal pain na dvaginal discharge x a few days. Psychology consulted to address poor medication adherence and various psychosocial stressors. Patient participated well in MI/probelm-solving intervention focused on medication adherence. Social support is adequate. Coping skills are limited. Communication skills are poor.    Plan: Psychology will continue to follow while patient is in-house.    Recommendation: Follow up with outpatient Psychiatry.      Thank you for the opportunity to participate in this patient's care.  Length of Service: 22 minutes    Chris Stallworth, PhD  Psychology  [facility]

## 2022-07-29 NOTE — DISCHARGE SUMMARY
[facility] - Telemetry [facility] ([address] Medicine  Discharge Summary      Patient Name: Patricia Nye  MRN: [id number]  Patient Class: OP- Observation  Admission Date: 7/26/2022  Hospital Length of Stay: 0 days  Discharge Date and Time: 7/29/2022  6:19 PM  Attending Physician: Faviola Burns MD   Discharging Provider: Day Blankenship PA-C  Primary Care Provider: Rd Virk MD  [facility] Medicine Team: [facility]OSP MED E Day Blankenship PA-C    HPI:   45 yo F with PMHX HIV/AIDS (CD 99 3/2022) and bipolar 1 disorder who presents to the ED complaining of abdominal pain na dvaginal discharge x a few days. Pt. States that she has been having increased urinary frequency and dyuria with vaginal discharge/foul odor. She has associated supapubic and RLQ pain that have been going on for around the same time. She denies any fevers, chills, cough, nausea, or vomiting. Pt. Does report that her menses have been abnormal with seemingly 2 cylces per month for the last several months as well. She is not sure if they are related. Additionally, the patient reports persistent R eye dryness and redness since her diagnosis of herpes zoster ophthalmicus. She states that the eye bother her all the time without relief. She never followed up with ophthalmology after her recent discharge 6/2022 because she states she was never contacted about an appointment. Pt. Also reports that she has not taken any of her medications including biktarvy, lithium, or buspar in 3 weeks because she has not gotten them refilled. Pt. Mentions that she is home alone and feels she has no one to take care of her, so she hasn't cared for herself. She denies any SI or HI, however.      * No surgery found *      Hospital Course:   Mrs. Nye was admitted to observation for acute cystitis with hematuria. Patient started on ceftriaxone. Urine culture E. Coli. CT with suspected adnexal cyst along the posterior left aspect of the uterus.  Transvaginal US obtained, reveals simple cyst without recommendations for further intervention or follow up. Ophthalmology consulted for R eye irritation s/p herpes zoster ophthalmicus; do not suspect active infection. Recommend erythromycin ointment, artificial tears, and warm compresses. ID consulted for assistance given AIDS diagnosis with noncomplicance. Recommend holding home biktarvy and establishing with ID clinic for management. Do not recommend treatment for UTI as sample dirty, ceftriaxone discontinued. Also recommend empiric tx for suspected STI. S/p ceftriaxone for gonorrhea, started on doxycycline for chlamydia, G/C negative and doxycycline discontinued. Vaginal screen without clue cells, trichomonas, yeast. RPR negative. HIV labs reviewed. Psychiatry consulted for medication management in setting of non-compliance, recommend continuing current doses of lithium and buspar, will need to follow up given history of non-compliance. Patient given refills of psychiatric medications. Care at home ordered for patient for close follow up. Patient set up for follow up in HIV clinic. Patient verbalized understanding, all questions answered.       Goals of Care Treatment Preferences:  Code Status: Full Code      Consults:   Consults (From admission, onward)          Status Ordering Provider     Inpatient consult to Psychology  Once        Provider:  (Not yet assigned)    Acknowledged ERIN MORENO     Inpatient consult to Psychiatry  Once        Provider:  (Not yet assigned)    Completed BERONICA PIÑA     Inpatient consult to Ophthalmology  Once        Provider:  (Not yet assigned)    Completed BERONICA PIÑA     Inpatient consult to Infectious Diseases  Once        Provider:  (Not yet assigned)    Completed BELL DANG            * AIDS  This patient in known to have AIDS (from CD4 count has been less than 200).   Lab Results   Component Value Date    ABSOLUTECD4 134 (L) 07/27/2022     -Pt. On  biktarvy but with intermittent compliance, has not taken in last week. Has not established care with ID here. ID consulted for further assistance and decision-making regarding restarting ART  - [facility]  - continue atovaquone for PCP ppx  - Follow up appointment scheduled for patient     Vaginal discharge  - concerning for STI though work up unremarkable, empirically tx for G/C  - UA dirty, completed 3 day tx for ceftriaxone  - OBGYN referral    Adnexal mass  -Suspected adnexal cyst along the posterior left aspect of the uterus measuring 3.7 x 3.2 cm, new compared to CT renal stone study from 11/19/2021 noted on abdominal CT  -May be contributing to pain, but no prompt intervention required as findings not consistent with ectopic, ruptured cyst, or torsion.  - transvaginal ultrasound Left ovarian simple cyst measuring 3.6 cm.  Benign finding in the physiological size range.  No follow-up recommended.    Herpes zoster ophthalmicus of right eye  -Pt. With persistent erythema and pain to R eye since recent herpes zoster opthalmicus infection. Reports competing treatment but did not f/u with opthalmology and seems to have poor compliance history  - ophthalmology consulted, do not suspect active infection  - Recommend erythromycin ointment, artificial tears, and warm compresses    UTI (urinary tract infection)  -Pt. With dysuria, increased frequency and discharge  -U/A >100 WBC but sample dirty with squam   - CT abdo without evidence of pyelo or bladder wall thickening. No acute source of infection noted   - completed 3 day course of ceftriaxone for UTI  -  ID does not recommend further treatment  given dirty sample and no evidence of infection  - repeat obtained given continued symptoms, negative for infection  - will need [facility] follow up    Bipolar 1 disorder  PTSD  Schizoaffective disorder  -Will resume buspar and lithium, but consult psychiatry given patients expressed depression  during interview and intermittent compliance with meds  - recommend continuing home medications  - will need follow up with psych  - refills given       Final Active Diagnoses:    Diagnosis Date Noted POA    PRINCIPAL PROBLEM:  AIDS [B20] 05/18/2012 Yes    Difficulty taking medication [Z91.14] 07/28/2022 Not Applicable    Vaginal discharge [N89.8] 07/27/2022 Yes    At risk for opportunistic infections [Z91.89] 07/26/2022 Yes    Adnexal mass [N94.89] 07/26/2022 Yes    Herpes zoster ophthalmicus of right eye [B02.30] 03/19/2022 Yes    Schizoaffective disorder, bipolar type [F25.0]  Yes    UTI (urinary tract infection) [N39.0] 09/01/2020 Yes    Bipolar 1 disorder [F31.9] 12/28/2017 Yes    PTSD (post-traumatic stress disorder) [F43.10] 12/11/2017 Yes      Problems Resolved During this Admission:       Discharged Condition: good    Disposition: Home or Self Care    Follow Up:   Follow-up Information       [facility] - Infectious Disease/Hop Follow up.    Specialty: Infectious Diseases  Why: Nurse will call to schedule follow up appointment; however, if you do not hear from someone within 48 hours contact the number listed.  Contact information:  [address] [place] [address] [id number] [phone]                           Patient Instructions:      Ambulatory referral/consult to Psychology   Standing Status: Future   Referral Priority: Routine Referral Type: Psychiatric   Referral Reason: Specialty Services Required   Requested Specialty: Psychology   Number of Visits Requested: 1     Ambulatory referral/consult to [facility] - Medical & Palliative   Standing Status: Future   Referral Priority: Routine Referral Type: Consultation   Referral Reason: Specialty Services Required   Number of Visits Requested: 1     Ambulatory referral/consult to Obstetrics / Gynecology   Standing Status: Future   Referral Priority: Routine Referral Type: Consultation   Referral Reason: Specialty Services Required    Requested Specialty: Obstetrics and Gynecology   Number of Visits Requested: 1     Diet Cardiac     Notify your health care provider if you experience any of the following:  temperature >100.4     Notify your health care provider if you experience any of the following:  severe uncontrolled pain     Activity as tolerated       Significant Diagnostic Studies: Labs:   CMP   Recent Labs   Lab 07/29/22  0303      K 3.8      CO2 23   GLU 99   BUN 9   CREATININE 0.7   CALCIUM 8.7   ANIONGAP 6*   ESTGFRAFRICA >60.0   EGFRNONAA >60.0   , CBC   Recent Labs   Lab 07/29/22  0303   WBC 3.20*   HGB 11.5*   HCT 34.4*       and Troponin No results for input(s): TROPONINI in the last 168 hours.  Microbiology:   Blood Culture   Lab Results   Component Value Date    LABBLOO No Growth to date 07/26/2022    LABBLOO No Growth to date 07/26/2022    LABBLOO No Growth to date 07/26/2022    LABBLOO No Growth to date 07/26/2022    LABBLOO No Growth to date 07/26/2022    LABBLOO No Growth to date 07/26/2022    and Urine Culture    Lab Results   Component Value Date    LABURIN ESCHERICHIA COLI  >100,000 cfu/ml   (A) 07/26/2022       Pending Diagnostic Studies:       None           Medications:  Reconciled Home Medications:      Medication List        CONTINUE taking these medications      acetaminophen 500 MG tablet  Commonly known as: TYLENOL     artificial tears 0.5 % ophthalmic solution  Commonly known as: ISOPTO TEARS  Place 2 drops into both eyes 3 (three) times daily.     atovaquone 750 mg/5 mL Susp oral liquid  Commonly known as: MEPRON  TAKE 10 MLS (1,500 MG TOTAL) BY MOUTH ONCE DAILY.     BIKTARVY -25 mg (25 kg or greater)  Generic drug: jynfptypz-kyzkcbyi-vdrtwdw ala  Take 1 tablet by mouth once daily.     busPIRone 10 MG tablet  Commonly known as: BUSPAR  Take 1 tablet (10 mg total) by mouth 3 (three) times daily.     erythromycin ophthalmic ointment  Commonly known as: ROMYCIN  place IN right eye TWICE  DAILY AND facial TO RASH FOR 10 DAYS     lithium 300 mg tablet  Commonly known as: LITHOTAB  Take 1 tablet (300 mg total) by mouth every 8 (eight) hours. LITHIUM CARBONATE ER            STOP taking these medications      azelastine 137 mcg (0.1 %) nasal spray  Commonly known as: ASTELIN     morphine 15 MG tablet  Commonly known as: MSIR              Indwelling Lines/Drains at time of discharge:   Lines/Drains/Airways       None                   Time spent on the discharge of patient: 36 minutes         Day Blankenship PA-C  Department of Hospital Medicine  [facility] - Telemetry Stepdown ([place]

## 2022-07-29 NOTE — ASSESSMENT & PLAN NOTE
This patient in known to have AIDS (from CD4 count has been less than 200).   Lab Results   Component Value Date    ABSOLUTECD4 134 (L) 07/27/2022     -Pt. On biktarvy but with intermittent compliance, has not taken in last week. Has not established care with ID here. ID consulted for further assistance and decision-making regarding restarting ART  - continue to hold ART until follow up in [facility]  - continue atovaquone for PCP ppx  - Follow up appointment scheduled for patient

## 2022-07-29 NOTE — ASSESSMENT & PLAN NOTE
PTSD  Schizoaffective disorder  -Will resume buspar and lithium, but consult psychiatry given patients expressed depression during interview and intermittent compliance with meds  - recommend continuing home medications  - will need follow up with psych  - refills given

## 2022-07-29 NOTE — ASSESSMENT & PLAN NOTE
-Pt. With dysuria, increased frequency and discharge  -U/A >100 WBC but sample dirty with squam   - CT abdo without evidence of pyelo or bladder wall thickening. No acute source of infection noted   - completed 3 day course of ceftriaxone for UTI  -  ID does not recommend further treatment  given dirty sample and no evidence of infection  - repeat obtained given continued symptoms, negative for infection  - will need [facility] follow up

## 2022-07-29 NOTE — PLAN OF CARE
SLIME contacted [facility] Psychology to schedule appointment from ambulatory referral. The first available appointment is on Sept. 28, 2022 at 1:00PM. Pt has been placed on the waiting list in case an earlier appointment becomes available.    SLIME contacted [facility] ([phone]), who pt was previously established with to schedule a follow up appointment. SLIME was informed that a nurse will contact pt to schedule follow up appointment. SLIME placed a note on pt's AVS regarding [facility] follow up.    Sasha Rollins LMSW  [facility]  Ext. [phone]

## 2022-07-29 NOTE — PLAN OF CARE
PATIENT AAO CALM AND COOPERATIVE WITH STAFF. REVIEWED POC WITH PATIENT ENGAGES WITH STAFF DURING EDUCATION. DISEASE PROCESS, MEDICATION, AND TOBACCO CESSATION EDUCATION PROVIDED DURING ADMISSION. PIV X2 REMOVED WITH TIP INTACT PRESSURE DRESSINGS APPLIED TO SITES PT TOLERATED THIS WELL. REVIEWED DISCHARGE INSTRUCTIONS AND PT VERBALIZED UNDERSTANDING DENYING ANY QUESTIONS AT THIS TIME. PT BELONGINGS REMAIN AT BEDSIDE. AND STAFF TO TRANSPORT PT IN  TO PRIVATE CAR

## 2022-07-29 NOTE — PLAN OF CARE
Problem: Adult Inpatient Plan of Care  Goal: Plan of Care Review  Outcome: Ongoing, Progressing  Goal: Patient-Specific Goal (Individualized)  Outcome: Ongoing, Progressing  Goal: Absence of Hospital-Acquired Illness or Injury  Outcome: Ongoing, Progressing  Goal: Optimal Comfort and Wellbeing  Outcome: Ongoing, Progressing  Goal: Readiness for Transition of Care  Outcome: Ongoing, Progressing     Problem: UTI (Urinary Tract Infection)  Goal: Improved Infection Symptoms  Outcome: Ongoing, Progressing

## 2022-08-01 LAB
BACTERIA BLD CULT: NORMAL
BACTERIA BLD CULT: NORMAL

## 2022-08-01 NOTE — PLAN OF CARE
[facility] - Telemetry Stepdown ([facility])  Discharge Final Note    Primary Care Provider: Rd Virk MD    Expected Discharge Date: 7/29/2022    Patient discharged to home via personal transportation.     Patient's bedside nurse and patient notified of the above.      Final Discharge Note (most recent)       Final Note - 08/01/22 0926          Final Note    Assessment Type Final Discharge Note (P)      Anticipated Discharge Disposition Home or Self Care (P)         Post-Acute Status    Post-Acute Authorization Other (P)      Other Status No Post-Acute Service Needs (P)                      Important Message from Medicare             Contact [facility] - Infectious Disease/Hop   Specialty: Infectious Diseases    [address] [facility] [address]   Phone: [phone]       Next Steps: Follow up    Instructions: Nurse will call to schedule follow up appointment; however, if you do not hear from someone within 48 hours contact the number listed.            Future Appointments   Date Time Provider Department Center   8/8/2022 10:30 AM [facility]   8/19/2022  3:00 PM Hudson Vera NP [id number]   8/30/2022 11:30 AM Beka Wang MD [facility] ID [facility]   9/28/2022  1:00 PM Ema Hyman NP-C [facility] PSYCH [facility]        scheduled post-discharge follow-up appointment and information added to AVS.     Sasha Rollins LMSW  [facility]  Ext. [phone]

## 2022-08-02 ENCOUNTER — TELEPHONE (OUTPATIENT)
Dept: OPHTHALMOLOGY | Facility: CLINIC | Age: 47
End: 2022-08-02
Payer: MEDICAID

## 2022-08-02 NOTE — TELEPHONE ENCOUNTER
----- Message from Anamaria Hough MD sent at 8/1/2022  8:01 AM CDT -----  Regarding: f/u inpatient  Hello!    This patient was seen by Dr. Escobar and Dr. Kwok and myself while inpatient. Dx with staph marginal keratitis. Can we get her f/u in triage clinic sometime in the next couple of weeks?    Thank you,   Anamaria

## 2022-08-03 ENCOUNTER — PES CALL (OUTPATIENT)
Dept: ADMINISTRATIVE | Facility: CLINIC | Age: 47
End: 2022-08-03
Payer: MEDICAID

## 2022-08-04 ENCOUNTER — PES CALL (OUTPATIENT)
Dept: ADMINISTRATIVE | Facility: CLINIC | Age: 47
End: 2022-08-04
Payer: MEDICAID

## 2022-08-05 ENCOUNTER — PES CALL (OUTPATIENT)
Dept: ADMINISTRATIVE | Facility: CLINIC | Age: 47
End: 2022-08-05
Payer: MEDICAID

## 2022-08-31 ENCOUNTER — TELEPHONE (OUTPATIENT)
Dept: PRIMARY CARE CLINIC | Facility: CLINIC | Age: 47
End: 2022-08-31
Payer: MEDICAID

## 2022-08-31 NOTE — TELEPHONE ENCOUNTER
----- Message from Breanna Rosas sent at 8/31/2022 11:27 AM CDT -----  Contact: [phone] Patient  Pt is requesting a virtual visit with Dr Cornell Britt. Pt declined the appt offered to scheduled the 08/31/22 at 3:40 with Nikole Hameed. Please call and advise.

## 2022-09-01 ENCOUNTER — OFFICE VISIT (OUTPATIENT)
Dept: PRIMARY CARE CLINIC | Facility: CLINIC | Age: 47
End: 2022-09-01
Payer: MEDICAID

## 2022-09-01 DIAGNOSIS — B20 AIDS: Primary | ICD-10-CM

## 2022-09-01 DIAGNOSIS — Z91.89 AT RISK FOR OPPORTUNISTIC INFECTIONS: ICD-10-CM

## 2022-09-01 DIAGNOSIS — F31.9 BIPOLAR 1 DISORDER: ICD-10-CM

## 2022-09-01 DIAGNOSIS — E03.9 ACQUIRED HYPOTHYROIDISM: ICD-10-CM

## 2022-09-01 DIAGNOSIS — F43.10 PTSD (POST-TRAUMATIC STRESS DISORDER): ICD-10-CM

## 2022-09-01 DIAGNOSIS — Z72.0 TOBACCO ABUSE: ICD-10-CM

## 2022-09-01 DIAGNOSIS — U07.1 COVID: ICD-10-CM

## 2022-09-01 PROCEDURE — 99214 PR OFFICE/OUTPT VISIT, EST, LEVL IV, 30-39 MIN: ICD-10-PCS | Mod: 95,,, | Performed by: FAMILY MEDICINE

## 2022-09-01 PROCEDURE — 99214 OFFICE O/P EST MOD 30 MIN: CPT | Mod: 95,,, | Performed by: FAMILY MEDICINE

## 2022-09-01 RX ORDER — AZELASTINE 1 MG/ML
1 SPRAY, METERED NASAL 2 TIMES DAILY
Qty: 30 ML | Refills: 1 | Status: SHIPPED | OUTPATIENT
Start: 2022-09-01 | End: 2023-05-06

## 2022-09-01 RX ORDER — DIPHENOXYLATE HYDROCHLORIDE AND ATROPINE SULFATE 2.5; .025 MG/1; MG/1
TABLET ORAL
Qty: 20 TABLET | Refills: 1 | Status: SHIPPED | OUTPATIENT
Start: 2022-09-01 | End: 2023-05-06
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
## 2022-09-27 ENCOUNTER — PATIENT MESSAGE (OUTPATIENT)
Dept: PRIMARY CARE CLINIC | Facility: CLINIC | Age: 47
End: 2022-09-27
Payer: MEDICAID

## 2022-10-08 ENCOUNTER — NURSE TRIAGE (OUTPATIENT)
Dept: ADMINISTRATIVE | Facility: CLINIC | Age: 47
End: 2022-10-08
Payer: MEDICAID

## 2022-10-08 NOTE — TELEPHONE ENCOUNTER
Pt transferred for triage but when triage offered pt refused and asked to make appt. Warm transferred to [facility] with scheduling. Will route message.   Reason for Disposition   Caller requesting an appointment, triage offered and declined    Protocols used: PCP Call - No Triage-A-

## 2022-10-10 ENCOUNTER — TELEPHONE (OUTPATIENT)
Dept: PSYCHOLOGY | Facility: CLINIC | Age: 47
End: 2022-10-10
Payer: MEDICAID

## 2022-10-10 ENCOUNTER — TELEPHONE (OUTPATIENT)
Dept: PRIMARY CARE CLINIC | Facility: CLINIC | Age: 47
End: 2022-10-10
Payer: MEDICAID

## 2022-10-10 NOTE — TELEPHONE ENCOUNTER
Called and spoke with patient, appointment scheduled for tomorrow.  Patient verbalized understanding

## 2022-10-10 NOTE — TELEPHONE ENCOUNTER
----- Message from Bozena Trey sent at 10/8/2022  3:19 PM CDT -----  Type:  Patient Requesting Referral    Who Called:Patricia  Does the patient already have the specialty appointment scheduled?:No  If yes, what is the date of that appointment?:  Referral to What Specialty:Psychiatry  Reason for Referral:therapy   Does the patient want the referral with a specific physician?:No  [facility]  Patient Requesting a Response?:Yes  Would the patient rather a call back or a response via [email or web address]? Call back  Best Call Back Number:[phone]  Additional Information:

## 2022-10-10 NOTE — TELEPHONE ENCOUNTER
----- Message from Sun Estrada sent at 10/8/2022  3:09 PM CDT -----  Contact: Patient  Type:  Sooner Apoointment Request    Name of Caller:Patient  When is the first available appointment?12/20/22  Symptoms:urine infection/cough  Would the patient rather a call back or a response via [employer]? Call back   Best Call Back Number:[phone]  Additional Information: Please assist

## 2022-10-11 ENCOUNTER — OFFICE VISIT (OUTPATIENT)
Dept: PRIMARY CARE CLINIC | Facility: CLINIC | Age: 47
End: 2022-10-11
Payer: MEDICAID

## 2022-10-11 VITALS
SYSTOLIC BLOOD PRESSURE: 120 MMHG | RESPIRATION RATE: 18 BRPM | HEIGHT: 61 IN | HEART RATE: 94 BPM | OXYGEN SATURATION: 97 % | BODY MASS INDEX: 23.9 KG/M2 | WEIGHT: 126.56 LBS | DIASTOLIC BLOOD PRESSURE: 80 MMHG

## 2022-10-11 DIAGNOSIS — F32.5 MAJOR DEPRESSIVE DISORDER IN REMISSION, UNSPECIFIED WHETHER RECURRENT: Chronic | ICD-10-CM

## 2022-10-11 DIAGNOSIS — B20 AIDS: ICD-10-CM

## 2022-10-11 DIAGNOSIS — F31.9 BIPOLAR 1 DISORDER: ICD-10-CM

## 2022-10-11 DIAGNOSIS — J40 BRONCHITIS: ICD-10-CM

## 2022-10-11 DIAGNOSIS — N30.00 ACUTE CYSTITIS WITHOUT HEMATURIA: ICD-10-CM

## 2022-10-11 DIAGNOSIS — Z72.0 TOBACCO ABUSE: ICD-10-CM

## 2022-10-11 DIAGNOSIS — B20 HIV DISEASE: ICD-10-CM

## 2022-10-11 DIAGNOSIS — Z23 FLU VACCINE NEED: ICD-10-CM

## 2022-10-11 DIAGNOSIS — J01.00 ACUTE MAXILLARY SINUSITIS, RECURRENCE NOT SPECIFIED: Primary | ICD-10-CM

## 2022-10-11 DIAGNOSIS — F25.0 SCHIZOAFFECTIVE DISORDER, BIPOLAR TYPE: ICD-10-CM

## 2022-10-11 DIAGNOSIS — F43.10 PTSD (POST-TRAUMATIC STRESS DISORDER): ICD-10-CM

## 2022-10-11 DIAGNOSIS — F41.9 ANXIETY DISORDER, UNSPECIFIED TYPE: ICD-10-CM

## 2022-10-11 PROBLEM — F31.10 BIPOLAR AFFECTIVE DISORDER, CURRENT EPISODE MANIC: Status: RESOLVED | Noted: 2020-11-20 | Resolved: 2022-10-11

## 2022-10-11 LAB
BILIRUB SERPL-MCNC: ABNORMAL MG/DL
BLOOD URINE, POC: ABNORMAL
CLARITY, POC UA: ABNORMAL
COLOR, POC UA: ABNORMAL
GLUCOSE UR QL STRIP: NORMAL
KETONES UR QL STRIP: ABNORMAL
LEUKOCYTE ESTERASE URINE, POC: ABNORMAL
NITRITE, POC UA: ABNORMAL
PH, POC UA: 6
PROTEIN, POC: ABNORMAL
SPECIFIC GRAVITY, POC UA: 1
UROBILINOGEN, POC UA: NORMAL

## 2022-10-11 PROCEDURE — 3074F PR MOST RECENT SYSTOLIC BLOOD PRESSURE < 130 MM HG: ICD-10-PCS | Mod: CPTII,,, | Performed by: FAMILY MEDICINE

## 2022-10-11 PROCEDURE — 3079F PR MOST RECENT DIASTOLIC BLOOD PRESSURE 80-89 MM HG: ICD-10-PCS | Mod: CPTII,,, | Performed by: FAMILY MEDICINE

## 2022-10-11 PROCEDURE — 1159F PR MEDICATION LIST DOCUMENTED IN MEDICAL RECORD: ICD-10-PCS | Mod: CPTII,,, | Performed by: FAMILY MEDICINE

## 2022-10-11 PROCEDURE — 90471 IMMUNIZATION ADMIN: CPT | Mod: PBBFAC,PN

## 2022-10-11 PROCEDURE — 1159F MED LIST DOCD IN RCRD: CPT | Mod: CPTII,,, | Performed by: FAMILY MEDICINE

## 2022-10-11 PROCEDURE — 96372 THER/PROPH/DIAG INJ SC/IM: CPT | Mod: PBBFAC,PN

## 2022-10-11 PROCEDURE — 99214 OFFICE O/P EST MOD 30 MIN: CPT | Mod: S$PBB,,, | Performed by: FAMILY MEDICINE

## 2022-10-11 PROCEDURE — 99999 PR PBB SHADOW E&M-EST. PATIENT-LVL IV: ICD-10-PCS | Mod: PBBFAC,,, | Performed by: FAMILY MEDICINE

## 2022-10-11 PROCEDURE — 99214 OFFICE O/P EST MOD 30 MIN: CPT | Mod: PBBFAC,25,PN | Performed by: FAMILY MEDICINE

## 2022-10-11 PROCEDURE — 99214 PR OFFICE/OUTPT VISIT, EST, LEVL IV, 30-39 MIN: ICD-10-PCS | Mod: S$PBB,,, | Performed by: FAMILY MEDICINE

## 2022-10-11 PROCEDURE — 3079F DIAST BP 80-89 MM HG: CPT | Mod: CPTII,,, | Performed by: FAMILY MEDICINE

## 2022-10-11 PROCEDURE — 3074F SYST BP LT 130 MM HG: CPT | Mod: CPTII,,, | Performed by: FAMILY MEDICINE

## 2022-10-11 PROCEDURE — 99999 PR PBB SHADOW E&M-EST. PATIENT-LVL IV: CPT | Mod: PBBFAC,,, | Performed by: FAMILY MEDICINE

## 2022-10-11 PROCEDURE — 81002 URINALYSIS NONAUTO W/O SCOPE: CPT | Mod: PBBFAC,PN | Performed by: FAMILY MEDICINE

## 2022-10-11 RX ORDER — ATOVAQUONE 750 MG/5ML
1500 SUSPENSION ORAL DAILY
Qty: 210 ML | Refills: 1 | Status: CANCELLED | OUTPATIENT
Start: 2022-10-11

## 2022-10-11 RX ORDER — LEVOFLOXACIN 500 MG/1
500 TABLET, FILM COATED ORAL DAILY
Qty: 10 TABLET | Refills: 0 | Status: SHIPPED | OUTPATIENT
Start: 2022-10-11 | End: 2022-10-21

## 2022-10-11 RX ORDER — TRIAMCINOLONE ACETONIDE 40 MG/ML
40 INJECTION, SUSPENSION INTRA-ARTICULAR; INTRAMUSCULAR ONCE
Status: COMPLETED | OUTPATIENT
Start: 2022-10-11 | End: 2022-10-11

## 2022-10-11 RX ORDER — PROMETHAZINE HYDROCHLORIDE AND DEXTROMETHORPHAN HYDROBROMIDE 6.25; 15 MG/5ML; MG/5ML
5 SYRUP ORAL EVERY 6 HOURS PRN
Qty: 180 ML | Refills: 1 | Status: SHIPPED | OUTPATIENT
Start: 2022-10-11 | End: 2023-05-06

## 2022-10-11 RX ORDER — BICTEGRAVIR SODIUM, EMTRICITABINE, AND TENOFOVIR ALAFENAMIDE FUMARATE 50; 200; 25 MG/1; MG/1; MG/1
1 TABLET ORAL DAILY
Qty: 60 TABLET | Refills: 2 | Status: CANCELLED | OUTPATIENT
Start: 2022-10-11

## 2022-10-11 RX ORDER — PHENAZOPYRIDINE HYDROCHLORIDE 200 MG/1
200 TABLET, FILM COATED ORAL 3 TIMES DAILY PRN
Qty: 15 TABLET | Refills: 0 | Status: SHIPPED | OUTPATIENT
Start: 2022-10-11 | End: 2022-10-21

## 2022-10-11 RX ADMIN — TRIAMCINOLONE ACETONIDE 40 MG: 40 INJECTION, SUSPENSION INTRA-ARTICULAR; INTRAMUSCULAR at 12:10

## 2022-10-11 NOTE — PATIENT INSTRUCTIONS
Upper respiratory infection   Levaquin 500 mg 1 q.d. times 10 days   Phenergan DM 1 tsp q.6 hours p.r.n. cough   UTI   Levaquin 500 mg 1 q.d. times 10 days   Peridium 200 mg 1 p.o. t.i.d. x5 days   Due to recurrent UTI needs to see urologist for possible cystoscopy   Return 6 months
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
## 2022-10-11 NOTE — PROGRESS NOTES
Verified pt ID using name and . Allergies reviewed. Administered kenalog 40mg in left VG per physician order using aseptic technique. Aspirated and no blood return noted. Pt tolerated well with no adverse reactions noted.

## 2022-10-18 RX ORDER — PHENAZOPYRIDINE HYDROCHLORIDE 200 MG/1
200 TABLET, FILM COATED ORAL 3 TIMES DAILY PRN
Qty: 15 TABLET | Refills: 0 | OUTPATIENT
Start: 2022-10-18 | End: 2022-10-28

## 2022-10-18 RX ORDER — LEVOFLOXACIN 500 MG/1
500 TABLET, FILM COATED ORAL DAILY
Qty: 10 TABLET | Refills: 0 | OUTPATIENT
Start: 2022-10-18 | End: 2022-10-28

## 2022-10-18 NOTE — TELEPHONE ENCOUNTER
----- Message from Breanna Rosas sent at 10/18/2022 11:52 AM CDT -----  Contact: [phone] Patient  Requesting an RX refill or new RX.  Is this a refill or new RX: new  RX name and strength (copy/paste from chart):  levoFLOXacin (LEVAQUIN) 500 MG tablet  Is this a 30 day or 90 day RX:   Pharmacy name and phone # (copy/paste from chart):  [facility] - [place], [place] - [facility] [address]  [facility] [address]  [place] [id number]  Phone: [phone] Fax: [phone]    Requesting an RX refill or new RX.  Is this a refill or new RX: new  RX name and strength (copy/paste from chart):  phenazopyridine (PYRIDIUM) 200 MG tablet  Is this a 30 day or 90 day RX:   Pharmacy name and phone # (copy/paste from chart):    [facility] - [place], [place] - [facility]  [address] [place] [address] [address]  [facility] [address]  Phone: [phone] Fax: [phone]    Pt states she has been staying at a friends house and left the medication there. Pt is also requesting a Rx for medication for diarrhea. Pt states she is currently at the pharmacy. Please call and advise.

## 2022-10-19 NOTE — TELEPHONE ENCOUNTER
Call tell pt antibiotics not rfilled over the phone With HIV should see infectious dz to evaluate

## 2022-11-11 ENCOUNTER — HOSPITAL ENCOUNTER (EMERGENCY)
Facility: OTHER | Age: 47
Discharge: HOME | End: 2022-11-11
Attending: EMERGENCY MEDICINE
Payer: MEDICAID

## 2022-11-11 VITALS
TEMPERATURE: 98 F | DIASTOLIC BLOOD PRESSURE: 88 MMHG | HEART RATE: 67 BPM | RESPIRATION RATE: 14 BRPM | HEIGHT: 62 IN | WEIGHT: 127 LBS | SYSTOLIC BLOOD PRESSURE: 139 MMHG | BODY MASS INDEX: 23.37 KG/M2 | OXYGEN SATURATION: 100 %

## 2022-11-11 DIAGNOSIS — R51.9 NONINTRACTABLE EPISODIC HEADACHE, UNSPECIFIED HEADACHE TYPE: Primary | ICD-10-CM

## 2022-11-11 PROCEDURE — 99282 EMERGENCY DEPT VISIT SF MDM: CPT

## 2022-11-11 PROCEDURE — 25000003 PHARM REV CODE 250: Performed by: EMERGENCY MEDICINE

## 2022-11-11 RX ORDER — ACETAMINOPHEN 500 MG
1000 TABLET ORAL
Status: COMPLETED | OUTPATIENT
Start: 2022-11-11 | End: 2022-11-11

## 2022-11-11 RX ORDER — ACETAMINOPHEN 500 MG
1000 TABLET ORAL EVERY 8 HOURS PRN
Qty: 30 TABLET | Refills: 0 | Status: SHIPPED | OUTPATIENT
Start: 2022-11-11 | End: 2022-11-16

## 2022-11-11 RX ADMIN — ACETAMINOPHEN 1000 MG: 500 TABLET ORAL at 05:11
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
## 2022-11-11 NOTE — ED NOTES
Patient states that she will be staying with her mom and that she feels safe. Provided local resources.

## 2022-12-07 ENCOUNTER — TELEPHONE (OUTPATIENT)
Dept: UROLOGY | Facility: CLINIC | Age: 47
End: 2022-12-07
Payer: MEDICAID

## 2022-12-07 NOTE — TELEPHONE ENCOUNTER
----- Message from Ora Lozano sent at 12/7/2022 12:03 PM CST -----       Type: Patient Returning Call    Who Called: Patient   Who Left Message for Patient: NA  Does the patient know what this is regarding?: Will be running more than 15 minutes late for appointment today at 1 PM. Patient says she would arrive by 2 PM. Wants a call back to see if this can be arranged.   Would the patient rather a call back or a response via [facility]? Call  Best Call Back Number: [phone]  Additional Information:  Please assist, thank you!          
No answer. Message left to call clinic back.     
alert active playful at discharge

## 2022-12-12 PROBLEM — L02.91 ABSCESS: Status: ACTIVE | Noted: 2022-12-12

## 2022-12-13 ENCOUNTER — TELEPHONE (OUTPATIENT)
Dept: PRIMARY CARE CLINIC | Facility: CLINIC | Age: 47
End: 2022-12-13
Payer: MEDICAID

## 2022-12-13 NOTE — TELEPHONE ENCOUNTER
----- Message from Breanna Rosas sent at 12/13/2022  2:23 PM CST -----  Contact: [phone] Patient  No blue slot available to schedule an appointment for the patient.  Patient is established with which PCP: Dr Cornell Britt  Reason for the visit: cyst underneath left arm/pt wants to see Dr Ferrell  Would the patient like a call back, or a response through their [facility] portal?:  call back

## 2023-01-19 NOTE — PROGRESS NOTES
"Subjective:      Patricia Langley is a 47 y.o. female who returns today regarding r UTI.    Recently [place] (7/22[place] [facility]  Presents today with complaint of severe dysuria, urgency, frequency.  Also reports headache and nausea.  She denies fever, chills.  Denies gross hematuria.    HPI 2021: Recurrent UTIs  Patient complains of recurrent urinary tract infections.  These have been occuring for 1 years and she reports 2 infections in the past year.  Her most recent infection was in February of this year.  Of her recent infections, 1 are culture proven, including Staphylococcus saprophyticus.  She describes associated symptoms during these episodes as burning with urination, frequency and urgency.  She denies associated fever and flank pain.  She reports that symptoms improve with antibiotic treatment, which have included Nitrofurantoin.  She is is sexually active with 1 partners.  The timing of her infections is related to intercourse.  Other treatments have included OTZ AZO. +smoker    The following portions of the patient's history were reviewed and updated as appropriate: allergies, current medications, past family history, past medical history, past social history, past surgical history and problem list.    Review of Systems  A comprehensive multipoint review of systems was negative except as otherwise stated in the HPI.     Objective:   Vitals: /80 (BP Location: Right arm, Patient Position: Sitting, BP Method: Large (Automatic))   Pulse 94   Ht 5' 1" (1.549 m)   Wt 54.8 kg (120 lb 11.2 oz)   BMI 22.81 kg/m²       Physical Exam   General: no acute distress  Head: normocephalic, atraumatic  Neck: supple, no lymphadenopathy, normal ROM, no masses  Respiratory: Symmetric expansion, non-labored breathing  Skin: no suspicious skin lesions noted  Neuro: alert and oriented x3, no gross deficits  Psych: non-anxious    Lab Review   Urinalysis demonstrates positive for nitrites, leukocytes, red blood " cells, urobilinogen  Lab Results   Component Value Date    WBC 3.20 (L) 07/29/2022    HGB 11.5 (L) 07/29/2022    HCT 34.4 (L) 07/29/2022    HCT 29 (L) 03/21/2019    MCV 82 07/29/2022     07/29/2022     Lab Results   Component Value Date    CREATININE 0.7 07/29/2022    BUN 9 07/29/2022       Assessment and Plan:   1. Dysuria  - Urine culture  - nitrofurantoin, macrocrystal-monohydrate, (MACROBID) 100 MG capsule; Take 1 capsule (100 mg total) by mouth 2 (two) times daily.  Dispense: 20 capsule; Refill: 0  - cefTRIAXone injection 1 g    2. Recurrent UTI  - Urine culture; Standing    3. Nausea  - ondansetron (ZOFRAN-ODT) 4 MG TbDL; Take 1 tablet (4 mg total) by mouth 2 (two) times daily.  Dispense: 10 tablet; Refill: 1    4. Antibiotic-induced yeast infection  - fluconazole (DIFLUCAN) 150 MG Tab; Take 1 tablet (150 mg total) by mouth once daily. for 1 day  Dispense: 1 tablet; Refill: 0       --Rocephin IM requested by patient; will administer today.  Rx for Macrobid sent to pharmacy will notify patient when urine culture is available  --follow-up p.r.n.    This note is dictated on [employer] word recognition program.  There are word recognition mistakes that are occasionally missed on review.

## 2023-01-20 ENCOUNTER — OFFICE VISIT (OUTPATIENT)
Dept: UROLOGY | Facility: CLINIC | Age: 48
End: 2023-01-20
Payer: MEDICAID

## 2023-01-20 VITALS
WEIGHT: 120.69 LBS | HEART RATE: 94 BPM | HEIGHT: 61 IN | DIASTOLIC BLOOD PRESSURE: 80 MMHG | BODY MASS INDEX: 22.78 KG/M2 | SYSTOLIC BLOOD PRESSURE: 120 MMHG

## 2023-01-20 DIAGNOSIS — R30.0 DYSURIA: Primary | ICD-10-CM

## 2023-01-20 DIAGNOSIS — R11.0 NAUSEA: ICD-10-CM

## 2023-01-20 DIAGNOSIS — B37.9 ANTIBIOTIC-INDUCED YEAST INFECTION: ICD-10-CM

## 2023-01-20 DIAGNOSIS — T36.95XA ANTIBIOTIC-INDUCED YEAST INFECTION: ICD-10-CM

## 2023-01-20 DIAGNOSIS — N39.0 RECURRENT UTI: ICD-10-CM

## 2023-01-20 LAB
BILIRUB SERPL-MCNC: ABNORMAL MG/DL
BLOOD URINE, POC: ABNORMAL
CLARITY, POC UA: ABNORMAL
COLOR, POC UA: YELLOW
GLUCOSE UR QL STRIP: NEGATIVE
KETONES UR QL STRIP: NEGATIVE
LEUKOCYTE ESTERASE URINE, POC: ABNORMAL
NITRITE, POC UA: POSITIVE
PH, POC UA: 6
PROTEIN, POC: ABNORMAL
SPECIFIC GRAVITY, POC UA: 1.02
UROBILINOGEN, POC UA: NORMAL

## 2023-01-20 PROCEDURE — 87088 URINE BACTERIA CULTURE: CPT | Performed by: NURSE PRACTITIONER

## 2023-01-20 PROCEDURE — 87077 CULTURE AEROBIC IDENTIFY: CPT | Performed by: NURSE PRACTITIONER

## 2023-01-20 PROCEDURE — 3008F BODY MASS INDEX DOCD: CPT | Mod: CPTII,,, | Performed by: NURSE PRACTITIONER

## 2023-01-20 PROCEDURE — 1160F RVW MEDS BY RX/DR IN RCRD: CPT | Mod: CPTII,,, | Performed by: NURSE PRACTITIONER

## 2023-01-20 PROCEDURE — 99213 OFFICE O/P EST LOW 20 MIN: CPT | Mod: 25,PBBFAC,PN | Performed by: NURSE PRACTITIONER

## 2023-01-20 PROCEDURE — 3079F PR MOST RECENT DIASTOLIC BLOOD PRESSURE 80-89 MM HG: ICD-10-PCS | Mod: CPTII,,, | Performed by: NURSE PRACTITIONER

## 2023-01-20 PROCEDURE — 99999 PR PBB SHADOW E&M-EST. PATIENT-LVL III: CPT | Mod: PBBFAC,,, | Performed by: NURSE PRACTITIONER

## 2023-01-20 PROCEDURE — 1160F PR REVIEW ALL MEDS BY PRESCRIBER/CLIN PHARMACIST DOCUMENTED: ICD-10-PCS | Mod: CPTII,,, | Performed by: NURSE PRACTITIONER

## 2023-01-20 PROCEDURE — 99214 PR OFFICE/OUTPT VISIT, EST, LEVL IV, 30-39 MIN: ICD-10-PCS | Mod: S$PBB,,, | Performed by: NURSE PRACTITIONER

## 2023-01-20 PROCEDURE — 3079F DIAST BP 80-89 MM HG: CPT | Mod: CPTII,,, | Performed by: NURSE PRACTITIONER

## 2023-01-20 PROCEDURE — 3074F SYST BP LT 130 MM HG: CPT | Mod: CPTII,,, | Performed by: NURSE PRACTITIONER

## 2023-01-20 PROCEDURE — 87086 URINE CULTURE/COLONY COUNT: CPT | Performed by: NURSE PRACTITIONER

## 2023-01-20 PROCEDURE — 3074F PR MOST RECENT SYSTOLIC BLOOD PRESSURE < 130 MM HG: ICD-10-PCS | Mod: CPTII,,, | Performed by: NURSE PRACTITIONER

## 2023-01-20 PROCEDURE — 87186 SC STD MICRODIL/AGAR DIL: CPT | Performed by: NURSE PRACTITIONER

## 2023-01-20 PROCEDURE — 96372 THER/PROPH/DIAG INJ SC/IM: CPT | Mod: PBBFAC,PN

## 2023-01-20 PROCEDURE — 1159F PR MEDICATION LIST DOCUMENTED IN MEDICAL RECORD: ICD-10-PCS | Mod: CPTII,,, | Performed by: NURSE PRACTITIONER

## 2023-01-20 PROCEDURE — 99214 OFFICE O/P EST MOD 30 MIN: CPT | Mod: S$PBB,,, | Performed by: NURSE PRACTITIONER

## 2023-01-20 PROCEDURE — 81002 URINALYSIS NONAUTO W/O SCOPE: CPT | Mod: PBBFAC,PN | Performed by: NURSE PRACTITIONER

## 2023-01-20 PROCEDURE — 3008F PR BODY MASS INDEX (BMI) DOCUMENTED: ICD-10-PCS | Mod: CPTII,,, | Performed by: NURSE PRACTITIONER

## 2023-01-20 PROCEDURE — 1159F MED LIST DOCD IN RCRD: CPT | Mod: CPTII,,, | Performed by: NURSE PRACTITIONER

## 2023-01-20 PROCEDURE — 99999 PR PBB SHADOW E&M-EST. PATIENT-LVL III: ICD-10-PCS | Mod: PBBFAC,,, | Performed by: NURSE PRACTITIONER

## 2023-01-20 RX ORDER — NITROFURANTOIN 25; 75 MG/1; MG/1
100 CAPSULE ORAL 2 TIMES DAILY
Qty: 20 CAPSULE | Refills: 0 | Status: SHIPPED | OUTPATIENT
Start: 2023-01-20 | End: 2023-04-12 | Stop reason: SDUPTHER

## 2023-01-20 RX ORDER — MIRTAZAPINE 15 MG/1
15 TABLET, FILM COATED ORAL NIGHTLY
Status: ON HOLD | COMMUNITY
Start: 2022-11-10 | End: 2023-05-12 | Stop reason: SDUPTHER

## 2023-01-20 RX ORDER — ONDANSETRON 4 MG/1
4 TABLET, ORALLY DISINTEGRATING ORAL 2 TIMES DAILY
Qty: 10 TABLET | Refills: 1 | Status: ON HOLD | OUTPATIENT
Start: 2023-01-20 | End: 2023-05-12 | Stop reason: HOSPADM

## 2023-01-20 RX ORDER — FLUCONAZOLE 150 MG/1
150 TABLET ORAL DAILY
Qty: 1 TABLET | Refills: 0 | Status: SHIPPED | OUTPATIENT
Start: 2023-01-20 | End: 2023-01-21

## 2023-01-20 RX ORDER — CEFTRIAXONE 1 G/1
1 INJECTION, POWDER, FOR SOLUTION INTRAMUSCULAR; INTRAVENOUS
Status: COMPLETED | OUTPATIENT
Start: 2023-01-20 | End: 2023-01-20

## 2023-01-20 RX ORDER — METHYLPREDNISOLONE 4 MG/1
TABLET ORAL
COMMUNITY
Start: 2022-10-18 | End: 2023-05-06

## 2023-01-20 RX ORDER — IBUPROFEN 200 MG
1 TABLET ORAL
Status: ON HOLD | COMMUNITY
Start: 2022-11-10 | End: 2023-05-12 | Stop reason: HOSPADM

## 2023-01-20 RX ADMIN — CEFTRIAXONE SODIUM 1 G: 1 INJECTION, POWDER, FOR SOLUTION INTRAMUSCULAR; INTRAVENOUS at 01:01

## 2023-01-20 NOTE — PROGRESS NOTES
Verified patient ID by name and . Administered Ceftriaxone 1 g IM injection reconstituted with lidocaine in Right Gluteus per provider order using aseptic technique. Aspirated and no blood return noted. Patient tolerated well with no adverse reactions noted.

## 2023-01-23 ENCOUNTER — TELEPHONE (OUTPATIENT)
Dept: UROLOGY | Facility: CLINIC | Age: 48
End: 2023-01-23
Payer: MEDICAID

## 2023-01-23 LAB — BACTERIA UR CULT: ABNORMAL

## 2023-01-23 NOTE — TELEPHONE ENCOUNTER
----- Message from Marybel Little NP sent at 1/23/2023 12:25 PM CST -----  Please call patient and let her know that urine culture was positive for infection.  I would like her to complete the prescription of Macrobid to fully treat infection.

## 2023-01-25 ENCOUNTER — TELEPHONE (OUTPATIENT)
Dept: UROLOGY | Facility: CLINIC | Age: 48
End: 2023-01-25
Payer: MEDICAID

## 2023-01-26 ENCOUNTER — TELEPHONE (OUTPATIENT)
Dept: UROLOGY | Facility: CLINIC | Age: 48
End: 2023-01-26
Payer: MEDICAID

## 2023-01-26 NOTE — TELEPHONE ENCOUNTER
Spoke with pt, pt is aware that per fannie urine culture was positive for infection. [place] would like her to complete the prescription of Macrobid to fully treat infection. Pt understood and will contact us if she have anymore questions or concerns.

## 2023-02-08 ENCOUNTER — TELEPHONE (OUTPATIENT)
Dept: PRIMARY CARE CLINIC | Facility: CLINIC | Age: 48
End: 2023-02-08
Payer: MEDICAID

## 2023-02-08 ENCOUNTER — PATIENT OUTREACH (OUTPATIENT)
Dept: ADMINISTRATIVE | Facility: HOSPITAL | Age: 48
End: 2023-02-08
Payer: MEDICAID

## 2023-02-08 NOTE — TELEPHONE ENCOUNTER
Returned call to patient in regards to message. Patient wanted a sooner appointment. Patient is schedule for 2/13/2023 for a virtual visit.

## 2023-02-08 NOTE — PROGRESS NOTES
Health Maintenance Due   Topic Date Due    Mammogram  Never done    Colorectal Cancer Screening  Never done    COVID-19 Vaccine (4 - Booster for Pfizer series) 04/07/2022        Chart review done.   HM updated.   Immunizations reviewed & updated.   Care Everywhere updated.   DIS reviewed

## 2023-02-08 NOTE — TELEPHONE ENCOUNTER
----- Message from Sisi Maria sent at 2/8/2023  4:09 PM CST -----  Contact: [phone]  No blue slot available to schedule an appointment for the patient.  Patient is established with which PCP: DR Britt  Reason for the visit: possible ear infection and sore throat   Would the patient like a call back, or a response through their [facility] portal?:  callback

## 2023-02-13 ENCOUNTER — OFFICE VISIT (OUTPATIENT)
Dept: PRIMARY CARE CLINIC | Facility: CLINIC | Age: 48
End: 2023-02-13
Payer: MEDICAID

## 2023-02-13 ENCOUNTER — TELEPHONE (OUTPATIENT)
Dept: PRIMARY CARE CLINIC | Facility: CLINIC | Age: 48
End: 2023-02-13
Payer: MEDICAID

## 2023-02-13 DIAGNOSIS — F43.10 PTSD (POST-TRAUMATIC STRESS DISORDER): ICD-10-CM

## 2023-02-13 DIAGNOSIS — Z72.0 TOBACCO ABUSE: ICD-10-CM

## 2023-02-13 DIAGNOSIS — J40 BRONCHITIS: ICD-10-CM

## 2023-02-13 DIAGNOSIS — J01.01 ACUTE RECURRENT MAXILLARY SINUSITIS: Primary | ICD-10-CM

## 2023-02-13 DIAGNOSIS — E03.9 ACQUIRED HYPOTHYROIDISM: ICD-10-CM

## 2023-02-13 DIAGNOSIS — F31.9 BIPOLAR 1 DISORDER: ICD-10-CM

## 2023-02-13 DIAGNOSIS — F25.0 SCHIZOAFFECTIVE DISORDER, BIPOLAR TYPE: ICD-10-CM

## 2023-02-13 DIAGNOSIS — B20 AIDS: ICD-10-CM

## 2023-02-13 PROCEDURE — 99213 PR OFFICE/OUTPT VISIT, EST, LEVL III, 20-29 MIN: ICD-10-PCS | Mod: 95,,, | Performed by: FAMILY MEDICINE

## 2023-02-13 PROCEDURE — 99213 OFFICE O/P EST LOW 20 MIN: CPT | Mod: 95,,, | Performed by: FAMILY MEDICINE

## 2023-02-13 NOTE — ADDENDUM NOTE
Addended by: JAYESH SWARTZ on: 2/8/2018 03:16 PM     Modules accepted: Orders     Dupixent Counseling: I discussed with the patient the risks of dupilumab including but not limited to eye infection and irritation, cold sores, injection site reactions, worsening of asthma, allergic reactions and increased risk of parasitic infection.  Live vaccines should be avoided while taking dupilumab. Dupilumab will also interact with certain medications such as warfarin and cyclosporine. The patient understands that monitoring is required and they must alert us or the primary physician if symptoms of infection or other concerning signs are noted.

## 2023-02-13 NOTE — TELEPHONE ENCOUNTER
Returned call to patient in regards to message. Patient said that she had issues trying to log in patient is changed to audio.

## 2023-02-23 ENCOUNTER — TELEPHONE (OUTPATIENT)
Dept: PRIMARY CARE CLINIC | Facility: CLINIC | Age: 48
End: 2023-02-23
Payer: MEDICAID

## 2023-02-23 NOTE — TELEPHONE ENCOUNTER
----- Message from Carol Ann Field sent at 2/23/2023 11:39 AM CST -----  Contact: pt [phone]  Requesting that the Rx for pain go to    [facility] - [place], [place] - [address]  [address] [facility] [place] [address]  Phone: [phone] Fax: [phone]    Please contact the patient with the status of this request.     Thank You

## 2023-02-23 NOTE — TELEPHONE ENCOUNTER
Returned call to patient in regards to message. Patient was requesting an appointment. Patient then realized that she had the referral for the orthopedic doctor from her visit to the ED for Left foot fracture. So patient didn't need the appointment with  after all.

## 2023-02-23 NOTE — TELEPHONE ENCOUNTER
----- Message from Sisi Maria sent at 2/23/2023 10:37 AM CST -----  Contact: [phone]  No blue slot available to schedule an appointment for the patient.  Patient is established with which PCP: DR ASHLEY Britt  Reason for the visit: closed fracture of left foot   Would the patient like a call back, or a response through their [facility] portal?: callback    Pt needs an ED f/u in a week(03/01). Per pt, she is in pain and would like to know if something can be called in. Pt is using   [facility] - [facility] [address] [facility]  [address] [facility] [address]  Phone: [phone] Fax: [phone]            Thank you

## 2023-02-25 ENCOUNTER — OFFICE VISIT (OUTPATIENT)
Dept: URGENT CARE | Facility: CLINIC | Age: 48
End: 2023-02-25
Payer: MEDICAID

## 2023-02-25 VITALS
HEIGHT: 61 IN | TEMPERATURE: 98 F | BODY MASS INDEX: 22.66 KG/M2 | WEIGHT: 120 LBS | OXYGEN SATURATION: 99 % | SYSTOLIC BLOOD PRESSURE: 140 MMHG | HEART RATE: 93 BPM | RESPIRATION RATE: 18 BRPM | DIASTOLIC BLOOD PRESSURE: 103 MMHG

## 2023-02-25 DIAGNOSIS — F17.200 NEEDS SMOKING CESSATION EDUCATION: ICD-10-CM

## 2023-02-25 DIAGNOSIS — S99.922A FOOT INJURY, LEFT, INITIAL ENCOUNTER: Primary | ICD-10-CM

## 2023-02-25 DIAGNOSIS — S92.912G: ICD-10-CM

## 2023-02-25 DIAGNOSIS — M53.3 COCCYODYNIA: ICD-10-CM

## 2023-02-25 PROCEDURE — 73630 XR FOOT COMPLETE 3 VIEW LEFT: ICD-10-PCS | Mod: FY,LT,S$GLB, | Performed by: RADIOLOGY

## 2023-02-25 PROCEDURE — 1159F PR MEDICATION LIST DOCUMENTED IN MEDICAL RECORD: ICD-10-PCS | Mod: CPTII,S$GLB,, | Performed by: FAMILY MEDICINE

## 2023-02-25 PROCEDURE — 3008F BODY MASS INDEX DOCD: CPT | Mod: CPTII,S$GLB,, | Performed by: FAMILY MEDICINE

## 2023-02-25 PROCEDURE — 99214 PR OFFICE/OUTPT VISIT, EST, LEVL IV, 30-39 MIN: ICD-10-PCS | Mod: S$GLB,,, | Performed by: FAMILY MEDICINE

## 2023-02-25 PROCEDURE — 3008F PR BODY MASS INDEX (BMI) DOCUMENTED: ICD-10-PCS | Mod: CPTII,S$GLB,, | Performed by: FAMILY MEDICINE

## 2023-02-25 PROCEDURE — 3077F PR MOST RECENT SYSTOLIC BLOOD PRESSURE >= 140 MM HG: ICD-10-PCS | Mod: CPTII,S$GLB,, | Performed by: FAMILY MEDICINE

## 2023-02-25 PROCEDURE — 99214 OFFICE O/P EST MOD 30 MIN: CPT | Mod: S$GLB,,, | Performed by: FAMILY MEDICINE

## 2023-02-25 PROCEDURE — 1159F MED LIST DOCD IN RCRD: CPT | Mod: CPTII,S$GLB,, | Performed by: FAMILY MEDICINE

## 2023-02-25 PROCEDURE — 3080F PR MOST RECENT DIASTOLIC BLOOD PRESSURE >= 90 MM HG: ICD-10-PCS | Mod: CPTII,S$GLB,, | Performed by: FAMILY MEDICINE

## 2023-02-25 PROCEDURE — 3080F DIAST BP >= 90 MM HG: CPT | Mod: CPTII,S$GLB,, | Performed by: FAMILY MEDICINE

## 2023-02-25 PROCEDURE — 3077F SYST BP >= 140 MM HG: CPT | Mod: CPTII,S$GLB,, | Performed by: FAMILY MEDICINE

## 2023-02-25 PROCEDURE — 73630 X-RAY EXAM OF FOOT: CPT | Mod: FY,LT,S$GLB, | Performed by: RADIOLOGY

## 2023-02-25 RX ORDER — KETOROLAC TROMETHAMINE 30 MG/ML
30 INJECTION, SOLUTION INTRAMUSCULAR; INTRAVENOUS
Status: COMPLETED | OUTPATIENT
Start: 2023-02-25 | End: 2023-02-25

## 2023-02-25 RX ORDER — KETOROLAC TROMETHAMINE 10 MG/1
TABLET, FILM COATED ORAL
Qty: 20 TABLET | Refills: 0 | Status: SHIPPED | OUTPATIENT
Start: 2023-02-25 | End: 2023-02-28 | Stop reason: SDUPTHER

## 2023-02-25 RX ADMIN — KETOROLAC TROMETHAMINE 30 MG: 30 INJECTION, SOLUTION INTRAMUSCULAR; INTRAVENOUS at 01:02
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
## 2023-02-28 ENCOUNTER — TELEPHONE (OUTPATIENT)
Dept: ORTHOPEDICS | Facility: CLINIC | Age: 48
End: 2023-02-28
Payer: MEDICAID

## 2023-02-28 DIAGNOSIS — S92.301A CLOSED NONDISPLACED FRACTURE OF METATARSAL BONE OF RIGHT FOOT, UNSPECIFIED METATARSAL, INITIAL ENCOUNTER: Primary | ICD-10-CM

## 2023-02-28 RX ORDER — KETOROLAC TROMETHAMINE 10 MG/1
TABLET, FILM COATED ORAL
Qty: 20 TABLET | Refills: 0 | Status: ON HOLD | OUTPATIENT
Start: 2023-02-28 | End: 2023-05-12 | Stop reason: HOSPADM

## 2023-02-28 NOTE — TELEPHONE ENCOUNTER
----- Message from Gurinder Harrison sent at 2/28/2023  2:43 PM CST -----  Contact: [phone]  Pt is calling to schedule an appt due to Closed fracture of left foot, initial encounter [S92.902A. She has a referral in the system but was told to get an appt within 3 days. Please call back to further assist.

## 2023-02-28 NOTE — PROGRESS NOTES
Staff received message from patient asking us to send Toradol to [facility] pharmacy as the prescription from the ER was sent to [facility] in they will not fill it.  Patient states she currently does not have the prescription.  Toradol sent.  Patient has appointment with orthopedics in 3 days.

## 2023-03-03 ENCOUNTER — OFFICE VISIT (OUTPATIENT)
Dept: ORTHOPEDICS | Facility: CLINIC | Age: 48
End: 2023-03-03
Payer: MEDICAID

## 2023-03-03 VITALS
DIASTOLIC BLOOD PRESSURE: 95 MMHG | HEART RATE: 86 BPM | WEIGHT: 112.63 LBS | BODY MASS INDEX: 21.27 KG/M2 | SYSTOLIC BLOOD PRESSURE: 133 MMHG | HEIGHT: 61 IN

## 2023-03-03 DIAGNOSIS — S92.902A CLOSED FRACTURE OF LEFT FOOT, INITIAL ENCOUNTER: Primary | ICD-10-CM

## 2023-03-03 PROCEDURE — 3008F PR BODY MASS INDEX (BMI) DOCUMENTED: ICD-10-PCS | Mod: CPTII,,,

## 2023-03-03 PROCEDURE — 99999 PR PBB SHADOW E&M-EST. PATIENT-LVL III: ICD-10-PCS | Mod: PBBFAC,,,

## 2023-03-03 PROCEDURE — 99213 PR OFFICE/OUTPT VISIT, EST, LEVL III, 20-29 MIN: ICD-10-PCS | Mod: S$PBB,,,

## 2023-03-03 PROCEDURE — 3008F BODY MASS INDEX DOCD: CPT | Mod: CPTII,,,

## 2023-03-03 PROCEDURE — 99999 PR PBB SHADOW E&M-EST. PATIENT-LVL III: CPT | Mod: PBBFAC,,,

## 2023-03-03 PROCEDURE — 3075F SYST BP GE 130 - 139MM HG: CPT | Mod: CPTII,,,

## 2023-03-03 PROCEDURE — 3080F DIAST BP >= 90 MM HG: CPT | Mod: CPTII,,,

## 2023-03-03 PROCEDURE — 99213 OFFICE O/P EST LOW 20 MIN: CPT | Mod: S$PBB,,,

## 2023-03-03 PROCEDURE — 3075F PR MOST RECENT SYSTOLIC BLOOD PRESS GE 130-139MM HG: ICD-10-PCS | Mod: CPTII,,,

## 2023-03-03 PROCEDURE — 99213 OFFICE O/P EST LOW 20 MIN: CPT | Mod: PBBFAC,PN

## 2023-03-03 PROCEDURE — 3080F PR MOST RECENT DIASTOLIC BLOOD PRESSURE >= 90 MM HG: ICD-10-PCS | Mod: CPTII,,,

## 2023-03-03 RX ORDER — TRAMADOL HYDROCHLORIDE 50 MG/1
50 TABLET ORAL EVERY 6 HOURS
Qty: 20 TABLET | Refills: 0 | Status: SHIPPED | OUTPATIENT
Start: 2023-03-03 | End: 2023-05-06
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
## 2023-03-04 ENCOUNTER — NURSE TRIAGE (OUTPATIENT)
Dept: ADMINISTRATIVE | Facility: CLINIC | Age: 48
End: 2023-03-04
Payer: MEDICAID

## 2023-03-05 ENCOUNTER — HOSPITAL ENCOUNTER (EMERGENCY)
Facility: HOSPITAL | Age: 48
Discharge: PSYCHIATRIC OR SUBSTANCE USE FACILITY | End: 2023-03-05
Attending: EMERGENCY MEDICINE
Payer: MEDICAID

## 2023-03-05 VITALS
TEMPERATURE: 99 F | OXYGEN SATURATION: 99 % | SYSTOLIC BLOOD PRESSURE: 126 MMHG | RESPIRATION RATE: 17 BRPM | DIASTOLIC BLOOD PRESSURE: 82 MMHG | HEART RATE: 85 BPM

## 2023-03-05 DIAGNOSIS — F29 PSYCHOSIS, UNSPECIFIED PSYCHOSIS TYPE: Primary | ICD-10-CM
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
## 2023-03-05 LAB
ALBUMIN SERPL BCP-MCNC: 3.5 G/DL (ref 3.5–5.2)
ALP SERPL-CCNC: 84 U/L (ref 55–135)
ALT SERPL W/O P-5'-P-CCNC: 33 U/L (ref 10–44)
AMPHET+METHAMPHET UR QL: NEGATIVE
ANION GAP SERPL CALC-SCNC: 8 MMOL/L (ref 8–16)
APAP SERPL-MCNC: <3 UG/ML (ref 10–20)
AST SERPL-CCNC: 34 U/L (ref 10–40)
BACTERIA #/AREA URNS AUTO: ABNORMAL /HPF
BARBITURATES UR QL SCN>200 NG/ML: NEGATIVE
BASOPHILS # BLD AUTO: 0.02 K/UL (ref 0–0.2)
BASOPHILS NFR BLD: 0.6 % (ref 0–1.9)
BENZODIAZ UR QL SCN>200 NG/ML: NEGATIVE
BILIRUB SERPL-MCNC: 0.3 MG/DL (ref 0.1–1)
BILIRUB UR QL STRIP: NEGATIVE
BUN SERPL-MCNC: 8 MG/DL (ref 6–20)
BZE UR QL SCN: NEGATIVE
CALCIUM SERPL-MCNC: 8.8 MG/DL (ref 8.7–10.5)
CANNABINOIDS UR QL SCN: NEGATIVE
CHLORIDE SERPL-SCNC: 108 MMOL/L (ref 95–110)
CK SERPL-CCNC: 63 U/L (ref 20–180)
CLARITY UR REFRACT.AUTO: ABNORMAL
CO2 SERPL-SCNC: 23 MMOL/L (ref 23–29)
COLOR UR AUTO: YELLOW
CREAT SERPL-MCNC: 0.7 MG/DL (ref 0.5–1.4)
CREAT UR-MCNC: 228 MG/DL (ref 15–325)
DIFFERENTIAL METHOD: ABNORMAL
EOSINOPHIL # BLD AUTO: 0.1 K/UL (ref 0–0.5)
EOSINOPHIL NFR BLD: 2.2 % (ref 0–8)
ERYTHROCYTE [DISTWIDTH] IN BLOOD BY AUTOMATED COUNT: 14.1 % (ref 11.5–14.5)
EST. GFR  (NO RACE VARIABLE): >60 ML/MIN/1.73 M^2
ETHANOL SERPL-MCNC: <10 MG/DL
GLUCOSE SERPL-MCNC: 91 MG/DL (ref 70–110)
GLUCOSE UR QL STRIP: NEGATIVE
HCT VFR BLD AUTO: 33.8 % (ref 37–48.5)
HGB BLD-MCNC: 11.5 G/DL (ref 12–16)
HGB UR QL STRIP: NEGATIVE
IMM GRANULOCYTES # BLD AUTO: 0.01 K/UL (ref 0–0.04)
IMM GRANULOCYTES NFR BLD AUTO: 0.3 % (ref 0–0.5)
KETONES UR QL STRIP: NEGATIVE
LEUKOCYTE ESTERASE UR QL STRIP: ABNORMAL
LYMPHOCYTES # BLD AUTO: 0.6 K/UL (ref 1–4.8)
LYMPHOCYTES NFR BLD: 16.4 % (ref 18–48)
MCH RBC QN AUTO: 28 PG (ref 27–31)
MCHC RBC AUTO-ENTMCNC: 34 G/DL (ref 32–36)
MCV RBC AUTO: 82 FL (ref 82–98)
METHADONE UR QL SCN>300 NG/ML: NEGATIVE
MICROSCOPIC COMMENT: ABNORMAL
MONOCYTES # BLD AUTO: 0.4 K/UL (ref 0.3–1)
MONOCYTES NFR BLD: 10 % (ref 4–15)
NEUTROPHILS # BLD AUTO: 2.5 K/UL (ref 1.8–7.7)
NEUTROPHILS NFR BLD: 70.5 % (ref 38–73)
NITRITE UR QL STRIP: NEGATIVE
NRBC BLD-RTO: 0 /100 WBC
OPIATES UR QL SCN: NEGATIVE
PCP UR QL SCN>25 NG/ML: NEGATIVE
PH UR STRIP: 6 [PH] (ref 5–8)
PLATELET # BLD AUTO: 179 K/UL (ref 150–450)
PMV BLD AUTO: 10.9 FL (ref 9.2–12.9)
POTASSIUM SERPL-SCNC: 3.1 MMOL/L (ref 3.5–5.1)
PROT SERPL-MCNC: 7.7 G/DL (ref 6–8.4)
PROT UR QL STRIP: ABNORMAL
RBC # BLD AUTO: 4.1 M/UL (ref 4–5.4)
RBC #/AREA URNS AUTO: 3 /HPF (ref 0–4)
SARS-COV-2 RDRP RESP QL NAA+PROBE: NEGATIVE
SODIUM SERPL-SCNC: 139 MMOL/L (ref 136–145)
SP GR UR STRIP: 1.02 (ref 1–1.03)
SQUAMOUS #/AREA URNS AUTO: 6 /HPF
TOXICOLOGY INFORMATION: NORMAL
TSH SERPL DL<=0.005 MIU/L-ACNC: 1.2 UIU/ML (ref 0.4–4)
URN SPEC COLLECT METH UR: ABNORMAL
WBC # BLD AUTO: 3.59 K/UL (ref 3.9–12.7)
WBC #/AREA URNS AUTO: 7 /HPF (ref 0–5)
YEAST UR QL AUTO: ABNORMAL

## 2023-03-05 PROCEDURE — 82550 ASSAY OF CK (CPK): CPT

## 2023-03-05 PROCEDURE — 85025 COMPLETE CBC W/AUTO DIFF WBC: CPT

## 2023-03-05 PROCEDURE — 96374 THER/PROPH/DIAG INJ IV PUSH: CPT

## 2023-03-05 PROCEDURE — 99285 EMERGENCY DEPT VISIT HI MDM: CPT | Mod: ,,, | Performed by: EMERGENCY MEDICINE

## 2023-03-05 PROCEDURE — 93010 ELECTROCARDIOGRAM REPORT: CPT | Mod: ,,, | Performed by: INTERNAL MEDICINE

## 2023-03-05 PROCEDURE — U0002 COVID-19 LAB TEST NON-CDC: HCPCS | Performed by: EMERGENCY MEDICINE

## 2023-03-05 PROCEDURE — 80307 DRUG TEST PRSMV CHEM ANLYZR: CPT

## 2023-03-05 PROCEDURE — 80143 DRUG ASSAY ACETAMINOPHEN: CPT

## 2023-03-05 PROCEDURE — 63600175 PHARM REV CODE 636 W HCPCS

## 2023-03-05 PROCEDURE — 93005 ELECTROCARDIOGRAM TRACING: CPT

## 2023-03-05 PROCEDURE — 84443 ASSAY THYROID STIM HORMONE: CPT

## 2023-03-05 PROCEDURE — 99285 PR EMERGENCY DEPT VISIT,LEVEL V: ICD-10-PCS | Mod: ,,, | Performed by: EMERGENCY MEDICINE

## 2023-03-05 PROCEDURE — 80053 COMPREHEN METABOLIC PANEL: CPT

## 2023-03-05 PROCEDURE — 99285 EMERGENCY DEPT VISIT HI MDM: CPT | Mod: 25

## 2023-03-05 PROCEDURE — 93010 EKG 12-LEAD: ICD-10-PCS | Mod: ,,, | Performed by: INTERNAL MEDICINE

## 2023-03-05 PROCEDURE — 81001 URINALYSIS AUTO W/SCOPE: CPT | Mod: 59

## 2023-03-05 PROCEDURE — 82077 ASSAY SPEC XCP UR&BREATH IA: CPT

## 2023-03-05 PROCEDURE — 25000003 PHARM REV CODE 250

## 2023-03-05 RX ORDER — DROPERIDOL 2.5 MG/ML
0.62 INJECTION, SOLUTION INTRAMUSCULAR; INTRAVENOUS
Status: COMPLETED | OUTPATIENT
Start: 2023-03-05 | End: 2023-03-05

## 2023-03-05 RX ORDER — ACETAMINOPHEN 500 MG
1000 TABLET ORAL
Status: COMPLETED | OUTPATIENT
Start: 2023-03-05 | End: 2023-03-05

## 2023-03-05 RX ORDER — POTASSIUM CHLORIDE 20 MEQ/1
20 TABLET, EXTENDED RELEASE ORAL ONCE
Status: CANCELLED | OUTPATIENT
Start: 2023-03-05 | End: 2023-03-05

## 2023-03-05 RX ADMIN — ACETAMINOPHEN 1000 MG: 500 TABLET ORAL at 12:03

## 2023-03-05 RX ADMIN — DROPERIDOL 0.62 MG: 2.5 INJECTION, SOLUTION INTRAMUSCULAR; INTRAVENOUS at 12:03

## 2023-03-05 NOTE — ED NOTES
Patient changed into paper scrubs, personal belongings removed from room, labeled and locked in EMS room. All cords, wires, and garbage cans have been removed from room for patient safety. Patient's mental health rights have been signed and placed in chart. Sitter at bedside to monitor and record patient activities at least every 15 minutes, while maintaining direct visual contact with patient. The sitter has no personal belongings inside the room.
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
## 2023-03-05 NOTE — ED NOTES
Patient 2 phones, 2 wallets & 8$ ( 1 $5 bill and 3 ones) cash placed in secured safe by charge nurse Say.   Remaining belongings including purse placed in secured closet.

## 2023-03-05 NOTE — ED NOTES
Patricia Langley, a 47 y.o. female     Triage note:  Chief Complaint   Patient presents with    Rectal Bleeding     Pt reports she was on a mechanical bull on MG day. She is c/o generalized body pain. Pt also reports rectal bleeding that began yesterday. Pt states she wants a CT scan of her entire body. Pt is tearful and disheveled.      Review of patient's allergies indicates:   Allergen Reactions    Penicillins Other (See Comments) and Nausea And Vomiting     Trouble swallowing and vomiting.     Sulfa (sulfonamide antibiotics) Swelling     Rash (skin)^    Opioids - morphine analogues      Pt states she is not allergic to morphine      Bactrim [sulfamethoxazole-trimethoprim] Rash     Past Medical History:   Diagnosis Date    Allergy     Anxiety     Cervical dysplasia 1998 / 2016    Depression     HIV infection     Hyperthyroidism     Insomnia      APPEARANCE: awake and alert in NAD.  SKIN: warm, dry and intact. No breakdown or bruising.  MUSCULOSKELETAL: Patient moving all extremities spontaneously, no obvious swelling or deformities noted. Ambulates independently.  RESPIRATORY: Denies shortness of breath.Respirations unlabored.   CARDIAC: Denies CP, 2+ distal pulses; no peripheral edema  ABDOMEN: S/ND/NT, Denies nausea  : voids spontaneously, denies difficulty. Reports intermittent rectal bleeding   Neurologic: AAO x 4; follows commands equal strength in all extremities; denies numbness/tingling. Denies dizziness

## 2023-03-05 NOTE — PROVIDER PROGRESS NOTES - EMERGENCY DEPT.
ED Resident HAND-OFF NOTE:  3:48 PM 3/5/2023  Patricia Langley is a 47 y.o. female who presented to the ED on 3/5/2023 and has been managed by Dr. Marroquin and Dr. Carrera, who reports patient C/O fall presenting with manic symptoms. I assumed care of patient from off-going ED physician team at 3:48 PM pending CT head.    On my evaluation, Patricia Langley appears well, hemodynamically stable and in NAD. Thus far, Patricia Langley has received:  Medications   droperidoL injection 0.625 mg (0.625 mg Intravenous Given 3/5/23 1231)   acetaminophen tablet 1,000 mg (1,000 mg Oral Given 3/5/23 1231)       On my exam, I appreciate:  /82 (BP Location: Right arm, Patient Position: Lying)   Pulse 85   Temp 98.5 °F (36.9 °C) (Oral)   Resp 17   SpO2 99%     - Called NSGY for findings of prominent extra-axial space seen on head CT. It appears to be chronic when compared to previous head CT. They looked imaging and they did not reccommend follow up as an outpatient. Will medically clear and transfer patient for inpatient psychiatry.  ______________________  Jazmine Estes MD  Emergency Medicine Resident  3:48 PM 3/5/2023

## 2023-04-05 ENCOUNTER — PATIENT OUTREACH (OUTPATIENT)
Dept: ADMINISTRATIVE | Facility: HOSPITAL | Age: 48
End: 2023-04-05
Payer: MEDICAID

## 2023-04-09 PROBLEM — T19.2XXA FOREIGN BODY IN VAGINA: Status: ACTIVE | Noted: 2023-04-09

## 2023-04-12 ENCOUNTER — OFFICE VISIT (OUTPATIENT)
Dept: PRIMARY CARE CLINIC | Facility: CLINIC | Age: 48
End: 2023-04-12
Payer: MEDICAID

## 2023-04-12 VITALS
DIASTOLIC BLOOD PRESSURE: 76 MMHG | HEIGHT: 61 IN | HEART RATE: 100 BPM | OXYGEN SATURATION: 98 % | TEMPERATURE: 97 F | RESPIRATION RATE: 18 BRPM | WEIGHT: 116.88 LBS | BODY MASS INDEX: 22.07 KG/M2 | SYSTOLIC BLOOD PRESSURE: 116 MMHG

## 2023-04-12 DIAGNOSIS — Z72.0 TOBACCO ABUSE: ICD-10-CM

## 2023-04-12 DIAGNOSIS — R10.33 ABDOMINAL PAIN, PERIUMBILICAL: ICD-10-CM

## 2023-04-12 DIAGNOSIS — S92.902D CLOSED FRACTURE OF LEFT FOOT WITH ROUTINE HEALING: ICD-10-CM

## 2023-04-12 DIAGNOSIS — F31.9 BIPOLAR 1 DISORDER: ICD-10-CM

## 2023-04-12 DIAGNOSIS — Z65.8 SOCIAL DISCORD: ICD-10-CM

## 2023-04-12 DIAGNOSIS — B20 AIDS: Primary | ICD-10-CM

## 2023-04-12 DIAGNOSIS — R30.0 DYSURIA: ICD-10-CM

## 2023-04-12 DIAGNOSIS — F25.0 SCHIZOAFFECTIVE DISORDER, BIPOLAR TYPE: ICD-10-CM

## 2023-04-12 DIAGNOSIS — K64.9 HEMORRHOIDS, UNSPECIFIED HEMORRHOID TYPE: ICD-10-CM

## 2023-04-12 DIAGNOSIS — Z87.19 HX OF HEMORRHOIDS: ICD-10-CM

## 2023-04-12 DIAGNOSIS — N39.0 RECURRENT UTI: ICD-10-CM

## 2023-04-12 PROBLEM — S92.912A: Status: ACTIVE | Noted: 2023-04-12

## 2023-04-12 LAB
BILIRUB SERPL-MCNC: ABNORMAL MG/DL
BLOOD URINE, POC: ABNORMAL
CLARITY, POC UA: ABNORMAL
COLOR, POC UA: ABNORMAL
GLUCOSE UR QL STRIP: NEGATIVE
KETONES UR QL STRIP: NEGATIVE
LEUKOCYTE ESTERASE URINE, POC: ABNORMAL
NITRITE, POC UA: POSITIVE
PH, POC UA: 5
PROTEIN, POC: ABNORMAL
SPECIFIC GRAVITY, POC UA: 1.01
UROBILINOGEN, POC UA: NEGATIVE

## 2023-04-12 PROCEDURE — 3008F PR BODY MASS INDEX (BMI) DOCUMENTED: ICD-10-PCS | Mod: CPTII,,, | Performed by: FAMILY MEDICINE

## 2023-04-12 PROCEDURE — 81002 URINALYSIS NONAUTO W/O SCOPE: CPT | Mod: PBBFAC,PN | Performed by: FAMILY MEDICINE

## 2023-04-12 PROCEDURE — 99214 PR OFFICE/OUTPT VISIT, EST, LEVL IV, 30-39 MIN: ICD-10-PCS | Mod: S$PBB,,, | Performed by: FAMILY MEDICINE

## 2023-04-12 PROCEDURE — 99214 OFFICE O/P EST MOD 30 MIN: CPT | Mod: S$PBB,,, | Performed by: FAMILY MEDICINE

## 2023-04-12 PROCEDURE — 1159F MED LIST DOCD IN RCRD: CPT | Mod: CPTII,,, | Performed by: FAMILY MEDICINE

## 2023-04-12 PROCEDURE — 99999 PR PBB SHADOW E&M-EST. PATIENT-LVL V: ICD-10-PCS | Mod: PBBFAC,,, | Performed by: FAMILY MEDICINE

## 2023-04-12 PROCEDURE — 3078F DIAST BP <80 MM HG: CPT | Mod: CPTII,,, | Performed by: FAMILY MEDICINE

## 2023-04-12 PROCEDURE — 3074F PR MOST RECENT SYSTOLIC BLOOD PRESSURE < 130 MM HG: ICD-10-PCS | Mod: CPTII,,, | Performed by: FAMILY MEDICINE

## 2023-04-12 PROCEDURE — 3078F PR MOST RECENT DIASTOLIC BLOOD PRESSURE < 80 MM HG: ICD-10-PCS | Mod: CPTII,,, | Performed by: FAMILY MEDICINE

## 2023-04-12 PROCEDURE — 3008F BODY MASS INDEX DOCD: CPT | Mod: CPTII,,, | Performed by: FAMILY MEDICINE

## 2023-04-12 PROCEDURE — 3074F SYST BP LT 130 MM HG: CPT | Mod: CPTII,,, | Performed by: FAMILY MEDICINE

## 2023-04-12 PROCEDURE — 99999 PR PBB SHADOW E&M-EST. PATIENT-LVL V: CPT | Mod: PBBFAC,,, | Performed by: FAMILY MEDICINE

## 2023-04-12 PROCEDURE — 1159F PR MEDICATION LIST DOCUMENTED IN MEDICAL RECORD: ICD-10-PCS | Mod: CPTII,,, | Performed by: FAMILY MEDICINE

## 2023-04-12 PROCEDURE — 99215 OFFICE O/P EST HI 40 MIN: CPT | Mod: PBBFAC,PN | Performed by: FAMILY MEDICINE

## 2023-04-12 RX ORDER — NITROFURANTOIN 25; 75 MG/1; MG/1
100 CAPSULE ORAL 2 TIMES DAILY
Qty: 14 CAPSULE | Refills: 0 | Status: SHIPPED | OUTPATIENT
Start: 2023-04-12 | End: 2023-05-06 | Stop reason: ALTCHOICE
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
## 2023-04-21 ENCOUNTER — PATIENT MESSAGE (OUTPATIENT)
Dept: ADMINISTRATIVE | Facility: HOSPITAL | Age: 48
End: 2023-04-21
Payer: COMMERCIAL

## 2023-04-28 ENCOUNTER — TELEPHONE (OUTPATIENT)
Dept: PRIMARY CARE CLINIC | Facility: CLINIC | Age: 48
End: 2023-04-28
Payer: COMMERCIAL

## 2023-04-28 NOTE — TELEPHONE ENCOUNTER
----- Message from Brandie Schneider sent at 4/28/2023 12:19 PM CDT -----  Contact: [phone]  Patient needs a Hosp follow up appt with their PCP only.       When is the next available appointment:  07/2023    Symptoms:  Hosp Follow up    Discharge date:04/27/2023    Needs to be seen by:05/4/2023    Would you prefer an answer via [employer]?: call     Comments:

## 2023-05-06 ENCOUNTER — HOSPITAL ENCOUNTER (INPATIENT)
Facility: HOSPITAL | Age: 48
LOS: 6 days | Discharge: HOME | DRG: 690 | End: 2023-05-12
Attending: EMERGENCY MEDICINE | Admitting: FAMILY MEDICINE
Payer: MEDICAID

## 2023-05-06 DIAGNOSIS — N12 PYELONEPHRITIS: Primary | ICD-10-CM

## 2023-05-06 DIAGNOSIS — B20 HISTORY OF HIV INFECTION: ICD-10-CM

## 2023-05-06 DIAGNOSIS — N30.00 ACUTE CYSTITIS WITHOUT HEMATURIA: ICD-10-CM

## 2023-05-06 DIAGNOSIS — F43.9 TRAUMA AND STRESSOR-RELATED DISORDER: ICD-10-CM

## 2023-05-06 DIAGNOSIS — N30.01 ACUTE CYSTITIS WITH HEMATURIA: ICD-10-CM

## 2023-05-06 DIAGNOSIS — F31.9 BIPOLAR 1 DISORDER: ICD-10-CM

## 2023-05-06 DIAGNOSIS — B20 HIV DISEASE: ICD-10-CM

## 2023-05-06 DIAGNOSIS — F25.0 SCHIZOAFFECTIVE DISORDER, BIPOLAR TYPE: ICD-10-CM

## 2023-05-06 DIAGNOSIS — R11.2 NAUSEA AND VOMITING, UNSPECIFIED VOMITING TYPE: ICD-10-CM

## 2023-05-06 DIAGNOSIS — L30.8 PRURITIC DERMATITIS: ICD-10-CM

## 2023-05-06 DIAGNOSIS — E87.6 HYPOKALEMIA: ICD-10-CM

## 2023-05-06 PROBLEM — K64.9 HEMORRHOID: Status: ACTIVE | Noted: 2023-05-06

## 2023-05-06 LAB
ALBUMIN SERPL BCP-MCNC: 4.3 G/DL (ref 3.5–5.2)
ALP SERPL-CCNC: 92 U/L (ref 55–135)
ALT SERPL W/O P-5'-P-CCNC: 59 U/L (ref 10–44)
ANION GAP SERPL CALC-SCNC: 17 MMOL/L (ref 8–16)
AST SERPL-CCNC: 62 U/L (ref 10–40)
B-HCG UR QL: NEGATIVE
BACTERIA #/AREA URNS HPF: ABNORMAL /HPF
BASOPHILS # BLD AUTO: 0.01 K/UL (ref 0–0.2)
BASOPHILS NFR BLD: 0.2 % (ref 0–1.9)
BILIRUB SERPL-MCNC: 0.7 MG/DL (ref 0.1–1)
BILIRUB UR QL STRIP: NEGATIVE
BUN SERPL-MCNC: 12 MG/DL (ref 6–20)
CALCIUM SERPL-MCNC: 9.6 MG/DL (ref 8.7–10.5)
CHLORIDE SERPL-SCNC: 103 MMOL/L (ref 95–110)
CLARITY UR: ABNORMAL
CO2 SERPL-SCNC: 18 MMOL/L (ref 23–29)
COLOR UR: YELLOW
CREAT SERPL-MCNC: 0.8 MG/DL (ref 0.5–1.4)
CTP QC/QA: YES
DIFFERENTIAL METHOD: ABNORMAL
EOSINOPHIL # BLD AUTO: 0.1 K/UL (ref 0–0.5)
EOSINOPHIL NFR BLD: 1.7 % (ref 0–8)
ERYTHROCYTE [DISTWIDTH] IN BLOOD BY AUTOMATED COUNT: 14.4 % (ref 11.5–14.5)
EST. GFR  (NO RACE VARIABLE): >60 ML/MIN/1.73 M^2
GLUCOSE SERPL-MCNC: 90 MG/DL (ref 70–110)
GLUCOSE UR QL STRIP: NEGATIVE
HCT VFR BLD AUTO: 36.5 % (ref 37–48.5)
HGB BLD-MCNC: 12.5 G/DL (ref 12–16)
HGB UR QL STRIP: ABNORMAL
HYALINE CASTS #/AREA URNS LPF: 0 /LPF
IMM GRANULOCYTES # BLD AUTO: 0.02 K/UL (ref 0–0.04)
IMM GRANULOCYTES NFR BLD AUTO: 0.5 % (ref 0–0.5)
KETONES UR QL STRIP: ABNORMAL
LEUKOCYTE ESTERASE UR QL STRIP: ABNORMAL
LIPASE SERPL-CCNC: 98 U/L (ref 4–60)
LYMPHOCYTES # BLD AUTO: 0.5 K/UL (ref 1–4.8)
LYMPHOCYTES NFR BLD: 11 % (ref 18–48)
MCH RBC QN AUTO: 28 PG (ref 27–31)
MCHC RBC AUTO-ENTMCNC: 34.2 G/DL (ref 32–36)
MCV RBC AUTO: 82 FL (ref 82–98)
MICROSCOPIC COMMENT: ABNORMAL
MONOCYTES # BLD AUTO: 0.4 K/UL (ref 0.3–1)
MONOCYTES NFR BLD: 9.8 % (ref 4–15)
NEUTROPHILS # BLD AUTO: 3.2 K/UL (ref 1.8–7.7)
NEUTROPHILS NFR BLD: 76.8 % (ref 38–73)
NITRITE UR QL STRIP: POSITIVE
NRBC BLD-RTO: 0 /100 WBC
PH UR STRIP: 6 [PH] (ref 5–8)
PLATELET # BLD AUTO: 206 K/UL (ref 150–450)
PMV BLD AUTO: 11.7 FL (ref 9.2–12.9)
POTASSIUM SERPL-SCNC: 2.9 MMOL/L (ref 3.5–5.1)
PROT SERPL-MCNC: 9.5 G/DL (ref 6–8.4)
PROT UR QL STRIP: ABNORMAL
RBC # BLD AUTO: 4.47 M/UL (ref 4–5.4)
RBC #/AREA URNS HPF: 5 /HPF (ref 0–4)
SODIUM SERPL-SCNC: 138 MMOL/L (ref 136–145)
SP GR UR STRIP: 1.02 (ref 1–1.03)
SQUAMOUS #/AREA URNS HPF: 8 /HPF
TSH SERPL DL<=0.005 MIU/L-ACNC: 1.69 UIU/ML (ref 0.4–4)
URN SPEC COLLECT METH UR: ABNORMAL
UROBILINOGEN UR STRIP-ACNC: NEGATIVE EU/DL
WBC # BLD AUTO: 4.17 K/UL (ref 3.9–12.7)
WBC #/AREA URNS HPF: >100 /HPF (ref 0–5)
WBC CLUMPS URNS QL MICRO: ABNORMAL

## 2023-05-06 PROCEDURE — G0378 HOSPITAL OBSERVATION PER HR: HCPCS

## 2023-05-06 PROCEDURE — 87077 CULTURE AEROBIC IDENTIFY: CPT | Performed by: PHYSICIAN ASSISTANT

## 2023-05-06 PROCEDURE — 87186 SC STD MICRODIL/AGAR DIL: CPT | Performed by: PHYSICIAN ASSISTANT

## 2023-05-06 PROCEDURE — 81025 URINE PREGNANCY TEST: CPT | Performed by: PHYSICIAN ASSISTANT

## 2023-05-06 PROCEDURE — 87086 URINE CULTURE/COLONY COUNT: CPT | Performed by: PHYSICIAN ASSISTANT

## 2023-05-06 PROCEDURE — 87088 URINE BACTERIA CULTURE: CPT | Performed by: PHYSICIAN ASSISTANT

## 2023-05-06 PROCEDURE — 93010 ELECTROCARDIOGRAM REPORT: CPT | Mod: ,,, | Performed by: INTERNAL MEDICINE

## 2023-05-06 PROCEDURE — 80053 COMPREHEN METABOLIC PANEL: CPT | Performed by: PHYSICIAN ASSISTANT

## 2023-05-06 PROCEDURE — 96365 THER/PROPH/DIAG IV INF INIT: CPT

## 2023-05-06 PROCEDURE — 83690 ASSAY OF LIPASE: CPT | Performed by: PHYSICIAN ASSISTANT

## 2023-05-06 PROCEDURE — 25000003 PHARM REV CODE 250: Performed by: PHYSICIAN ASSISTANT

## 2023-05-06 PROCEDURE — 84443 ASSAY THYROID STIM HORMONE: CPT | Performed by: FAMILY MEDICINE

## 2023-05-06 PROCEDURE — 81000 URINALYSIS NONAUTO W/SCOPE: CPT | Performed by: PHYSICIAN ASSISTANT

## 2023-05-06 PROCEDURE — 25500020 PHARM REV CODE 255: Performed by: EMERGENCY MEDICINE

## 2023-05-06 PROCEDURE — 96361 HYDRATE IV INFUSION ADD-ON: CPT

## 2023-05-06 PROCEDURE — 93005 ELECTROCARDIOGRAM TRACING: CPT

## 2023-05-06 PROCEDURE — 36415 COLL VENOUS BLD VENIPUNCTURE: CPT | Performed by: FAMILY MEDICINE

## 2023-05-06 PROCEDURE — 85025 COMPLETE CBC W/AUTO DIFF WBC: CPT | Performed by: PHYSICIAN ASSISTANT

## 2023-05-06 PROCEDURE — 99285 EMERGENCY DEPT VISIT HI MDM: CPT | Mod: 25

## 2023-05-06 PROCEDURE — 25000003 PHARM REV CODE 250: Performed by: FAMILY MEDICINE

## 2023-05-06 PROCEDURE — 93010 EKG 12-LEAD: ICD-10-PCS | Mod: ,,, | Performed by: INTERNAL MEDICINE

## 2023-05-06 PROCEDURE — 11000001 HC ACUTE MED/SURG PRIVATE ROOM

## 2023-05-06 PROCEDURE — 96375 TX/PRO/DX INJ NEW DRUG ADDON: CPT

## 2023-05-06 PROCEDURE — 87040 BLOOD CULTURE FOR BACTERIA: CPT | Performed by: PHYSICIAN ASSISTANT

## 2023-05-06 PROCEDURE — 63600175 PHARM REV CODE 636 W HCPCS: Performed by: PHYSICIAN ASSISTANT

## 2023-05-06 RX ORDER — QUETIAPINE FUMARATE 50 MG/1
50 TABLET, FILM COATED ORAL NIGHTLY
Status: ON HOLD | COMMUNITY
Start: 2023-03-06 | End: 2023-05-12 | Stop reason: HOSPADM

## 2023-05-06 RX ORDER — TEMAZEPAM 7.5 MG/1
7.5 CAPSULE ORAL NIGHTLY PRN
Status: ON HOLD | COMMUNITY
Start: 2023-03-13 | End: 2023-05-12 | Stop reason: HOSPADM

## 2023-05-06 RX ORDER — HYDROCORTISONE 25 MG/G
CREAM TOPICAL 2 TIMES DAILY
Status: COMPLETED | OUTPATIENT
Start: 2023-05-06 | End: 2023-05-09

## 2023-05-06 RX ORDER — KETOROLAC TROMETHAMINE 10 MG/1
10 TABLET, FILM COATED ORAL EVERY 6 HOURS PRN
Status: DISCONTINUED | OUTPATIENT
Start: 2023-05-06 | End: 2023-05-07

## 2023-05-06 RX ORDER — MULTIPLE VITAMINS W/ MINERALS TAB 9MG-400MCG
1 TAB ORAL
COMMUNITY
Start: 2023-03-07 | End: 2023-05-06

## 2023-05-06 RX ORDER — MIRTAZAPINE 7.5 MG/1
15 TABLET, FILM COATED ORAL NIGHTLY
Status: DISCONTINUED | OUTPATIENT
Start: 2023-05-06 | End: 2023-05-12 | Stop reason: HOSPADM

## 2023-05-06 RX ORDER — HYDROXYZINE PAMOATE 25 MG/1
25 CAPSULE ORAL 3 TIMES DAILY PRN
Status: ON HOLD | COMMUNITY
Start: 2023-03-06 | End: 2023-05-12 | Stop reason: HOSPADM

## 2023-05-06 RX ORDER — HALOPERIDOL 5 MG/ML
1 INJECTION INTRAMUSCULAR EVERY 6 HOURS PRN
Status: ON HOLD | COMMUNITY
Start: 2023-03-06 | End: 2023-05-12 | Stop reason: HOSPADM

## 2023-05-06 RX ORDER — DIVALPROEX SODIUM 250 MG/1
250 TABLET, DELAYED RELEASE ORAL 2 TIMES DAILY
Status: ON HOLD | COMMUNITY
Start: 2023-03-16 | End: 2023-05-12 | Stop reason: HOSPADM

## 2023-05-06 RX ORDER — KETOROLAC TROMETHAMINE 30 MG/ML
15 INJECTION, SOLUTION INTRAMUSCULAR; INTRAVENOUS
Status: COMPLETED | OUTPATIENT
Start: 2023-05-06 | End: 2023-05-06

## 2023-05-06 RX ORDER — SODIUM CHLORIDE 0.9 % (FLUSH) 0.9 %
10 SYRINGE (ML) INJECTION
Status: DISCONTINUED | OUTPATIENT
Start: 2023-05-06 | End: 2023-05-12 | Stop reason: HOSPADM

## 2023-05-06 RX ORDER — QUETIAPINE FUMARATE 200 MG/1
200 TABLET, FILM COATED ORAL NIGHTLY
Status: ON HOLD | COMMUNITY
Start: 2023-03-16 | End: 2023-05-12 | Stop reason: HOSPADM

## 2023-05-06 RX ORDER — ACETAMINOPHEN 325 MG/1
650 TABLET ORAL EVERY 4 HOURS PRN
COMMUNITY
Start: 2023-03-06 | End: 2023-05-06

## 2023-05-06 RX ORDER — ASCORBIC ACID 1000 MG
TABLET ORAL
COMMUNITY
Start: 2023-03-06 | End: 2023-05-06

## 2023-05-06 RX ORDER — ONDANSETRON 4 MG/1
4 TABLET, ORALLY DISINTEGRATING ORAL
Status: COMPLETED | OUTPATIENT
Start: 2023-05-06 | End: 2023-05-06

## 2023-05-06 RX ORDER — POTASSIUM CHLORIDE 20 MEQ/1
40 TABLET, EXTENDED RELEASE ORAL
Status: COMPLETED | OUTPATIENT
Start: 2023-05-06 | End: 2023-05-06

## 2023-05-06 RX ORDER — HYDROCORTISONE 25 MG/G
CREAM TOPICAL 2 TIMES DAILY
Status: DISCONTINUED | OUTPATIENT
Start: 2023-05-06 | End: 2023-05-06

## 2023-05-06 RX ORDER — ACETAMINOPHEN 325 MG/1
650 TABLET ORAL EVERY 4 HOURS PRN
Status: DISCONTINUED | OUTPATIENT
Start: 2023-05-06 | End: 2023-05-12 | Stop reason: HOSPADM

## 2023-05-06 RX ORDER — ALUMINUM HYDROXIDE, MAGNESIUM HYDROXIDE, AND DIMETHICONE 400; 400; 40 MG/5ML; MG/5ML; MG/5ML
SUSPENSION ORAL
COMMUNITY
Start: 2023-03-06 | End: 2023-05-06

## 2023-05-06 RX ORDER — QUETIAPINE FUMARATE 25 MG/1
50 TABLET, FILM COATED ORAL DAILY
Status: DISCONTINUED | OUTPATIENT
Start: 2023-05-06 | End: 2023-05-09

## 2023-05-06 RX ORDER — LIDOCAINE HYDROCHLORIDE 20 MG/ML
JELLY TOPICAL
Status: COMPLETED | OUTPATIENT
Start: 2023-05-06 | End: 2023-05-06

## 2023-05-06 RX ORDER — QUETIAPINE FUMARATE 100 MG/1
100 TABLET, FILM COATED ORAL NIGHTLY
Status: ON HOLD | COMMUNITY
Start: 2023-03-08 | End: 2023-05-12 | Stop reason: HOSPADM

## 2023-05-06 RX ADMIN — HYPROMELLOSE 2910 2 DROP: 5 SOLUTION/ DROPS OPHTHALMIC at 09:05

## 2023-05-06 RX ADMIN — CEFTRIAXONE 1 G: 1 INJECTION, POWDER, FOR SOLUTION INTRAMUSCULAR; INTRAVENOUS at 11:05

## 2023-05-06 RX ADMIN — ACETAMINOPHEN 650 MG: 325 TABLET ORAL at 02:05

## 2023-05-06 RX ADMIN — IOHEXOL 75 ML: 350 INJECTION, SOLUTION INTRAVENOUS at 11:05

## 2023-05-06 RX ADMIN — SODIUM CHLORIDE 1000 ML: 0.9 INJECTION, SOLUTION INTRAVENOUS at 10:05

## 2023-05-06 RX ADMIN — POTASSIUM CHLORIDE 40 MEQ: 1500 TABLET, EXTENDED RELEASE ORAL at 11:05

## 2023-05-06 RX ADMIN — HYPROMELLOSE 2910 2 DROP: 5 SOLUTION/ DROPS OPHTHALMIC at 02:05

## 2023-05-06 RX ADMIN — ONDANSETRON 4 MG: 4 TABLET, ORALLY DISINTEGRATING ORAL at 10:05

## 2023-05-06 RX ADMIN — LIDOCAINE HYDROCHLORIDE 5 ML: 20 JELLY TOPICAL at 10:05

## 2023-05-06 RX ADMIN — QUETIAPINE FUMARATE 50 MG: 25 TABLET ORAL at 07:05

## 2023-05-06 RX ADMIN — KETOROLAC TROMETHAMINE 10 MG: 10 TABLET, FILM COATED ORAL at 04:05

## 2023-05-06 RX ADMIN — MIRTAZAPINE 15 MG: 7.5 TABLET ORAL at 09:05

## 2023-05-06 RX ADMIN — HYDROCORTISONE: 25 CREAM TOPICAL at 05:05

## 2023-05-06 RX ADMIN — KETOROLAC TROMETHAMINE 15 MG: 30 INJECTION, SOLUTION INTRAMUSCULAR; INTRAVENOUS at 10:05

## 2023-05-06 NOTE — PHARMACY MED REC
"[facility]           Pharmacy  Admission Medication History     The home medication history was taken by Joan Jerry.      Medication history obtained from Medications listed below were obtained from: Patient/family. Patient states she is not taking any medications    Based on information gathered for medication list, you may go to "Admission" then "Reconcile Home Medications" tabs to review and/or act upon those items.     The home medication list has been updated by the Pharmacy department.   Please read ALL comments highlighted in yellow.   Please address this information as you see fit.    Feel free to contact us if you have any questions or require assistance.    The medications listed below were removed from the home medication list.  Please reorder if appropriate:    Patient reports NOT TAKING the following medication(s):  Tylenol 500mg  Tylenol 325mg  Isopto tears  Astelin nasal  Ceftin 500mg  Lomotil  Romycin oint  Medrol dpk 4mg  Macrobid 100mg  Promethazine dm  Ultram 50mg        No current facility-administered medications on file prior to encounter.     Current Outpatient Medications on File Prior to Encounter   Medication Sig Dispense Refill    atovaquone (MEPRON) 750 mg/5 mL Susp TAKE 10 MLS (1,500 MG TOTAL) BY MOUTH ONCE DAILY. (Patient not taking: Reported on 1/20/2023) 210 mL 1    BIKTARVY -25 mg (25 kg or greater) Take 1 tablet by mouth once daily. (Patient not taking: Reported on 4/12/2023) 60 tablet 2    busPIRone (BUSPAR) 10 MG tablet Take 1 tablet (10 mg total) by mouth 3 (three) times daily. (Patient not taking: Reported on 5/6/2023) 90 tablet 5    divalproex (DEPAKOTE) 250 MG EC tablet Take 250 mg by mouth 2 (two) times daily.      haloperidol lactate (HALDOL) 5 mg/mL injection Inject 1 mL into the muscle every 6 (six) hours as needed.      hydrOXYzine pamoate (VISTARIL) 25 MG Cap Take 25 mg by mouth 3 (three) times daily as needed.      ketorolac (TORADOL) 10 " mg tablet Take one po q 8 hrs prn pain (Patient not taking: Reported on 3/3/2023) 20 tablet 0    lithium (LITHOTAB) 300 mg tablet Take 1 tablet (300 mg total) by mouth every 8 (eight) hours. LITHIUM CARBONATE ER (Patient not taking: Reported on 4/12/2023) 90 tablet 3    mirtazapine (REMERON) 15 MG tablet Take 15 mg by mouth every evening.      nicotine (NICODERM CQ) 14 mg/24 hr 1 patch.      ondansetron (ZOFRAN-ODT) 4 MG TbDL Take 1 tablet (4 mg total) by mouth 2 (two) times daily. (Patient not taking: Reported on 4/12/2023) 10 tablet 1    QUEtiapine (SEROQUEL) 100 MG Tab Take 100 mg by mouth every evening.      QUEtiapine (SEROQUEL) 200 MG Tab Take 200 mg by mouth every evening.      QUEtiapine (SEROQUEL) 50 MG tablet Take 50 mg by mouth every evening.      temazepam (RESTORIL) 7.5 MG Cap Take 7.5 mg by mouth nightly as needed.         Please address this information as you see fit.  Feel free to contact us if you have any questions or require assistance.    Joan Jerry  [phone]                  .

## 2023-05-06 NOTE — SUBJECTIVE & OBJECTIVE
Past Medical History:   Diagnosis Date    Allergy     Anxiety     Cervical dysplasia 1998 / 2016    Depression     HIV infection     Hyperthyroidism     Insomnia        Past Surgical History:   Procedure Laterality Date    SKULL FRACTURE ELEVATION         Review of patient's allergies indicates:   Allergen Reactions    Penicillins Other (See Comments) and Nausea And Vomiting     Trouble swallowing and vomiting.     Sulfa (sulfonamide antibiotics) Swelling     Rash (skin)^    Opioids - morphine analogues      Pt states she is not allergic to morphine      Bactrim [sulfamethoxazole-trimethoprim] Rash       No current facility-administered medications on file prior to encounter.     Current Outpatient Medications on File Prior to Encounter   Medication Sig    atovaquone (MEPRON) 750 mg/5 mL Susp TAKE 10 MLS (1,500 MG TOTAL) BY MOUTH ONCE DAILY. (Patient not taking: Reported on 1/20/2023)    BIKTARVY -25 mg (25 kg or greater) Take 1 tablet by mouth once daily. (Patient not taking: Reported on 4/12/2023)    busPIRone (BUSPAR) 10 MG tablet Take 1 tablet (10 mg total) by mouth 3 (three) times daily. (Patient not taking: Reported on 5/6/2023)    divalproex (DEPAKOTE) 250 MG EC tablet Take 250 mg by mouth 2 (two) times daily.    haloperidol lactate (HALDOL) 5 mg/mL injection Inject 1 mL into the muscle every 6 (six) hours as needed.    hydrOXYzine pamoate (VISTARIL) 25 MG Cap Take 25 mg by mouth 3 (three) times daily as needed.    ketorolac (TORADOL) 10 mg tablet Take one po q 8 hrs prn pain (Patient not taking: Reported on 3/3/2023)    lithium (LITHOTAB) 300 mg tablet Take 1 tablet (300 mg total) by mouth every 8 (eight) hours. LITHIUM CARBONATE ER (Patient not taking: Reported on 4/12/2023)    mirtazapine (REMERON) 15 MG tablet Take 15 mg by mouth every evening.    nicotine (NICODERM CQ) 14 mg/24 hr 1 patch.    ondansetron (ZOFRAN-ODT) 4 MG TbDL Take 1 tablet (4 mg total) by mouth 2 (two) times daily. (Patient not  taking: Reported on 2023)    QUEtiapine (SEROQUEL) 100 MG Tab Take 100 mg by mouth every evening.    QUEtiapine (SEROQUEL) 200 MG Tab Take 200 mg by mouth every evening.    QUEtiapine (SEROQUEL) 50 MG tablet Take 50 mg by mouth every evening.    temazepam (RESTORIL) 7.5 MG Cap Take 7.5 mg by mouth nightly as needed.    [DISCONTINUED] acetaminophen (TYLENOL) 325 MG tablet Take 650 mg by mouth every 4 (four) hours as needed.    [DISCONTINUED] acetaminophen (TYLENOL) 500 MG tablet Take 1 tablet (500 mg total) by mouth every 6 (six) hours as needed for Pain. (Patient not taking: Reported on 2023)    [DISCONTINUED] artificial tears (ISOPTO TEARS) 0.5 % ophthalmic solution Place 2 drops into both eyes 3 (three) times daily.    [DISCONTINUED] azelastine (ASTELIN) 137 mcg (0.1 %) nasal spray 1 spray (137 mcg total) by Nasal route 2 (two) times daily. (Patient not taking: Reported on 2023)    [DISCONTINUED] cefUROXime (CEFTIN) 500 MG tablet Take 1 tablet (500 mg total) by mouth every 12 (twelve) hours. (Patient not taking: Reported on 2023)    [DISCONTINUED] diphenoxylate-atropine 2.5-0.025 mg (LOMOTIL) 2.5-0.025 mg per tablet One p.o. after each loose bowel movement up to 4 per day do not take if constipated (Patient not taking: Reported on 2023)    [DISCONTINUED] erythromycin (ROMYCIN) ophthalmic ointment place IN right eye TWICE DAILY AND facial TO RASH FOR 10 DAYS    [DISCONTINUED] MAG-AL PLUS EXTRA STRENGTH 400-400-40 mg/5 mL suspension SMARTSI Milliliter(s) By Mouth Every 4 Hours PRN    [DISCONTINUED] methylPREDNISolone (MEDROL DOSEPACK) 4 mg tablet TAKE AS DIRECTED    [DISCONTINUED] MILK OF MAGNESIA 400 mg/5 mL suspension SMARTSI Milliliter(s) By Mouth Daily PRN    [DISCONTINUED] nitrofurantoin, macrocrystal-monohydrate, (MACROBID) 100 MG capsule Take 1 capsule (100 mg total) by mouth 2 (two) times daily.    [DISCONTINUED] promethazine-dextromethorphan (PROMETHAZINE-DM) 6.25-15 mg/5 mL  Syrp Take 5 mLs by mouth every 6 (six) hours as needed (cough). (Patient not taking: Reported on 4/12/2023)    [DISCONTINUED] THERA M PLUS, FERROUS FUMARAT, 9 mg iron-400 mcg Tab tablet Take 1 tablet by mouth.    [DISCONTINUED] traMADoL (ULTRAM) 50 mg tablet Take 1 tablet (50 mg total) by mouth every 6 (six) hours. (Patient not taking: Reported on 4/12/2023)     Family History       Problem Relation (Age of Onset)    Heart disease Mother          Tobacco Use    Smoking status: Some Days     Types: Vaping with nicotine    Smokeless tobacco: Never   Substance and Sexual Activity    Alcohol use: Yes     Comment: socially    Drug use: Not Currently    Sexual activity: Yes     Partners: Male     Birth control/protection: None     Review of Systems   Constitutional:  Negative for activity change and fever.   HENT:  Negative for congestion.    Eyes:  Negative for photophobia and visual disturbance.   Respiratory:  Negative for shortness of breath.    Gastrointestinal:  Positive for abdominal pain, blood in stool, nausea and vomiting.   Endocrine: Negative for polyphagia.   Genitourinary:  Negative for difficulty urinating and dysuria.   Skin:  Negative for color change and wound.   Neurological:  Negative for headaches.   Psychiatric/Behavioral:  Negative for agitation.    Objective:     Vital Signs (Most Recent):  Temp: 98 °F (36.7 °C) (05/06/23 0840)  Pulse: 103 (05/06/23 0840)  Resp: 20 (05/06/23 0840)  BP: 104/71 (05/06/23 0840)  SpO2: 97 % (05/06/23 0840) Vital Signs (24h Range):  Temp:  [98 °F (36.7 °C)] 98 °F (36.7 °C)  Pulse:  [103] 103  Resp:  [20] 20  SpO2:  [97 %] 97 %  BP: (104)/(71) 104/71        There is no height or weight on file to calculate BMI.     Physical Exam  HENT:      Head: Normocephalic and atraumatic.      Nose: No congestion or rhinorrhea.   Cardiovascular:      Rate and Rhythm: Tachycardia present.   Pulmonary:      Effort: Pulmonary effort is normal.      Breath sounds: No rales.   Abdominal:       General: Abdomen is flat. Bowel sounds are normal.      Tenderness: There is abdominal tenderness in the suprapubic area. There is right CVA tenderness and left CVA tenderness.   Musculoskeletal:         General: Normal range of motion.      Cervical back: Normal range of motion.      Right lower leg: No edema.      Left lower leg: No edema.   Skin:     General: Skin is warm.   Neurological:      Mental Status: She is alert and oriented to person, place, and time.   Psychiatric:         Mood and Affect: Mood normal.         Behavior: Behavior normal.              Significant Labs: A1C:   Recent Labs   Lab 11/08/22  0709   HGBA1C 5.7*     ABGs: No results for input(s): PH, PCO2, HCO3, POCSATURATED, BE, TOTALHB, COHB, METHB, O2HB, POCFIO2, PO2 in the last 48 hours.  Blood Culture: No results for input(s): LABBLOO in the last 48 hours.  CBC:   Recent Labs   Lab 05/06/23  1006   WBC 4.17   HGB 12.5   HCT 36.5*        CMP:   Recent Labs   Lab 05/06/23  1006      K 2.9*      CO2 18*   GLU 90   BUN 12   CREATININE 0.8   CALCIUM 9.6   PROT 9.5*   ALBUMIN 4.3   BILITOT 0.7   ALKPHOS 92   AST 62*   ALT 59*   ANIONGAP 17*     Troponin: No results for input(s): TROPONINI, TROPONINIHS in the last 48 hours.  TSH:   Recent Labs   Lab 03/05/23  1231   TSH 1.197     Urine Culture: No results for input(s): LABURIN in the last 48 hours.  Urine Studies:   Recent Labs   Lab 05/06/23  1008   COLORU Yellow   APPEARANCEUA Hazy*   PHUR 6.0   SPECGRAV 1.025   PROTEINUA 1+*   GLUCUA Negative   KETONESU Trace*   BILIRUBINUA Negative   OCCULTUA Trace*   NITRITE Positive*   UROBILINOGEN Negative   LEUKOCYTESUR 3+*   RBCUA 5*   WBCUA >100*   BACTERIA Many*   SQUAMEPITHEL 8   HYALINECASTS 0       Significant Imaging: I have reviewed all pertinent imaging results/findings within the past 24 hours.

## 2023-05-06 NOTE — ASSESSMENT & PLAN NOTE
Anxiety disorder  Major depressive disorder in remission  Schizoaffective disorder, bipolar type   BuSpar, Depakote, Haldol, lithium, Seroquel, Restoril- patient denied taking any of this meds, stating her identity was stolen and this meds are not for her  Consult psych

## 2023-05-06 NOTE — HPI
"Korin Gomez is a 46 y/o F with PMH of anxiety, depression, HIV (untreated), hypothyroidism, insomnia, presents with 3 month history abdominal pain that is worse in the past 4 days.  There is associated nausea, vomiting, rectal pain, intermittent episode of bright red blood after BM.  She denies fever, chills, lightheadedness, headaches, chest pain, diarrhea, dysuria, shortness of breath.    In the ED sodium 138, potassium 2.9, bicarb 18, H/H 12.5/36.5, WBC 4.17, UA positive, urine microscopy with few clumps of WBC, many bacteria CT abdomen/pelvis unremarkable with no acute intra-abdominal pathology.  Starting on ceftriaxone pending urine culture  During medication reconciliation, patient stated, " my identity was stolen and all that medications and not for me".  Admit hospital medicine observation  "

## 2023-05-06 NOTE — ED NOTES
"Patient presents to ED from home with c/o hemorrhoids and abdominal pain with nausea, vomiting, and diarrhea that she states started "about three months ago". ARTURO Bueno @ bedside. Patient reports LBM today and watery emesis today since being in ED. Patient AAOx4 and ambulatory.   "

## 2023-05-06 NOTE — ASSESSMENT & PLAN NOTE
She admits she is been offered surgery for hemorrhoids but has deferred    Sitz bath   [facility]

## 2023-05-06 NOTE — PLAN OF CARE
Problem: Adult Inpatient Plan of Care  Goal: Plan of Care Review  Outcome: Ongoing, Progressing      05/06/23 1455   Admission   Initial VN Admission Questions Complete  (Patient arrived to unit. Admission questions completed. POC reviewed, allowed time for questions. Instructed to call for assistance.)   Communication Issues? None   Shift   Virtual Nurse - Rounding Complete   Virtual Nurse - Patient Verbalized Approval Of Camera Use;VN Rounding   Type of Frequent Check   Type Patient Rounds   Safety/Activity   Patient Rounds call light in patient/parent reach;visualized patient   Safety Promotion/Fall Prevention instructed to call staff for mobility;assistive device/personal item within reach   Positioning   Body Position position changed independently   Pain/Comfort/Sleep   Sleep/Rest/Relaxation awake

## 2023-05-06 NOTE — SUBJECTIVE & OBJECTIVE
Past Medical History:   Diagnosis Date    Allergy     Anxiety     Cervical dysplasia 1998 / 2016    Depression     HIV infection     Hyperthyroidism     Insomnia        Past Surgical History:   Procedure Laterality Date    SKULL FRACTURE ELEVATION         Review of patient's allergies indicates:   Allergen Reactions    Penicillins Other (See Comments) and Nausea And Vomiting     Trouble swallowing and vomiting.     Sulfa (sulfonamide antibiotics) Swelling     Rash (skin)^    Opioids - morphine analogues      Pt states she is not allergic to morphine      Bactrim [sulfamethoxazole-trimethoprim] Rash       Medications:  Medications Prior to Admission   Medication Sig    atovaquone (MEPRON) 750 mg/5 mL Susp TAKE 10 MLS (1,500 MG TOTAL) BY MOUTH ONCE DAILY. (Patient not taking: Reported on 1/20/2023)    BIKTARVY -25 mg (25 kg or greater) Take 1 tablet by mouth once daily. (Patient not taking: Reported on 4/12/2023)    busPIRone (BUSPAR) 10 MG tablet Take 1 tablet (10 mg total) by mouth 3 (three) times daily. (Patient not taking: Reported on 5/6/2023)    divalproex (DEPAKOTE) 250 MG EC tablet Take 250 mg by mouth 2 (two) times daily.    haloperidol lactate (HALDOL) 5 mg/mL injection Inject 1 mL into the muscle every 6 (six) hours as needed.    hydrOXYzine pamoate (VISTARIL) 25 MG Cap Take 25 mg by mouth 3 (three) times daily as needed.    ketorolac (TORADOL) 10 mg tablet Take one po q 8 hrs prn pain (Patient not taking: Reported on 3/3/2023)    lithium (LITHOTAB) 300 mg tablet Take 1 tablet (300 mg total) by mouth every 8 (eight) hours. LITHIUM CARBONATE ER (Patient not taking: Reported on 4/12/2023)    mirtazapine (REMERON) 15 MG tablet Take 15 mg by mouth every evening.    nicotine (NICODERM CQ) 14 mg/24 hr 1 patch.    ondansetron (ZOFRAN-ODT) 4 MG TbDL Take 1 tablet (4 mg total) by mouth 2 (two) times daily. (Patient not taking: Reported on 4/12/2023)    QUEtiapine (SEROQUEL) 100 MG Tab Take 100 mg by mouth  every evening.    QUEtiapine (SEROQUEL) 200 MG Tab Take 200 mg by mouth every evening.    QUEtiapine (SEROQUEL) 50 MG tablet Take 50 mg by mouth every evening.    temazepam (RESTORIL) 7.5 MG Cap Take 7.5 mg by mouth nightly as needed.     Antibiotics (From admission, onward)      Start     Stop Route Frequency Ordered    05/07/23 1130  cefTRIAXone (ROCEPHIN) 1 g in dextrose 5 % in water (D5W) 5 % 50 mL IVPB (MB+)         -- IV Every 24 hours (non-standard times) 05/06/23 1244          Antifungals (From admission, onward)      None          Antivirals (From admission, onward)      None             Immunization History   Administered Date(s) Administered    COVID-19 MRNA, LN-S PF (MODERNA HALF 0.25 ML DOSE) 02/10/2022    COVID-19, MRNA, LN-S, PF (Pfizer) (Gray Cap) 02/04/2022    COVID-19, MRNA, LN-S, PF (Pfizer) (Purple Cap) 01/14/2022, 02/04/2022    Influenza - Quadrivalent - MDCK - PF 10/10/2019, 01/05/2022    Influenza - Quadrivalent - PF *Preferred* (6 months and older) 11/09/2016, 11/20/2020, 10/11/2022    Influenza - Trivalent (ADULT) 10/17/2011    Pneumococcal Conjugate - 13 Valent 08/23/2017    Pneumococcal Polysaccharide - 23 Valent 02/06/2020    Td - PF (ADULT) 02/06/2020    Tdap 11/09/2016       Family History       Problem Relation (Age of Onset)    Heart disease Mother          Social History     Socioeconomic History    Marital status:    Occupational History    Occupation: unemployed    Tobacco Use    Smoking status: Some Days     Types: Vaping with nicotine    Smokeless tobacco: Never   Substance and Sexual Activity    Alcohol use: Yes     Comment: socially    Drug use: Not Currently    Sexual activity: Yes     Partners: Male     Birth control/protection: None     Review of Systems   Constitutional:  Positive for activity change. Negative for appetite change, chills and fever.   HENT: Negative.     Eyes: Negative.    Cardiovascular: Negative.    Gastrointestinal:  Positive for blood in stool.    Endocrine: Negative.    Genitourinary:         Rectal pain - large hemrrhoid    Allergic/Immunologic: Positive for immunocompromised state.   Neurological: Negative.    Objective:     Vital Signs (Most Recent):  Temp: 99 °F (37.2 °C) (05/06/23 1514)  Pulse: 88 (05/06/23 1514)  Resp: 16 (05/06/23 1514)  BP: 126/76 (05/06/23 1514)  SpO2: 98 % (05/06/23 1514) Vital Signs (24h Range):  Temp:  [98 °F (36.7 °C)-99 °F (37.2 °C)] 99 °F (37.2 °C)  Pulse:  [] 88  Resp:  [16-20] 16  SpO2:  [89 %-98 %] 98 %  BP: (104-138)/(71-93) 126/76     Weight: 50.7 kg (111 lb 12.4 oz)  Body mass index is 18.04 kg/m².    Estimated Creatinine Clearance: 70.3 mL/min (based on SCr of 0.8 mg/dL).     Physical Exam  Constitutional:       Appearance: Normal appearance.   HENT:      Head: Normocephalic and atraumatic.   Cardiovascular:      Rate and Rhythm: Normal rate and regular rhythm.   Pulmonary:      Effort: Pulmonary effort is normal.      Breath sounds: Normal breath sounds.   Abdominal:      General: Abdomen is flat.      Palpations: Abdomen is soft.   Genitourinary:     Comments: Large circumferential hemorrhoid - no pus noted - no erythema. Difficult lightening but do not think this is a wart -  Musculoskeletal:      Cervical back: Normal range of motion and neck supple.   Neurological:      Mental Status: She is alert.        Significant Labs: All pertinent labs within the past 24 hours have been reviewed.    Significant Imaging: I have reviewed all pertinent imaging results/findings within the past 24 hours.

## 2023-05-06 NOTE — ED NOTES
"Pt now requesting to have hemorrhoid: "Removed now!" Moderate anxiety noted at point of triage.   "

## 2023-05-06 NOTE — H&P
"[facility] Medicine  History & Physical    Patient Name: Patricia Langley  MRN: [id number]  Patient Class: OP- Observation  Admission Date: 5/6/2023  Attending Physician: Juhi Fregoso MD  Primary Care Provider: Cornell Britt MD         Patient information was obtained from patient and ER records.     Subjective:     Principal Problem:Pyelonephritis    Chief Complaint:   Chief Complaint   Patient presents with    Hemorrhoids     Large hemorrhoid that has been noted by pt: "For a while." Bleeding noted.     Abdominal Pain     Generalized, abd pain x1 month on/off with n/v+. 6/10 pain.         HPI: Korin Gomez is a 48 y/o F with PMH of anxiety, depression, HIV (untreated), hypothyroidism, insomnia, presents with 3 month history abdominal pain that is worse in the past 4 days.  There is associated nausea, vomiting, rectal pain, intermittent episode of bright red blood after BM.  She denies fever, chills, lightheadedness, headaches, chest pain, diarrhea, dysuria, shortness of breath.    In the ED sodium 138, potassium 2.9, bicarb 18, H/H 12.5/36.5, WBC 4.17, UA positive, urine microscopy with few clumps of WBC, many bacteria CT abdomen/pelvis unremarkable with no acute intra-abdominal pathology.  Starting on ceftriaxone pending urine culture  During medication reconciliation, patient stated, " my identity was stolen and all that medications and not for me".  Admit hospital medicine observation      Past Medical History:   Diagnosis Date    Allergy     Anxiety     Cervical dysplasia 1998 / 2016    Depression     HIV infection     Hyperthyroidism     Insomnia        Past Surgical History:   Procedure Laterality Date    SKULL FRACTURE ELEVATION         Review of patient's allergies indicates:   Allergen Reactions    Penicillins Other (See Comments) and Nausea And Vomiting     Trouble swallowing and vomiting.     Sulfa (sulfonamide antibiotics) Swelling     Rash (skin)^    Opioids " - morphine analogues      Pt states she is not allergic to morphine      Bactrim [sulfamethoxazole-trimethoprim] Rash       No current facility-administered medications on file prior to encounter.     Current Outpatient Medications on File Prior to Encounter   Medication Sig    atovaquone (MEPRON) 750 mg/5 mL Susp TAKE 10 MLS (1,500 MG TOTAL) BY MOUTH ONCE DAILY. (Patient not taking: Reported on 1/20/2023)    BIKTARVY -25 mg (25 kg or greater) Take 1 tablet by mouth once daily. (Patient not taking: Reported on 4/12/2023)    busPIRone (BUSPAR) 10 MG tablet Take 1 tablet (10 mg total) by mouth 3 (three) times daily. (Patient not taking: Reported on 5/6/2023)    divalproex (DEPAKOTE) 250 MG EC tablet Take 250 mg by mouth 2 (two) times daily.    haloperidol lactate (HALDOL) 5 mg/mL injection Inject 1 mL into the muscle every 6 (six) hours as needed.    hydrOXYzine pamoate (VISTARIL) 25 MG Cap Take 25 mg by mouth 3 (three) times daily as needed.    ketorolac (TORADOL) 10 mg tablet Take one po q 8 hrs prn pain (Patient not taking: Reported on 3/3/2023)    lithium (LITHOTAB) 300 mg tablet Take 1 tablet (300 mg total) by mouth every 8 (eight) hours. LITHIUM CARBONATE ER (Patient not taking: Reported on 4/12/2023)    mirtazapine (REMERON) 15 MG tablet Take 15 mg by mouth every evening.    nicotine (NICODERM CQ) 14 mg/24 hr 1 patch.    ondansetron (ZOFRAN-ODT) 4 MG TbDL Take 1 tablet (4 mg total) by mouth 2 (two) times daily. (Patient not taking: Reported on 4/12/2023)    QUEtiapine (SEROQUEL) 100 MG Tab Take 100 mg by mouth every evening.    QUEtiapine (SEROQUEL) 200 MG Tab Take 200 mg by mouth every evening.    QUEtiapine (SEROQUEL) 50 MG tablet Take 50 mg by mouth every evening.    temazepam (RESTORIL) 7.5 MG Cap Take 7.5 mg by mouth nightly as needed.    [DISCONTINUED] acetaminophen (TYLENOL) 325 MG tablet Take 650 mg by mouth every 4 (four) hours as needed.    [DISCONTINUED] acetaminophen  (TYLENOL) 500 MG tablet Take 1 tablet (500 mg total) by mouth every 6 (six) hours as needed for Pain. (Patient not taking: Reported on 2023)    [DISCONTINUED] artificial tears (ISOPTO TEARS) 0.5 % ophthalmic solution Place 2 drops into both eyes 3 (three) times daily.    [DISCONTINUED] azelastine (ASTELIN) 137 mcg (0.1 %) nasal spray 1 spray (137 mcg total) by Nasal route 2 (two) times daily. (Patient not taking: Reported on 2023)    [DISCONTINUED] cefUROXime (CEFTIN) 500 MG tablet Take 1 tablet (500 mg total) by mouth every 12 (twelve) hours. (Patient not taking: Reported on 2023)    [DISCONTINUED] diphenoxylate-atropine 2.5-0.025 mg (LOMOTIL) 2.5-0.025 mg per tablet One p.o. after each loose bowel movement up to 4 per day do not take if constipated (Patient not taking: Reported on 2023)    [DISCONTINUED] erythromycin (ROMYCIN) ophthalmic ointment place IN right eye TWICE DAILY AND facial TO RASH FOR 10 DAYS    [DISCONTINUED] MAG-AL PLUS EXTRA STRENGTH 400-400-40 mg/5 mL suspension SMARTSI Milliliter(s) By Mouth Every 4 Hours PRN    [DISCONTINUED] methylPREDNISolone (MEDROL DOSEPACK) 4 mg tablet TAKE AS DIRECTED    [DISCONTINUED] MILK OF MAGNESIA 400 mg/5 mL suspension SMARTSI Milliliter(s) By Mouth Daily PRN    [DISCONTINUED] nitrofurantoin, macrocrystal-monohydrate, (MACROBID) 100 MG capsule Take 1 capsule (100 mg total) by mouth 2 (two) times daily.    [DISCONTINUED] promethazine-dextromethorphan (PROMETHAZINE-DM) 6.25-15 mg/5 mL Syrp Take 5 mLs by mouth every 6 (six) hours as needed (cough). (Patient not taking: Reported on 2023)    [DISCONTINUED] THERA M PLUS, FERROUS FUMARAT, 9 mg iron-400 mcg Tab tablet Take 1 tablet by mouth.    [DISCONTINUED] traMADoL (ULTRAM) 50 mg tablet Take 1 tablet (50 mg total) by mouth every 6 (six) hours. (Patient not taking: Reported on 2023)     Family History       Problem Relation (Age of Onset)    Heart disease Mother           Tobacco Use    Smoking status: Some Days     Types: Vaping with nicotine    Smokeless tobacco: Never   Substance and Sexual Activity    Alcohol use: Yes     Comment: socially    Drug use: Not Currently    Sexual activity: Yes     Partners: Male     Birth control/protection: None     Review of Systems   Constitutional:  Negative for activity change and fever.   HENT:  Negative for congestion.    Eyes:  Negative for photophobia and visual disturbance.   Respiratory:  Negative for shortness of breath.    Gastrointestinal:  Positive for abdominal pain, blood in stool, nausea and vomiting.   Endocrine: Negative for polyphagia.   Genitourinary:  Negative for difficulty urinating and dysuria.   Skin:  Negative for color change and wound.   Neurological:  Negative for headaches.   Psychiatric/Behavioral:  Negative for agitation.    Objective:     Vital Signs (Most Recent):  Temp: 98 °F (36.7 °C) (05/06/23 0840)  Pulse: 103 (05/06/23 0840)  Resp: 20 (05/06/23 0840)  BP: 104/71 (05/06/23 0840)  SpO2: 97 % (05/06/23 0840) Vital Signs (24h Range):  Temp:  [98 °F (36.7 °C)] 98 °F (36.7 °C)  Pulse:  [103] 103  Resp:  [20] 20  SpO2:  [97 %] 97 %  BP: (104)/(71) 104/71        There is no height or weight on file to calculate BMI.     Physical Exam  HENT:      Head: Normocephalic and atraumatic.      Nose: No congestion or rhinorrhea.   Cardiovascular:      Rate and Rhythm: Tachycardia present.   Pulmonary:      Effort: Pulmonary effort is normal.      Breath sounds: No rales.   Abdominal:      General: Abdomen is flat. Bowel sounds are normal.      Tenderness: There is abdominal tenderness in the suprapubic area. There is right CVA tenderness and left CVA tenderness.   Musculoskeletal:         General: Normal range of motion.      Cervical back: Normal range of motion.      Right lower leg: No edema.      Left lower leg: No edema.   Skin:     General: Skin is warm.   Neurological:      Mental Status: She is alert and  oriented to person, place, and time.   Psychiatric:         Mood and Affect: Mood normal.         Behavior: Behavior normal.              Significant Labs: A1C:   Recent Labs   Lab 11/08/22  0709   HGBA1C 5.7*     ABGs: No results for input(s): PH, PCO2, HCO3, POCSATURATED, BE, TOTALHB, COHB, METHB, O2HB, POCFIO2, PO2 in the last 48 hours.  Blood Culture: No results for input(s): LABBLOO in the last 48 hours.  CBC:   Recent Labs   Lab 05/06/23  1006   WBC 4.17   HGB 12.5   HCT 36.5*        CMP:   Recent Labs   Lab 05/06/23  1006      K 2.9*      CO2 18*   GLU 90   BUN 12   CREATININE 0.8   CALCIUM 9.6   PROT 9.5*   ALBUMIN 4.3   BILITOT 0.7   ALKPHOS 92   AST 62*   ALT 59*   ANIONGAP 17*     Troponin: No results for input(s): TROPONINI, TROPONINIHS in the last 48 hours.  TSH:   Recent Labs   Lab 03/05/23  1231   TSH 1.197     Urine Culture: No results for input(s): LABURIN in the last 48 hours.  Urine Studies:   Recent Labs   Lab 05/06/23  1008   COLORU Yellow   APPEARANCEUA Hazy*   PHUR 6.0   SPECGRAV 1.025   PROTEINUA 1+*   GLUCUA Negative   KETONESU Trace*   BILIRUBINUA Negative   OCCULTUA Trace*   NITRITE Positive*   UROBILINOGEN Negative   LEUKOCYTESUR 3+*   RBCUA 5*   WBCUA >100*   BACTERIA Many*   SQUAMEPITHEL 8   HYALINECASTS 0       Significant Imaging: I have reviewed all pertinent imaging results/findings within the past 24 hours.    Assessment/Plan:     * Pyelonephritis  UA positive  Urine microscopy positive   Start ceftriaxone, pending urine culture      Hemorrhoid   She admits she is been offered surgery for hemorrhoids but has deferred    Sitz bath   Anusol HC      Hypokalemia  Replace      Anxiety disorder        Insomnia  Continue mirtazapine      Bipolar 1 disorder  Anxiety disorder  Major depressive disorder in remission  Schizoaffective disorder, bipolar type   BuSpar, Depakote, Haldol, lithium, Seroquel, Restoril- patient denied taking any of this meds, stating her identity  was stolen and this meds are not for her  Consult psych      AIDS  At risk for opportunistic infections  This patient in known to have AIDS (from CD4 count has been less than 200). Labs reviewed- No results found for: CD4, No results found for: HIVDNAPCR. Patient is not on HAART. Will start HAART. Prophylaxis was offered and declined by the patient at this time- . Continue to monitor routine labs. Last CD4 count was   Lab Results   Component Value Date    ABSOLUTECD4 134 (L) 07/27/2022       We will consult Infectious disease at this time. Other HIV-associated diseases are not present.        VTE Risk Mitigation (From admission, onward)         Ordered     IP VTE HIGH RISK PATIENT  Once         05/06/23 1244     Place sequential compression device  Until discontinued         05/06/23 1244                   On 05/06/2023, patient should be placed in hospital observation services under my care.        Juhi Fregoso MD  Department of Hospital Medicine  [facility]

## 2023-05-06 NOTE — ED PROVIDER NOTES
"Encounter Date: 5/6/2023       History     Chief Complaint   Patient presents with    Hemorrhoids     Large hemorrhoid that has been noted by pt: "For a while." Bleeding noted.     Abdominal Pain     Generalized, abd pain x1 month on/off with n/v+. 6/10 pain.      47-year-old female, PMH anxiety, depression, HIV (untreated), hypothyroidism, insomnia, presents to ED with multiple complaints including abdominal pain that began 3 months ago, along with 4 day onset nausea and vomiting and intermittent hemorrhoid pain "for a long time".  She admits she is been offered surgery for hemorrhoids but has deferred.  Intermittent episodes of bright red blood after bowel movements.  No current bleeding.  No melena.  She has not taken any medications for her symptoms.  Denying fevers, chills, lightheadedness or dizziness, headaches, chest pain, cough, shortness breath, dysuria, changes in urine color or frequency, vaginal bleeding, vaginal discharge, constipation, diarrhea.  No other acute complaints at this time.    The history is provided by the patient.   Review of patient's allergies indicates:   Allergen Reactions    Penicillins Other (See Comments) and Nausea And Vomiting     Trouble swallowing and vomiting.     Sulfa (sulfonamide antibiotics) Swelling     Rash (skin)^    Opioids - morphine analogues      Pt states she is not allergic to morphine      Bactrim [sulfamethoxazole-trimethoprim] Rash     Past Medical History:   Diagnosis Date    Allergy     Anxiety     Cervical dysplasia 1998 / 2016    Depression     HIV infection     Hyperthyroidism     Insomnia      Past Surgical History:   Procedure Laterality Date    SKULL FRACTURE ELEVATION       Family History   Problem Relation Age of Onset    Heart disease Mother     Breast cancer Neg Hx     Colon cancer Neg Hx     Ovarian cancer Neg Hx      Social History     Tobacco Use    Smoking status: Some Days     Types: Vaping with nicotine    Smokeless tobacco: Never   Substance " Use Topics    Alcohol use: Yes     Comment: socially    Drug use: Not Currently     Review of Systems   Constitutional:  Negative for activity change, chills and fever.   HENT:  Negative for congestion and sore throat.    Eyes:  Negative for visual disturbance.   Respiratory:  Negative for cough and shortness of breath.    Cardiovascular:  Negative for chest pain.   Gastrointestinal:  Positive for abdominal pain, nausea, rectal pain and vomiting. Negative for constipation and diarrhea.   Genitourinary:  Negative for dysuria, flank pain, vaginal discharge and vaginal pain.   Musculoskeletal:  Negative for back pain, neck pain and neck stiffness.   Neurological:  Negative for dizziness, light-headedness and headaches.   Psychiatric/Behavioral:  The patient is nervous/anxious.      Physical Exam     Initial Vitals [05/06/23 0840]   BP Pulse Resp Temp SpO2   104/71 103 20 98 °F (36.7 °C) 97 %      MAP       --         Physical Exam    Nursing note and vitals reviewed.  Constitutional: Vital signs are normal. She appears well-developed and well-nourished. She is not diaphoretic. She is cooperative.   HENT:   Head: Normocephalic and atraumatic.   Eyes: EOM are normal.   Neck: Neck supple.   Normal range of motion.  Cardiovascular:  Normal rate, regular rhythm and normal heart sounds.           Pulmonary/Chest: Effort normal and breath sounds normal.   Abdominal: Abdomen is soft.   Generalized left-sided abdominal tenderness with pain reported predominantly to left lower quadrant.  No guarding.  No overlying skin changes.  Bowel sounds intact.  Denying CVA tenderness.   Genitourinary:    Genitourinary Comments: Rectal exam performed with nurseDre, at bedside  Multiple hemorrhoids noted predominantly to patient's right side of rectum.  No active bleeding.  Tenderness to site.  Single hemorrhoid appearing purple and firm; likely thrombosed     Musculoskeletal:         General: No tenderness.      Cervical back: Normal  range of motion and neck supple.     Neurological: She is alert and oriented to person, place, and time. She is not disoriented. GCS eye subscore is 4. GCS verbal subscore is 5. GCS motor subscore is 6.   Skin: Skin is warm and dry.   Psychiatric:   Very anxious on exam.  History is intermittently inconsistent.       ED Course   Procedures  Labs Reviewed   CBC W/ AUTO DIFFERENTIAL - Abnormal; Notable for the following components:       Result Value    Hematocrit 36.5 (*)     Lymph # 0.5 (*)     Gran % 76.8 (*)     Lymph % 11.0 (*)     All other components within normal limits   COMPREHENSIVE METABOLIC PANEL - Abnormal; Notable for the following components:    Potassium 2.9 (*)     CO2 18 (*)     Total Protein 9.5 (*)     AST 62 (*)     ALT 59 (*)     Anion Gap 17 (*)     All other components within normal limits   URINALYSIS, REFLEX TO URINE CULTURE - Abnormal; Notable for the following components:    Appearance, UA Hazy (*)     Protein, UA 1+ (*)     Ketones, UA Trace (*)     Occult Blood UA Trace (*)     Nitrite, UA Positive (*)     Leukocytes, UA 3+ (*)     All other components within normal limits    Narrative:     Specimen Source->Urine   LIPASE - Abnormal; Notable for the following components:    Lipase 98 (*)     All other components within normal limits   URINALYSIS MICROSCOPIC - Abnormal; Notable for the following components:    RBC, UA 5 (*)     WBC, UA >100 (*)     WBC Clumps, UA Few (*)     Bacteria Many (*)     All other components within normal limits    Narrative:     Specimen Source->Urine   CULTURE, URINE   CULTURE, BLOOD   CULTURE, BLOOD   POCT URINE PREGNANCY          Imaging Results              CT Abdomen Pelvis With Contrast (Final result)  Result time 05/06/23 11:48:32      Final result by Dick Boyce MD (05/06/23 11:48:32)                   Impression:      No evidence of acute intra-abdominal pathology as above.      Electronically signed by: Dick Boyce  MD  Date:    05/06/2023  Time:    11:48               Narrative:    EXAMINATION:  CT ABDOMEN PELVIS WITH CONTRAST    CLINICAL HISTORY:  LLQ abdominal pain;    TECHNIQUE:  Low dose axial CT images obtained throughout the region of the abdomen and pelvis following the administration 75 mL Omnipaque 350 intravenous contrast.  Axial, sagittal and coronal reconstructions were performed.    Examination mildly degraded by patient motion artifact.    COMPARISON:  07/26/2022    FINDINGS:  Stable small hypoattenuating focus in the right hepatic lobe, presumed cyst.  No new focal abnormality.  Gallbladder and bile ducts are unremarkable.    Spleen, pancreas, and adrenal glands appear within normal limits.    Kidneys appear normal. The ureters are decompressed. Urinary bladder unremarkable.  Uterus and adnexal structures appear within normal limits.    Limited assessment of the gastrointestinal tract without the use of enteric contrast.  The stomach, small bowel and colon demonstrate no evidence of obstruction or focal inflammation.  Appendix is unremarkable.    No free air or free fluid identified within the abdomen or pelvis.    Aorta tapers normally throughout its visualized course.    Osseous structures exhibit mild degenerative changes. No fracture or focal osseous destructive lesion.                                       Medications   sodium chloride 0.9% bolus 1,000 mL 1,000 mL (1,000 mLs Intravenous New Bag 5/6/23 1009)   ketorolac injection 15 mg (15 mg Intravenous Given 5/6/23 1018)   ondansetron disintegrating tablet 4 mg (4 mg Oral Given 5/6/23 1019)   LIDOcaine HCl 2% urojet (5 mLs Mucous Membrane Given by Provider 5/6/23 1019)   cefTRIAXone (ROCEPHIN) 1 g in dextrose 5 % in water (D5W) 5 % 50 mL IVPB (MB+) (0 g Intravenous Stopped 5/6/23 1219)   iohexoL (OMNIPAQUE 350) injection 75 mL (75 mLs Intravenous Given 5/6/23 1129)   potassium chloride SA CR tablet 40 mEq (40 mEq Oral Given 5/6/23 1146)     Medical  "Decision Making:   Initial Assessment:   Patient presenting with multiple complaints including hemorrhoid pain "for a long time", left-sided abdominal pain for 3 months and 4 day onset nausea and vomiting.  Afebrile with vitals grossly unremarkable.  Left-sided abdominal tenderness noted on exam.  No guarding.  Rectal hemorrhoids noted with no active bleed.  Differential Diagnosis:   GERD, intestinal spasm, gastroenteritis, gastritis, ulcer, cholecystitis, cholelithiasis, gallstones, pancreatitis, ileus, small bowel obstruction, appendicitis, diverticulitis, colitis, constipation, intestinal gas pain, hemorrhoids      ED Management:  CBC, CMP, lipase, UA, UPT, CT abdomen pelvis    UPT negative.  CBC grossly unremarkable no elevation in WBC.  CMP significant for hyperkalemia with potassium 2.9; given IV potassium.  Lipase mildly elevated at 98.    UA significant for urinary tract infection noting nitrite positive, 3+ leukocytes, >100 WBC with clumps.  Overall concerning for pyelonephritis.  Patient is immunocompromised with untreated HIV.  Will consider placement observation.  Blood cultures obtained.  Will begin Rocephin 1 g in ED. Urojet applied to hemorrhoids    CT abdomen pelvis with no significant intra-abdominal findings per Radiology reading.    12:01 PM  Patient discussed with [facility], Dr. Cordoba, who has agreed to accept patient for observation.    Patient was discussed with attending, Dr. Boles, who agrees with ED course and dispo.                        Clinical Impression:   Final diagnoses:  [E87.6] Hypokalemia  [N30.01] Acute cystitis with hematuria  [N12] Pyelonephritis (Primary)  [R11.2] Nausea and vomiting, unspecified vomiting type  [B20] History of HIV infection        ED Disposition Condition    Observation                 Myles Marinelli PA-C  05/06/23 1223    "

## 2023-05-06 NOTE — ASSESSMENT & PLAN NOTE
At risk for opportunistic infections  This patient in known to have AIDS (from CD4 count has been less than 200). Labs reviewed- No results found for: CD4, No results found for: HIVDNAPCR. Patient is not on HAART. Will start HAART. Prophylaxis was offered and declined by the patient at this time- . Continue to monitor routine labs. Last CD4 count was   Lab Results   Component Value Date    ABSOLUTECD4 134 (L) 07/27/2022       We will consult Infectious disease at this time. Other HIV-associated diseases are not present.

## 2023-05-06 NOTE — ASSESSMENT & PLAN NOTE
"Korin Gomez is a 46 y/o F with PMH of anxiety, depression, HIV (untreated), hypothyroidism, insomnia, presents with 3 month history abdominal pain that is worse in the past 4 days.  There is associated nausea, vomiting, rectal pain, intermittent episode of bright red blood after BM.  She denies fever, chills, lightheadedness, headaches, chest pain, diarrhea, dysuria, shortness of breath.    In the ED sodium 138, potassium 2.9, bicarb 18, H/H 12.5/36.5, WBC 4.17, UA positive, urine microscopy with few clumps of WBC, many bacteria CT abdomen/pelvis unremarkable with no acute intra-abdominal pathology.  Starting on ceftriaxone pending urine culture  During medication reconciliation, patient stated, " my identity was stolen and all that medications and not for me".     Very strange affect - vague with responses to questions and timeline makes no sense - apparently under the care of Dr. Iesha Mendoza at [facility] but has been very non-complaint - last regimen was biktarvy which she stopped because they kept stopping it - not clear what this means. Has large hemorrhoid and that is the reason for admit - workup also noted UA c/w UTI on ceftriaxone and results pending     Recs:   1. OK to start biktarvy 1 PO qd - ID will start this and get HIV VL abd CD4 cell count   2. Agree with ceftriaxone for now for UTI   3. Needs colon-rectal surgery consult for large hemorrhoid - may need banding - not clear if this service is available at [place] or not - consider transfer to main [place] if they h ave CRS otherwise [facility] has CRS services   "

## 2023-05-06 NOTE — CONSULTS
"[facility]  Infectious Disease  Consult Note    Patient Name: Patricia Langley  MRN: [id number]  Admission Date: 5/6/2023  Hospital Length of Stay: 0 days  Attending Physician: Juhi Fregoso*  Primary Care Provider: Cornell Britt MD     Isolation Status: No active isolations    Patient information was obtained from ER records.      Consults  Assessment/Plan:     ID  AIDS  Korin Gomez is a 46 y/o F with PMH of anxiety, depression, HIV (untreated), hypothyroidism, insomnia, presents with 3 month history abdominal pain that is worse in the past 4 days.  There is associated nausea, vomiting, rectal pain, intermittent episode of bright red blood after BM.  She denies fever, chills, lightheadedness, headaches, chest pain, diarrhea, dysuria, shortness of breath.    In the ED sodium 138, potassium 2.9, bicarb 18, H/H 12.5/36.5, WBC 4.17, UA positive, urine microscopy with few clumps of WBC, many bacteria CT abdomen/pelvis unremarkable with no acute intra-abdominal pathology.  Starting on ceftriaxone pending urine culture  During medication reconciliation, patient stated, " my identity was stolen and all that medications and not for me".     Very strange affect - vague with responses to questions and timeline makes no sense - apparently under the care of Dr. Iesha Mendoza at [facility] but has been very non-complaint - last regimen was biktarvy which she stopped because they kept stopping it - not clear what this means. Has large hemorrhoid and that is the reason for admit - workup also noted UA c/w UTI on ceftriaxone and results pending     Recs:   1. OK to start biktarvy 1 PO qd - ID will start this and get HIV VL abd CD4 cell count   2. Agree with ceftriaxone for now for UTI   3. Needs colon-rectal surgery consult for large hemorrhoid - may need banding - not clear if this service is available at [facility] or not - consider transfer to [facility] if they h ave CRS otherwise [facility] has CRS services         Thank " "you for your consult. I will follow-up with patient. Please contact us if you have any additional questions.    Olvin Anand MD  Infectious Disease  [place] - Med Surg    Subjective:     Principal Problem: Pyelonephritis    HPI: Korin Gomez is a 48 y/o F with PMH of anxiety, depression, HIV (untreated), hypothyroidism, insomnia, presents with 3 month history abdominal pain that is worse in the past 4 days.  There is associated nausea, vomiting, rectal pain, intermittent episode of bright red blood after BM.  She denies fever, chills, lightheadedness, headaches, chest pain, diarrhea, dysuria, shortness of breath.    In the ED sodium 138, potassium 2.9, bicarb 18, H/H 12.5/36.5, WBC 4.17, UA positive, urine microscopy with few clumps of WBC, many bacteria CT abdomen/pelvis unremarkable with no acute intra-abdominal pathology.  Starting on ceftriaxone pending urine culture  During medication reconciliation, patient stated, " my identity was stolen and all that medications and not for me".     Very strange affect - vague with responses to questions and timeline makes no sense - apparently under the care of Dr. Iesha Mendoza at [facility] but has been very non-complaint - last regimen was biktarvy which she stopped because they kept stopping it - not clear what this means. Has large hemorrhoid and that is the reason for admit - workup also noted UA c/w UTI on ceftriaxone and results pending       Past Medical History:   Diagnosis Date    Allergy     Anxiety     Cervical dysplasia 1998 / 2016    Depression     HIV infection     Hyperthyroidism     Insomnia        Past Surgical History:   Procedure Laterality Date    SKULL FRACTURE ELEVATION         Review of patient's allergies indicates:   Allergen Reactions    Penicillins Other (See Comments) and Nausea And Vomiting     Trouble swallowing and vomiting.     Sulfa (sulfonamide antibiotics) Swelling     Rash (skin)^    Opioids - morphine analogues      Pt " states she is not allergic to morphine      Bactrim [sulfamethoxazole-trimethoprim] Rash       Medications:  Medications Prior to Admission   Medication Sig    atovaquone (MEPRON) 750 mg/5 mL Susp TAKE 10 MLS (1,500 MG TOTAL) BY MOUTH ONCE DAILY. (Patient not taking: Reported on 1/20/2023)    BIKTARVY -25 mg (25 kg or greater) Take 1 tablet by mouth once daily. (Patient not taking: Reported on 4/12/2023)    busPIRone (BUSPAR) 10 MG tablet Take 1 tablet (10 mg total) by mouth 3 (three) times daily. (Patient not taking: Reported on 5/6/2023)    divalproex (DEPAKOTE) 250 MG EC tablet Take 250 mg by mouth 2 (two) times daily.    haloperidol lactate (HALDOL) 5 mg/mL injection Inject 1 mL into the muscle every 6 (six) hours as needed.    hydrOXYzine pamoate (VISTARIL) 25 MG Cap Take 25 mg by mouth 3 (three) times daily as needed.    ketorolac (TORADOL) 10 mg tablet Take one po q 8 hrs prn pain (Patient not taking: Reported on 3/3/2023)    lithium (LITHOTAB) 300 mg tablet Take 1 tablet (300 mg total) by mouth every 8 (eight) hours. LITHIUM CARBONATE ER (Patient not taking: Reported on 4/12/2023)    mirtazapine (REMERON) 15 MG tablet Take 15 mg by mouth every evening.    nicotine (NICODERM CQ) 14 mg/24 hr 1 patch.    ondansetron (ZOFRAN-ODT) 4 MG TbDL Take 1 tablet (4 mg total) by mouth 2 (two) times daily. (Patient not taking: Reported on 4/12/2023)    QUEtiapine (SEROQUEL) 100 MG Tab Take 100 mg by mouth every evening.    QUEtiapine (SEROQUEL) 200 MG Tab Take 200 mg by mouth every evening.    QUEtiapine (SEROQUEL) 50 MG tablet Take 50 mg by mouth every evening.    temazepam (RESTORIL) 7.5 MG Cap Take 7.5 mg by mouth nightly as needed.     Antibiotics (From admission, onward)      Start     Stop Route Frequency Ordered    05/07/23 1130  cefTRIAXone (ROCEPHIN) 1 g in dextrose 5 % in water (D5W) 5 % 50 mL IVPB (MB+)         -- IV Every 24 hours (non-standard times) 05/06/23 1244          Antifungals  (From admission, onward)      None          Antivirals (From admission, onward)      None             Immunization History   Administered Date(s) Administered    COVID-19 MRNA, LN-S PF (MODERNA HALF 0.25 ML DOSE) 02/10/2022    COVID-19, MRNA, LN-S, PF (Pfizer) (Gray Cap) 02/04/2022    COVID-19, MRNA, LN-S, PF (Pfizer) (Purple Cap) 01/14/2022, 02/04/2022    Influenza - Quadrivalent - MDCK - PF 10/10/2019, 01/05/2022    Influenza - Quadrivalent - PF *Preferred* (6 months and older) 11/09/2016, 11/20/2020, 10/11/2022    Influenza - Trivalent (ADULT) 10/17/2011    Pneumococcal Conjugate - 13 Valent 08/23/2017    Pneumococcal Polysaccharide - 23 Valent 02/06/2020    Td - PF (ADULT) 02/06/2020    Tdap 11/09/2016       Family History       Problem Relation (Age of Onset)    Heart disease Mother          Social History     Socioeconomic History    Marital status:    Occupational History    Occupation: unemployed    Tobacco Use    Smoking status: Some Days     Types: Vaping with nicotine    Smokeless tobacco: Never   Substance and Sexual Activity    Alcohol use: Yes     Comment: socially    Drug use: Not Currently    Sexual activity: Yes     Partners: Male     Birth control/protection: None     Review of Systems   Constitutional:  Positive for activity change. Negative for appetite change, chills and fever.   HENT: Negative.     Eyes: Negative.    Cardiovascular: Negative.    Gastrointestinal:  Positive for blood in stool.   Endocrine: Negative.    Genitourinary:         Rectal pain - large hemrrhoid    Allergic/Immunologic: Positive for immunocompromised state.   Neurological: Negative.    Objective:     Vital Signs (Most Recent):  Temp: 99 °F (37.2 °C) (05/06/23 1514)  Pulse: 88 (05/06/23 1514)  Resp: 16 (05/06/23 1514)  BP: 126/76 (05/06/23 1514)  SpO2: 98 % (05/06/23 1514) Vital Signs (24h Range):  Temp:  [98 °F (36.7 °C)-99 °F (37.2 °C)] 99 °F (37.2 °C)  Pulse:  [] 88  Resp:  [16-20]  16  SpO2:  [89 %-98 %] 98 %  BP: (104-138)/(71-93) 126/76     Weight: 50.7 kg (111 lb 12.4 oz)  Body mass index is 18.04 kg/m².    Estimated Creatinine Clearance: 70.3 mL/min (based on SCr of 0.8 mg/dL).     Physical Exam  Constitutional:       Appearance: Normal appearance.   HENT:      Head: Normocephalic and atraumatic.   Cardiovascular:      Rate and Rhythm: Normal rate and regular rhythm.   Pulmonary:      Effort: Pulmonary effort is normal.      Breath sounds: Normal breath sounds.   Abdominal:      General: Abdomen is flat.      Palpations: Abdomen is soft.   Genitourinary:     Comments: Large circumferential hemorrhoid - no pus noted - no erythema. Difficult lightening but do not think this is a wart -  Musculoskeletal:      Cervical back: Normal range of motion and neck supple.   Neurological:      Mental Status: She is alert.        Significant Labs: All pertinent labs within the past 24 hours have been reviewed.    Significant Imaging: I have reviewed all pertinent imaging results/findings within the past 24 hours.

## 2023-05-06 NOTE — HPI
"Korin Gomez is a 46 y/o F with PMH of anxiety, depression, HIV (untreated), hypothyroidism, insomnia, presents with 3 month history abdominal pain that is worse in the past 4 days.  There is associated nausea, vomiting, rectal pain, intermittent episode of bright red blood after BM.  She denies fever, chills, lightheadedness, headaches, chest pain, diarrhea, dysuria, shortness of breath.    In the ED sodium 138, potassium 2.9, bicarb 18, H/H 12.5/36.5, WBC 4.17, UA positive, urine microscopy with few clumps of WBC, many bacteria CT abdomen/pelvis unremarkable with no acute intra-abdominal pathology.  Starting on ceftriaxone pending urine culture  During medication reconciliation, patient stated, " my identity was stolen and all that medications and not for me".     Very strange affect - vague with responses to questions and timeline makes no sense - apparently under the care of Dr. Iesha Mendoza at [facility] but has been very non-complaint - last regimen was biktarvy which she stopped because they kept stopping it - not clear what this means. Has large hemorrhoid and that is the reason for admit - workup also noted UA c/w UTI on ceftriaxone and results pending   "

## 2023-05-07 LAB
ANION GAP SERPL CALC-SCNC: 8 MMOL/L (ref 8–16)
BASOPHILS # BLD AUTO: 0.02 K/UL (ref 0–0.2)
BASOPHILS NFR BLD: 0.3 % (ref 0–1.9)
BUN SERPL-MCNC: 8 MG/DL (ref 6–20)
CALCIUM SERPL-MCNC: 7.9 MG/DL (ref 8.7–10.5)
CHLORIDE SERPL-SCNC: 109 MMOL/L (ref 95–110)
CO2 SERPL-SCNC: 18 MMOL/L (ref 23–29)
CREAT SERPL-MCNC: 0.8 MG/DL (ref 0.5–1.4)
DIFFERENTIAL METHOD: ABNORMAL
EOSINOPHIL # BLD AUTO: 0.1 K/UL (ref 0–0.5)
EOSINOPHIL NFR BLD: 1.5 % (ref 0–8)
ERYTHROCYTE [DISTWIDTH] IN BLOOD BY AUTOMATED COUNT: 14.7 % (ref 11.5–14.5)
EST. GFR  (NO RACE VARIABLE): >60 ML/MIN/1.73 M^2
GLUCOSE SERPL-MCNC: 128 MG/DL (ref 70–110)
HCT VFR BLD AUTO: 29.7 % (ref 37–48.5)
HGB BLD-MCNC: 9.7 G/DL (ref 12–16)
IMM GRANULOCYTES # BLD AUTO: 0.05 K/UL (ref 0–0.04)
IMM GRANULOCYTES NFR BLD AUTO: 0.7 % (ref 0–0.5)
LACTATE SERPL-SCNC: 0.8 MMOL/L (ref 0.5–2.2)
LYMPHOCYTES # BLD AUTO: 0.4 K/UL (ref 1–4.8)
LYMPHOCYTES NFR BLD: 5.2 % (ref 18–48)
MCH RBC QN AUTO: 27.7 PG (ref 27–31)
MCHC RBC AUTO-ENTMCNC: 32.7 G/DL (ref 32–36)
MCV RBC AUTO: 85 FL (ref 82–98)
MONOCYTES # BLD AUTO: 0.5 K/UL (ref 0.3–1)
MONOCYTES NFR BLD: 7 % (ref 4–15)
NEUTROPHILS # BLD AUTO: 5.7 K/UL (ref 1.8–7.7)
NEUTROPHILS NFR BLD: 85.3 % (ref 38–73)
NRBC BLD-RTO: 0 /100 WBC
PLATELET # BLD AUTO: 156 K/UL (ref 150–450)
PMV BLD AUTO: 11.8 FL (ref 9.2–12.9)
POTASSIUM SERPL-SCNC: 3 MMOL/L (ref 3.5–5.1)
RBC # BLD AUTO: 3.5 M/UL (ref 4–5.4)
SODIUM SERPL-SCNC: 135 MMOL/L (ref 136–145)
WBC # BLD AUTO: 6.73 K/UL (ref 3.9–12.7)

## 2023-05-07 PROCEDURE — 85025 COMPLETE CBC W/AUTO DIFF WBC: CPT | Performed by: FAMILY MEDICINE

## 2023-05-07 PROCEDURE — 87536 HIV-1 QUANT&REVRSE TRNSCRPJ: CPT | Performed by: INTERNAL MEDICINE

## 2023-05-07 PROCEDURE — 25000003 PHARM REV CODE 250: Performed by: INTERNAL MEDICINE

## 2023-05-07 PROCEDURE — 83605 ASSAY OF LACTIC ACID: CPT | Performed by: INTERNAL MEDICINE

## 2023-05-07 PROCEDURE — 11000001 HC ACUTE MED/SURG PRIVATE ROOM

## 2023-05-07 PROCEDURE — 80048 BASIC METABOLIC PNL TOTAL CA: CPT | Performed by: FAMILY MEDICINE

## 2023-05-07 PROCEDURE — 63600175 PHARM REV CODE 636 W HCPCS: Performed by: FAMILY MEDICINE

## 2023-05-07 PROCEDURE — 86361 T CELL ABSOLUTE COUNT: CPT | Performed by: INTERNAL MEDICINE

## 2023-05-07 PROCEDURE — 36415 COLL VENOUS BLD VENIPUNCTURE: CPT | Performed by: INTERNAL MEDICINE

## 2023-05-07 PROCEDURE — 25000003 PHARM REV CODE 250: Performed by: FAMILY MEDICINE

## 2023-05-07 PROCEDURE — 63600175 PHARM REV CODE 636 W HCPCS: Performed by: NURSE PRACTITIONER

## 2023-05-07 PROCEDURE — 63600175 PHARM REV CODE 636 W HCPCS: Performed by: INTERNAL MEDICINE

## 2023-05-07 PROCEDURE — G0378 HOSPITAL OBSERVATION PER HR: HCPCS

## 2023-05-07 RX ORDER — SODIUM CHLORIDE, SODIUM LACTATE, POTASSIUM CHLORIDE, CALCIUM CHLORIDE 600; 310; 30; 20 MG/100ML; MG/100ML; MG/100ML; MG/100ML
INJECTION, SOLUTION INTRAVENOUS CONTINUOUS
Status: DISCONTINUED | OUTPATIENT
Start: 2023-05-07 | End: 2023-05-07

## 2023-05-07 RX ORDER — IBUPROFEN 400 MG/1
400 TABLET ORAL EVERY 6 HOURS PRN
Status: DISCONTINUED | OUTPATIENT
Start: 2023-05-07 | End: 2023-05-12 | Stop reason: HOSPADM

## 2023-05-07 RX ORDER — SODIUM CHLORIDE, SODIUM LACTATE, POTASSIUM CHLORIDE, CALCIUM CHLORIDE 600; 310; 30; 20 MG/100ML; MG/100ML; MG/100ML; MG/100ML
INJECTION, SOLUTION INTRAVENOUS CONTINUOUS
Status: DISCONTINUED | OUTPATIENT
Start: 2023-05-07 | End: 2023-05-09

## 2023-05-07 RX ADMIN — HYPROMELLOSE 2910 2 DROP: 5 SOLUTION/ DROPS OPHTHALMIC at 08:05

## 2023-05-07 RX ADMIN — SODIUM CHLORIDE, POTASSIUM CHLORIDE, SODIUM LACTATE AND CALCIUM CHLORIDE 1000 ML: 600; 310; 30; 20 INJECTION, SOLUTION INTRAVENOUS at 04:05

## 2023-05-07 RX ADMIN — SODIUM CHLORIDE, POTASSIUM CHLORIDE, SODIUM LACTATE AND CALCIUM CHLORIDE: 600; 310; 30; 20 INJECTION, SOLUTION INTRAVENOUS at 08:05

## 2023-05-07 RX ADMIN — BICTEGRAVIR SODIUM, EMTRICITABINE, AND TENOFOVIR ALAFENAMIDE FUMARATE 1 TABLET: 50; 200; 25 TABLET ORAL at 08:05

## 2023-05-07 RX ADMIN — IBUPROFEN 400 MG: 400 TABLET ORAL at 02:05

## 2023-05-07 RX ADMIN — VANCOMYCIN HYDROCHLORIDE 750 MG: 750 INJECTION, POWDER, LYOPHILIZED, FOR SOLUTION INTRAVENOUS at 02:05

## 2023-05-07 RX ADMIN — POTASSIUM BICARBONATE 25 MEQ: 978 TABLET, EFFERVESCENT ORAL at 11:05

## 2023-05-07 RX ADMIN — CEFTRIAXONE 1 G: 1 INJECTION, POWDER, FOR SOLUTION INTRAMUSCULAR; INTRAVENOUS at 12:05

## 2023-05-07 RX ADMIN — IBUPROFEN 400 MG: 400 TABLET ORAL at 01:05

## 2023-05-07 RX ADMIN — VANCOMYCIN HYDROCHLORIDE 1250 MG: 1.25 INJECTION, POWDER, LYOPHILIZED, FOR SOLUTION INTRAVENOUS at 02:05

## 2023-05-07 RX ADMIN — SODIUM CHLORIDE, POTASSIUM CHLORIDE, SODIUM LACTATE AND CALCIUM CHLORIDE: 600; 310; 30; 20 INJECTION, SOLUTION INTRAVENOUS at 09:05

## 2023-05-07 RX ADMIN — HYDROCORTISONE: 25 CREAM TOPICAL at 08:05

## 2023-05-07 RX ADMIN — QUETIAPINE FUMARATE 50 MG: 25 TABLET ORAL at 08:05

## 2023-05-07 RX ADMIN — MIRTAZAPINE 15 MG: 7.5 TABLET ORAL at 08:05

## 2023-05-07 RX ADMIN — ACETAMINOPHEN 650 MG: 325 TABLET ORAL at 04:05

## 2023-05-07 RX ADMIN — HYDROCORTISONE: 25 CREAM TOPICAL at 09:05

## 2023-05-07 RX ADMIN — SODIUM CHLORIDE, POTASSIUM CHLORIDE, SODIUM LACTATE AND CALCIUM CHLORIDE 500 ML: 600; 310; 30; 20 INJECTION, SOLUTION INTRAVENOUS at 08:05

## 2023-05-07 RX ADMIN — POTASSIUM BICARBONATE 25 MEQ: 978 TABLET, EFFERVESCENT ORAL at 05:05

## 2023-05-07 RX ADMIN — ACETAMINOPHEN 650 MG: 325 TABLET ORAL at 12:05

## 2023-05-07 RX ADMIN — HYPROMELLOSE 2910 2 DROP: 5 SOLUTION/ DROPS OPHTHALMIC at 02:05

## 2023-05-07 NOTE — PROGRESS NOTES
Pharmacokinetic Initial Assessment: IV Vancomycin    Assessment/Plan:    Initiate intravenous vancomycin with loading dose of 1250 mg once followed by a maintenance dose of vancomycin 750mg IV every 12 hours  Desired empiric serum trough concentration is 15 to 20 mcg/mL  Draw vancomycin trough level 60 min prior to fourth dose on 5/8 at approximately 1330  Pharmacy will continue to follow and monitor vancomycin.      Please contact pharmacy at extension [phone] with any questions regarding this assessment.     Thank you for the consult,   Sydnee Aguilar       Patient brief summary:  Patricia Langley is a 47 y.o. female initiated on antimicrobial therapy with IV Vancomycin for treatment of suspected sepsis    Drug Allergies:   Review of patient's allergies indicates:   Allergen Reactions    Penicillins Other (See Comments) and Nausea And Vomiting     Trouble swallowing and vomiting.     Sulfa (sulfonamide antibiotics) Swelling     Rash (skin)^    Opioids - morphine analogues      Pt states she is not allergic to morphine      Bactrim [sulfamethoxazole-trimethoprim] Rash       Actual Body Weight:   50.7 kg    Renal Function:   Estimated Creatinine Clearance: 70.3 mL/min (based on SCr of 0.8 mg/dL).,     Dialysis Method (if applicable):  N/A    CBC (last 72 hours):  Recent Labs   Lab Result Units 05/06/23  1006   WBC K/uL 4.17   Hemoglobin g/dL 12.5   Hematocrit % 36.5*   Platelets K/uL 206   Gran % % 76.8*   Lymph % % 11.0*   Mono % % 9.8   Eosinophil % % 1.7   Basophil % % 0.2   Differential Method  Automated       Metabolic Panel (last 72 hours):  Recent Labs   Lab Result Units 05/06/23  1006 05/06/23  1008   Sodium mmol/L 138  --    Potassium mmol/L 2.9*  --    Chloride mmol/L 103  --    CO2 mmol/L 18*  --    Glucose mg/dL 90  --    Glucose, UA   --  Negative   BUN mg/dL 12  --    Creatinine mg/dL 0.8  --    Albumin g/dL 4.3  --    Total Bilirubin mg/dL 0.7  --    Alkaline Phosphatase U/L 92  --    AST U/L 62*  --     ALT U/L 59*  --        Drug levels (last 3 results):  No results for input(s): VANCOMYCINRA, VANCORANDOM, VANCOMYCINPE, VANCOPEAK, VANCOMYCINTR, VANCOTROUGH in the last 72 hours.    Microbiologic Results:  Microbiology Results (last 7 days)       Procedure Component Value Units Date/Time    Blood culture #1 **CANNOT BE ORDERED STAT** [[id number]] Collected: 05/06/23 1143    Order Status: Completed Specimen: Blood Updated: 05/07/23 0115     Blood Culture, Routine No Growth to date    Blood culture #2 **CANNOT BE ORDERED STAT** [[id number]] Collected: 05/06/23 1143    Order Status: Completed Specimen: Blood Updated: 05/07/23 0115     Blood Culture, Routine No Growth to date    Urine culture [[id number]] Collected: 05/06/23 1008    Order Status: No result Specimen: Urine Updated: 05/06/23 1058

## 2023-05-07 NOTE — SUBJECTIVE & OBJECTIVE
Interval History: awake and alert, reported hypotension overnight that improved with fluid resuscitation   Will consult psych for med eval in AM  Hypokalemia- replace  Appreciate ID recsstart-  biktarvy 1 PO qd - ID will start this and get HIV VL and CD4 cell count and ceftriaxone for UTI      Review of Systems   Constitutional:  Negative for activity change and fever.   HENT:  Negative for congestion.    Eyes:  Negative for photophobia and visual disturbance.   Respiratory:  Negative for shortness of breath.    Gastrointestinal:  Positive for abdominal pain. Negative for blood in stool, nausea and vomiting.   Endocrine: Negative for polyphagia.   Genitourinary:  Negative for difficulty urinating and dysuria.   Skin:  Negative for color change and wound.   Neurological:  Negative for headaches.   Psychiatric/Behavioral:  Negative for agitation.    Objective:     Vital Signs (Most Recent):  Temp: 98.2 °F (36.8 °C) (05/07/23 1520)  Pulse: 94 (05/07/23 1552)  Resp: 16 (05/07/23 1520)  BP: 100/62 (05/07/23 1520)  SpO2: 96 % (05/07/23 1520) Vital Signs (24h Range):  Temp:  [97.7 °F (36.5 °C)-102.8 °F (39.3 °C)] 98.2 °F (36.8 °C)  Pulse:  [] 94  Resp:  [16-19] 16  SpO2:  [96 %-100 %] 96 %  BP: ()/(54-82) 100/62     Weight: 50.7 kg (111 lb 12.4 oz)  Body mass index is 18.04 kg/m².    Intake/Output Summary (Last 24 hours) at 5/7/2023 1647  Last data filed at 5/7/2023 1250  Gross per 24 hour   Intake 120 ml   Output --   Net 120 ml         Physical Exam  HENT:      Head: Normocephalic and atraumatic.   Cardiovascular:      Rate and Rhythm: Normal rate.   Pulmonary:      Effort: Pulmonary effort is normal.      Breath sounds: No rales.   Abdominal:      General: Abdomen is flat. Bowel sounds are normal.      Tenderness: There is abdominal tenderness in the suprapubic area. There is no right CVA tenderness or left CVA tenderness.   Musculoskeletal:         General: Normal range of motion.      Cervical back: Normal  range of motion.      Right lower leg: No edema.      Left lower leg: No edema.   Skin:     General: Skin is warm.   Neurological:      Mental Status: She is alert and oriented to person, place, and time.   Psychiatric:         Mood and Affect: Mood normal.         Behavior: Behavior normal.           Significant Labs: A1C: No results for input(s): HGBA1C in the last 4320 hours.  ABGs: No results for input(s): PH, PCO2, HCO3, POCSATURATED, BE, TOTALHB, COHB, METHB, O2HB, POCFIO2, PO2 in the last 48 hours.  Blood Culture:   Recent Labs   Lab 05/06/23  1143   LABBLOO No Growth to date  No Growth to date  No Growth to date  No Growth to date     CBC:   Recent Labs   Lab 05/06/23  1006 05/07/23  0335   WBC 4.17 6.73   HGB 12.5 9.7*   HCT 36.5* 29.7*    156     CMP:   Recent Labs   Lab 05/06/23  1006 05/07/23  0335    135*   K 2.9* 3.0*    109   CO2 18* 18*   GLU 90 128*   BUN 12 8   CREATININE 0.8 0.8   CALCIUM 9.6 7.9*   PROT 9.5*  --    ALBUMIN 4.3  --    BILITOT 0.7  --    ALKPHOS 92  --    AST 62*  --    ALT 59*  --    ANIONGAP 17* 8     Lactic Acid:   Recent Labs   Lab 05/07/23  0440   LACTATE 0.8     Lipase:   Recent Labs   Lab 05/06/23  1006   LIPASE 98*     Lipid Panel: No results for input(s): CHOL, HDL, LDLCALC, TRIG, CHOLHDL in the last 48 hours.  Magnesium: No results for input(s): MG in the last 48 hours.  Troponin: No results for input(s): TROPONINI, TROPONINIHS in the last 48 hours.  TSH:   Recent Labs   Lab 05/06/23  1006   TSH 1.692     Urine Culture: No results for input(s): LABURIN in the last 48 hours.  Urine Studies:   Recent Labs   Lab 05/06/23  1008   COLORU Yellow   APPEARANCEUA Hazy*   PHUR 6.0   SPECGRAV 1.025   PROTEINUA 1+*   GLUCUA Negative   KETONESU Trace*   BILIRUBINUA Negative   OCCULTUA Trace*   NITRITE Positive*   UROBILINOGEN Negative   LEUKOCYTESUR 3+*   RBCUA 5*   WBCUA >100*   BACTERIA Many*   SQUAMEPITHEL 8   HYALINECASTS 0       Significant Imaging: I have  reviewed all pertinent imaging results/findings within the past 24 hours.

## 2023-05-07 NOTE — PLAN OF CARE
Pt vitals were not maintained, pt temp had spike twice. 102.8  and then  102.7 , pt was given tylenol for the first spike MD was notified and then motrin and tylenol for the second one. Ice packs applied   Pt was warm, red, and have chills. BP dropped as well. MD came to bedside to assess pt. Pt is also lethargic. Bolus given, lactic acid taken. Pt is Ox4. New iv started. Pt new temp was 100.2. [facility]   Problem: Adult Inpatient Plan of Car    Goal: Optimal Comfort and Wellbeing  Outcome: Ongoing, Progressing     Problem: UTI (Urinary Tract Infection)  Goal: Improved Infection Symptoms  Outcome: Ongoing, Progressing     Problem: Behavioral Health Comorbidity  Goal: Maintenance of Behavioral Health Symptom Control  Outcome: Ongoing, Progressing

## 2023-05-07 NOTE — CODE/ RAPID DOCUMENTATION
"RAPID RESPONSE NURSE PROACTIVE ROUNDING NOTE     Time of Visit: N/A    Admit Date: 2023  LOS: 0  Code Status: Full Code   Date of Visit: 2023  : 1975  Age: 47 y.o.  Sex: female  Race: White  Bed: K532/K532 A:   MRN: [id number]  Was the patient discharged from an ICU this admission? No   Was the patient discharged from a PACU within last 24 hours?  No  Did the patient receive conscious sedation/general anesthesia in last 24 hours?  No  Was the patient in the ED within the past 24 hours?  Yes  Was the patient started on NIPPV within the past 24 hours?  No  Attending Physician: Juhi Fregoso*  Primary Service: Networked reference to record PCT     ASSESSMENT     Notified by Epic patient alert.  Reason for alert: MEWS 4    Diagnosis: Pyelonephritis    Abnormal Vital Signs: /82 (BP Location: Right arm, Patient Position: Lying)   Pulse 108   Temp (!) 102.8 °F (39.3 °C) (Oral)   Resp 16   Ht 5' 6" (1.676 m)   Wt 50.7 kg (111 lb 12.4 oz)   SpO2 100%   Breastfeeding No   BMI 18.04 kg/m²      Clinical Issues:  Pyelonephritis    Patient  has a past medical history of Allergy, Anxiety, Cervical dysplasia, Depression, HIV infection, Hyperthyroidism, and Insomnia.      Chart reviewed due to MEWS score of 4. Pt febrile to 102.8 oral. Tylenol and ibuprofen already given. VS otherwise stable. Will continue to monitor.     FOLLOW-UP     Call back the Rapid Response Nurse, ANGELI OROZCO RN at [facility] Phone: [phone] for additional questions or concerns.         "

## 2023-05-07 NOTE — ASSESSMENT & PLAN NOTE
UA positive  Urine microscopy positive   Start ceftriaxone, pending urine culture  Consult ID: agree with ceftriaxone for now for UTI

## 2023-05-07 NOTE — ASSESSMENT & PLAN NOTE
She admits she is been offered surgery for hemorrhoids but has deferred    Sitz bath   Anusol HC   Needs colon-rectal surgery consult for large hemorrhoid - may need banding - not clear if this service is available at [place] or not - consider transfer to main campus if they h ave CRS otherwise [facility] has CRS services

## 2023-05-07 NOTE — PLAN OF CARE
Pt is AAOx3. Pt given medications as ordered per MAR. IV antibiotics given as scheduled. IVFs infusing continuously. PRN Ibuprofen given for headache. Cardiac monitoring in place. Safety maintained. Bed alarm set. Instructed to use call light for assistance. Will continue to monitor.

## 2023-05-07 NOTE — PROGRESS NOTES
RAPID RESPONSE NURSE PROACTIVE ROUNDING NOTE       Time of Visit: 820    Admit Date: 2023  LOS: 0  Code Status: Full Code   Date of Visit: 2023  : 1975  Age: 47 y.o.  Sex: female  Race: White  Bed: K532/K532 A:   MRN: [id number]  Was the patient discharged from an ICU this admission? No   Was the patient discharged from a PACU within last 24 hours? No   Did the patient receive conscious sedation/general anesthesia in last 24 hours? No   Was the patient in the ED within the past 24 hours? Yes   Was the patient on NIPPV within the past 24 hours? No   Attending Physician: Juhi Fregoso*  Primary Service: Hospitalist,Hospice and Palliative Medicine   Time spent at the bedside: < 15 min    SITUATION    Notified by previous RRN during handoff  Reason for alert: hypotension    Diagnosis: Pyelonephritis   has a past medical history of Allergy, Anxiety, Cervical dysplasia, Depression, HIV infection, Hyperthyroidism, and Insomnia.    Last Vitals:  Temp: 98.2 °F (36.8 °C) (809)  Pulse: 90 (809)  Resp: 16 (809)  BP: 86/54 (809)  SpO2: 96 % (809)    24 Hour Vitals Range:  Temp:  [98 °F (36.7 °C)-102.8 °F (39.3 °C)]   Pulse:  []   Resp:  [16-20]   BP: ()/(54-93)   SpO2:  [89 %-100 %]     Clinical Issues:  hypotension    ASSESSMENT/INTERVENTIONS    Rounded on patient. Patient in bed, alert and oriented. Denies any SOB or chills. Afebrile this AM. Slightly hypotensive. LR bolus still infusing. Complaints of some rectal bleeding.     RECOMMENDATIONS  Recheck BP after bolus completed    PROVIDER ESCALATION    Physician escalation: No    Disposition:Remain in room 532    FOLLOW UP    Call back the Rapid Response NurseTammy RN at [phone] for additional questions or concerns.

## 2023-05-07 NOTE — CODE/ RAPID DOCUMENTATION
Rapid Response Nurse Follow-up Note     Followed up with patient for proactive rounding.   Notified by bedside RN of BP 93/54. Pt remains febrile, ice packs applied. Primary team notified by bedside RN. Lactic acid drawn. Order for fluid bolus received.  Reviewed plan of care with bedside RNMercedes .   Team will continue to follow.  Please call Rapid Response RN, ANGELI OROZCO RN with any questions or concerns at N Phone: [phone].

## 2023-05-07 NOTE — NURSING
Dr. Cordoba notified that patient's BP this morning 86/54. MD changed fluids to continuous LR at 150. MD also notified that patient stated she had some blood in her stool as well.

## 2023-05-07 NOTE — ASSESSMENT & PLAN NOTE
At risk for opportunistic infections  This patient in known to have AIDS (from CD4 count has been less than 200). Labs reviewed- No results found for: CD4, No results found for: HIVDNAPCR. Patient is not on HAART. Will start HAART. Prophylaxis was offered and declined by the patient at this time- . Continue to monitor routine labs. Last CD4 count was   Lab Results   Component Value Date    ABSOLUTECD4 134 (L) 07/27/2022       We will consult Infectious disease at this time. Other HIV-associated diseases are not present.    Appreciate ID recs resume Biktarvy, CD4 count and HIV viral load

## 2023-05-07 NOTE — PROGRESS NOTES
"[facility]  Progress Note    Patient Name: Patricia Langley  MRN: [id number]  Patient Class: OP- Observation   Admission Date: 5/6/2023  Length of Stay: 0 days  Attending Physician: Juhi Fregoso*  Primary Care Provider: Cornell Britt MD        Subjective:     Principal Problem:Pyelonephritis        HPI:  Korin Gomez is a 46 y/o F with PMH of anxiety, depression, HIV (untreated), hypothyroidism, insomnia, presents with 3 month history abdominal pain that is worse in the past 4 days.  There is associated nausea, vomiting, rectal pain, intermittent episode of bright red blood after BM.  She denies fever, chills, lightheadedness, headaches, chest pain, diarrhea, dysuria, shortness of breath.    In the ED sodium 138, potassium 2.9, bicarb 18, H/H 12.5/36.5, WBC 4.17, UA positive, urine microscopy with few clumps of WBC, many bacteria CT abdomen/pelvis unremarkable with no acute intra-abdominal pathology.  Starting on ceftriaxone pending urine culture  During medication reconciliation, patient stated, " my identity was stolen and all that medications and not for me".  Admit hospital medicine observation      Overview/Hospital Course:  No notes on file    Interval History: awake and alert, reported hypotension overnight that improved with fluid resuscitation   Will consult psych for med eval in AM  Hypokalemia- replace  Appreciate ID recsstart-  biktarvy 1 PO qd - ID will start this and get HIV VL and CD4 cell count and ceftriaxone for UTI      Review of Systems   Constitutional:  Negative for activity change and fever.   HENT:  Negative for congestion.    Eyes:  Negative for photophobia and visual disturbance.   Respiratory:  Negative for shortness of breath.    Gastrointestinal:  Positive for abdominal pain. Negative for blood in stool, nausea and vomiting.   Endocrine: Negative for polyphagia.   Genitourinary:  Negative for difficulty urinating and dysuria.   Skin:  Negative for " color change and wound.   Neurological:  Negative for headaches.   Psychiatric/Behavioral:  Negative for agitation.    Objective:     Vital Signs (Most Recent):  Temp: 98.2 °F (36.8 °C) (05/07/23 1520)  Pulse: 94 (05/07/23 1552)  Resp: 16 (05/07/23 1520)  BP: 100/62 (05/07/23 1520)  SpO2: 96 % (05/07/23 1520) Vital Signs (24h Range):  Temp:  [97.7 °F (36.5 °C)-102.8 °F (39.3 °C)] 98.2 °F (36.8 °C)  Pulse:  [] 94  Resp:  [16-19] 16  SpO2:  [96 %-100 %] 96 %  BP: ()/(54-82) 100/62     Weight: 50.7 kg (111 lb 12.4 oz)  Body mass index is 18.04 kg/m².    Intake/Output Summary (Last 24 hours) at 5/7/2023 1647  Last data filed at 5/7/2023 1250  Gross per 24 hour   Intake 120 ml   Output --   Net 120 ml         Physical Exam  HENT:      Head: Normocephalic and atraumatic.   Cardiovascular:      Rate and Rhythm: Normal rate.   Pulmonary:      Effort: Pulmonary effort is normal.      Breath sounds: No rales.   Abdominal:      General: Abdomen is flat. Bowel sounds are normal.      Tenderness: There is abdominal tenderness in the suprapubic area. There is no right CVA tenderness or left CVA tenderness.   Musculoskeletal:         General: Normal range of motion.      Cervical back: Normal range of motion.      Right lower leg: No edema.      Left lower leg: No edema.   Skin:     General: Skin is warm.   Neurological:      Mental Status: She is alert and oriented to person, place, and time.   Psychiatric:         Mood and Affect: Mood normal.         Behavior: Behavior normal.           Significant Labs: A1C: No results for input(s): HGBA1C in the last 4320 hours.  ABGs: No results for input(s): PH, PCO2, HCO3, POCSATURATED, BE, TOTALHB, COHB, METHB, O2HB, POCFIO2, PO2 in the last 48 hours.  Blood Culture:   Recent Labs   Lab 05/06/23  1143   LABBLOO No Growth to date  No Growth to date  No Growth to date  No Growth to date     CBC:   Recent Labs   Lab 05/06/23  1006 05/07/23  0335   WBC 4.17 6.73   HGB 12.5 9.7*    HCT 36.5* 29.7*    156     CMP:   Recent Labs   Lab 05/06/23  1006 05/07/23  0335    135*   K 2.9* 3.0*    109   CO2 18* 18*   GLU 90 128*   BUN 12 8   CREATININE 0.8 0.8   CALCIUM 9.6 7.9*   PROT 9.5*  --    ALBUMIN 4.3  --    BILITOT 0.7  --    ALKPHOS 92  --    AST 62*  --    ALT 59*  --    ANIONGAP 17* 8     Lactic Acid:   Recent Labs   Lab 05/07/23  0440   LACTATE 0.8     Lipase:   Recent Labs   Lab 05/06/23  1006   LIPASE 98*     Lipid Panel: No results for input(s): CHOL, HDL, LDLCALC, TRIG, CHOLHDL in the last 48 hours.  Magnesium: No results for input(s): MG in the last 48 hours.  Troponin: No results for input(s): TROPONINI, TROPONINIHS in the last 48 hours.  TSH:   Recent Labs   Lab 05/06/23  1006   TSH 1.692     Urine Culture: No results for input(s): LABURIN in the last 48 hours.  Urine Studies:   Recent Labs   Lab 05/06/23  1008   COLORU Yellow   APPEARANCEUA Hazy*   PHUR 6.0   SPECGRAV 1.025   PROTEINUA 1+*   GLUCUA Negative   KETONESU Trace*   BILIRUBINUA Negative   OCCULTUA Trace*   NITRITE Positive*   UROBILINOGEN Negative   LEUKOCYTESUR 3+*   RBCUA 5*   WBCUA >100*   BACTERIA Many*   SQUAMEPITHEL 8   HYALINECASTS 0       Significant Imaging: I have reviewed all pertinent imaging results/findings within the past 24 hours.      Assessment/Plan:      * Pyelonephritis  UA positive  Urine microscopy positive   Start ceftriaxone, pending urine culture  Consult ID: agree with ceftriaxone for now for UTI     Hemorrhoid   She admits she is been offered surgery for hemorrhoids but has deferred    Sitz bath   Anusol HC   Needs colon-rectal surgery consult for large hemorrhoid - may need banding - not clear if this service is available at [place] or not - consider transfer to main campus if they h ave CRS otherwise [facility] has CRS services    Hypokalemia  Replace      Anxiety disorder        Insomnia  Continue mirtazapine      Bipolar 1 disorder  Anxiety disorder  Major depressive disorder in  remission  Schizoaffective disorder, bipolar type   BuSpar, Depakote, Haldol, lithium, Seroquel, Restoril- patient denied taking any of this meds, stating her identity was stolen and this meds are not for her  Consult psych      AIDS  At risk for opportunistic infections  This patient in known to have AIDS (from CD4 count has been less than 200). Labs reviewed- No results found for: CD4, No results found for: HIVDNAPCR. Patient is not on HAART. Will start HAART. Prophylaxis was offered and declined by the patient at this time- . Continue to monitor routine labs. Last CD4 count was   Lab Results   Component Value Date    ABSOLUTECD4 134 (L) 07/27/2022       We will consult Infectious disease at this time. Other HIV-associated diseases are not present.    Appreciate ID recs resume Biktarvy, CD4 count and HIV viral load      VTE Risk Mitigation (From admission, onward)         Ordered     IP VTE HIGH RISK PATIENT  Once         05/06/23 1244     Place sequential compression device  Until discontinued         05/06/23 1244                Discharge Planning   RUFINA:      Code Status: Full Code   Is the patient medically ready for discharge?:     Reason for patient still in hospital (select all that apply): Patient trending condition               Juhi Fregoso MD  Department of Hospital Medicine   [facility]

## 2023-05-08 ENCOUNTER — CLINICAL SUPPORT (OUTPATIENT)
Dept: SMOKING CESSATION | Facility: CLINIC | Age: 48
End: 2023-05-08
Payer: COMMERCIAL

## 2023-05-08 ENCOUNTER — PATIENT OUTREACH (OUTPATIENT)
Dept: ADMINISTRATIVE | Facility: HOSPITAL | Age: 48
End: 2023-05-08
Payer: COMMERCIAL

## 2023-05-08 ENCOUNTER — TELEPHONE (OUTPATIENT)
Dept: PRIMARY CARE CLINIC | Facility: CLINIC | Age: 48
End: 2023-05-08
Payer: COMMERCIAL

## 2023-05-08 DIAGNOSIS — F17.210 CIGARETTE SMOKER: Primary | ICD-10-CM

## 2023-05-08 LAB
ANION GAP SERPL CALC-SCNC: 8 MMOL/L (ref 8–16)
BACTERIA UR CULT: ABNORMAL
BASOPHILS # BLD AUTO: 0.02 K/UL (ref 0–0.2)
BASOPHILS NFR BLD: 0.4 % (ref 0–1.9)
BUN SERPL-MCNC: 4 MG/DL (ref 6–20)
CALCIUM SERPL-MCNC: 8 MG/DL (ref 8.7–10.5)
CD3+CD4+ CELLS # BLD: 10 CELLS/UL (ref 300–1400)
CD3+CD4+ CELLS NFR BLD: 4.4 % (ref 28–57)
CHLORIDE SERPL-SCNC: 111 MMOL/L (ref 95–110)
CO2 SERPL-SCNC: 21 MMOL/L (ref 23–29)
CREAT SERPL-MCNC: 0.8 MG/DL (ref 0.5–1.4)
DIFFERENTIAL METHOD: ABNORMAL
EOSINOPHIL # BLD AUTO: 0.2 K/UL (ref 0–0.5)
EOSINOPHIL NFR BLD: 3.3 % (ref 0–8)
ERYTHROCYTE [DISTWIDTH] IN BLOOD BY AUTOMATED COUNT: 14.9 % (ref 11.5–14.5)
EST. GFR  (NO RACE VARIABLE): >60 ML/MIN/1.73 M^2
GLUCOSE SERPL-MCNC: 137 MG/DL (ref 70–110)
HCT VFR BLD AUTO: 27.1 % (ref 37–48.5)
HGB BLD-MCNC: 9.2 G/DL (ref 12–16)
HIV1 RNA # SERPL NAA+PROBE: ABNORMAL COPIES/ML
HIV1 RNA SERPL NAA+PROBE-LOG#: 5.78 LOG COPIES/ML
HIV1 RNA SERPL QL NAA+PROBE: DETECTED
IMM GRANULOCYTES # BLD AUTO: 0.04 K/UL (ref 0–0.04)
IMM GRANULOCYTES NFR BLD AUTO: 0.9 % (ref 0–0.5)
LYMPHOCYTES # BLD AUTO: 0.3 K/UL (ref 1–4.8)
LYMPHOCYTES NFR BLD: 5.7 % (ref 18–48)
MCH RBC QN AUTO: 28.1 PG (ref 27–31)
MCHC RBC AUTO-ENTMCNC: 33.9 G/DL (ref 32–36)
MCV RBC AUTO: 83 FL (ref 82–98)
MONOCYTES # BLD AUTO: 0.2 K/UL (ref 0.3–1)
MONOCYTES NFR BLD: 5.1 % (ref 4–15)
NEUTROPHILS # BLD AUTO: 3.8 K/UL (ref 1.8–7.7)
NEUTROPHILS NFR BLD: 84.6 % (ref 38–73)
NRBC BLD-RTO: 0 /100 WBC
PLATELET # BLD AUTO: 133 K/UL (ref 150–450)
PMV BLD AUTO: 10.8 FL (ref 9.2–12.9)
POTASSIUM SERPL-SCNC: 3.2 MMOL/L (ref 3.5–5.1)
RBC # BLD AUTO: 3.27 M/UL (ref 4–5.4)
SODIUM SERPL-SCNC: 140 MMOL/L (ref 136–145)
VANCOMYCIN TROUGH SERPL-MCNC: 9.8 UG/ML (ref 10–22)
WBC # BLD AUTO: 4.54 K/UL (ref 3.9–12.7)

## 2023-05-08 PROCEDURE — 25000003 PHARM REV CODE 250: Performed by: FAMILY MEDICINE

## 2023-05-08 PROCEDURE — 36415 COLL VENOUS BLD VENIPUNCTURE: CPT | Performed by: FAMILY MEDICINE

## 2023-05-08 PROCEDURE — S4991 NICOTINE PATCH NONLEGEND: HCPCS | Performed by: FAMILY MEDICINE

## 2023-05-08 PROCEDURE — 63600175 PHARM REV CODE 636 W HCPCS: Performed by: FAMILY MEDICINE

## 2023-05-08 PROCEDURE — 80048 BASIC METABOLIC PNL TOTAL CA: CPT | Performed by: FAMILY MEDICINE

## 2023-05-08 PROCEDURE — 99406 PT REFUSED TOBACCO CESSATION: ICD-10-PCS | Mod: ,,,

## 2023-05-08 PROCEDURE — 99406 BEHAV CHNG SMOKING 3-10 MIN: CPT | Mod: ,,,

## 2023-05-08 PROCEDURE — 85025 COMPLETE CBC W/AUTO DIFF WBC: CPT | Performed by: FAMILY MEDICINE

## 2023-05-08 PROCEDURE — 25000003 PHARM REV CODE 250: Performed by: INTERNAL MEDICINE

## 2023-05-08 PROCEDURE — 80202 ASSAY OF VANCOMYCIN: CPT | Performed by: FAMILY MEDICINE

## 2023-05-08 PROCEDURE — 11000001 HC ACUTE MED/SURG PRIVATE ROOM

## 2023-05-08 RX ORDER — NICOTINE 7MG/24HR
1 PATCH, TRANSDERMAL 24 HOURS TRANSDERMAL DAILY
Status: DISCONTINUED | OUTPATIENT
Start: 2023-05-08 | End: 2023-05-10 | Stop reason: DRUGHIGH

## 2023-05-08 RX ORDER — ATOVAQUONE 750 MG/5ML
1500 SUSPENSION ORAL DAILY
Status: DISCONTINUED | OUTPATIENT
Start: 2023-05-08 | End: 2023-05-12 | Stop reason: HOSPADM

## 2023-05-08 RX ADMIN — CEFTRIAXONE 1 G: 1 INJECTION, POWDER, FOR SOLUTION INTRAMUSCULAR; INTRAVENOUS at 11:05

## 2023-05-08 RX ADMIN — HYDROCORTISONE: 25 CREAM TOPICAL at 09:05

## 2023-05-08 RX ADMIN — HYPROMELLOSE 2910 2 DROP: 5 SOLUTION/ DROPS OPHTHALMIC at 02:05

## 2023-05-08 RX ADMIN — ACETAMINOPHEN 650 MG: 325 TABLET ORAL at 02:05

## 2023-05-08 RX ADMIN — ATOVAQUONE 1500 MG: 750 SUSPENSION ORAL at 02:05

## 2023-05-08 RX ADMIN — IBUPROFEN 400 MG: 400 TABLET ORAL at 02:05

## 2023-05-08 RX ADMIN — POTASSIUM BICARBONATE 25 MEQ: 978 TABLET, EFFERVESCENT ORAL at 05:05

## 2023-05-08 RX ADMIN — HYPROMELLOSE 2910 2 DROP: 5 SOLUTION/ DROPS OPHTHALMIC at 08:05

## 2023-05-08 RX ADMIN — HYDROCORTISONE: 25 CREAM TOPICAL at 08:05

## 2023-05-08 RX ADMIN — SODIUM CHLORIDE, POTASSIUM CHLORIDE, SODIUM LACTATE AND CALCIUM CHLORIDE: 600; 310; 30; 20 INJECTION, SOLUTION INTRAVENOUS at 02:05

## 2023-05-08 RX ADMIN — HYPROMELLOSE 2910 2 DROP: 5 SOLUTION/ DROPS OPHTHALMIC at 09:05

## 2023-05-08 RX ADMIN — VANCOMYCIN HYDROCHLORIDE 750 MG: 750 INJECTION, POWDER, LYOPHILIZED, FOR SOLUTION INTRAVENOUS at 03:05

## 2023-05-08 RX ADMIN — QUETIAPINE FUMARATE 50 MG: 25 TABLET ORAL at 09:05

## 2023-05-08 RX ADMIN — BICTEGRAVIR SODIUM, EMTRICITABINE, AND TENOFOVIR ALAFENAMIDE FUMARATE 1 TABLET: 50; 200; 25 TABLET ORAL at 09:05

## 2023-05-08 RX ADMIN — ACETAMINOPHEN 650 MG: 325 TABLET ORAL at 08:05

## 2023-05-08 RX ADMIN — NICOTINE 1 PATCH: 7 PATCH, EXTENDED RELEASE TRANSDERMAL at 12:05

## 2023-05-08 RX ADMIN — POTASSIUM BICARBONATE 25 MEQ: 978 TABLET, EFFERVESCENT ORAL at 02:05

## 2023-05-08 NOTE — PROGRESS NOTES
"[facility] Medicine  Progress Note    Patient Name: Patricia Langley  MRN: [id number]  Patient Class: OP- Observation   Admission Date: 5/6/2023  Length of Stay: 0 days  Attending Physician: Juhi Fregoso*  Primary Care Provider: Cornell Britt MD        Subjective:     Principal Problem:Pyelonephritis        HPI:  Korin Gomez is a 46 y/o F with PMH of anxiety, depression, HIV (untreated), hypothyroidism, insomnia, presents with 3 month history abdominal pain that is worse in the past 4 days.  There is associated nausea, vomiting, rectal pain, intermittent episode of bright red blood after BM.  She denies fever, chills, lightheadedness, headaches, chest pain, diarrhea, dysuria, shortness of breath.    In the ED sodium 138, potassium 2.9, bicarb 18, H/H 12.5/36.5, WBC 4.17, UA positive, urine microscopy with few clumps of WBC, many bacteria CT abdomen/pelvis unremarkable with no acute intra-abdominal pathology.  Starting on ceftriaxone pending urine culture  During medication reconciliation, patient stated, " my identity was stolen and all that medications and not for me".  Admit hospital medicine observation      Overview/Hospital Course:  No notes on file    Interval History: awake and alert, reports generalized malaise,   Urine cx with GNR continue ceftriaxone pending sensitivity    Await psych consult  Hypokalemia- replace  CD4 10, ,503- continue Biktarvy per ID rec's      Review of Systems   Constitutional:  Negative for activity change and fever.   HENT:  Negative for congestion.    Respiratory:  Negative for shortness of breath.    Gastrointestinal:  Negative for abdominal pain, blood in stool, nausea and vomiting.   Genitourinary:  Negative for difficulty urinating and dysuria.   Skin:  Negative for color change and wound.   Neurological:  Negative for headaches.   Psychiatric/Behavioral:  Negative for agitation.    Objective:     Vital Signs (Most Recent):  Temp: 98.2 °F " (36.8 °C) (05/08/23 1124)  Pulse: 85 (05/08/23 1230)  Resp: 18 (05/08/23 1124)  BP: 102/68 (05/08/23 1124)  SpO2: 99 % (05/08/23 0827) Vital Signs (24h Range):  Temp:  [98.2 °F (36.8 °C)-99.8 °F (37.7 °C)] 98.2 °F (36.8 °C)  Pulse:  [77-99] 85  Resp:  [16-18] 18  SpO2:  [96 %-99 %] 99 %  BP: ()/(54-76) 102/68     Weight: 54.1 kg (119 lb 4.3 oz)  Body mass index is 19.25 kg/m².    Intake/Output Summary (Last 24 hours) at 5/8/2023 1432  Last data filed at 5/7/2023 2343  Gross per 24 hour   Intake --   Output 700 ml   Net -700 ml           Physical Exam  HENT:      Head: Normocephalic and atraumatic.   Cardiovascular:      Rate and Rhythm: Normal rate.   Pulmonary:      Effort: Pulmonary effort is normal.      Breath sounds: No rales.   Abdominal:      General: Abdomen is flat. Bowel sounds are normal.      Tenderness: There is abdominal tenderness in the suprapubic area. There is no right CVA tenderness or left CVA tenderness.   Musculoskeletal:         General: Normal range of motion.      Cervical back: Normal range of motion.      Right lower leg: No edema.      Left lower leg: No edema.   Skin:     General: Skin is warm.   Neurological:      Mental Status: She is alert and oriented to person, place, and time.   Psychiatric:         Mood and Affect: Mood normal.         Behavior: Behavior normal.           Significant Labs: A1C: No results for input(s): HGBA1C in the last 4320 hours.  ABGs: No results for input(s): PH, PCO2, HCO3, POCSATURATED, BE, TOTALHB, COHB, METHB, O2HB, POCFIO2, PO2 in the last 48 hours.  Blood Culture:   No results for input(s): LABBLOO in the last 48 hours.    CBC:   Recent Labs   Lab 05/07/23  0335 05/08/23  0455   WBC 6.73 4.54   HGB 9.7* 9.2*   HCT 29.7* 27.1*    133*       CMP:   Recent Labs   Lab 05/07/23  0335 05/08/23  0455   * 140   K 3.0* 3.2*    111*   CO2 18* 21*   * 137*   BUN 8 4*   CREATININE 0.8 0.8   CALCIUM 7.9* 8.0*   ANIONGAP 8 8        Lactic Acid:   Recent Labs   Lab 05/07/23  0440   LACTATE 0.8       Lipase:   No results for input(s): LIPASE in the last 48 hours.    Lipid Panel: No results for input(s): CHOL, HDL, LDLCALC, TRIG, CHOLHDL in the last 48 hours.  Magnesium: No results for input(s): MG in the last 48 hours.  Troponin: No results for input(s): TROPONINI, TROPONINIHS in the last 48 hours.  TSH:   Recent Labs   Lab 05/06/23  1006   TSH 1.692       Urine Culture: No results for input(s): LABURIN in the last 48 hours.  Urine Studies:   No results for input(s): COLORU, APPEARANCEUA, PHUR, SPECGRAV, PROTEINUA, GLUCUA, KETONESU, BILIRUBINUA, OCCULTUA, NITRITE, UROBILINOGEN, LEUKOCYTESUR, RBCUA, WBCUA, BACTERIA, SQUAMEPITHEL, HYALINECASTS in the last 48 hours.    Invalid input(s): WRIGHTSUR      Significant Imaging: I have reviewed all pertinent imaging results/findings within the past 24 hours.      Assessment/Plan:      * Pyelonephritis  UA positive  Urine microscopy positive   Start ceftriaxone, pending urine culture  Consult ID: agree with ceftriaxone for now for UTI     Hemorrhoid   She admits she is been offered surgery for hemorrhoids but has deferred    Sitz bath   Anusol HC   Needs colon-rectal surgery consult for large hemorrhoid - may need banding - not clear if this service is available at [place] or not - consider transfer to main campus if they h ave CRS otherwise [facility] has CRS services    Hypokalemia  Replace      Anxiety disorder        Insomnia  Continue mirtazapine      Bipolar 1 disorder  Anxiety disorder  Major depressive disorder in remission  Schizoaffective disorder, bipolar type   BuSpar, Depakote, Haldol, lithium, Seroquel, Restoril- patient denied taking any of this meds, stating her identity was stolen and this meds are not for her  Consult psych      AIDS  At risk for opportunistic infections  This patient in known to have AIDS (from CD4 count has been less than 200). Labs reviewed- No results found for: CD4, No  results found for: HIVDNAPCR. Patient is not on HAART. Will start HAART. Prophylaxis was offered and declined by the patient at this time- . Continue to monitor routine labs. Last CD4 count was   Lab Results   Component Value Date    ABSOLUTECD4 10 (L) 05/07/2023       We will consult Infectious disease at this time. Other HIV-associated diseases are not present.    Appreciate ID recs resume Biktarvy, CD4 count and HIV viral load  CD4 10, ,503- continue Biktarvy        VTE Risk Mitigation (From admission, onward)         Ordered     IP VTE HIGH RISK PATIENT  Once         05/06/23 1244     Place sequential compression device  Until discontinued         05/06/23 1244                Discharge Planning   RUFINA: 5/10/2023     Code Status: Full Code   Is the patient medically ready for discharge?:     Reason for patient still in hospital (select all that apply): Patient trending condition  Discharge Plan A: Home                  Juhi Fregoso MD  Department of Hospital Medicine   [facility] Surg

## 2023-05-08 NOTE — PLAN OF CARE
Problem: Adult Inpatient Plan of Care  Goal: Plan of Care Review  Outcome: Ongoing, Progressing     Chart reviewed.

## 2023-05-08 NOTE — SUBJECTIVE & OBJECTIVE
Interval History: awake and alert, reports generalized malaise,   Urine cx with GNR continue ceftriaxone pending sensitivity    Await psych consult  Hypokalemia- replace  CD4 10, ,503- continue Biktarvy per ID rec's      Review of Systems   Constitutional:  Negative for activity change and fever.   HENT:  Negative for congestion.    Respiratory:  Negative for shortness of breath.    Gastrointestinal:  Negative for abdominal pain, blood in stool, nausea and vomiting.   Genitourinary:  Negative for difficulty urinating and dysuria.   Skin:  Negative for color change and wound.   Neurological:  Negative for headaches.   Psychiatric/Behavioral:  Negative for agitation.    Objective:     Vital Signs (Most Recent):  Temp: 98.2 °F (36.8 °C) (05/08/23 1124)  Pulse: 85 (05/08/23 1230)  Resp: 18 (05/08/23 1124)  BP: 102/68 (05/08/23 1124)  SpO2: 99 % (05/08/23 0827) Vital Signs (24h Range):  Temp:  [98.2 °F (36.8 °C)-99.8 °F (37.7 °C)] 98.2 °F (36.8 °C)  Pulse:  [77-99] 85  Resp:  [16-18] 18  SpO2:  [96 %-99 %] 99 %  BP: ()/(54-76) 102/68     Weight: 54.1 kg (119 lb 4.3 oz)  Body mass index is 19.25 kg/m².    Intake/Output Summary (Last 24 hours) at 5/8/2023 1432  Last data filed at 5/7/2023 2343  Gross per 24 hour   Intake --   Output 700 ml   Net -700 ml           Physical Exam  HENT:      Head: Normocephalic and atraumatic.   Cardiovascular:      Rate and Rhythm: Normal rate.   Pulmonary:      Effort: Pulmonary effort is normal.      Breath sounds: No rales.   Abdominal:      General: Abdomen is flat. Bowel sounds are normal.      Tenderness: There is abdominal tenderness in the suprapubic area. There is no right CVA tenderness or left CVA tenderness.   Musculoskeletal:         General: Normal range of motion.      Cervical back: Normal range of motion.      Right lower leg: No edema.      Left lower leg: No edema.   Skin:     General: Skin is warm.   Neurological:      Mental Status: She is alert and oriented  to person, place, and time.   Psychiatric:         Mood and Affect: Mood normal.         Behavior: Behavior normal.           Significant Labs: A1C: No results for input(s): HGBA1C in the last 4320 hours.  ABGs: No results for input(s): PH, PCO2, HCO3, POCSATURATED, BE, TOTALHB, COHB, METHB, O2HB, POCFIO2, PO2 in the last 48 hours.  Blood Culture:   No results for input(s): LABBLOO in the last 48 hours.    CBC:   Recent Labs   Lab 05/07/23 0335 05/08/23  0455   WBC 6.73 4.54   HGB 9.7* 9.2*   HCT 29.7* 27.1*    133*       CMP:   Recent Labs   Lab 05/07/23 0335 05/08/23  0455   * 140   K 3.0* 3.2*    111*   CO2 18* 21*   * 137*   BUN 8 4*   CREATININE 0.8 0.8   CALCIUM 7.9* 8.0*   ANIONGAP 8 8       Lactic Acid:   Recent Labs   Lab 05/07/23  0440   LACTATE 0.8       Lipase:   No results for input(s): LIPASE in the last 48 hours.    Lipid Panel: No results for input(s): CHOL, HDL, LDLCALC, TRIG, CHOLHDL in the last 48 hours.  Magnesium: No results for input(s): MG in the last 48 hours.  Troponin: No results for input(s): TROPONINI, TROPONINIHS in the last 48 hours.  TSH:   Recent Labs   Lab 05/06/23  1006   TSH 1.692       Urine Culture: No results for input(s): LABURIN in the last 48 hours.  Urine Studies:   No results for input(s): COLORU, APPEARANCEUA, PHUR, SPECGRAV, PROTEINUA, GLUCUA, KETONESU, BILIRUBINUA, OCCULTUA, NITRITE, UROBILINOGEN, LEUKOCYTESUR, RBCUA, WBCUA, BACTERIA, SQUAMEPITHEL, HYALINECASTS in the last 48 hours.    Invalid input(s): WRIGHTSUR      Significant Imaging: I have reviewed all pertinent imaging results/findings within the past 24 hours.

## 2023-05-08 NOTE — PROGRESS NOTES
"[place] - [facility] S[facility]  Infectious Disease  Progress Note    Patient Name: Patricia Langley  MRN: [id number]  Admission Date: 5/6/2023  Length of Stay: 0 days  Attending Physician: Juhi Fregoso*  Primary Care Provider: Cornell Britt MD    Isolation Status: No active isolations  Assessment/Plan:      ID  * Pyelonephritis  Korin Gomez is a 48 y/o F with PMH of anxiety, depression, HIV (untreated), hypothyroidism, insomnia, presents with 3 month history abdominal pain that is worse in the past 4 days.  There is associated nausea, vomiting, rectal pain, intermittent episode of bright red blood after BM.  She denies fever, chills, lightheadedness, headaches, chest pain, diarrhea, dysuria, shortness of breath.    In the ED sodium 138, potassium 2.9, bicarb 18, H/H 12.5/36.5, WBC 4.17, UA positive, urine microscopy with few clumps of WBC, many bacteria CT abdomen/pelvis unremarkable with no acute intra-abdominal pathology.  Starting on ceftriaxone pending urine culture  During medication reconciliation, patient stated, " my identity was stolen and all that medications and not for me".     Very strange affect - vague with responses to questions and timeline makes no sense - apparently under the care of Dr. Iesha Mendoza at [facility] but has been very non-complaint - last regimen was biktarvy which she stopped because they kept stopping it - not clear what this means. Has large hemorrhoid and that is the reason for admit - workup also noted UA c/w UTI on ceftriaxone and results pending     Had admit to [facility] in 4-23 - colonoscopy showed shallow erosions but pathology was not helpful. SKin bx also not concerning   - left AMA before rectal issues could be addressed. CD4 28 (6.5%) HIV VL 971K    Keep on ceftriaxone for UTI   Add mepron for PJP prophylaxis        Anticipated Disposition:     Thank you for your consult. I will follow-up with patient. Please contact us if you have any additional questions.    Olvin Anand, " "MD  Infectious Disease  [place] - Med Surg    Subjective:     Principal Problem:Pyelonephritis    HPI: Korin Gomez is a 46 y/o F with PMH of anxiety, depression, HIV (untreated), hypothyroidism, insomnia, presents with 3 month history abdominal pain that is worse in the past 4 days.  There is associated nausea, vomiting, rectal pain, intermittent episode of bright red blood after BM.  She denies fever, chills, lightheadedness, headaches, chest pain, diarrhea, dysuria, shortness of breath.    In the ED sodium 138, potassium 2.9, bicarb 18, H/H 12.5/36.5, WBC 4.17, UA positive, urine microscopy with few clumps of WBC, many bacteria CT abdomen/pelvis unremarkable with no acute intra-abdominal pathology.  Starting on ceftriaxone pending urine culture  During medication reconciliation, patient stated, " my identity was stolen and all that medications and not for me".     Very strange affect - vague with responses to questions and timeline makes no sense - apparently under the care of Dr. Iesha Mendoza at [facility] but has been very non-complaint - last regimen was biktarvy which she stopped because they kept stopping it - not clear what this means. Has large hemorrhoid and that is the reason for admit - workup also noted UA c/w UTI on ceftriaxone and results pending     Interval History: doing OK today - still with odd responses to questions    Review of Systems  Constitutional:  Positive for activity change. Negative for appetite change, chills and fever.   HENT: Negative.     Eyes: Negative.    Cardiovascular: Negative.    Gastrointestinal:  Positive for blood in stool.   Endocrine: Negative.    Genitourinary:         Rectal pain - large hemrrhoid    Allergic/Immunologic: Positive for immunocompromised state.   Neurological: Negative.      Objective:     Vital Signs (Most Recent):  Temp: 99.1 °F (37.3 °C) (05/07/23 2348)  Pulse: 98 (05/08/23 0014)  Resp: 17 (05/07/23 2348)  BP: 115/60 (05/07/23 2348)  SpO2: 98 % " (05/07/23 2011) Vital Signs (24h Range):  Temp:  [97.7 °F (36.5 °C)-102.7 °F (39.3 °C)] 99.1 °F (37.3 °C)  Pulse:  [] 98  Resp:  [16-17] 17  SpO2:  [96 %-98 %] 98 %  BP: ()/(54-62) 115/60     Weight: 54.1 kg (119 lb 4.3 oz)  Body mass index is 19.25 kg/m².    Estimated Creatinine Clearance: 75 mL/min (based on SCr of 0.8 mg/dL).     Physical Exam       Appearance: Normal appearance.   HENT:      Head: Normocephalic and atraumatic.   Cardiovascular:      Rate and Rhythm: Normal rate and regular rhythm.   Pulmonary:      Effort: Pulmonary effort is normal.      Breath sounds: Normal breath sounds.   Abdominal:      General: Abdomen is flat.      Palpations: Abdomen is soft.   Genitourinary:     Comments: Large circumferential hemorrhoid - no pus noted - no erythema. Difficult lightening but do not think this is a wart -  Musculoskeletal:      Cervical back: Normal range of motion and neck supple.   Neurological:      Mental Status: She is alert.      Significant Labs: All pertinent labs within the past 24 hours have been reviewed.    Significant Imaging: I have reviewed all pertinent imaging results/findings within the past 24 hours.

## 2023-05-08 NOTE — PROGRESS NOTES
Smoking cessation education provided. Pt states that she started smoking at age 12, and smoked 0.5 pk/day cigarettes until approx. 1 yr ago, at which time she switched to vaping w/ nicotine. Pt states ready to quit. Ambulatory referral to Smoking Cessation clinic following hospital discharge.

## 2023-05-08 NOTE — NURSING
Pt with low BP. Pt c/o weakness and no appetite. Pt instructed not to get OOB alone for safety, verbalized understanding. Bed in lowest position, locked, and bed alarms turned on. Pt instructed to call when need, verbalized understanding. Call bell within pt's reach.     MD notified of BP. Order received for 500 cc LR bolus. Will administer bolus and reassess BP upon completion.

## 2023-05-08 NOTE — PROGRESS NOTES
"[place] - Med Surg  Infectious Disease  Progress Note    Patient Name: Patricia Langley  MRN: [id number]  Admission Date: 5/6/2023  Length of Stay: 0 days  Attending Physician: Juhi Fregoso*  Primary Care Provider: Cornell Britt MD    Isolation Status: No active isolations  Assessment/Plan:      No new Assessment & Plan notes have been filed under this hospital service since the last note was generated.  Service: Infectious Diseases      Anticipated Disposition:     Thank you for your consult. I will follow-up with patient. Please contact us if you have any additional questions.    Olvin Anand MD  Infectious Disease  [place] - Med Surg    Subjective:     Principal Problem:Pyelonephritis    HPI: Korin Gomez is a 46 y/o F with PMH of anxiety, depression, HIV (untreated), hypothyroidism, insomnia, presents with 3 month history abdominal pain that is worse in the past 4 days.  There is associated nausea, vomiting, rectal pain, intermittent episode of bright red blood after BM.  She denies fever, chills, lightheadedness, headaches, chest pain, diarrhea, dysuria, shortness of breath.    In the ED sodium 138, potassium 2.9, bicarb 18, H/H 12.5/36.5, WBC 4.17, UA positive, urine microscopy with few clumps of WBC, many bacteria CT abdomen/pelvis unremarkable with no acute intra-abdominal pathology.  Starting on ceftriaxone pending urine culture  During medication reconciliation, patient stated, " my identity was stolen and all that medications and not for me".     Very strange affect - vague with responses to questions and timeline makes no sense - apparently under the care of Dr. Iesha Mendoza at [facility] but has been very non-complaint - last regimen was biktarvy which she stopped because they kept stopping it - not clear what this means. Has large hemorrhoid and that is the reason for admit - workup also noted UA c/w UTI on ceftriaxone and results pending     No new subjective & objective note has been " filed under this hospital service since the last note was generated.

## 2023-05-08 NOTE — PLAN OF CARE
[place] [facility]  Initial Discharge Assessment       Primary Care Provider: Cornell Britt MD    Admission Diagnosis: Hypokalemia [E87.6]  Pyelonephritis [N12]  Acute cystitis with hematuria [N30.01]  History of HIV infection [B20]  Nausea and vomiting, unspecified vomiting type [R11.2]    Admission Date: 5/6/2023  Expected Discharge Date: 5/10/2023    Consult: ID & psych    Payor: MEDICAID / Plan: HE[place] BLUE [place] / Product Type: Managed Medicaid /     Extended Emergency Contact Information  Primary Emergency Contact: Allred,Elder  Address: [address] [address] [facility]  Home Phone: [phone]  Mobile Phone: [phone]  Relation: Other   needed? No  Secondary Emergency Contact: Renea Huber  Mobile Phone: [phone]  Relation: Mother  Preferred language: English   needed? No    Discharge Plan A: (P) Home  Discharge Plan B: (P) Home Health      [facility] - [place], [place] - [address] [facility]  [address] [facility] [address]  Phone: [phone] Fax: [phone]    [facility] [address] [facility]  [address] [facility] [address]  Phone: [phone] Fax: [phone]    [facility] [address] [place] [address] [address] [facility]. [place]. [facility]  [address] [facility] [place]. [address] [place] [address]  Phone: [phone] Fax: [phone]    [id number] [place] - [address] AT [facility] [address]  [address]  [facility] [place] [address]  Phone: [phone] Fax: [phone]    [facility][id number] - [place] - [address] AT [facility]  [address] [address]  Phone: [phone] Fax: [phone]      Initial Assessment (most recent)       Adult Discharge Assessment - 05/08/23 1145          Discharge Assessment    Assessment Type Discharge Planning Assessment (P)       Confirmed/corrected address, phone number and insurance Yes (P)      Confirmed Demographics Correct on Facesheet (P)      Source of Information patient (P)      Communicated RUFINA with patient/caregiver Date not available/Unable to determine (P)      People in Home friend(s) (P)    Kd Jane ([phone])    Do you expect to return to your current living situation? Yes (P)      Do you have help at home or someone to help you manage your care at home? Yes (P)      Prior to hospitilization cognitive status: Alert/Oriented (P)      Current cognitive status: Alert/Oriented (P)      Equipment Currently Used at Home none (P)      Readmission within 30 days? No (P)      Patient currently being followed by outpatient case management? No (P)      Do you currently have service(s) that help you manage your care at home? No (P)      Do you take prescription medications? Yes (P)      Do you have prescription coverage? Yes (P)      Do you have any problems affording any of your prescribed medications? No (P)      Is the patient taking medications as prescribed? no (P)      If no, which medications is patient not taking? HIV meds (P)      How do you get to doctors appointments? health plan transportation (P)      Are you on dialysis? No (P)      Do you take coumadin? No (P)      Discharge Plan A Home (P)      Discharge Plan B Home Health (P)      DME Needed Upon Discharge  none (P)      Discharge Plan discussed with: Patient (P)         Physical Activity    On average, how many days per week do you engage in moderate to strenuous exercise (like a brisk walk)? 0 days (P)      On average, how many minutes do you engage in exercise at this level? 0 min (P)         Financial Resource Strain    How hard is it for you to pay for the very basics like food, housing, medical care, and heating? Hard (P)         Housing Stability    In the last 12 months, was there a time when you were not able to pay the mortgage or rent on time? Yes (P)       In the last 12 months, was there a time when you did not have a steady place to sleep or slept in a shelter (including now)? Yes (P)         Transportation Needs    In the past 12 months, has lack of transportation kept you from medical appointments or from getting medications? No (P)      In the past 12 months, has lack of transportation kept you from meetings, work, or from getting things needed for daily living? No (P)         Food Insecurity    Within the past 12 months, you worried that your food would run out before you got the money to buy more. Sometimes true (P)      Within the past 12 months, the food you bought just didn't last and you didn't have money to get more. Sometimes true (P)         Stress    Do you feel stress - tense, restless, nervous, or anxious, or unable to sleep at night because your mind is troubled all the time - these days? Very much (P)         Social Connections    In a typical week, how many times do you talk on the phone with family, friends, or neighbors? More than three times a week (P)      How often do you get together with friends or relatives? More than three times a week (P)      How often do you attend Restoration or Zoroastrian services? Never (P)      Do you belong to any clubs or organizations such as Restoration groups, unions, fraternal or athletic groups, or school groups? No (P)      How often do you attend meetings of the clubs or organizations you belong to? Never (P)      Are you , , , , never , or living with a partner?  (P)         Alcohol Use    Q1: How often do you have a drink containing alcohol? 4 or more times a week (P)      Q2: How many drinks containing alcohol do you have on a typical day when you are drinking? 3 or 4 (P)                    1145  Patient resting quietly in bed when CM rounded. No family present. Patient was admitted with pyelonephritis & is being followed by ID and psych.    Patient lives with her  friend, Kd Lara, is independent of all ADLs, & denied the need for assistance with transportation at time of discharge.     CM updated patient's whiteboard with CM name & contact information.     1400  Message sent to the schedulers requesting a hospital follow up appointment with Dr Cornell Britt (PCP). Awaiting response.      Will continue to follow.

## 2023-05-08 NOTE — TELEPHONE ENCOUNTER
----- Message from Lauren Rob sent at 5/8/2023  2:17 PM CDT -----  Regarding: HFU  Patient is being discharged from [facility] this afternoon and is requiring a hospital follow up appointment with their Primary Care Provider in 7 days. Patient is established. I am unable to schedule an appointment in that time frame. Please schedule patient a sooner appointment and message me back so Discharge Nurse can advise patient prior to discharge.    DX:pyelonephritis      Thank you, Adriana  Physician Referral Specialist/Discharge

## 2023-05-08 NOTE — ASSESSMENT & PLAN NOTE
"Korin Gomez is a 48 y/o F with PMH of anxiety, depression, HIV (untreated), hypothyroidism, insomnia, presents with 3 month history abdominal pain that is worse in the past 4 days.  There is associated nausea, vomiting, rectal pain, intermittent episode of bright red blood after BM.  She denies fever, chills, lightheadedness, headaches, chest pain, diarrhea, dysuria, shortness of breath.    In the ED sodium 138, potassium 2.9, bicarb 18, H/H 12.5/36.5, WBC 4.17, UA positive, urine microscopy with few clumps of WBC, many bacteria CT abdomen/pelvis unremarkable with no acute intra-abdominal pathology.  Starting on ceftriaxone pending urine culture  During medication reconciliation, patient stated, " my identity was stolen and all that medications and not for me".     Very strange affect - vague with responses to questions and timeline makes no sense - apparently under the care of Dr. Iesha Mendoza at [facility] but has been very non-complaint - last regimen was biktarvy which she stopped because they kept stopping it - not clear what this means. Has large hemorrhoid and that is the reason for admit - workup also noted UA c/w UTI on ceftriaxone and results pending     Had admit to [facility] in 4-23 - colonoscopy showed shallow erosions but pathology was not helpful. SKin bx also not concerning   - left AMA before rectal issues could be addressed. CD4 28 (6.5%) HIV VL 971K    Keep on ceftriaxone for UTI   Add mepron for PJP prophylaxis  "

## 2023-05-08 NOTE — NURSING
PROACTIVE ROUNDING NOTE       Reason for alert: Follow up from day shift    Diagnosis: Pyelonephritis   has a past medical history of Allergy, Anxiety, Cervical dysplasia, Depression, HIV infection, Hyperthyroidism, and Insomnia.    Last Vitals:  Temp: 98.3 °F (36.8 °C) (05/07 2011)  Pulse: 82 (05/07 2151)  Resp: 17 (05/07 2011)  BP: 103/59 (05/07 2151)  SpO2: 98 % (05/07 2011)    24 Hour Vitals Range:  Temp:  [97.7 °F (36.5 °C)-102.8 °F (39.3 °C)]   Pulse:  []   Resp:  [16-17]   BP: ()/(54-82)   SpO2:  [96 %-100 %]     Clinical Issues: Circulatory  Chart review done, bolus given for recently. Spoke with bedside nurse Anai. Asked her to follow up with any concerns or if BP drops again.    FOLLOW UP    Call back the Rapid Response NurseManisha at [phone] for additional questions or concerns.

## 2023-05-08 NOTE — ASSESSMENT & PLAN NOTE
At risk for opportunistic infections  This patient in known to have AIDS (from CD4 count has been less than 200). Labs reviewed- No results found for: CD4, No results found for: HIVDNAPCR. Patient is not on HAART. Will start HAART. Prophylaxis was offered and declined by the patient at this time- . Continue to monitor routine labs. Last CD4 count was   Lab Results   Component Value Date    ABSOLUTECD4 10 (L) 05/07/2023       We will consult Infectious disease at this time. Other HIV-associated diseases are not present.    Appreciate ID recs resume Biktarvy, CD4 count and HIV viral load  CD4 10, ,503- continue Biktarvy

## 2023-05-08 NOTE — PROGRESS NOTES
[facility] Infectious Diseases Consult Note    Primary Attending Physician: Dr. Zenobia AGUIRRE  Consultant Attending: Dr. Kika AGUIRRE    Reason for Consult:     Untreated HIV/AIDS    Assessment/Recommendations     Patricia Langley is a 47 y.o. female with PMH of HIV/AIDS (CD4 10/4.4%), hypothyroid, anxiety, depression admitted for 3 months of worsening abdominal pain and BRB per rectum. Found to have a UTI vs pyelo with w GNR. Not on HAART for HIV treatment, previously on Biktarvy. Reports she usually only has a few days worth when she leaves the hospital and then never has any more prescription for the antiretrovirals. HIV viral load pending (in April HIV ).     -Urine culture with GNR   - speciation and sensitivities pending  - Blood Cx NGTD  - Continue Ceftriaxone for UTI, until further sensitivities result  - Continue Mepron for PJP ppx  - Continue Biktarvy    Thank you for allowing us to participate in the care of this patient. Pt seen with staff Dr. Anand. Please contact us if you have any questions regarding this consult.    Yaquelin Ryan MD  [facility] IM/Peds PGY3/Ho2  [facility]    Subjective:      Ms. Langley reports continued BRB per rectum on evaluation today. NO fevers or chills. NO difficulty breathing. Continued abdominal pain. Alos concern about getting her antiretroviral medications once she leaves the hospital, in the past she has a 'few days they give me' and then no refills/doesn't know where to get refills.      Objective:   Last 24 Hour Vital Signs:  BP  Min: 87/54  Max: 122/67  Temp  Av.7 °F (37.1 °C)  Min: 98.2 °F (36.8 °C)  Max: 99.8 °F (37.7 °C)  Pulse  Av.3  Min: 77  Max: 99  Resp  Av.3  Min: 16  Max: 18  SpO2  Av.8 %  Min: 96 %  Max: 99 %  Weight  Av.1 kg (119 lb 4.3 oz)  Min: 54.1 kg (119 lb 4.3 oz)  Max: 54.1 kg (119 lb 4.3 oz)  I/O last 3 completed shifts:  In: 120 [P.O.:120]  Out: 700 [Urine:700]    Physical Exam  Vitals reviewed.   Constitutional:        General: She is not in acute distress.     Appearance: She is ill-appearing. She is not toxic-appearing.   HENT:      Head: Normocephalic and atraumatic.   Cardiovascular:      Rate and Rhythm: Normal rate.   Pulmonary:      Effort: Pulmonary effort is normal. No respiratory distress.   Abdominal:      Palpations: Abdomen is soft.   Musculoskeletal:      Cervical back: Normal range of motion.   Skin:     General: Skin is warm and dry.   Neurological:      Mental Status: She is alert and oriented to person, place, and time.   Psychiatric:      Comments: Odd affect and speech pattern, occasionally tangental       Laboratory Results:  Trended Lab Data:  Recent Labs   Lab 05/06/23  1006 05/07/23  0335 05/08/23  0455   WBC 4.17 6.73 4.54   HGB 12.5 9.7* 9.2*   HCT 36.5* 29.7* 27.1*    156 133*   MCV 82 85 83   RDW 14.4 14.7* 14.9*    135* 140   K 2.9* 3.0* 3.2*    109 111*   CO2 18* 18* 21*   BUN 12 8 4*   GLU 90 128* 137*   PROT 9.5*  --   --    ALBUMIN 4.3  --   --    BILITOT 0.7  --   --    AST 62*  --   --    ALKPHOS 92  --   --    ALT 59*  --   --      Urinalysis:   Lab Results   Component Value Date    LABURIN (A) 05/06/2023     GRAM NEGATIVE CARMENCITA  >100,000 cfu/ml  Identification and susceptibility pending      COLORU Yellow 05/06/2023    CLARITYU Cloudy 04/12/2023    SPECGRAV 1.025 05/06/2023    NITRITE Positive (A) 05/06/2023    KETONESU Trace (A) 05/06/2023    UROBILINOGEN Negative 05/06/2023       Microbiology Data:  UrCx 5/6: GNR  BlCx 5/6: NGTD    Antimicrobials:  Ceftriaxone 5/6 - now    Other Results:    Radiology:  X-Ray Foot Complete 3 view Left    Result Date: 4/12/2023  EXAMINATION: XR FOOT COMPLETE 3 VIEW LEFT CLINICAL HISTORY: Unspecified fracture of left foot, subsequent encounter for fracture with routine healing TECHNIQUE: XR FOOT COMPLETE 3 VIEW LEFT COMPARISON: 02/25/2023 FINDINGS: Increased sclerosis and callus across 2nd through 5th metatarsal neck fractures compatible  with interval healing.  No new fractures are seen and no significant degenerative change.     See above Electronically signed by: Gordon Lopez Jr Date:    04/12/2023 Time:    14:36    CT Abdomen Pelvis With Contrast    Result Date: 5/6/2023  EXAMINATION: CT ABDOMEN PELVIS WITH CONTRAST CLINICAL HISTORY: LLQ abdominal pain; TECHNIQUE: Low dose axial CT images obtained throughout the region of the abdomen and pelvis following the administration 75 mL Omnipaque 350 intravenous contrast.  Axial, sagittal and coronal reconstructions were performed. Examination mildly degraded by patient motion artifact. COMPARISON: 07/26/2022 FINDINGS: Stable small hypoattenuating focus in the right hepatic lobe, presumed cyst.  No new focal abnormality.  Gallbladder and bile ducts are unremarkable. Spleen, pancreas, and adrenal glands appear within normal limits. Kidneys appear normal. The ureters are decompressed. Urinary bladder unremarkable.  Uterus and adnexal structures appear within normal limits. Limited assessment of the gastrointestinal tract without the use of enteric contrast.  The stomach, small bowel and colon demonstrate no evidence of obstruction or focal inflammation.  Appendix is unremarkable. No free air or free fluid identified within the abdomen or pelvis. Aorta tapers normally throughout its visualized course. Osseous structures exhibit mild degenerative changes. No fracture or focal osseous destructive lesion.     No evidence of acute intra-abdominal pathology as above. Electronically signed by: Dick Boyce MD Date:    05/06/2023 Time:    11:48

## 2023-05-08 NOTE — SUBJECTIVE & OBJECTIVE
Interval History: doing OK today - still with odd responses to questions    Review of Systems  Constitutional:  Positive for activity change. Negative for appetite change, chills and fever.   HENT: Negative.     Eyes: Negative.    Cardiovascular: Negative.    Gastrointestinal:  Positive for blood in stool.   Endocrine: Negative.    Genitourinary:         Rectal pain - large hemrrhoid    Allergic/Immunologic: Positive for immunocompromised state.   Neurological: Negative.      Objective:     Vital Signs (Most Recent):  Temp: 99.1 °F (37.3 °C) (05/07/23 2348)  Pulse: 98 (05/08/23 0014)  Resp: 17 (05/07/23 2348)  BP: 115/60 (05/07/23 2348)  SpO2: 98 % (05/07/23 2011) Vital Signs (24h Range):  Temp:  [97.7 °F (36.5 °C)-102.7 °F (39.3 °C)] 99.1 °F (37.3 °C)  Pulse:  [] 98  Resp:  [16-17] 17  SpO2:  [96 %-98 %] 98 %  BP: ()/(54-62) 115/60     Weight: 54.1 kg (119 lb 4.3 oz)  Body mass index is 19.25 kg/m².    Estimated Creatinine Clearance: 75 mL/min (based on SCr of 0.8 mg/dL).     Physical Exam       Appearance: Normal appearance.   HENT:      Head: Normocephalic and atraumatic.   Cardiovascular:      Rate and Rhythm: Normal rate and regular rhythm.   Pulmonary:      Effort: Pulmonary effort is normal.      Breath sounds: Normal breath sounds.   Abdominal:      General: Abdomen is flat.      Palpations: Abdomen is soft.   Genitourinary:     Comments: Large circumferential hemorrhoid - no pus noted - no erythema. Difficult lightening but do not think this is a wart -  Musculoskeletal:      Cervical back: Normal range of motion and neck supple.   Neurological:      Mental Status: She is alert.      Significant Labs: All pertinent labs within the past 24 hours have been reviewed.    Significant Imaging: I have reviewed all pertinent imaging results/findings within the past 24 hours.

## 2023-05-08 NOTE — PROGRESS NOTES
[facility]                   Pharmacy  Pharmacy Vancomycin/AG Sign-off    Therapy with vancomycin completed and/or consult discontinued by provider.  Pharmacy will sign off, please re-consult as needed. Thank you for allowing us to participate in this patient's care.     Bigg Virk, PharmD    [phone]

## 2023-05-09 ENCOUNTER — TELEPHONE (OUTPATIENT)
Dept: PRIMARY CARE CLINIC | Facility: CLINIC | Age: 48
End: 2023-05-09
Payer: COMMERCIAL

## 2023-05-09 ENCOUNTER — CLINICAL SUPPORT (OUTPATIENT)
Dept: SMOKING CESSATION | Facility: CLINIC | Age: 48
End: 2023-05-09
Payer: COMMERCIAL

## 2023-05-09 DIAGNOSIS — F17.210 CIGARETTE SMOKER: Primary | ICD-10-CM

## 2023-05-09 PROBLEM — D72.819 LEUKOPENIA: Status: ACTIVE | Noted: 2023-05-09

## 2023-05-09 LAB
ANION GAP SERPL CALC-SCNC: 6 MMOL/L (ref 8–16)
BASOPHILS NFR BLD: 1 % (ref 0–1.9)
BUN SERPL-MCNC: 6 MG/DL (ref 6–20)
BURR CELLS BLD QL SMEAR: ABNORMAL
CALCIUM SERPL-MCNC: 8.5 MG/DL (ref 8.7–10.5)
CHLORIDE SERPL-SCNC: 111 MMOL/L (ref 95–110)
CO2 SERPL-SCNC: 25 MMOL/L (ref 23–29)
CREAT SERPL-MCNC: 0.7 MG/DL (ref 0.5–1.4)
DIFFERENTIAL METHOD: ABNORMAL
EOSINOPHIL NFR BLD: 1 % (ref 0–8)
ERYTHROCYTE [DISTWIDTH] IN BLOOD BY AUTOMATED COUNT: 15.1 % (ref 11.5–14.5)
EST. GFR  (NO RACE VARIABLE): >60 ML/MIN/1.73 M^2
GLUCOSE SERPL-MCNC: 114 MG/DL (ref 70–110)
HCT VFR BLD AUTO: 30.3 % (ref 37–48.5)
HGB BLD-MCNC: 10 G/DL (ref 12–16)
IMM GRANULOCYTES # BLD AUTO: ABNORMAL K/UL (ref 0–0.04)
IMM GRANULOCYTES NFR BLD AUTO: ABNORMAL % (ref 0–0.5)
LYMPHOCYTES NFR BLD: 13 % (ref 18–48)
MCH RBC QN AUTO: 27.9 PG (ref 27–31)
MCHC RBC AUTO-ENTMCNC: 33 G/DL (ref 32–36)
MCV RBC AUTO: 85 FL (ref 82–98)
MONOCYTES NFR BLD: 4 % (ref 4–15)
NEUTROPHILS NFR BLD: 71 % (ref 38–73)
NEUTS BAND NFR BLD MANUAL: 10 %
NRBC BLD-RTO: 0 /100 WBC
OVALOCYTES BLD QL SMEAR: ABNORMAL
PLATELET # BLD AUTO: 149 K/UL (ref 150–450)
PLATELET BLD QL SMEAR: ABNORMAL
PMV BLD AUTO: 11.5 FL (ref 9.2–12.9)
POIKILOCYTOSIS BLD QL SMEAR: SLIGHT
POTASSIUM SERPL-SCNC: 3.4 MMOL/L (ref 3.5–5.1)
RBC # BLD AUTO: 3.58 M/UL (ref 4–5.4)
SODIUM SERPL-SCNC: 142 MMOL/L (ref 136–145)
WBC # BLD AUTO: 1.76 K/UL (ref 3.9–12.7)

## 2023-05-09 PROCEDURE — 85007 BL SMEAR W/DIFF WBC COUNT: CPT | Performed by: FAMILY MEDICINE

## 2023-05-09 PROCEDURE — 90833 PR PSYCHOTHERAPY W/PATIENT W/E&M, 30 MIN (ADD ON): ICD-10-PCS | Mod: AF,HB,, | Performed by: PSYCHIATRY & NEUROLOGY

## 2023-05-09 PROCEDURE — 85027 COMPLETE CBC AUTOMATED: CPT | Performed by: FAMILY MEDICINE

## 2023-05-09 PROCEDURE — 99223 PR INITIAL HOSPITAL CARE,LEVL III: ICD-10-PCS | Mod: AF,HB,, | Performed by: PSYCHIATRY & NEUROLOGY

## 2023-05-09 PROCEDURE — 99223 1ST HOSP IP/OBS HIGH 75: CPT | Mod: AF,HB,, | Performed by: PSYCHIATRY & NEUROLOGY

## 2023-05-09 PROCEDURE — 25000003 PHARM REV CODE 250: Performed by: PSYCHIATRY & NEUROLOGY

## 2023-05-09 PROCEDURE — 63600175 PHARM REV CODE 636 W HCPCS: Performed by: FAMILY MEDICINE

## 2023-05-09 PROCEDURE — 80048 BASIC METABOLIC PNL TOTAL CA: CPT | Performed by: FAMILY MEDICINE

## 2023-05-09 PROCEDURE — 25000003 PHARM REV CODE 250: Performed by: INTERNAL MEDICINE

## 2023-05-09 PROCEDURE — 11000001 HC ACUTE MED/SURG PRIVATE ROOM

## 2023-05-09 PROCEDURE — 25000003 PHARM REV CODE 250: Performed by: FAMILY MEDICINE

## 2023-05-09 PROCEDURE — 90833 PSYTX W PT W E/M 30 MIN: CPT | Mod: AF,HB,, | Performed by: PSYCHIATRY & NEUROLOGY

## 2023-05-09 PROCEDURE — S4991 NICOTINE PATCH NONLEGEND: HCPCS | Performed by: FAMILY MEDICINE

## 2023-05-09 PROCEDURE — 99406 BEHAV CHNG SMOKING 3-10 MIN: CPT | Mod: S$GLB,,,

## 2023-05-09 PROCEDURE — 36415 COLL VENOUS BLD VENIPUNCTURE: CPT | Performed by: FAMILY MEDICINE

## 2023-05-09 PROCEDURE — 99406 PT REFUSED TOBACCO CESSATION: ICD-10-PCS | Mod: S$GLB,,,

## 2023-05-09 RX ORDER — QUETIAPINE FUMARATE 100 MG/1
100 TABLET, FILM COATED ORAL NIGHTLY
Status: DISCONTINUED | OUTPATIENT
Start: 2023-05-09 | End: 2023-05-10

## 2023-05-09 RX ORDER — LIDOCAINE HYDROCHLORIDE 20 MG/ML
JELLY TOPICAL
Status: DISCONTINUED | OUTPATIENT
Start: 2023-05-09 | End: 2023-05-12 | Stop reason: HOSPADM

## 2023-05-09 RX ORDER — TRAMADOL HYDROCHLORIDE 50 MG/1
50 TABLET ORAL EVERY 6 HOURS PRN
Status: CANCELLED | OUTPATIENT
Start: 2023-05-09

## 2023-05-09 RX ORDER — TRAMADOL HYDROCHLORIDE 50 MG/1
50 TABLET ORAL ONCE
Status: COMPLETED | OUTPATIENT
Start: 2023-05-09 | End: 2023-05-09

## 2023-05-09 RX ORDER — ARIPIPRAZOLE 5 MG/1
10 TABLET ORAL DAILY
Status: DISCONTINUED | OUTPATIENT
Start: 2023-05-09 | End: 2023-05-10

## 2023-05-09 RX ORDER — DICYCLOMINE HYDROCHLORIDE 10 MG/1
10 CAPSULE ORAL 3 TIMES DAILY
Status: DISCONTINUED | OUTPATIENT
Start: 2023-05-09 | End: 2023-05-10

## 2023-05-09 RX ADMIN — HYPROMELLOSE 2910 2 DROP: 5 SOLUTION/ DROPS OPHTHALMIC at 08:05

## 2023-05-09 RX ADMIN — ACETAMINOPHEN 650 MG: 325 TABLET ORAL at 08:05

## 2023-05-09 RX ADMIN — ARIPIPRAZOLE 10 MG: 5 TABLET ORAL at 03:05

## 2023-05-09 RX ADMIN — HYPROMELLOSE 2910 2 DROP: 5 SOLUTION/ DROPS OPHTHALMIC at 03:05

## 2023-05-09 RX ADMIN — QUETIAPINE FUMARATE 100 MG: 100 TABLET ORAL at 11:05

## 2023-05-09 RX ADMIN — TRAMADOL HYDROCHLORIDE 50 MG: 50 TABLET, COATED ORAL at 12:05

## 2023-05-09 RX ADMIN — POTASSIUM BICARBONATE 25 MEQ: 978 TABLET, EFFERVESCENT ORAL at 09:05

## 2023-05-09 RX ADMIN — BICTEGRAVIR SODIUM, EMTRICITABINE, AND TENOFOVIR ALAFENAMIDE FUMARATE 1 TABLET: 50; 200; 25 TABLET ORAL at 09:05

## 2023-05-09 RX ADMIN — NICOTINE 1 PATCH: 7 PATCH, EXTENDED RELEASE TRANSDERMAL at 09:05

## 2023-05-09 RX ADMIN — QUETIAPINE FUMARATE 50 MG: 25 TABLET ORAL at 09:05

## 2023-05-09 RX ADMIN — HYPROMELLOSE 2910 2 DROP: 5 SOLUTION/ DROPS OPHTHALMIC at 09:05

## 2023-05-09 RX ADMIN — DICYCLOMINE HYDROCHLORIDE 10 MG: 10 CAPSULE ORAL at 03:05

## 2023-05-09 RX ADMIN — CEFTRIAXONE 1 G: 1 INJECTION, POWDER, FOR SOLUTION INTRAMUSCULAR; INTRAVENOUS at 11:05

## 2023-05-09 RX ADMIN — ATOVAQUONE 1500 MG: 750 SUSPENSION ORAL at 09:05

## 2023-05-09 RX ADMIN — HYDROCORTISONE: 25 CREAM TOPICAL at 09:05

## 2023-05-09 NOTE — PLAN OF CARE
7167  CM was informed by  Lauren that Dr Britt's office is requesting that the pt call to schedule her hospital follow up appointment. Information added to the patient's discharge paperwork.       Will continue to follow.

## 2023-05-09 NOTE — TELEPHONE ENCOUNTER
----- Message from Lauren Rob sent at 5/9/2023  2:31 PM CDT -----  Regarding: RE: HFU  Thanks Charbel.  I will pass this onto DC.  Have a great afternoon!  ----- Message -----  From: Charbel Ang MA  Sent: 5/9/2023   1:27 PM CDT  To: Lauren Rob  Subject: RE: HFU                                          Hello,     Called patient on yesterday in regards to your message. No ans wasn't able to leave voicemail for patient. I tried contacting the patient to see if I can schedule her with another provider in this office. Since my provider  only comes 3 days a week. He will be out for 2 weeks his going out of the country. I was unable to contact patient. Please have patient call clinic that way I can go through schedule and see what provider I can schedule her with. Thank you.  ----- Message -----  From: Lauren Rob  Sent: 5/9/2023   1:19 PM CDT  To: Balwinder Sarabia Staff  Subject: FW: HFU                                          Good afternoon.  I sent a message yesterday requesting a HFU w patient PCP.  Patient's at [facility] are required to have PCP appt prior to DC.  Patient will be discharging tomorrow.  Please advise status as [place] is requesting update.  Thank you!!!  ----- Message -----  From: Lauren Rob  Sent: 5/8/2023   2:18 PM CDT  To: Balwinder Melton  Subject: HFU                                              Patient is being discharged from [facility] this afternoon and is requiring a hospital follow up appointment with their Primary Care Provider in 7 days. Patient is established. I am unable to schedule an appointment in that time frame. Please schedule patient a sooner appointment and message me back so Discharge Nurse can advise patient prior to discharge.    DX:pyelonephritis      Thank you, Adriana  Physician Referral Specialist/Discharge

## 2023-05-09 NOTE — PROGRESS NOTES
Smoking cessation educatino follow-up: Pt reporting nicotine withdrawal symptoms with patch in use. MD contacted to request dosage increase to 14 mg nicotine patch Q day.

## 2023-05-09 NOTE — PROGRESS NOTES
[facility] Consult Note    Primary Attending Physician: Dr. Zenobia AGUIRRE  Consultant Attending: Dr. Kika AGUIRRE    Reason for Consult:     Untreated HIV/AIDS    Assessment/Recommendations     Patricia Langley is a 47 y.o. female with PMH of HIV/AIDS (CD4 10/4.4%), hypothyroid, anxiety, depression admitted for 3 months of worsening abdominal pain and BRB per rectum. Found to have a UTI vs pyelo with w GNR. Not on HAART for HIV treatment, previously on Biktarvy. Reports she usually only has a few days worth when she leaves the hospital and then never has any more prescription for the antiretrovirals. HIV viral load pending (in April HIV ).     -Urine culture with Ecoli   - resistant to fluoroquinolones  - Stop ceftriaxone and begin Nitrofurantoin for 4 additional days ( - )  - Blood Cx NGTD  - Continue Mepron for PJP ppx  - Continue Biktarvy  - needs close followup with Dr. Mendoza (ID) at [facility]    Thank you for allowing us to participate in the care of this patient. Pt seen with staff Dr. Anand, ID will sign off at this time. Please contact us if you have any questions regarding this consult.    Yaquelin Ryan MD  [facility] PGY3/HO2  [facility]    Subjective:      Ms. Langley reports poor sleep overnight due to pain from her hemorrhoids. Continues to have bright red blood with stools. Endorses continued dysuria, no fever/chills/new back pain.      Objective:   Last 24 Hour Vital Signs:  BP  Min: 102/68  Max: 160/99  Temp  Av.6 °F (37 °C)  Min: 98.1 °F (36.7 °C)  Max: 99.4 °F (37.4 °C)  Pulse  Av.9  Min: 67  Max: 91  Resp  Av.6  Min: 17  Max: 24  SpO2  Av.8 %  Min: 99 %  Max: 100 %  Weight  Av.5 kg (117 lb 15.1 oz)  Min: 53.5 kg (117 lb 15.1 oz)  Max: 53.5 kg (117 lb 15.1 oz)  I/O last 3 completed shifts:  In: 3106.2 [I.V.:3008.1; IV Piggyback:98.2]  Out: 701 [Urine:701]    Physical Exam  Vitals reviewed.   Constitutional:       General: She is not  in acute distress.     Appearance: She is ill-appearing. She is not toxic-appearing.   HENT:      Head: Normocephalic and atraumatic.   Cardiovascular:      Rate and Rhythm: Normal rate.   Pulmonary:      Effort: Pulmonary effort is normal. No respiratory distress.   Abdominal:      Palpations: Abdomen is soft.   Musculoskeletal:      Cervical back: Normal range of motion.   Skin:     General: Skin is warm and dry.   Neurological:      Mental Status: She is alert and oriented to person, place, and time.   Psychiatric:      Comments: Odd affect and speech pattern, occasionally tangental       Laboratory Results:  Trended Lab Data:  Recent Labs   Lab 05/06/23  1006 05/07/23  0335 05/08/23  0455 05/09/23  0429   WBC 4.17 6.73 4.54 1.76*   HGB 12.5 9.7* 9.2* 10.0*   HCT 36.5* 29.7* 27.1* 30.3*    156 133* 149*   MCV 82 85 83 85   RDW 14.4 14.7* 14.9* 15.1*    135* 140 142   K 2.9* 3.0* 3.2* 3.4*    109 111* 111*   CO2 18* 18* 21* 25   BUN 12 8 4* 6   GLU 90 128* 137* 114*   PROT 9.5*  --   --   --    ALBUMIN 4.3  --   --   --    BILITOT 0.7  --   --   --    AST 62*  --   --   --    ALKPHOS 92  --   --   --    ALT 59*  --   --   --        Urinalysis:   Lab Results   Component Value Date    LABURIN ESCHERICHIA COLI  >100,000 cfu/ml   (A) 05/06/2023    COLORU Yellow 05/06/2023    CLARITYU Cloudy 04/12/2023    SPECGRAV 1.025 05/06/2023    NITRITE Positive (A) 05/06/2023    KETONESU Trace (A) 05/06/2023    UROBILINOGEN Negative 05/06/2023       Microbiology Data:  UrCx 5/6: E.Coli  BlCx 5/6: NGTD    Antimicrobials:  Ceftriaxone 5/6 - now    Other Results:    Radiology:  X-Ray Foot Complete 3 view Left    Result Date: 4/12/2023  EXAMINATION: XR FOOT COMPLETE 3 VIEW LEFT CLINICAL HISTORY: Unspecified fracture of left foot, subsequent encounter for fracture with routine healing TECHNIQUE: XR FOOT COMPLETE 3 VIEW LEFT COMPARISON: 02/25/2023 FINDINGS: Increased sclerosis and callus across 2nd through 5th  metatarsal neck fractures compatible with interval healing.  No new fractures are seen and no significant degenerative change.     See above Electronically signed by: Gordon  Magdi Jr Date:    04/12/2023 Time:    14:36    CT Abdomen Pelvis With Contrast    Result Date: 5/6/2023  EXAMINATION: CT ABDOMEN PELVIS WITH CONTRAST CLINICAL HISTORY: LLQ abdominal pain; TECHNIQUE: Low dose axial CT images obtained throughout the region of the abdomen and pelvis following the administration 75 mL Omnipaque 350 intravenous contrast.  Axial, sagittal and coronal reconstructions were performed. Examination mildly degraded by patient motion artifact. COMPARISON: 07/26/2022 FINDINGS: Stable small hypoattenuating focus in the right hepatic lobe, presumed cyst.  No new focal abnormality.  Gallbladder and bile ducts are unremarkable. Spleen, pancreas, and adrenal glands appear within normal limits. Kidneys appear normal. The ureters are decompressed. Urinary bladder unremarkable.  Uterus and adnexal structures appear within normal limits. Limited assessment of the gastrointestinal tract without the use of enteric contrast.  The stomach, small bowel and colon demonstrate no evidence of obstruction or focal inflammation.  Appendix is unremarkable. No free air or free fluid identified within the abdomen or pelvis. Aorta tapers normally throughout its visualized course. Osseous structures exhibit mild degenerative changes. No fracture or focal osseous destructive lesion.     No evidence of acute intra-abdominal pathology as above. Electronically signed by: Dick Boyce MD Date:    05/06/2023 Time:    11:48

## 2023-05-09 NOTE — TELEPHONE ENCOUNTER
----- Message from Lauren Rob sent at 5/9/2023  1:18 PM CDT -----  Regarding: FW: HFU  Good afternoon.  I sent a message yesterday requesting a HFU w patient PCP.  Patient's at [facility] are required to have PCP appt prior to DC.  Patient will be discharging tomorrow.  Please advise status as DC is requesting update.  Thank you!!!  ----- Message -----  From: Lauren Rob  Sent: 5/8/2023   2:18 PM CDT  To: Balwinder Sarabia Staff  Subject: HFU                                              Patient is being discharged from [facility] this afternoon and is requiring a hospital follow up appointment with their Primary Care Provider in 7 days. Patient is established. I am unable to schedule an appointment in that time frame. Please schedule patient a sooner appointment and message me back so Discharge Nurse can advise patient prior to discharge.    DX:pyelonephritis      Thank you, Adriana  Physician Referral Specialist/Discharge
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
## 2023-05-09 NOTE — TELEPHONE ENCOUNTER
2nd attempt called patient in regards to message. No ans unable to lvm for call back. I sent a message to Adriana awaiting response on patient calling clinic.

## 2023-05-10 PROBLEM — B00.1 HERPES LABIALIS: Status: ACTIVE | Noted: 2023-05-10

## 2023-05-10 PROBLEM — B20 NEUTROPENIA ASSOCIATED WITH ACQUIRED IMMUNE DEFICIENCY SYNDROME (AIDS): Status: ACTIVE | Noted: 2023-05-10

## 2023-05-10 PROBLEM — D70.3 NEUTROPENIA ASSOCIATED WITH ACQUIRED IMMUNE DEFICIENCY SYNDROME (AIDS): Status: ACTIVE | Noted: 2023-05-10

## 2023-05-10 LAB
ANION GAP SERPL CALC-SCNC: 9 MMOL/L (ref 8–16)
ANISOCYTOSIS BLD QL SMEAR: SLIGHT
BASOPHILS # BLD AUTO: ABNORMAL K/UL (ref 0–0.2)
BASOPHILS NFR BLD: 4 % (ref 0–1.9)
BUN SERPL-MCNC: 5 MG/DL (ref 6–20)
CALCIUM SERPL-MCNC: 9 MG/DL (ref 8.7–10.5)
CHLORIDE SERPL-SCNC: 107 MMOL/L (ref 95–110)
CO2 SERPL-SCNC: 27 MMOL/L (ref 23–29)
CREAT SERPL-MCNC: 0.7 MG/DL (ref 0.5–1.4)
DIFFERENTIAL METHOD: ABNORMAL
EOSINOPHIL # BLD AUTO: ABNORMAL K/UL (ref 0–0.5)
EOSINOPHIL NFR BLD: 8 % (ref 0–8)
ERYTHROCYTE [DISTWIDTH] IN BLOOD BY AUTOMATED COUNT: 14.6 % (ref 11.5–14.5)
EST. GFR  (NO RACE VARIABLE): >60 ML/MIN/1.73 M^2
GLUCOSE SERPL-MCNC: 88 MG/DL (ref 70–110)
HCT VFR BLD AUTO: 32.3 % (ref 37–48.5)
HGB BLD-MCNC: 10.8 G/DL (ref 12–16)
IMM GRANULOCYTES # BLD AUTO: ABNORMAL K/UL (ref 0–0.04)
IMM GRANULOCYTES NFR BLD AUTO: ABNORMAL % (ref 0–0.5)
LYMPHOCYTES # BLD AUTO: ABNORMAL K/UL (ref 1–4.8)
LYMPHOCYTES NFR BLD: 30 % (ref 18–48)
MCH RBC QN AUTO: 28.1 PG (ref 27–31)
MCHC RBC AUTO-ENTMCNC: 33.4 G/DL (ref 32–36)
MCV RBC AUTO: 84 FL (ref 82–98)
METAMYELOCYTES NFR BLD MANUAL: 2 %
MONOCYTES # BLD AUTO: ABNORMAL K/UL (ref 0.3–1)
MONOCYTES NFR BLD: 4 % (ref 4–15)
NEUTROPHILS NFR BLD: 52 % (ref 38–73)
NRBC BLD-RTO: 0 /100 WBC
OVALOCYTES BLD QL SMEAR: ABNORMAL
PLATELET # BLD AUTO: 175 K/UL (ref 150–450)
PLATELET BLD QL SMEAR: ABNORMAL
PMV BLD AUTO: 11.4 FL (ref 9.2–12.9)
POIKILOCYTOSIS BLD QL SMEAR: SLIGHT
POTASSIUM SERPL-SCNC: 3.6 MMOL/L (ref 3.5–5.1)
RBC # BLD AUTO: 3.84 M/UL (ref 4–5.4)
SODIUM SERPL-SCNC: 143 MMOL/L (ref 136–145)
WBC # BLD AUTO: 1.53 K/UL (ref 3.9–12.7)

## 2023-05-10 PROCEDURE — 85007 BL SMEAR W/DIFF WBC COUNT: CPT | Performed by: FAMILY MEDICINE

## 2023-05-10 PROCEDURE — 90833 PSYTX W PT W E/M 30 MIN: CPT | Mod: AF,HB,, | Performed by: PSYCHIATRY & NEUROLOGY

## 2023-05-10 PROCEDURE — 99233 SBSQ HOSP IP/OBS HIGH 50: CPT | Mod: AF,HB,, | Performed by: PSYCHIATRY & NEUROLOGY

## 2023-05-10 PROCEDURE — S4991 NICOTINE PATCH NONLEGEND: HCPCS | Performed by: INTERNAL MEDICINE

## 2023-05-10 PROCEDURE — 25000003 PHARM REV CODE 250: Performed by: FAMILY MEDICINE

## 2023-05-10 PROCEDURE — 25000003 PHARM REV CODE 250: Performed by: INTERNAL MEDICINE

## 2023-05-10 PROCEDURE — 63600175 PHARM REV CODE 636 W HCPCS: Performed by: NURSE PRACTITIONER

## 2023-05-10 PROCEDURE — 85027 COMPLETE CBC AUTOMATED: CPT | Performed by: FAMILY MEDICINE

## 2023-05-10 PROCEDURE — 11000001 HC ACUTE MED/SURG PRIVATE ROOM

## 2023-05-10 PROCEDURE — 80048 BASIC METABOLIC PNL TOTAL CA: CPT | Performed by: FAMILY MEDICINE

## 2023-05-10 PROCEDURE — 90833 PR PSYCHOTHERAPY W/PATIENT W/E&M, 30 MIN (ADD ON): ICD-10-PCS | Mod: AF,HB,, | Performed by: PSYCHIATRY & NEUROLOGY

## 2023-05-10 PROCEDURE — 63600175 PHARM REV CODE 636 W HCPCS: Performed by: FAMILY MEDICINE

## 2023-05-10 PROCEDURE — 36415 COLL VENOUS BLD VENIPUNCTURE: CPT | Performed by: FAMILY MEDICINE

## 2023-05-10 PROCEDURE — 99233 PR SUBSEQUENT HOSPITAL CARE,LEVL III: ICD-10-PCS | Mod: AF,HB,, | Performed by: PSYCHIATRY & NEUROLOGY

## 2023-05-10 RX ORDER — BUSPIRONE HYDROCHLORIDE 7.5 MG/1
7.5 TABLET ORAL 2 TIMES DAILY
Status: DISCONTINUED | OUTPATIENT
Start: 2023-05-10 | End: 2023-05-12

## 2023-05-10 RX ORDER — ACYCLOVIR 200 MG/1
400 CAPSULE ORAL 3 TIMES DAILY
Status: DISCONTINUED | OUTPATIENT
Start: 2023-05-10 | End: 2023-05-12 | Stop reason: HOSPADM

## 2023-05-10 RX ORDER — IBUPROFEN 200 MG
1 TABLET ORAL DAILY
Status: DISCONTINUED | OUTPATIENT
Start: 2023-05-10 | End: 2023-05-11

## 2023-05-10 RX ORDER — ACYCLOVIR 200 MG/1
200 CAPSULE ORAL
Status: DISCONTINUED | OUTPATIENT
Start: 2023-05-10 | End: 2023-05-10

## 2023-05-10 RX ORDER — ONDANSETRON 2 MG/ML
4 INJECTION INTRAMUSCULAR; INTRAVENOUS EVERY 6 HOURS PRN
Status: DISCONTINUED | OUTPATIENT
Start: 2023-05-10 | End: 2023-05-12 | Stop reason: HOSPADM

## 2023-05-10 RX ADMIN — ACYCLOVIR 400 MG: 200 CAPSULE ORAL at 08:05

## 2023-05-10 RX ADMIN — NICOTINE 1 PATCH: 14 PATCH, EXTENDED RELEASE TRANSDERMAL at 08:05

## 2023-05-10 RX ADMIN — HYPROMELLOSE 2910 2 DROP: 5 SOLUTION/ DROPS OPHTHALMIC at 03:05

## 2023-05-10 RX ADMIN — ACETAMINOPHEN 650 MG: 325 TABLET ORAL at 07:05

## 2023-05-10 RX ADMIN — CEFTRIAXONE 1 G: 1 INJECTION, POWDER, FOR SOLUTION INTRAMUSCULAR; INTRAVENOUS at 11:05

## 2023-05-10 RX ADMIN — DICYCLOMINE HYDROCHLORIDE 10 MG: 10 CAPSULE ORAL at 03:05

## 2023-05-10 RX ADMIN — BUSPIRONE HYDROCHLORIDE 7.5 MG: 7.5 TABLET ORAL at 08:05

## 2023-05-10 RX ADMIN — HYPROMELLOSE 2910 2 DROP: 5 SOLUTION/ DROPS OPHTHALMIC at 08:05

## 2023-05-10 RX ADMIN — IBUPROFEN 400 MG: 400 TABLET ORAL at 10:05

## 2023-05-10 RX ADMIN — BICTEGRAVIR SODIUM, EMTRICITABINE, AND TENOFOVIR ALAFENAMIDE FUMARATE 1 TABLET: 50; 200; 25 TABLET ORAL at 08:05

## 2023-05-10 RX ADMIN — DICYCLOMINE HYDROCHLORIDE 10 MG: 10 CAPSULE ORAL at 08:05

## 2023-05-10 RX ADMIN — QUETIAPINE FUMARATE 150 MG: 100 TABLET ORAL at 08:05

## 2023-05-10 RX ADMIN — ONDANSETRON HYDROCHLORIDE 4 MG: 2 INJECTION, SOLUTION INTRAMUSCULAR; INTRAVENOUS at 11:05

## 2023-05-10 RX ADMIN — POTASSIUM BICARBONATE 25 MEQ: 978 TABLET, EFFERVESCENT ORAL at 08:05

## 2023-05-10 RX ADMIN — ATOVAQUONE 1500 MG: 750 SUSPENSION ORAL at 08:05

## 2023-05-10 NOTE — SUBJECTIVE & OBJECTIVE
Interval History: Today having some pelvic/abdominal pain and having pain from her cold sore    Review of Systems   Constitutional:  Negative for activity change and fever.   HENT:  Negative for congestion.    Respiratory:  Negative for shortness of breath.    Gastrointestinal:  Negative for abdominal pain, blood in stool, nausea and vomiting.   Genitourinary:  Negative for difficulty urinating and dysuria.   Skin:  Negative for color change and wound.   Neurological:  Negative for headaches.   Psychiatric/Behavioral:  Negative for agitation.    Objective:     Vital Signs (Most Recent):  Temp: 98.3 °F (36.8 °C) (05/09/23 2038)  Pulse: 81 (05/09/23 2038)  Resp: 19 (05/09/23 2038)  BP: (!) 147/87 (05/09/23 2038)  SpO2: 100 % (05/09/23 2038) Vital Signs (24h Range):  Temp:  [98.1 °F (36.7 °C)-98.9 °F (37.2 °C)] 98.3 °F (36.8 °C)  Pulse:  [67-90] 81  Resp:  [17-24] 19  SpO2:  [99 %-100 %] 100 %  BP: (141-160)/(82-99) 147/87     Weight: 53.5 kg (117 lb 15.1 oz)  Body mass index is 19.04 kg/m².    Intake/Output Summary (Last 24 hours) at 5/9/2023 2142  Last data filed at 5/9/2023 0619  Gross per 24 hour   Intake 3106.24 ml   Output --   Net 3106.24 ml         Physical Exam  HENT:      Head: Normocephalic and atraumatic.   Cardiovascular:      Rate and Rhythm: Normal rate.   Pulmonary:      Effort: Pulmonary effort is normal.      Breath sounds: No rales.   Abdominal:      General: Abdomen is flat. Bowel sounds are normal.      Tenderness: There is abdominal tenderness in the suprapubic area. There is no right CVA tenderness or left CVA tenderness.   Musculoskeletal:         General: Normal range of motion.      Cervical back: Normal range of motion.      Right lower leg: No edema.      Left lower leg: No edema.   Skin:     General: Skin is warm.   Neurological:      Mental Status: She is alert and oriented to person, place, and time.   Psychiatric:         Mood and Affect: Mood normal.         Behavior: Behavior normal.            Significant Labs: All pertinent labs within the past 24 hours have been reviewed.    Significant Imaging: I have reviewed all pertinent imaging results/findings within the past 24 hours.

## 2023-05-10 NOTE — ASSESSMENT & PLAN NOTE
This patient in known to have AIDS (from CD4 count has been less than 200). Labs reviewed- No results found for: CD4, No results found for: HIVDNAPCR. Patient is on HAART. Will start HAART. Prophylaxis was given at this time- Atovaquone. Continue to monitor routine labs. Last CD4 count was   Lab Results   Component Value Date    ABSOLUTECD4 10 (L) 05/07/2023       We will consult Infectious disease at this time. Evaluate and treat HIV-associated diseases as indicated by specific problems listed below.     Also could be adverse effect of Biktarvy

## 2023-05-10 NOTE — PLAN OF CARE
AAO x4. Regular diet continued. Pt denies N/V, SOB, distress. Medications given per MAR. Vital signs stable throughout the night. Cardiac monitoring continued. Up independently in room. Safety precautions maintained. Bed alarm set. Call bell within reach. Patient encouraged to call for assistance.

## 2023-05-10 NOTE — PROGRESS NOTES
"[facility]  Progress Note    Patient Name: Patricia Langley  MRN: [id number]  Patient Class: IP- Inpatient   Admission Date: 5/6/2023  Length of Stay: 1 days  Attending Physician: Keysha Skelton MD  Primary Care Provider: Cornell Britt MD        Subjective:     Principal Problem:Pyelonephritis        HPI:  Korin Gomez is a 46 y/o F with PMH of anxiety, depression, HIV (untreated), hypothyroidism, insomnia, presents with 3 month history abdominal pain that is worse in the past 4 days.  There is associated nausea, vomiting, rectal pain, intermittent episode of bright red blood after BM.  She denies fever, chills, lightheadedness, headaches, chest pain, diarrhea, dysuria, shortness of breath.    In the ED sodium 138, potassium 2.9, bicarb 18, H/H 12.5/36.5, WBC 4.17, UA positive, urine microscopy with few clumps of WBC, many bacteria CT abdomen/pelvis unremarkable with no acute intra-abdominal pathology.  Starting on ceftriaxone pending urine culture  During medication reconciliation, patient stated, " my identity was stolen and all that medications and not for me".  Admit hospital medicine observation      Overview/Hospital Course:  No notes on file    Interval History: Today having some pelvic/abdominal pain and having pain from her cold sore    Review of Systems   Constitutional:  Negative for activity change and fever.   HENT:  Negative for congestion.    Respiratory:  Negative for shortness of breath.    Gastrointestinal:  Negative for abdominal pain, blood in stool, nausea and vomiting.   Genitourinary:  Negative for difficulty urinating and dysuria.   Skin:  Negative for color change and wound.   Neurological:  Negative for headaches.   Psychiatric/Behavioral:  Negative for agitation.    Objective:     Vital Signs (Most Recent):  Temp: 98.3 °F (36.8 °C) (05/09/23 2038)  Pulse: 81 (05/09/23 2038)  Resp: 19 (05/09/23 2038)  BP: (!) 147/87 (05/09/23 2038)  SpO2: 100 % (05/09/23 2038) " Vital Signs (24h Range):  Temp:  [98.1 °F (36.7 °C)-98.9 °F (37.2 °C)] 98.3 °F (36.8 °C)  Pulse:  [67-90] 81  Resp:  [17-24] 19  SpO2:  [99 %-100 %] 100 %  BP: (141-160)/(82-99) 147/87     Weight: 53.5 kg (117 lb 15.1 oz)  Body mass index is 19.04 kg/m².    Intake/Output Summary (Last 24 hours) at 5/9/2023 2142  Last data filed at 5/9/2023 0619  Gross per 24 hour   Intake 3106.24 ml   Output --   Net 3106.24 ml         Physical Exam  HENT:      Head: Normocephalic and atraumatic.   Cardiovascular:      Rate and Rhythm: Normal rate.   Pulmonary:      Effort: Pulmonary effort is normal.      Breath sounds: No rales.   Abdominal:      General: Abdomen is flat. Bowel sounds are normal.      Tenderness: There is abdominal tenderness in the suprapubic area. There is no right CVA tenderness or left CVA tenderness.   Musculoskeletal:         General: Normal range of motion.      Cervical back: Normal range of motion.      Right lower leg: No edema.      Left lower leg: No edema.   Skin:     General: Skin is warm.   Neurological:      Mental Status: She is alert and oriented to person, place, and time.   Psychiatric:         Mood and Affect: Mood normal.         Behavior: Behavior normal.           Significant Labs: All pertinent labs within the past 24 hours have been reviewed.    Significant Imaging: I have reviewed all pertinent imaging results/findings within the past 24 hours.      Assessment/Plan:      * Pyelonephritis  UA positive  Urine microscopy positive   Start ceftriaxone, pending urine culture  Consult ID: agree with ceftriaxone for now for UTI     Leukopenia  WBC 1.76, monitor for now  Hold off on neutropenic precautions      Hemorrhoid   She admits she is been offered surgery for hemorrhoids but has deferred    Sitz bath   An[facility]ol HC   Needs colon-rectal surgery consult for large hemorrhoid - may need banding - not clear if this service is available at [facility] or not - consider transfer to main campus if they h  ave CRS otherwise [facility] has CRS services    Hypokalemia  Replace      Anxiety disorder        Insomnia  Continue mirtazapine      Bipolar 1 disorder  Anxiety disorder  Major depressive disorder in remission  Schizoaffective disorder, bipolar type   BuSpar, Depakote, Haldol, lithium, Seroquel, Restoril- patient denied taking any of this meds, stating her identity was stolen and this meds are not for her  Consult psych      AIDS  At risk for opportunistic infections  This patient in known to have AIDS (from CD4 count has been less than 200). Labs reviewed- No results found for: CD4, No results found for: HIVDNAPCR. Patient is not on HAART. Will start HAART. Prophylaxis was offered and declined by the patient at this time- . Continue to monitor routine labs. Last CD4 count was   Lab Results   Component Value Date    ABSOLUTECD4 10 (L) 05/07/2023       We will consult Infectious disease at this time. Other HIV-associated diseases are not present.    Appreciate ID recs resume Biktarvy, CD4 count and HIV viral load  CD4 10, ,503- continue Biktarvy        VTE Risk Mitigation (From admission, onward)         Ordered     IP VTE HIGH RISK PATIENT  Once         05/06/23 1244     Place sequential compression device  Until discontinued         05/06/23 1244                Discharge Planning   RUFINA: 5/10/2023     Code Status: Full Code   Is the patient medically ready for discharge?:     Reason for patient still in hospital (select all that apply): Patient trending condition and Laboratory test  Discharge Plan A: Home                  Keysha Skelton MD  Department of Hospital Medicine   [facility]

## 2023-05-10 NOTE — PLAN OF CARE
Discussed case with primary -   Concern over lower WBC  -  not neutropenic   Biktarvy may cause this but rare 3-5% of the time   Could also be HIV related BM suppression   Also possible IRIS from a BM infection - dMAC, histo etc that manifests after ART initiation     Overall - needs to be in a supervised setting for now   Ideally consideration for transfer to [facility] for CRS consult and perhaps banding of hemorrhoids and then WBC can be monitored and additional workup if needed such as BM biopsy, stopping biktarvy, starting G-CSF etc.     If not transfer - the [facility] will continue to follow and start this work up as well if the WBC continues to fall

## 2023-05-10 NOTE — ASSESSMENT & PLAN NOTE
She admits she is been offered surgery for hemorrhoids but has deferred    Sitz bath   [facility]   Needs colon-rectal surgery consult for large hemorrhoid - may need banding - not clear if this service is available at [facility] or not - consider transfer to main campus if they h ave CRS otherwise [facility] has CRS services

## 2023-05-10 NOTE — PROGRESS NOTES
"PSYCHIATRY INPATIENT PROGRESS NOTE  SUBSEQUENT HOSPITAL VISIT      5/10/2023 12:30 PM   Patricia Langley   1975   [id number]           DATE OF ADMISSION: 5/6/2023  9:12 AM    SITE: [facility]    CURRENT LEGAL STATUS: Patient does not currently meet PEC criteria due to not currently being an imminent threat to self/others and not being gravely disabled 2/2 mental illness at this time.     HISTORY    Per Initial History from Primary Team:        Patient presents with    Hemorrhoids       Large hemorrhoid that has been noted by pt: "For a while." Bleeding noted.     Abdominal Pain       Generalized, abd pain x1 month on/off with n/v+. 6/10 pain.    Korin Gomez is a 48 y/o F with PMH of anxiety, depression, HIV (untreated), hypothyroidism, insomnia, presents with 3 month history abdominal pain that is worse in the past 4 days.  There is associated nausea, vomiting, rectal pain, intermittent episode of bright red blood after BM.  She denies fever, chills, lightheadedness, headaches, chest pain, diarrhea, dysuria, shortness of breath.    In the ED sodium 138, potassium 2.9, bicarb 18, H/H 12.5/36.5, WBC 4.17, UA positive, urine microscopy with few clumps of WBC, many bacteria CT abdomen/pelvis unremarkable with no acute intra-abdominal pathology.  Starting on ceftriaxone pending urine culture  During medication reconciliation, patient stated, " my identity was stolen and all that medications and not for me".  Admit hospital medicine observation  Interval History from Primary Team on 05/09/2023:  Today having some pelvic/abdominal pain and having pain from her cold sore       Chief Complaint / Reason for Original Psychiatry Consult: Psychiatric Medication Management        Subjective / Interval Psychiatric History Today (05/10/2023) (with Psychiatric ROS below):  Patricia Langley is a 47 y.o. female with a past medical history of HIV infection and hyperthyroidism and a past psychiatric history of Schizoaffective " Disorder, Bipolar Type, anxiety, depression, PTSD, polysubstance abuse, and insomnia, currently being treated by her inpatient primary treatment team for a principal problem of pyelonephritis.  Psychiatry was originally consulted as noted above. The patient was seen and examined again today.  The chart was reviewed again today.  On examination today, the patient was alert and oriented to person, place, city, state, month, year, and situation.  She was CAM-ICU negative for delirium.  She endorses sleeping well overnight (roughly 7 hours) but did have some difficulty getting to sleep due to anxious thoughts.  She endorses an improving appetite.  She denies any current or recent AH, VH, or TH.  She continues to exhibit some bizarre / paranoid delusional thought content (denies any desire or plan to act on this thought content), and while her thought process is still tangential and loosely associated at times, it is slightly more linear and requiring less redirection today.  Patient again denies any current/recent passive/active SI/HI.  Patient endorses some possible akathisia with the Abilify and is requesting to stop Abilify.  She is requesting to start Buspar due to prior success with this medication for similar anxiety s/s that she is currently experiencing.  She also endorses recent crystal meth abuse (unable / unwilling to give timeline) and perseverates on wanting Xanax today.  Patient denies any other adverse effects to her current medication regimen.  Regarding current medical/physical complaints, patient endorses improving fatigue and improving abdominal pains.  Patient denies any other medical complaints at this time.  NAD was observed during the examination.  Psychotherapy was again implemented as noted below with a focus on improving mood, psychosis, and anxiety.  See detailed psych ROS below.  See A/P below.          Psychiatric Review Of Systems - Currently, the patient is endorsing and/or denying the  following:  (patient's endorsements are BOLDED below; if not BOLDED, then patient denied):     Endorses Symptoms of Depression (improving): diminished mood, low motivation, loss of interest/anhedonia, irritability, diminished energy, change in sleep, change in appetite, diminished concentration or cognition or indecisiveness, PMA/R, excessive guilt or hopelessness or worthlessness, suicidal ideations     Denies issues with Sleep: initiation, maintenance, early morning awakening with inability to return to sleep     Denies Suicidal/Homicidal ideations: active/passive ideations, organized plans, future intentions     Endorses Symptoms of psychosis: hallucinations, delusions, disorganized thinking, disorganized behavior or abnormal motor behavior, or negative symptoms (diminshed emotional expression, avolition, anhedonia, alogia, asociality)      Denies Symptoms of janine or hypomania: elevated, expansive, or irritable mood with increased energy or activity; with inflated self-esteem or grandiosity, decreased need for sleep, increased rate of speech, FOI or racing thoughts, distractibility, increased goal directed activity or PMA, risky/disinhibited behavior     Endorses Symptoms of Anxiety: excessive anxiety/worry/fear, more days than not, about numerous issues, difficult to control, with restlessness, fatigue, poor concentration, irritability, muscle tension, sleep disturbance; causes functionally impairing distress      Denies Symptoms of Panic Disorder: recurrent panic attacks, precipitated or un-precipitated, source of worry and/or behavioral changes secondary; with or without agoraphobia     Endorses Symptoms of PTSD: h/o trauma; re-experiencing/intrusive symptoms, avoidant behavior, negative alterations in cognition or mood, or hyperarousal symptoms; with or without dissociative symptoms      Denies Symptoms of OCD: obsessions or compulsions      Denies Symptoms of Eating Disorders: anorexia, bulimia or binging    "  Denies Substance Use: intoxication, withdrawal, tolerance, used in larger amounts or duration than intended, unsuccessful attempts to limit or quit, increased time engaging in or seeking out, cravings or strong desire to use, failure to fulfill obligations, negative consequences in social/interpersonal/occupational,/recreational areas, use in dangerous situations, medical or psychological consequences         [facility] Toolkit ASQ Suicide Screening Tool:  In the past few weeks, have you wished you were dead? Denies   In the past few weeks, have you felt that you or your family would be better off if you were dead? Denies  In the past week, have you been having thoughts about killing yourself? Denies  Have you ever tried to kill yourself? YES ; remote ("cut myself when I was 12")  Are you having thoughts of killing yourself right now? Denies         PSYCHOTHERAPY ADD-ON +[id number]   30 (16-37*) minutes     Time: 21 minutes  Participants: Met with patient     Therapeutic Intervention Type: behavior modifying psychotherapy, supportive psychotherapy  Why chosen therapy is appropriate versus another modality: relevant to diagnosis, patient responds to this modality, evidence based practice     Target symptoms: mood, psychosis, and anxiety  Primary focus: improving mood, psychosis, and anxiety  Psychotherapeutic techniques: supportive and psychodynamic techniques; psycho-education; deep breathing exercises; reality / insight orientation; CBT; problem solving techniques and managing life stressors     Outcome monitoring methods: self-report, observation     Patient's response to intervention:  The patient's response to intervention is accepting / guarded.     Progress toward goals:  The patient's progress toward goals is fair / limited.        ROS:  General ROS: negative for - chills, fever or night sweats; positive for improving fatigue  Ophthalmic ROS: negative for - blurry vision, double vision or eye pain  ENT ROS: negative " for - sinus pain, headache, sore throat or visual changes  Allergy and Immunology ROS: negative for - hives, itchy/watery eyes or nasal congestion  Hematological and Lymphatic ROS: negative for - bleeding problems, bruising, jaundice or pallor  Endocrine ROS: negative for - galactorrhea, hot flashes, mood swings, palpitations or temperature intolerance  Respiratory ROS: negative for - cough, hemoptysis, shortness of breath, tachypnea or wheezing  Cardiovascular ROS: negative for - chest pain, dyspnea on exertion, loss of consciousness, palpitations, rapid heart rate or shortness of breath  Gastrointestinal ROS: negative for - blood in stools, change in bowel habits, constipation or diarrhea; positive for improving appetite & abdominal pain   Genito-Urinary ROS: negative for - incontinence, nocturia or pelvic pain  Musculoskeletal ROS: negative for - joint stiffness, joint swelling, joint pain or muscle pain   Neurological ROS: negative for - behavioral changes, confusion, dizziness, memory loss, numbness/tingling or seizures  Dermatological ROS: negative for dry skin, hair changes, pruritus or rash  Psychiatric ROS: see detailed psychiatric ROS above in subjective section       PAST MEDICAL & SURGICAL HISTORY   Past Medical History:   Diagnosis Date    Allergy     Anxiety     Cervical dysplasia 1998 / 2016    Depression     HIV infection     Hyperthyroidism     Insomnia      Past Surgical History:   Procedure Laterality Date    SKULL FRACTURE ELEVATION       NEUROLOGIC HISTORY  Seizures: denies   Head trauma: yes, remote      FAMILY HISTORY   Family History   Problem Relation Age of Onset    Heart disease Mother     Breast cancer Neg Hx     Colon cancer Neg Hx     Ovarian cancer Neg Hx        ALLERGIES   Review of patient's allergies indicates:   Allergen Reactions    Penicillins Other (See Comments) and Nausea And Vomiting     Trouble swallowing and vomiting.     Sulfa (sulfonamide antibiotics) Swelling     Rash  (skin)^    Opioids - morphine analogues      Pt states she is not allergic to morphine      Bactrim [sulfamethoxazole-trimethoprim] Rash       CURRENT MEDICATION REGIMEN   Home Meds:   Prior to Admission medications    Medication Sig Start Date End Date Taking? Authorizing Provider   atovaquone (MEPRON) 750 mg/5 mL Susp TAKE 10 MLS (1,500 MG TOTAL) BY MOUTH ONCE DAILY.  Patient not taking: Reported on 1/20/2023 7/15/22   Rd Virk MD   BIKTARVY -25 mg (25 kg or greater) Take 1 tablet by mouth once daily.  Patient not taking: Reported on 4/12/2023 6/6/22   Rd iVrk MD   busPIRone (BUSPAR) 10 MG tablet Take 1 tablet (10 mg total) by mouth 3 (three) times daily.  Patient not taking: Reported on 5/6/2023 7/29/22 10/11/22  Day Blankenship PA-C   divalproex (DEPAKOTE) 250 MG EC tablet Take 250 mg by mouth 2 (two) times daily. 3/16/23   Historical Provider   haloperidol lactate (HALDOL) 5 mg/mL injection Inject 1 mL into the muscle every 6 (six) hours as needed. 3/6/23   Historical Provider   hydrOXYzine pamoate (VISTARIL) 25 MG Cap Take 25 mg by mouth 3 (three) times daily as needed. 3/6/23   Historical Provider   ketorolac (TORADOL) 10 mg tablet Take one po q 8 hrs prn pain  Patient not taking: Reported on 3/3/2023 2/28/23   Kelly Luong PA-C   lithium (LITHOTAB) 300 mg tablet Take 1 tablet (300 mg total) by mouth every 8 (eight) hours. LITHIUM CARBONATE ER  Patient not taking: Reported on 4/12/2023 7/29/22   Day Blankenship PA-C   mirtazapine (REMERON) 15 MG tablet Take 15 mg by mouth every evening. 11/10/22   Historical Provider   nicotine (NICODERM CQ) 14 mg/24 hr 1 patch. 11/10/22   Historical Provider   ondansetron (ZOFRAN-ODT) 4 MG TbDL Take 1 tablet (4 mg total) by mouth 2 (two) times daily.  Patient not taking: Reported on 4/12/2023 1/20/23   Marybel Little, NP   QUEtiapine (SEROQUEL) 100 MG Tab Take 100 mg by mouth every evening. 3/8/23   Historical Provider   QUEtiapine  (SEROQUEL) 200 MG Tab Take 200 mg by mouth every evening. 3/16/23   Historical Provider   QUEtiapine (SEROQUEL) 50 MG tablet Take 50 mg by mouth every evening. 3/6/23   Historical Provider   temazepam (RESTORIL) 7.5 MG Cap Take 7.5 mg by mouth nightly as needed. 3/13/23   Historical Provider       OTC Meds: denies     Scheduled Meds:    ARIPiprazole  10 mg Oral Daily    artificial tears  2 drop Both Eyes TID    atovaquone  1,500 mg Oral Daily    ccycttdjs-jiqoolwa-eqvedsk ala  1 tablet Oral Daily    cefTRIAXone (ROCEPHIN) IVPB  1 g Intravenous Q24H    dicyclomine  10 mg Oral TID    mirtazapine  15 mg Oral QHS    nicotine  1 patch Transdermal Daily    potassium bicarbonate  25 mEq Oral Daily    QUEtiapine  100 mg Oral Nightly      PRN Meds: acetaminophen, ibuprofen, LIDOcaine HCl 2%, ondansetron, sodium chloride 0.9%   Psychotherapeutics (From admission, onward)      Start     Stop Route Frequency Ordered    05/09/23 2100  QUEtiapine tablet 100 mg         -- Oral Nightly 05/09/23 1424    05/09/23 1430  ARIPiprazole tablet 10 mg         -- Oral Daily 05/09/23 1424    05/06/23 2100  mirtazapine tablet 15 mg         -- Oral Nightly 05/06/23 1244            LABORATORY DATA   Recent Results (from the past 72 hour(s))   Basic Metabolic Panel    Collection Time: 05/08/23  4:55 AM   Result Value Ref Range    Sodium 140 136 - 145 mmol/L    Potassium 3.2 (L) 3.5 - 5.1 mmol/L    Chloride 111 (H) 95 - 110 mmol/L    CO2 21 (L) 23 - 29 mmol/L    Glucose 137 (H) 70 - 110 mg/dL    BUN 4 (L) 6 - 20 mg/dL    Creatinine 0.8 0.5 - 1.4 mg/dL    Calcium 8.0 (L) 8.7 - 10.5 mg/dL    Anion Gap 8 8 - 16 mmol/L    eGFR >60 >60 mL/min/1.73 m^2   CBC auto differential    Collection Time: 05/08/23  4:55 AM   Result Value Ref Range    WBC 4.54 3.90 - 12.70 K/uL    RBC 3.27 (L) 4.00 - 5.40 M/uL    Hemoglobin 9.2 (L) 12.0 - 16.0 g/dL    Hematocrit 27.1 (L) 37.0 - 48.5 %    MCV 83 82 - 98 fL    MCH 28.1 27.0 - 31.0 pg    MCHC 33.9 32.0 - 36.0 g/dL     RDW 14.9 (H) 11.5 - 14.5 %    Platelets 133 (L) 150 - 450 K/uL    MPV 10.8 9.2 - 12.9 fL    Immature Granulocytes 0.9 (H) 0.0 - 0.5 %    Gran # (ANC) 3.8 1.8 - 7.7 K/uL    Immature Grans (Abs) 0.04 0.00 - 0.04 K/uL    Lymph # 0.3 (L) 1.0 - 4.8 K/uL    Mono # 0.2 (L) 0.3 - 1.0 K/uL    Eos # 0.2 0.0 - 0.5 K/uL    Baso # 0.02 0.00 - 0.20 K/uL    nRBC 0 0 /100 WBC    Gran % 84.6 (H) 38.0 - 73.0 %    Lymph % 5.7 (L) 18.0 - 48.0 %    Mono % 5.1 4.0 - 15.0 %    Eosinophil % 3.3 0.0 - 8.0 %    Basophil % 0.4 0.0 - 1.9 %    Differential Method Automated    VANCOMYCIN, TROUGH    Collection Time: 05/08/23  1:43 PM   Result Value Ref Range    Vancomycin-Trough 9.8 (L) 10.0 - 22.0 ug/mL   Basic Metabolic Panel    Collection Time: 05/09/23  4:29 AM   Result Value Ref Range    Sodium 142 136 - 145 mmol/L    Potassium 3.4 (L) 3.5 - 5.1 mmol/L    Chloride 111 (H) 95 - 110 mmol/L    CO2 25 23 - 29 mmol/L    Glucose 114 (H) 70 - 110 mg/dL    BUN 6 6 - 20 mg/dL    Creatinine 0.7 0.5 - 1.4 mg/dL    Calcium 8.5 (L) 8.7 - 10.5 mg/dL    Anion Gap 6 (L) 8 - 16 mmol/L    eGFR >60 >60 mL/min/1.73 m^2   CBC auto differential    Collection Time: 05/09/23  4:29 AM   Result Value Ref Range    WBC 1.76 (LL) 3.90 - 12.70 K/uL    RBC 3.58 (L) 4.00 - 5.40 M/uL    Hemoglobin 10.0 (L) 12.0 - 16.0 g/dL    Hematocrit 30.3 (L) 37.0 - 48.5 %    MCV 85 82 - 98 fL    MCH 27.9 27.0 - 31.0 pg    MCHC 33.0 32.0 - 36.0 g/dL    RDW 15.1 (H) 11.5 - 14.5 %    Platelets 149 (L) 150 - 450 K/uL    MPV 11.5 9.2 - 12.9 fL    Immature Granulocytes Test Not Performed 0.0 - 0.5 %    Immature Grans (Abs) Test Not Performed 0.00 - 0.04 K/uL    nRBC 0 0 /100 WBC    Gran % 71.0 38.0 - 73.0 %    Lymph % 13.0 (L) 18.0 - 48.0 %    Mono % 4.0 4.0 - 15.0 %    Eosinophil % 1.0 0.0 - 8.0 %    Basophil % 1.0 0.0 - 1.9 %    Bands 10.0 %    Platelet Estimate Appears normal     Poik Slight     Ovalocytes Occasional     Revloc Cells Occasional     Differential Method Manual    Basic  Metabolic Panel    Collection Time: 05/10/23  4:47 AM   Result Value Ref Range    Sodium 143 136 - 145 mmol/L    Potassium 3.6 3.5 - 5.1 mmol/L    Chloride 107 95 - 110 mmol/L    CO2 27 23 - 29 mmol/L    Glucose 88 70 - 110 mg/dL    BUN 5 (L) 6 - 20 mg/dL    Creatinine 0.7 0.5 - 1.4 mg/dL    Calcium 9.0 8.7 - 10.5 mg/dL    Anion Gap 9 8 - 16 mmol/L    eGFR >60 >60 mL/min/1.73 m^2   CBC auto differential    Collection Time: 05/10/23  4:48 AM   Result Value Ref Range    WBC 1.53 (LL) 3.90 - 12.70 K/uL    RBC 3.84 (L) 4.00 - 5.40 M/uL    Hemoglobin 10.8 (L) 12.0 - 16.0 g/dL    Hematocrit 32.3 (L) 37.0 - 48.5 %    MCV 84 82 - 98 fL    MCH 28.1 27.0 - 31.0 pg    MCHC 33.4 32.0 - 36.0 g/dL    RDW 14.6 (H) 11.5 - 14.5 %    Platelets 175 150 - 450 K/uL    MPV 11.4 9.2 - 12.9 fL    Immature Granulocytes CANCELED 0.0 - 0.5 %    Immature Grans (Abs) CANCELED 0.00 - 0.04 K/uL    Lymph # CANCELED 1.0 - 4.8 K/uL    Mono # CANCELED 0.3 - 1.0 K/uL    Eos # CANCELED 0.0 - 0.5 K/uL    Baso # CANCELED 0.00 - 0.20 K/uL    nRBC 0 0 /100 WBC    Gran % 52.0 38.0 - 73.0 %    Lymph % 30.0 18.0 - 48.0 %    Mono % 4.0 4.0 - 15.0 %    Eosinophil % 8.0 0.0 - 8.0 %    Basophil % 4.0 (H) 0.0 - 1.9 %    Metamyelocytes 2.0 %    Platelet Estimate Appears normal     Aniso Slight     Poik Slight     Ovalocytes Occasional     Differential Method Manual       No results found for: PHENYTOIN, PHENOBARB, VALPROATE, CBMZ      EXAMINATION    VITALS   Vitals:    05/10/23 0312 05/10/23 0449 05/10/23 0715 05/10/23 1128   BP: 114/77  (!) 125/91 125/84   Patient Position:       Pulse: 80 102 94 85   Resp: 19  20 20   Temp: 97.8 °F (36.6 °C)  97.3 °F (36.3 °C) 97.7 °F (36.5 °C)   TempSrc:       SpO2: 98%  100% 98%   Weight:       Height:          CONSTITUTIONAL  General Appearance: NAD, unremarkable, age appropriate, normal weight, seated in bed, calm, guarded      MUSCULOSKELETAL  Muscle Strength and Tone: WNL    Abnormal Involuntary Movements: none observed  "  Gait and Station: WNL; non-ataxic      PSYCHIATRIC   Behavior/Cooperation:  cooperative, better participation, restless   Speech:  normal tone, normal rate, normal pitch, normal volume   Language: grossly intact with spontaneous speech, able to name and repeat   Mood: "I'm fine"  Affect:  congruent with mood and mildly irritable / guarded   Associations: derailment of thought; has SONAL (slowly improving)  Thought Process: circumferential / tangential (slowly improving)  Thought Content: + delusions ; denies SI, HI, AH, VH, or TH (no RIS observed)  Sensorium: Alert   Alert and Oriented: to person, place, city, state, month, year, and situation   Memory: 3/3 immediate, 3/3 at 5 minutes               Recent:  Intact; able to report recent events              Remote:  Intact; Named 4/4 past presidents   Attention/concentration: Intact / Fair. Appropriate for age/education. Able to spell w-[id number]-r-l-[id number] & d-l-r-o-w.   Similarities: Intact (difference between apple and orange?)  Abstract reasoning: Intact  Fund of Knowledge: Named 4/4 past presidents   Insight: Limited   Judgment: Limited      CAM ICU Delirium Assessment - NEGATIVE     Is the patient aware of the biomedical complications associated with substance abuse and mental illness? yes           MEDICAL DECISION MAKING     ASSESSMENT         Schizoaffective Disorder, Bipolar Type   PTSD  Unspecified Anxiety Disorder   Hx of Polysubstance Abuse         RECOMMENDATIONS       - At this time, the patient does not currently meet PEC criteria due to not being an imminent threat to self/others and not being gravely disabled 2/2 mental illness; will continue to evaluate the need for PEC during the hospitalization.       - Discontinue Abilify due to concern for AE of akathisia and increase Seroquel to 150 mg PO QHS for sleep / appetite / mood / psychosis (discussed risks/benefits/alt vs no treatment with patient).     - Continue Remeron 15 mg PO QHS for mood / anxiety / sleep / " appetite (discussed risks/benefits/alt vs no treatment with patient).    - Begin Buspar 7.5 mg PO BID for anxiety / mood (discussed risks/benefits/alt vs no treatment with patient).     - Psychotherapy was again performed with patient as noted above with a focus on improving mood, psychosis, anxiety, and total sobriety.       - Patient's most recent resulted labs, imaging, and EKG were reviewed today.  Repeat EKG to assess for improvement in QTc.     - Please have CM/SW assist patient with ASAP outpatient mental health f/u for s/p discharge from this facility.      - Patient was again instructed to call 911 and/or [phone], and return to the nearest ED if she begins feeling suicidal, homicidal, or gravely disabled (for s/p this hospitalization).       - Thank you for this consult ; will continue to follow patient while at [facility].         Total time spent with patient and/or managing/coordinating patient's care today (excluding the time spent on psychotherapy): 55 minutes   Time spent on psychotherapy today (as noted above): 21 minutes   Total time for encounter today including psychotherapy: 76 minutes      More than 50% of the time was spent counseling/coordinating care.     Consulting clinician was informed of the encounter and consult note.      STAFF:  Say Coley MD  [facility]  5/10/2023

## 2023-05-10 NOTE — PROGRESS NOTES
"[facility] Medicine  Progress Note    Patient Name: Patricia Langley  MRN: [id number]  Patient Class: IP- Inpatient   Admission Date: 5/6/2023  Length of Stay: 2 days  Attending Physician: Keysha Skelton MD  Primary Care Provider: Cornell Britt MD        Subjective:     Principal Problem:Pyelonephritis        HPI:  Korin Gomez is a 46 y/o F with PMH of anxiety, depression, HIV (untreated), hypothyroidism, insomnia, presents with 3 month history abdominal pain that is worse in the past 4 days.  There is associated nausea, vomiting, rectal pain, intermittent episode of bright red blood after BM.  She denies fever, chills, lightheadedness, headaches, chest pain, diarrhea, dysuria, shortness of breath.    In the ED sodium 138, potassium 2.9, bicarb 18, H/H 12.5/36.5, WBC 4.17, UA positive, urine microscopy with few clumps of WBC, many bacteria CT abdomen/pelvis unremarkable with no acute intra-abdominal pathology.  Starting on ceftriaxone pending urine culture  During medication reconciliation, patient stated, " my identity was stolen and all that medications and not for me".  Admit hospital medicine observation      Overview/Hospital Course:  No notes on file    No new subjective & objective note has been filed under this hospital service since the last note was generated.      Assessment/Plan:      * Pyelonephritis  UA positive  Urine microscopy positive   Start ceftriaxone, pending urine culture  Consult ID: agree with ceftriaxone for now for UTI     Neutropenia associated with acquired immune deficiency syndrome (AIDS)  This patient in known to have AIDS (from CD4 count has been less than 200). Labs reviewed- No results found for: CD4, No results found for: HIVDNAPCR. Patient is on HAART. Will start HAART. Prophylaxis was given at this time- Atovaquone. Continue to monitor routine labs. Last CD4 count was   Lab Results   Component Value Date    ABSOLUTECD4 10 (L) 05/07/2023       We will " consult Infectious disease at this time. Evaluate and treat HIV-associated diseases as indicated by specific problems listed below.     Also could be adverse effect of Biktarvy    Herpes labialis  Cold sore present  Started Acyclovir 400mg TID x 5-10 days, monitor for worsening infection      Leukopenia  WBC 1.76, monitor for now  Hold off on neutropenic precautions      Hemorrhoid  She admits she is been offered surgery for hemorrhoids but has deferred  Sitz bath   Anusol HC, learn to self reduce  Called [facility]-rectal surgery consult for large hemorrhoid - may need banding, do not want to perform surgical intervention given very low CD4 count, follow up after immune system improved    Hypokalemia  Replace      At risk for opportunistic infections  Atovaquone PPx      Anxiety disorder        Insomnia  Continue mirtazapine      Bipolar 1 disorder  Anxiety disorder  Major depressive disorder in remission  Schizoaffective disorder, bipolar type   BuSpar, Depakote, Haldol, lithium, Seroquel, Restoril- patient denied taking any of this meds, stating her identity was stolen and this meds are not for her  Consult psych      AIDS  At risk for opportunistic infections  This patient in known to have AIDS (from CD4 count has been less than 200). Labs reviewed- No results found for: CD4, No results found for: HIVDNAPCR. Patient is not on HAART. Will start HAART. Prophylaxis was offered and declined by the patient at this time- . Continue to monitor routine labs. Last CD4 count was   Lab Results   Component Value Date    ABSOLUTECD4 10 (L) 05/07/2023       We will consult Infectious disease at this time. Other HIV-associated diseases are not present.    Appreciate ID recs resume Biktarvy, CD4 count and HIV viral load  CD4 10, ,503- continue Biktarvy        VTE Risk Mitigation (From admission, onward)         Ordered     IP VTE HIGH RISK PATIENT  Once         05/06/23 1244     Place sequential compression  device  Until discontinued         05/06/23 1244                Discharge Planning   RUFINA: 5/12/2023     Code Status: Full Code   Is the patient medically ready for discharge?:     Reason for patient still in hospital (select all that apply): Patient trending condition, Laboratory test and Treatment  Discharge Plan A: Home                  Keysha Skelton MD  Department of Hospital Medicine   [facility]

## 2023-05-10 NOTE — ASSESSMENT & PLAN NOTE
She admits she is been offered surgery for hemorrhoids but has deferred  Sitz bath   Anusol HC, learn to self reduce  Called [facility]-rectal surgery consult for large hemorrhoid - may need banding, do not want to perform surgical intervention given very low CD4 count, follow up after immune system improved

## 2023-05-10 NOTE — PROGRESS NOTES
05/10/23 0545   Critical Value Communication   Date Result Received 05/10/23   Time Result Received 0526   Resulting Department of Critical Value Lab   Who communicated critical value from resulting department? Nicci Boyer   Critical Test #1 WBC   Critical Test #1 Result 1.53   Name of Notified Physician/Designee Dr Brennan   Date Notified 05/10/23   Time Notified 0545   Read Back Verification Yes   Physician Directive No new orders

## 2023-05-11 ENCOUNTER — CLINICAL SUPPORT (OUTPATIENT)
Dept: SMOKING CESSATION | Facility: CLINIC | Age: 48
End: 2023-05-11
Payer: COMMERCIAL

## 2023-05-11 DIAGNOSIS — F17.210 CIGARETTE SMOKER: Primary | ICD-10-CM

## 2023-05-11 LAB
ANION GAP SERPL CALC-SCNC: 10 MMOL/L (ref 8–16)
ANISOCYTOSIS BLD QL SMEAR: SLIGHT
BACTERIA BLD CULT: NORMAL
BACTERIA BLD CULT: NORMAL
BASOPHILS NFR BLD: 1.6 % (ref 0–1.9)
BUN SERPL-MCNC: 9 MG/DL (ref 6–20)
CALCIUM SERPL-MCNC: 8.8 MG/DL (ref 8.7–10.5)
CHLORIDE SERPL-SCNC: 105 MMOL/L (ref 95–110)
CO2 SERPL-SCNC: 26 MMOL/L (ref 23–29)
CREAT SERPL-MCNC: 0.8 MG/DL (ref 0.5–1.4)
DIFFERENTIAL METHOD: ABNORMAL
EOSINOPHIL NFR BLD: 3.2 % (ref 0–8)
ERYTHROCYTE [DISTWIDTH] IN BLOOD BY AUTOMATED COUNT: 14.4 % (ref 11.5–14.5)
EST. GFR  (NO RACE VARIABLE): >60 ML/MIN/1.73 M^2
GLUCOSE SERPL-MCNC: 94 MG/DL (ref 70–110)
HCT VFR BLD AUTO: 31.1 % (ref 37–48.5)
HGB BLD-MCNC: 10.2 G/DL (ref 12–16)
IMM GRANULOCYTES # BLD AUTO: ABNORMAL K/UL (ref 0–0.04)
IMM GRANULOCYTES NFR BLD AUTO: ABNORMAL % (ref 0–0.5)
LYMPHOCYTES NFR BLD: 23.8 % (ref 18–48)
MCH RBC QN AUTO: 27.8 PG (ref 27–31)
MCHC RBC AUTO-ENTMCNC: 32.8 G/DL (ref 32–36)
MCV RBC AUTO: 85 FL (ref 82–98)
MONOCYTES NFR BLD: 9.5 % (ref 4–15)
NEUTROPHILS NFR BLD: 61.9 % (ref 38–73)
NRBC BLD-RTO: 0 /100 WBC
OVALOCYTES BLD QL SMEAR: ABNORMAL
PLATELET # BLD AUTO: 192 K/UL (ref 150–450)
PLATELET BLD QL SMEAR: ABNORMAL
PMV BLD AUTO: 11.3 FL (ref 9.2–12.9)
POCT GLUCOSE: 105 MG/DL (ref 70–110)
POIKILOCYTOSIS BLD QL SMEAR: SLIGHT
POTASSIUM SERPL-SCNC: 3.8 MMOL/L (ref 3.5–5.1)
RBC # BLD AUTO: 3.67 M/UL (ref 4–5.4)
SODIUM SERPL-SCNC: 141 MMOL/L (ref 136–145)
WBC # BLD AUTO: 1.69 K/UL (ref 3.9–12.7)

## 2023-05-11 PROCEDURE — 99406 PT REFUSED TOBACCO CESSATION: ICD-10-PCS | Mod: ,,,

## 2023-05-11 PROCEDURE — 36415 COLL VENOUS BLD VENIPUNCTURE: CPT | Performed by: FAMILY MEDICINE

## 2023-05-11 PROCEDURE — 25000003 PHARM REV CODE 250: Performed by: INTERNAL MEDICINE

## 2023-05-11 PROCEDURE — 11000001 HC ACUTE MED/SURG PRIVATE ROOM

## 2023-05-11 PROCEDURE — 99406 BEHAV CHNG SMOKING 3-10 MIN: CPT | Mod: ,,,

## 2023-05-11 PROCEDURE — 90833 PSYTX W PT W E/M 30 MIN: CPT | Mod: AF,HB,, | Performed by: PSYCHIATRY & NEUROLOGY

## 2023-05-11 PROCEDURE — 63600175 PHARM REV CODE 636 W HCPCS: Performed by: NURSE PRACTITIONER

## 2023-05-11 PROCEDURE — 85007 BL SMEAR W/DIFF WBC COUNT: CPT | Performed by: FAMILY MEDICINE

## 2023-05-11 PROCEDURE — 90833 PR PSYCHOTHERAPY W/PATIENT W/E&M, 30 MIN (ADD ON): ICD-10-PCS | Mod: AF,HB,, | Performed by: PSYCHIATRY & NEUROLOGY

## 2023-05-11 PROCEDURE — 63600175 PHARM REV CODE 636 W HCPCS: Performed by: FAMILY MEDICINE

## 2023-05-11 PROCEDURE — 99233 SBSQ HOSP IP/OBS HIGH 50: CPT | Mod: AF,HB,, | Performed by: PSYCHIATRY & NEUROLOGY

## 2023-05-11 PROCEDURE — S4991 NICOTINE PATCH NONLEGEND: HCPCS | Performed by: INTERNAL MEDICINE

## 2023-05-11 PROCEDURE — 99233 PR SUBSEQUENT HOSPITAL CARE,LEVL III: ICD-10-PCS | Mod: AF,HB,, | Performed by: PSYCHIATRY & NEUROLOGY

## 2023-05-11 PROCEDURE — 80048 BASIC METABOLIC PNL TOTAL CA: CPT | Performed by: FAMILY MEDICINE

## 2023-05-11 PROCEDURE — 25000003 PHARM REV CODE 250: Performed by: FAMILY MEDICINE

## 2023-05-11 PROCEDURE — 85027 COMPLETE CBC AUTOMATED: CPT | Performed by: FAMILY MEDICINE

## 2023-05-11 RX ORDER — NITROFURANTOIN 25; 75 MG/1; MG/1
100 CAPSULE ORAL EVERY 12 HOURS
Status: DISCONTINUED | OUTPATIENT
Start: 2023-05-11 | End: 2023-05-12 | Stop reason: HOSPADM

## 2023-05-11 RX ORDER — IBUPROFEN 200 MG
1 TABLET ORAL DAILY
Status: DISCONTINUED | OUTPATIENT
Start: 2023-05-11 | End: 2023-05-12 | Stop reason: HOSPADM

## 2023-05-11 RX ADMIN — NITROFURANTOIN MONOHYDRATE/MACROCRYSTALS 100 MG: 25; 75 CAPSULE ORAL at 09:05

## 2023-05-11 RX ADMIN — MIRTAZAPINE 15 MG: 7.5 TABLET ORAL at 09:05

## 2023-05-11 RX ADMIN — HYPROMELLOSE 2910 2 DROP: 5 SOLUTION/ DROPS OPHTHALMIC at 03:05

## 2023-05-11 RX ADMIN — ONDANSETRON HYDROCHLORIDE 4 MG: 2 INJECTION, SOLUTION INTRAMUSCULAR; INTRAVENOUS at 03:05

## 2023-05-11 RX ADMIN — BICTEGRAVIR SODIUM, EMTRICITABINE, AND TENOFOVIR ALAFENAMIDE FUMARATE 1 TABLET: 50; 200; 25 TABLET ORAL at 08:05

## 2023-05-11 RX ADMIN — CEFTRIAXONE 1 G: 1 INJECTION, POWDER, FOR SOLUTION INTRAMUSCULAR; INTRAVENOUS at 12:05

## 2023-05-11 RX ADMIN — BUSPIRONE HYDROCHLORIDE 7.5 MG: 7.5 TABLET ORAL at 08:05

## 2023-05-11 RX ADMIN — ATOVAQUONE 1500 MG: 750 SUSPENSION ORAL at 08:05

## 2023-05-11 RX ADMIN — QUETIAPINE FUMARATE 150 MG: 100 TABLET ORAL at 09:05

## 2023-05-11 RX ADMIN — BUSPIRONE HYDROCHLORIDE 7.5 MG: 7.5 TABLET ORAL at 09:05

## 2023-05-11 RX ADMIN — HYPROMELLOSE 2910 2 DROP: 5 SOLUTION/ DROPS OPHTHALMIC at 09:05

## 2023-05-11 RX ADMIN — NICOTINE 1 PATCH: 21 PATCH, EXTENDED RELEASE TRANSDERMAL at 01:05

## 2023-05-11 RX ADMIN — NICOTINE 1 PATCH: 14 PATCH, EXTENDED RELEASE TRANSDERMAL at 08:05

## 2023-05-11 RX ADMIN — ACYCLOVIR 400 MG: 200 CAPSULE ORAL at 08:05

## 2023-05-11 RX ADMIN — ACYCLOVIR 400 MG: 200 CAPSULE ORAL at 09:05

## 2023-05-11 RX ADMIN — POTASSIUM BICARBONATE 25 MEQ: 978 TABLET, EFFERVESCENT ORAL at 10:05

## 2023-05-11 RX ADMIN — ACYCLOVIR 400 MG: 200 CAPSULE ORAL at 03:05

## 2023-05-11 NOTE — SUBJECTIVE & OBJECTIVE
Interval History: Today is concerned about the other hospitals    Review of Systems   Constitutional:  Negative for activity change and fever.   HENT:  Positive for mouth sores. Negative for congestion.    Respiratory:  Negative for shortness of breath.    Gastrointestinal:  Negative for abdominal pain, blood in stool, nausea and vomiting.   Genitourinary:  Negative for difficulty urinating and dysuria.   Skin:  Negative for color change, rash and wound.   Neurological:  Negative for headaches.   Psychiatric/Behavioral:  Positive for behavioral problems. Negative for agitation.    Objective:     Vital Signs (Most Recent):  Temp: 98.6 °F (37 °C) (05/11/23 1157)  Pulse: 88 (05/11/23 1623)  Resp: 20 (05/11/23 1157)  BP: (!) 125/91 (05/11/23 1157)  SpO2: 99 % (05/11/23 1157) Vital Signs (24h Range):  Temp:  [96.9 °F (36.1 °C)-99 °F (37.2 °C)] 98.6 °F (37 °C)  Pulse:  [70-88] 88  Resp:  [19-20] 20  SpO2:  [97 %-100 %] 99 %  BP: ()/(61-93) 125/91     Weight: 53.5 kg (117 lb 15.1 oz)  Body mass index is 19.04 kg/m².    Intake/Output Summary (Last 24 hours) at 5/11/2023 1814  Last data filed at 5/10/2023 2026  Gross per 24 hour   Intake 480 ml   Output --   Net 480 ml         Physical Exam  HENT:      Head: Normocephalic and atraumatic.   Cardiovascular:      Rate and Rhythm: Normal rate.   Pulmonary:      Effort: Pulmonary effort is normal.      Breath sounds: No rales.   Abdominal:      General: Abdomen is flat. Bowel sounds are normal.      Tenderness: There is abdominal tenderness in the suprapubic area. There is no right CVA tenderness or left CVA tenderness.   Musculoskeletal:         General: Normal range of motion.      Cervical back: Normal range of motion.      Right lower leg: No edema.      Left lower leg: No edema.   Skin:     General: Skin is warm.   Neurological:      Mental Status: She is alert and oriented to person, place, and time.   Psychiatric:         Mood and Affect: Mood normal.          Behavior: Behavior normal.           Significant Labs: All pertinent labs within the past 24 hours have been reviewed.    Significant Imaging: I have reviewed all pertinent imaging results/findings within the past 24 hours.

## 2023-05-11 NOTE — PROGRESS NOTES
Smoking cessation education follow-up note; Pt reporting frequent breakthrough cravings with 14 mg nicotine patch in use. Contacted provider to request increase to 21 mg nicotine patch Q day.

## 2023-05-11 NOTE — PLAN OF CARE
Problem: Adult Inpatient Plan of Care  Goal: Plan of Care Review  Outcome: Ongoing, Progressing     Vital signs, labs, notes and care plan reviewed.

## 2023-05-11 NOTE — PROGRESS NOTES
"[facility] Medicine  Progress Note    Patient Name: Patricia Langley  MRN: [id number]  Patient Class: IP- Inpatient   Admission Date: 5/6/2023  Length of Stay: 3 days  Attending Physician: Keysha Skelton MD  Primary Care Provider: Cornell Britt MD        Subjective:     Principal Problem:Pyelonephritis        HPI:  Korin Gomez is a 46 y/o F with PMH of anxiety, depression, HIV (untreated), hypothyroidism, insomnia, presents with 3 month history abdominal pain that is worse in the past 4 days.  There is associated nausea, vomiting, rectal pain, intermittent episode of bright red blood after BM.  She denies fever, chills, lightheadedness, headaches, chest pain, diarrhea, dysuria, shortness of breath.    In the ED sodium 138, potassium 2.9, bicarb 18, H/H 12.5/36.5, WBC 4.17, UA positive, urine microscopy with few clumps of WBC, many bacteria CT abdomen/pelvis unremarkable with no acute intra-abdominal pathology.  Starting on ceftriaxone pending urine culture  During medication reconciliation, patient stated, " my identity was stolen and all that medications and not for me".  Admit hospital medicine observation      Overview/Hospital Course:  No notes on file    Interval History: Today is concerned about the other hospitals    Review of Systems   Constitutional:  Negative for activity change and fever.   HENT:  Positive for mouth sores. Negative for congestion.    Respiratory:  Negative for shortness of breath.    Gastrointestinal:  Negative for abdominal pain, blood in stool, nausea and vomiting.   Genitourinary:  Negative for difficulty urinating and dysuria.   Skin:  Negative for color change, rash and wound.   Neurological:  Negative for headaches.   Psychiatric/Behavioral:  Positive for behavioral problems. Negative for agitation.    Objective:     Vital Signs (Most Recent):  Temp: 98.6 °F (37 °C) (05/11/23 1157)  Pulse: 88 (05/11/23 1623)  Resp: 20 (05/11/23 1157)  BP: (!) 125/91 (05/11/23 " 1157)  SpO2: 99 % (05/11/23 1157) Vital Signs (24h Range):  Temp:  [96.9 °F (36.1 °C)-99 °F (37.2 °C)] 98.6 °F (37 °C)  Pulse:  [70-88] 88  Resp:  [19-20] 20  SpO2:  [97 %-100 %] 99 %  BP: ()/(61-93) 125/91     Weight: 53.5 kg (117 lb 15.1 oz)  Body mass index is 19.04 kg/m².    Intake/Output Summary (Last 24 hours) at 5/11/2023 1814  Last data filed at 5/10/2023 2026  Gross per 24 hour   Intake 480 ml   Output --   Net 480 ml         Physical Exam  HENT:      Head: Normocephalic and atraumatic.   Cardiovascular:      Rate and Rhythm: Normal rate.   Pulmonary:      Effort: Pulmonary effort is normal.      Breath sounds: No rales.   Abdominal:      General: Abdomen is flat. Bowel sounds are normal.      Tenderness: There is abdominal tenderness in the suprapubic area. There is no right CVA tenderness or left CVA tenderness.   Musculoskeletal:         General: Normal range of motion.      Cervical back: Normal range of motion.      Right lower leg: No edema.      Left lower leg: No edema.   Skin:     General: Skin is warm.   Neurological:      Mental Status: She is alert and oriented to person, place, and time.   Psychiatric:         Mood and Affect: Mood normal.         Behavior: Behavior normal.           Significant Labs: All pertinent labs within the past 24 hours have been reviewed.    Significant Imaging: I have reviewed all pertinent imaging results/findings within the past 24 hours.      Assessment/Plan:      * Pyelonephritis  UA positive  Urine microscopy positive   Start ceftriaxone, pending urine culture  Consult ID: agree with ceftriaxone for now for UTI     Neutropenia associated with acquired immune deficiency syndrome (AIDS)  This patient in known to have AIDS (from CD4 count has been less than 200). Labs reviewed- No results found for: CD4, No results found for: HIVDNAPCR. Patient is on HAART. Will start HAART. Prophylaxis was given at this time- Atovaquone. Continue to monitor routine labs. Last  CD4 count was   Lab Results   Component Value Date    ABSOLUTECD4 10 (L) 05/07/2023       We will consult Infectious disease at this time. Evaluate and treat HIV-associated diseases as indicated by specific problems listed below.     Also could be adverse effect of Biktarvy    Herpes labialis  Cold sore present  Started Acyclovir 400mg TID x 5-10 days, monitor for worsening infection      Leukopenia  WBC 1.76, monitor for now  Hold off on neutropenic precautions      Hemorrhoid  She admits she is been offered surgery for hemorrhoids but has deferred  Sitz bath   Anusol HC, learn to self reduce  Called [facility]-rectal surgery consult for large hemorrhoid - may need banding, do not want to perform surgical intervention given very low CD4 count, follow up after immune system improved    Hypokalemia  Replace      At risk for opportunistic infections  Atovaquone PPx      Anxiety disorder        Insomnia  Continue mirtazapine      Bipolar 1 disorder  Anxiety disorder  Major depressive disorder in remission  Schizoaffective disorder, bipolar type   BuSpar, Depakote, Haldol, lithium, Seroquel, Restoril- patient denied taking any of this meds, stating her identity was stolen and this meds are not for her  Consult psych, appreciate recommendations  On Remeron, Buspar and Seroquel      AIDS  At risk for opportunistic infections  This patient in known to have AIDS (from CD4 count has been less than 200). Labs reviewed- No results found for: CD4, No results found for: HIVDNAPCR. Patient is not on HAART. Will start HAART. Prophylaxis was offered and declined by the patient at this time- . Continue to monitor routine labs. Last CD4 count was   Lab Results   Component Value Date    ABSOLUTECD4 10 (L) 05/07/2023       We will consult Infectious disease at this time. Other HIV-associated diseases are not present.    Appreciate ID recs resume Biktarvy, CD4 count and HIV viral load  CD4 10, ,503- continue  Biktarvy        VTE Risk Mitigation (From admission, onward)         Ordered     IP VTE HIGH RISK PATIENT  Once         05/06/23 1244     Place sequential compression device  Until discontinued         05/06/23 1244                Discharge Planning   RUFINA: 5/12/2023     Code Status: Full Code   Is the patient medically ready for discharge?:     Reason for patient still in hospital (select all that apply): Patient trending condition and Laboratory test  Discharge Plan A: Home                  Keysha Skelton MD  Department of Hospital Medicine   [facility]

## 2023-05-11 NOTE — ASSESSMENT & PLAN NOTE
She admits she is been offered surgery for hemorrhoids but has deferred  Sitz bath   Anusol HC, learn to self reduce  Called [facility]-rectal surgery consult for large hemorrhoid - may need banding, do not want to perform surgical intervention given very low CD4 count, follow up after immune system improved

## 2023-05-11 NOTE — ASSESSMENT & PLAN NOTE
Anxiety disorder  Major depressive disorder in remission  Schizoaffective disorder, bipolar type   BuSpar, Depakote, Haldol, lithium, Seroquel, Restoril- patient denied taking any of this meds, stating her identity was stolen and this meds are not for her  Consult psych, appreciate recommendations  On Remeron, Buspar and Seroquel

## 2023-05-11 NOTE — PLAN OF CARE
Patricia Langley is a 47 y.o. female with PMH of HIV/AIDS (CD4 10/4.4%), hypothyroid, anxiety, depression admitted for 3 months of worsening abdominal pain and BRB per rectum. Found to have a UTI vs pyelo with w GNR. Not on HAART for HIV treatment, previously on Biktarvy. Reports she usually only has a few days worth when she leaves the hospital and then never has any more prescription for the antiretrovirals. HIV viral load pending (in April HIV ).   Concern for worsening leukopenia,. On arrival WbC 6.7 >  now 1.69. Has been stable past 3 days. Decrease in WBC could be due to HIV related BM suppression, or potential IRIS from BM infection being 'unmasked' after ART initiation, such as dMAC or histo, although level has now stabilized over past couple days so less likely    - Complete UTI treatment Nitrofurantoin until 5/13  - Continue Mepron for PJP ppx  - Continue Biktarvy  - needs close followup with Dr. Mendoza (ID) at [facility]  - May need additional workup with heme onc, such as BM biopsy     Thank you for allowing us to participate in the care of this patient. Pt discussed with staff Dr. Anand. Please contact us if you have any questions regarding this consult.    Yaquelin Ryan MD  [employer] IM/Peds PGY3/HO2  [employer] [facility]

## 2023-05-12 VITALS
DIASTOLIC BLOOD PRESSURE: 81 MMHG | RESPIRATION RATE: 18 BRPM | HEIGHT: 66 IN | OXYGEN SATURATION: 100 % | HEART RATE: 79 BPM | SYSTOLIC BLOOD PRESSURE: 119 MMHG | WEIGHT: 115.94 LBS | TEMPERATURE: 98 F | BODY MASS INDEX: 18.63 KG/M2

## 2023-05-12 LAB
ANION GAP SERPL CALC-SCNC: 10 MMOL/L (ref 8–16)
ANISOCYTOSIS BLD QL SMEAR: SLIGHT
BASOPHILS NFR BLD: 1 % (ref 0–1.9)
BUN SERPL-MCNC: 14 MG/DL (ref 6–20)
BURR CELLS BLD QL SMEAR: ABNORMAL
CALCIUM SERPL-MCNC: 9.3 MG/DL (ref 8.7–10.5)
CHLORIDE SERPL-SCNC: 106 MMOL/L (ref 95–110)
CO2 SERPL-SCNC: 25 MMOL/L (ref 23–29)
CREAT SERPL-MCNC: 0.7 MG/DL (ref 0.5–1.4)
DACRYOCYTES BLD QL SMEAR: ABNORMAL
DIFFERENTIAL METHOD: ABNORMAL
EOSINOPHIL NFR BLD: 2 % (ref 0–8)
ERYTHROCYTE [DISTWIDTH] IN BLOOD BY AUTOMATED COUNT: 14.3 % (ref 11.5–14.5)
EST. GFR  (NO RACE VARIABLE): >60 ML/MIN/1.73 M^2
GLUCOSE SERPL-MCNC: 102 MG/DL (ref 70–110)
HCT VFR BLD AUTO: 30.7 % (ref 37–48.5)
HGB BLD-MCNC: 10.2 G/DL (ref 12–16)
IMM GRANULOCYTES # BLD AUTO: ABNORMAL K/UL (ref 0–0.04)
IMM GRANULOCYTES NFR BLD AUTO: ABNORMAL % (ref 0–0.5)
LYMPHOCYTES NFR BLD: 21 % (ref 18–48)
MCH RBC QN AUTO: 28.3 PG (ref 27–31)
MCHC RBC AUTO-ENTMCNC: 33.2 G/DL (ref 32–36)
MCV RBC AUTO: 85 FL (ref 82–98)
MONOCYTES NFR BLD: 8 % (ref 4–15)
NEUTROPHILS NFR BLD: 65 % (ref 38–73)
NEUTS BAND NFR BLD MANUAL: 3 %
NRBC BLD-RTO: 0 /100 WBC
OVALOCYTES BLD QL SMEAR: ABNORMAL
PLATELET # BLD AUTO: 207 K/UL (ref 150–450)
PLATELET BLD QL SMEAR: ABNORMAL
PMV BLD AUTO: 10.7 FL (ref 9.2–12.9)
POIKILOCYTOSIS BLD QL SMEAR: SLIGHT
POLYCHROMASIA BLD QL SMEAR: ABNORMAL
POTASSIUM SERPL-SCNC: 4.1 MMOL/L (ref 3.5–5.1)
RBC # BLD AUTO: 3.61 M/UL (ref 4–5.4)
SCHISTOCYTES BLD QL SMEAR: ABNORMAL
SODIUM SERPL-SCNC: 141 MMOL/L (ref 136–145)
WBC # BLD AUTO: 2.16 K/UL (ref 3.9–12.7)
WBC TOXIC VACUOLES BLD QL SMEAR: PRESENT

## 2023-05-12 PROCEDURE — 90833 PSYTX W PT W E/M 30 MIN: CPT | Mod: AF,HB,, | Performed by: PSYCHIATRY & NEUROLOGY

## 2023-05-12 PROCEDURE — 80048 BASIC METABOLIC PNL TOTAL CA: CPT | Performed by: FAMILY MEDICINE

## 2023-05-12 PROCEDURE — 99233 PR SUBSEQUENT HOSPITAL CARE,LEVL III: ICD-10-PCS | Mod: AF,HB,, | Performed by: PSYCHIATRY & NEUROLOGY

## 2023-05-12 PROCEDURE — 25000003 PHARM REV CODE 250: Performed by: INTERNAL MEDICINE

## 2023-05-12 PROCEDURE — 99233 SBSQ HOSP IP/OBS HIGH 50: CPT | Mod: AF,HB,, | Performed by: PSYCHIATRY & NEUROLOGY

## 2023-05-12 PROCEDURE — 36415 COLL VENOUS BLD VENIPUNCTURE: CPT | Performed by: FAMILY MEDICINE

## 2023-05-12 PROCEDURE — 25000003 PHARM REV CODE 250: Performed by: FAMILY MEDICINE

## 2023-05-12 PROCEDURE — 90833 PR PSYCHOTHERAPY W/PATIENT W/E&M, 30 MIN (ADD ON): ICD-10-PCS | Mod: AF,HB,, | Performed by: PSYCHIATRY & NEUROLOGY

## 2023-05-12 PROCEDURE — 85025 COMPLETE CBC W/AUTO DIFF WBC: CPT | Performed by: FAMILY MEDICINE

## 2023-05-12 PROCEDURE — S4991 NICOTINE PATCH NONLEGEND: HCPCS | Performed by: INTERNAL MEDICINE

## 2023-05-12 RX ORDER — BUSPIRONE HYDROCHLORIDE 10 MG/1
10 TABLET ORAL 2 TIMES DAILY
Qty: 180 TABLET | Refills: 3 | Status: SHIPPED | OUTPATIENT
Start: 2023-05-12 | End: 2024-05-11

## 2023-05-12 RX ORDER — NITROFURANTOIN 25; 75 MG/1; MG/1
100 CAPSULE ORAL EVERY 12 HOURS
Qty: 2 CAPSULE | Refills: 0 | Status: SHIPPED | OUTPATIENT
Start: 2023-05-12 | End: 2023-05-13

## 2023-05-12 RX ORDER — ATOVAQUONE 750 MG/5ML
1500 SUSPENSION ORAL DAILY
Qty: 900 ML | Refills: 1 | Status: SHIPPED | OUTPATIENT
Start: 2023-05-12 | End: 2023-05-12 | Stop reason: SDUPTHER

## 2023-05-12 RX ORDER — NITROFURANTOIN 25; 75 MG/1; MG/1
100 CAPSULE ORAL EVERY 12 HOURS
Qty: 2 CAPSULE | Refills: 0 | Status: SHIPPED | OUTPATIENT
Start: 2023-05-12 | End: 2023-05-12 | Stop reason: SDUPTHER

## 2023-05-12 RX ORDER — IBUPROFEN 200 MG
1 TABLET ORAL DAILY
Qty: 84 PATCH | Refills: 0 | Status: SHIPPED | OUTPATIENT
Start: 2023-05-12 | End: 2023-08-10

## 2023-05-12 RX ORDER — ACYCLOVIR 400 MG/1
400 TABLET ORAL 2 TIMES DAILY
Qty: 14 TABLET | Refills: 0 | Status: SHIPPED | OUTPATIENT
Start: 2023-05-12 | End: 2023-05-12 | Stop reason: SDUPTHER

## 2023-05-12 RX ORDER — BUSPIRONE HYDROCHLORIDE 5 MG/1
10 TABLET ORAL 2 TIMES DAILY
Status: DISCONTINUED | OUTPATIENT
Start: 2023-05-12 | End: 2023-05-12 | Stop reason: HOSPADM

## 2023-05-12 RX ORDER — ACYCLOVIR 200 MG/1
400 CAPSULE ORAL 3 TIMES DAILY
Qty: 30 CAPSULE | Refills: 0 | Status: SHIPPED | OUTPATIENT
Start: 2023-05-12 | End: 2023-05-12 | Stop reason: HOSPADM

## 2023-05-12 RX ORDER — MIRTAZAPINE 15 MG/1
15 TABLET, FILM COATED ORAL NIGHTLY
Qty: 90 TABLET | Refills: 0 | Status: SHIPPED | OUTPATIENT
Start: 2023-05-12 | End: 2023-10-17

## 2023-05-12 RX ORDER — ATOVAQUONE 750 MG/5ML
1500 SUSPENSION ORAL DAILY
Qty: 900 ML | Refills: 1 | Status: SHIPPED | OUTPATIENT
Start: 2023-05-12 | End: 2023-11-08

## 2023-05-12 RX ORDER — ACYCLOVIR 400 MG/1
400 TABLET ORAL 2 TIMES DAILY
Qty: 14 TABLET | Refills: 0 | Status: SHIPPED | OUTPATIENT
Start: 2023-05-12 | End: 2023-10-17

## 2023-05-12 RX ORDER — BUSPIRONE HYDROCHLORIDE 7.5 MG/1
7.5 TABLET ORAL 2 TIMES DAILY
Qty: 180 TABLET | Refills: 3 | Status: SHIPPED | OUTPATIENT
Start: 2023-05-12 | End: 2023-05-12 | Stop reason: HOSPADM

## 2023-05-12 RX ORDER — IBUPROFEN 200 MG
1 TABLET ORAL DAILY
Qty: 84 PATCH | Refills: 0 | Status: SHIPPED | OUTPATIENT
Start: 2023-05-12 | End: 2023-05-12 | Stop reason: SDUPTHER

## 2023-05-12 RX ORDER — BICTEGRAVIR SODIUM, EMTRICITABINE, AND TENOFOVIR ALAFENAMIDE FUMARATE 50; 200; 25 MG/1; MG/1; MG/1
1 TABLET ORAL DAILY
Qty: 90 TABLET | Refills: 1 | Status: SHIPPED | OUTPATIENT
Start: 2023-05-12 | End: 2023-11-08

## 2023-05-12 RX ORDER — QUETIAPINE FUMARATE 150 MG/1
150 TABLET, FILM COATED ORAL NIGHTLY
Qty: 90 TABLET | Refills: 0 | Status: SHIPPED | OUTPATIENT
Start: 2023-05-12 | End: 2023-10-17

## 2023-05-12 RX ADMIN — ACYCLOVIR 400 MG: 200 CAPSULE ORAL at 09:05

## 2023-05-12 RX ADMIN — BICTEGRAVIR SODIUM, EMTRICITABINE, AND TENOFOVIR ALAFENAMIDE FUMARATE 1 TABLET: 50; 200; 25 TABLET ORAL at 09:05

## 2023-05-12 RX ADMIN — NICOTINE 1 PATCH: 21 PATCH, EXTENDED RELEASE TRANSDERMAL at 09:05

## 2023-05-12 RX ADMIN — NITROFURANTOIN MONOHYDRATE/MACROCRYSTALS 100 MG: 25; 75 CAPSULE ORAL at 09:05

## 2023-05-12 RX ADMIN — ATOVAQUONE 1500 MG: 750 SUSPENSION ORAL at 09:05

## 2023-05-12 NOTE — PROGRESS NOTES
"PSYCHIATRY INPATIENT PROGRESS NOTE  SUBSEQUENT HOSPITAL VISIT      5/12/2023 7:00 AM   Patricia Langley   1975   [id number]           DATE OF ADMISSION: 5/6/2023  9:12 AM    SITE: [facility]    CURRENT LEGAL STATUS: Patient does not currently meet PEC criteria due to not currently being an imminent threat to self/others and not being gravely disabled 2/2 mental illness at this time.     HISTORY    Per Initial History from Primary Team:           Patient presents with    Hemorrhoids       Large hemorrhoid that has been noted by pt: "For a while." Bleeding noted.     Abdominal Pain       Generalized, abd pain x1 month on/off with n/v+. 6/10 pain.    Korin Gomez is a 48 y/o F with PMH of anxiety, depression, HIV (untreated), hypothyroidism, insomnia, presents with 3 month history abdominal pain that is worse in the past 4 days.  There is associated nausea, vomiting, rectal pain, intermittent episode of bright red blood after BM.  She denies fever, chills, lightheadedness, headaches, chest pain, diarrhea, dysuria, shortness of breath.    In the ED sodium 138, potassium 2.9, bicarb 18, H/H 12.5/36.5, WBC 4.17, UA positive, urine microscopy with few clumps of WBC, many bacteria CT abdomen/pelvis unremarkable with no acute intra-abdominal pathology.  Starting on ceftriaxone pending urine culture  During medication reconciliation, patient stated, " my identity was stolen and all that medications and not for me".  Admit hospital medicine observation  Interval History from Primary Team on 05/11/2023:  Today is concerned about the other hospitals       Chief Complaint / Reason for Original Psychiatry Consult: Psychiatric Medication Management        Subjective / Interval Psychiatric History Today (05/12/2023) (with Psychiatric ROS below):  Patricia Langley is a 47 y.o. female with a past medical history of HIV infection and hyperthyroidism and a past psychiatric history of Schizoaffective Disorder, Bipolar Type, anxiety, " depression, PTSD, polysubstance abuse, and insomnia, currently being treated by her inpatient primary treatment team for a principal problem of pyelonephritis.  Psychiatry was originally consulted as noted above. The patient was seen and examined again this AM.  The chart was reviewed again this AM.  On examination today, the patient was more somnolent than prior days.  She remains oriented to person, place, city, state, month, year, and situation.  She remains CAM-ICU negative for delirium.  She endorses sleeping about 7-8 hours overnight.  She again endorses improving anxiety with starting the Buspar.  She endorses a good / improving appetite.  She again denies any current or recent AH, VH, or TH.  While slowly improving, she continues to exhibit some bizarre / paranoid delusional thought content (again denies any desire or plan to act on this thought content), and while her thought process remains tangential and loosely associated at times, it remains more redirectable today (consistent with yesterday).  Patient again denies any current/recent passive/active SI/HI.  Patient is again less perseverative on wanting Xanax / Benzos today.  Patient denies any other adverse effects to her current medication regimen.  Regarding current medical/physical complaints, patient endorses cold sore oral pain.  Patient denies any other medical complaints at this time.  NAD was observed during the examination.  Psychotherapy was again implemented as noted below with a focus on improving mood, psychosis, and anxiety.  Despite counseling / education, the patient denies wanting to go up anymore on her Seroquel due to concern for excessive somnolence.  See detailed psych ROS below.  See A/P below.          Psychiatric Review Of Systems - Currently, the patient is endorsing and/or denying the following:  (patient's endorsements are BOLDED below; if not BOLDED, then patient denied):     Endorses Symptoms of Depression (improving):  diminished mood, low motivation, loss of interest/anhedonia, irritability, diminished energy, change in sleep, change in appetite, diminished concentration or cognition or indecisiveness, PMA/R, excessive guilt or hopelessness or worthlessness, suicidal ideations     Denies issues with Sleep: initiation, maintenance, early morning awakening with inability to return to sleep     Denies Suicidal/Homicidal ideations: active/passive ideations, organized plans, future intentions     Endorses Symptoms of psychosis (slowly improving): hallucinations, delusions, disorganized thinking, disorganized behavior or abnormal motor behavior, or negative symptoms (diminshed emotional expression, avolition, anhedonia, alogia, asociality)      Denies Symptoms of janine or hypomania: elevated, expansive, or irritable mood with increased energy or activity; with inflated self-esteem or grandiosity, decreased need for sleep, increased rate of speech, FOI or racing thoughts, distractibility, increased goal directed activity or PMA, risky/disinhibited behavior     Endorses Symptoms of Anxiety (improving): excessive anxiety/worry/fear, more days than not, about numerous issues, difficult to control, with restlessness, fatigue, poor concentration, irritability, muscle tension, sleep disturbance; causes functionally impairing distress      Denies Symptoms of Panic Disorder: recurrent panic attacks, precipitated or un-precipitated, source of worry and/or behavioral changes secondary; with or without agoraphobia     Endorses Symptoms of PTSD (improving): h/o trauma; re-experiencing/intrusive symptoms, avoidant behavior, negative alterations in cognition or mood, or hyperarousal symptoms; with or without dissociative symptoms      Denies Symptoms of OCD: obsessions or compulsions      Denies Symptoms of Eating Disorders: anorexia, bulimia or binging     Denies Substance Use: intoxication, withdrawal, tolerance, used in larger amounts or duration  "than intended, unsuccessful attempts to limit or quit, increased time engaging in or seeking out, cravings or strong desire to use, failure to fulfill obligations, negative consequences in social/interpersonal/occupational,/recreational areas, use in dangerous situations, medical or psychological consequences         [facility] Toolkit ASQ Suicide Screening Tool:  In the past few weeks, have you wished you were dead? Denies   In the past few weeks, have you felt that you or your family would be better off if you were dead? Denies  In the past week, have you been having thoughts about killing yourself? Denies  Have you ever tried to kill yourself? YES ; remote ("cut myself when I was 12")  Are you having thoughts of killing yourself right now? Denies         PSYCHOTHERAPY ADD-ON [id number]   30 (16-37*) minutes     Time: 20 minutes  Participants: Met with patient     Therapeutic Intervention Type: behavior modifying psychotherapy, supportive psychotherapy  Why chosen therapy is appropriate versus another modality: relevant to diagnosis, patient responds to this modality, evidence based practice     Target symptoms: mood, psychosis, and anxiety  Primary focus: improving mood, psychosis, and anxiety  Psychotherapeutic techniques: supportive and psychodynamic techniques; psycho-education; deep breathing exercises; reality / insight orientation; CBT; problem solving techniques and managing life stressors     Outcome monitoring methods: self-report, observation     Patient's response to intervention:  The patient's response to intervention is accepting / guarded / slowly improving.     Progress toward goals:  The patient's progress toward goals is fair / limited / slowly improving.          ROS:  General ROS: negative for - chills, fatigue, fever or night sweats  Ophthalmic ROS: negative for - blurry vision, double vision or eye pain  ENT ROS: negative for - sinus pain, headache, sore throat or visual changes; positive for cold " sore oral pain   Allergy and Immunology ROS: negative for - hives, itchy/watery eyes or nasal congestion  Hematological and Lymphatic ROS: negative for - bleeding problems, bruising, jaundice or pallor  Endocrine ROS: negative for - galactorrhea, hot flashes, mood swings, palpitations or temperature intolerance  Respiratory ROS: negative for - cough, hemoptysis, shortness of breath, tachypnea or wheezing  Cardiovascular ROS: negative for - chest pain, dyspnea on exertion, loss of consciousness, palpitations, rapid heart rate or shortness of breath  Gastrointestinal ROS: negative for - blood in stools, abdominal pain, change in bowel habits, nausea, constipation or diarrhea   Genito-Urinary ROS: negative for - incontinence, nocturia or pelvic pain  Musculoskeletal ROS: negative for - joint stiffness, joint swelling, joint pain or muscle pain   Neurological ROS: negative for - behavioral changes, confusion, dizziness, memory loss, numbness/tingling or seizures  Dermatological ROS: negative for dry skin, hair changes, pruritus or rash  Psychiatric ROS: see detailed psychiatric ROS above in subjective section       PAST MEDICAL & SURGICAL HISTORY   Past Medical History:   Diagnosis Date    Allergy     Anxiety     Cervical dysplasia 1998 / 2016    Depression     HIV infection     Hyperthyroidism     Insomnia      Past Surgical History:   Procedure Laterality Date    SKULL FRACTURE ELEVATION       NEUROLOGIC HISTORY  Seizures: denies   Head trauma: yes, remote      FAMILY HISTORY   Family History   Problem Relation Age of Onset    Heart disease Mother     Breast cancer Neg Hx     Colon cancer Neg Hx     Ovarian cancer Neg Hx        ALLERGIES   Review of patient's allergies indicates:   Allergen Reactions    Penicillins Other (See Comments) and Nausea And Vomiting     Trouble swallowing and vomiting.     Sulfa (sulfonamide antibiotics) Swelling     Rash (skin)^    Opioids - morphine analogues      Pt states she is not  allergic to morphine      Bactrim [sulfamethoxazole-trimethoprim] Rash       CURRENT MEDICATION REGIMEN   Home Meds:   Prior to Admission medications    Medication Sig Start Date End Date Taking? Authorizing Provider   acyclovir (ZOVIRAX) 400 MG tablet Take 1 tablet (400 mg total) by mouth 2 (two) times daily. for 7 days 5/12/23 5/19/23  Keysha Skelton MD   atovaquone (MEPRON) 750 mg/5 mL Susp oral liquid Take 10 mLs (1,500 mg total) by mouth once daily. 5/12/23 11/8/23  MD KATHY Santacruz -25 mg (25 kg or greater) Take 1 tablet by mouth once daily. 5/12/23 11/8/23  Keysha Skelton MD   busPIRone (BUSPAR) 7.5 MG tablet Take 1 tablet (7.5 mg total) by mouth 2 (two) times daily. 5/12/23 5/11/24  Keysha Skelton MD   mirtazapine (REMERON) 15 MG tablet Take 1 tablet (15 mg total) by mouth every evening. 5/12/23 8/10/23  Keysha Skelton MD   nicotine (NICODERM CQ) 21 mg/24 hr Place 1 patch onto the skin once daily. 5/12/23 8/10/23  Keysha Skelton MD   nitrofurantoin, macrocrystal-monohydrate, (MACROBID) 100 MG capsule Take 1 capsule (100 mg total) by mouth every 12 (twelve) hours. for 1 day 5/12/23 5/13/23  Keysha Skelton MD   QUEtiapine 150 mg Tab Take 150 mg by mouth nightly. 5/12/23 8/10/23  Keysha Skelton MD   acyclovir (ZOVIRAX) 200 MG capsule Take 2 capsules (400 mg total) by mouth 3 (three) times daily. for 5 days 5/12/23 5/12/23  Keysha Skelton MD   atovaquone (MEPRON) 750 mg/5 mL Susp TAKE 10 MLS (1,500 MG TOTAL) BY MOUTH ONCE DAILY.  Patient not taking: Reported on 1/20/2023 7/15/22 5/12/23  MD KATHY Quijano -25 mg (25 kg or greater) Take 1 tablet by mouth once daily.  Patient not taking: Reported on 4/12/2023 6/6/22 5/12/23  Rd Virk MD   busPIRone (BUSPAR) 10 MG tablet Take 1 tablet (10 mg total) by mouth 3 (three) times daily.  Patient not taking: Reported on 5/6/2023 7/29/22 5/12/23  Day Blankenship PA-C   divalproex (DEPAKOTE) 250  MG EC tablet Take 250 mg by mouth 2 (two) times daily. 3/16/23 5/12/23  Historical Provider   haloperidol lactate (HALDOL) 5 mg/mL injection Inject 1 mL into the muscle every 6 (six) hours as needed. 3/6/23 5/12/23  Historical Provider   hydrOXYzine pamoate (VISTARIL) 25 MG Cap Take 25 mg by mouth 3 (three) times daily as needed. 3/6/23 5/12/23  Historical Provider   ketorolac (TORADOL) 10 mg tablet Take one po q 8 hrs prn pain  Patient not taking: Reported on 3/3/2023 2/28/23 5/12/23  Kelly Luong PA-C   lithium (LITHOTAB) 300 mg tablet Take 1 tablet (300 mg total) by mouth every 8 (eight) hours. LITHIUM CARBONATE ER  Patient not taking: Reported on 4/12/2023 7/29/22 5/12/23  Day Blankenship PA-C   mirtazapine (REMERON) 15 MG tablet Take 15 mg by mouth every evening. 11/10/22 5/12/23  Historical Provider   nicotine (NICODERM CQ) 14 mg/24 hr 1 patch. 11/10/22 5/12/23  Historical Provider   ondansetron (ZOFRAN-ODT) 4 MG TbDL Take 1 tablet (4 mg total) by mouth 2 (two) times daily.  Patient not taking: Reported on 4/12/2023 1/20/23 5/12/23  Marybel Little NP   QUEtiapine (SEROQUEL) 100 MG Tab Take 100 mg by mouth every evening. 3/8/23 5/12/23  Historical Provider   QUEtiapine (SEROQUEL) 200 MG Tab Take 200 mg by mouth every evening. 3/16/23 5/12/23  Historical Provider   QUEtiapine (SEROQUEL) 50 MG tablet Take 50 mg by mouth every evening. 3/6/23 5/12/23  Historical Provider   temazepam (RESTORIL) 7.5 MG Cap Take 7.5 mg by mouth nightly as needed. 3/13/23 5/12/23  Historical Provider       OTC Meds: denies     Scheduled Meds:    acyclovir  400 mg Oral TID    artificial tears  2 drop Both Eyes TID    atovaquone  1,500 mg Oral Daily    xadilebgy-rsurqmnv-bjjflhf ala  1 tablet Oral Daily    busPIRone  7.5 mg Oral BID    mirtazapine  15 mg Oral QHS    nicotine  1 patch Transdermal Daily    nitrofurantoin (macrocrystal-monohydrate)  100 mg Oral Q12H    potassium bicarbonate  25 mEq Oral Daily    QUEtiapine   150 mg Oral Nightly      PRN Meds: acetaminophen, ibuprofen, LIDOcaine HCl 2%, ondansetron, sodium chloride 0.9%   Psychotherapeutics (From admission, onward)      Start     Stop Route Frequency Ordered    05/10/23 2100  busPIRone tablet 7.5 mg         -- Oral 2 times daily 05/10/23 1651    05/10/23 2100  QUEtiapine tablet 150 mg         -- Oral Nightly 05/10/23 1651    05/06/23 2100  mirtazapine tablet 15 mg         -- Oral Nightly 05/06/23 1244            LABORATORY DATA   Recent Results (from the past 72 hour(s))   Basic Metabolic Panel    Collection Time: 05/10/23  4:47 AM   Result Value Ref Range    Sodium 143 136 - 145 mmol/L    Potassium 3.6 3.5 - 5.1 mmol/L    Chloride 107 95 - 110 mmol/L    CO2 27 23 - 29 mmol/L    Glucose 88 70 - 110 mg/dL    BUN 5 (L) 6 - 20 mg/dL    Creatinine 0.7 0.5 - 1.4 mg/dL    Calcium 9.0 8.7 - 10.5 mg/dL    Anion Gap 9 8 - 16 mmol/L    eGFR >60 >60 mL/min/1.73 m^2   CBC auto differential    Collection Time: 05/10/23  4:48 AM   Result Value Ref Range    WBC 1.53 (LL) 3.90 - 12.70 K/uL    RBC 3.84 (L) 4.00 - 5.40 M/uL    Hemoglobin 10.8 (L) 12.0 - 16.0 g/dL    Hematocrit 32.3 (L) 37.0 - 48.5 %    MCV 84 82 - 98 fL    MCH 28.1 27.0 - 31.0 pg    MCHC 33.4 32.0 - 36.0 g/dL    RDW 14.6 (H) 11.5 - 14.5 %    Platelets 175 150 - 450 K/uL    MPV 11.4 9.2 - 12.9 fL    Immature Granulocytes CANCELED 0.0 - 0.5 %    Immature Grans (Abs) CANCELED 0.00 - 0.04 K/uL    Lymph # CANCELED 1.0 - 4.8 K/uL    Mono # CANCELED 0.3 - 1.0 K/uL    Eos # CANCELED 0.0 - 0.5 K/uL    Baso # CANCELED 0.00 - 0.20 K/uL    nRBC 0 0 /100 WBC    Gran % 52.0 38.0 - 73.0 %    Lymph % 30.0 18.0 - 48.0 %    Mono % 4.0 4.0 - 15.0 %    Eosinophil % 8.0 0.0 - 8.0 %    Basophil % 4.0 (H) 0.0 - 1.9 %    Metamyelocytes 2.0 %    Platelet Estimate Appears normal     Aniso Slight     Poik Slight     Ovalocytes Occasional     Differential Method Manual    Basic Metabolic Panel    Collection Time: 05/11/23  4:52 AM   Result Value Ref  Range    Sodium 141 136 - 145 mmol/L    Potassium 3.8 3.5 - 5.1 mmol/L    Chloride 105 95 - 110 mmol/L    CO2 26 23 - 29 mmol/L    Glucose 94 70 - 110 mg/dL    BUN 9 6 - 20 mg/dL    Creatinine 0.8 0.5 - 1.4 mg/dL    Calcium 8.8 8.7 - 10.5 mg/dL    Anion Gap 10 8 - 16 mmol/L    eGFR >60 >60 mL/min/1.73 m^2   CBC auto differential    Collection Time: 05/11/23  4:52 AM   Result Value Ref Range    WBC 1.69 (LL) 3.90 - 12.70 K/uL    RBC 3.67 (L) 4.00 - 5.40 M/uL    Hemoglobin 10.2 (L) 12.0 - 16.0 g/dL    Hematocrit 31.1 (L) 37.0 - 48.5 %    MCV 85 82 - 98 fL    MCH 27.8 27.0 - 31.0 pg    MCHC 32.8 32.0 - 36.0 g/dL    RDW 14.4 11.5 - 14.5 %    Platelets 192 150 - 450 K/uL    MPV 11.3 9.2 - 12.9 fL    Immature Granulocytes CANCELED 0.0 - 0.5 %    Immature Grans (Abs) CANCELED 0.00 - 0.04 K/uL    nRBC 0 0 /100 WBC    Gran % 61.9 38.0 - 73.0 %    Lymph % 23.8 18.0 - 48.0 %    Mono % 9.5 4.0 - 15.0 %    Eosinophil % 3.2 0.0 - 8.0 %    Basophil % 1.6 0.0 - 1.9 %    Platelet Estimate Appears normal     Aniso Slight     Poik Slight     Ovalocytes Occasional     Differential Method Manual    POCT glucose    Collection Time: 05/11/23  4:24 PM   Result Value Ref Range    POCT Glucose 105 70 - 110 mg/dL   Basic Metabolic Panel    Collection Time: 05/12/23  4:48 AM   Result Value Ref Range    Sodium 141 136 - 145 mmol/L    Potassium 4.1 3.5 - 5.1 mmol/L    Chloride 106 95 - 110 mmol/L    CO2 25 23 - 29 mmol/L    Glucose 102 70 - 110 mg/dL    BUN 14 6 - 20 mg/dL    Creatinine 0.7 0.5 - 1.4 mg/dL    Calcium 9.3 8.7 - 10.5 mg/dL    Anion Gap 10 8 - 16 mmol/L    eGFR >60 >60 mL/min/1.73 m^2   CBC auto differential    Collection Time: 05/12/23  4:48 AM   Result Value Ref Range    WBC 2.16 (L) 3.90 - 12.70 K/uL    RBC 3.61 (L) 4.00 - 5.40 M/uL    Hemoglobin 10.2 (L) 12.0 - 16.0 g/dL    Hematocrit 30.7 (L) 37.0 - 48.5 %    MCV 85 82 - 98 fL    MCH 28.3 27.0 - 31.0 pg    MCHC 33.2 32.0 - 36.0 g/dL    RDW 14.3 11.5 - 14.5 %    Platelets 207  "150 - 450 K/uL    MPV 10.7 9.2 - 12.9 fL    Immature Granulocytes CANCELED 0.0 - 0.5 %    Immature Grans (Abs) CANCELED 0.00 - 0.04 K/uL    nRBC 0 0 /100 WBC    Gran % 65.0 38.0 - 73.0 %    Lymph % 21.0 18.0 - 48.0 %    Mono % 8.0 4.0 - 15.0 %    Eosinophil % 2.0 0.0 - 8.0 %    Basophil % 1.0 0.0 - 1.9 %    Bands 3.0 %    Platelet Estimate Appears normal     Aniso Slight     Poik Slight     Poly Occasional     Ovalocytes Occasional     Tear Drop Cells Occasional     Tolono Cells Occasional     Fragmented Cells Occasional     Vacuolated Granulocytes Present     Differential Method Automated       No results found for: PHENYTOIN, PHENOBARB, VALPROATE, CBMZ      EXAMINATION    VITALS   Vitals:    05/12/23 0051 05/12/23 0406 05/12/23 0442 05/12/23 0759   BP:   109/74 119/81   Patient Position:   Lying    Pulse: 91 81 78 79   Resp:   18 18   Temp:   98.1 °F (36.7 °C) 98 °F (36.7 °C)   TempSrc:   Oral    SpO2:   99% 100%   Weight:       Height:          CONSTITUTIONAL  General Appearance: NAD, unremarkable, age appropriate, normal weight, laying in bed, calm, less guarded      MUSCULOSKELETAL  Muscle Strength and Tone: WNL    Abnormal Involuntary Movements: none observed   Gait and Station: WNL; non-ataxic      PSYCHIATRIC   Behavior/Cooperation:  cooperative, better participation, less restless, more friendly   Speech:  normal tone, normal rate, normal pitch, normal volume   Language: grossly intact with spontaneous speech, able to name and repeat   Mood: "better ; tired"  Affect:  congruent with mood and less irritable / guarded   Associations: derailment of thought; has SONAL (slowly improving)  Thought Process: circumferential / tangential (slowly improving)  Thought Content: + delusions ; denies SI, HI, AH, VH, or TH (no RIS observed)  Sensorium: Alert   Alert and Oriented: to person, place, city, state, month, year, and situation   Memory: 3/3 immediate, 3/3 at 5 minutes               Recent:  Intact; able to report " recent events              Remote:  Intact; Named 4/4 past presidents   Attention/concentration: Intact / Fair. Appropriate for age/education. Able to spell w-[id number]-r-l-d & d-l-r-o-w.   Similarities: Intact (difference between apple and orange?)  Abstract reasoning: Intact  Fund of Knowledge: Named 4/4 past presidents   Insight: Limited   Judgment: Limited      CAM ICU Delirium Assessment - NEGATIVE     Is the patient aware of the biomedical complications associated with substance abuse and mental illness? yes           MEDICAL DECISION MAKING     ASSESSMENT         Schizoaffective Disorder, Bipolar Type   PTSD  Unspecified Anxiety Disorder   Hx of Polysubstance Abuse         RECOMMENDATIONS       - At this time, the patient does not currently meet PEC criteria due to not being an imminent threat to self/others and not being gravely disabled 2/2 mental illness; will continue to evaluate the need for PEC during the hospitalization.       - Continue Seroquel at 150 mg PO QHS for sleep / appetite / mood / psychosis (discussed risks/benefits/alt vs no treatment with patient).     - Continue Remeron 15 mg PO QHS for mood / anxiety / sleep / appetite (discussed risks/benefits/alt vs no treatment with patient).     - Increase Buspar to 10 mg PO BID for anxiety / mood (discussed risks/benefits/alt vs no treatment with patient).     - Psychotherapy was again performed with patient as noted above with a focus on improving mood, psychosis, anxiety, and total sobriety.       - Patient's most recent resulted labs, imaging, and EKG were reviewed today.  Repeat EKG to assess for improvement in QTc.     - Please have CM/SW assist patient with ASAP outpatient mental health f/u for s/p discharge from this facility.      - Patient was again instructed to call 911 and/or [phone], and return to the nearest ED if she begins feeling suicidal, homicidal, or gravely disabled (for s/p this hospitalization).       - Thank you for this consult ; will  continue to follow patient while at [facility].         Total time spent with patient and/or managing/coordinating patient's care today (excluding the time spent on psychotherapy): 55 minutes   Time spent on psychotherapy today (as noted above): 20 minutes   Total time for encounter today including psychotherapy: 75 minutes      More than 50% of the time was spent counseling/coordinating care.     Consulting clinician was informed of the encounter and consult note.      STAFF:  Say Coley MD  [facility]  5/12/2023

## 2023-05-12 NOTE — PLAN OF CARE
Discharge orders noted. AVS prepared with medication list, importance of medication compliance, follow up appointments, diet, home care instructions, treatment plan, self management, and when to seek medical attention. Detailed clinical reference list attached. AVS printed and handed to patient by bedside nurse. VN reviewed discharge instructions with patient using teachback method.  Allowed time for questions, all questions answered.  Patient verbalized complete understanding of discharge instructions and voices no concerns.      Discharge instructions complete.  Bedside delivery complete.  Transport wheelchair requested.  Bedside nurse notified.

## 2023-05-12 NOTE — PLAN OF CARE
1000  DC order noted. [place]ospfu appts [place] for the [place] [place]/2023 at 0800 & Gema Rubio ([facility]) on 5/22/2023 at 1430. Information added to the pt's discharge paperwork.     Patient awake & alert in room when CM rounded. No family present. Patient in agreement with plan to discharge home today, denied the need for assistance with transportation at time of discharge, & verbalized understanding regarding the hospital follow up appointments with ERIKA Lopez & Dr Rubio. Pt stated that she will call to schedule her hospfu appt with Dr Britt (PCP) as required by the MD office.    Message sent to nurses Krystal & Tiffany and virtual nurse Brinda informing that the pt is cleared to discharge.       Will continue to follow.
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
## 2023-05-15 ENCOUNTER — SPECIALTY PHARMACY (OUTPATIENT)
Dept: PHARMACY | Facility: CLINIC | Age: 48
End: 2023-05-15
Payer: COMMERCIAL

## 2023-05-15 NOTE — PLAN OF CARE
[facility] - Med Surg  Discharge Final Note    Primary Care Provider: Cornell Britt MD    Expected Discharge Date: 5/12/2023    Final Discharge Note (most recent)       Final Note - 05/15/23 0748          Final Note    Assessment Type Final Discharge Note (P)      Anticipated Discharge Disposition Home or Self Care (P)      Hospital Resources/Appts/Education Provided Appointments scheduled and added to AVS (P)                      Contact Info       Cornell Britt MD   Specialty: Family Medicine   Relationship: PCP - General    [address]  [facility] [address]   Phone: [phone]       Next Steps: Call in 1 week(s)    Instructions: MD office requested that the patient call to scheduled to hospital follow up appointment    [facility] - Nephrology/Colon & Rectal/Urology   Specialty: Nephrology, Colon and Rectal Surgery, Urology    [address] [facility] [address]   Phone: [phone]       Next Steps: Follow up in 1 month(s)    [facility] [address]   Specialty: Psychology    [address], [facility] [address]  [facility] [address]       Next Steps: Follow up on 5/22/2023    Instructions: at 8:00 AM; [facility] follow up appointment with ERIKA Rubio MD   Specialty: Infectious Diseases    [address] [place] [address]   Phone: [phone]       Next Steps: Follow up on 5/22/2023    Instructions: at 2:30pm[facility]

## 2023-05-16 ENCOUNTER — PATIENT OUTREACH (OUTPATIENT)
Dept: ADMINISTRATIVE | Facility: OTHER | Age: 48
End: 2023-05-16

## 2023-05-17 ENCOUNTER — SPECIALTY PHARMACY (OUTPATIENT)
Dept: PHARMACY | Facility: CLINIC | Age: 48
End: 2023-05-17
Payer: COMMERCIAL

## 2023-05-17 DIAGNOSIS — B20 HIV DISEASE: Primary | ICD-10-CM

## 2023-05-17 NOTE — TELEPHONE ENCOUNTER
Outgoing call to patient for Biktarvy initial. Prior to starting the initial, patient stated that she does not need Biktarvy and that she was going to find a new provider. Then abruptly hung up.

## 2023-05-26 NOTE — TELEPHONE ENCOUNTER
Specialty Pharmacy - Initial Clinical Assessment    Specialty Medication Orders Linked to Encounter      Flowsheet Row Most Recent Value   Medication #1 BIKTARVY -25 mg (25 kg or greater) (Order#[id number], Rx#[id number])          Patient Diagnosis   B20 - Human immunodeficiency virus (HIV) disease    Subjective    Patricia Langley is a 47 y.o. female, who is followed by the specialty pharmacy service for management and education.    Recent Encounters       Date Type Provider Description    05/17/2023 Specialty Pharmacy Chacho Malave, Kwan Initial Clinical Assessment    05/15/2023 Specialty Pharmacy Hiren Mckinney, PharmD Referral Authorization            Current Outpatient Medications   Medication Sig    acyclovir (ZOVIRAX) 400 MG tablet Take 1 tablet (400 mg total) by mouth 2 (two) times daily. for 7 days    atovaquone (MEPRON) 750 mg/5 mL Susp oral liquid Take 10 mLs (1,500 mg total) by mouth once daily.    BIKTARVY -25 mg (25 kg or greater) Take 1 tablet by mouth once daily.    busPIRone (BUSPAR) 10 MG tablet Take 1 tablet (10 mg total) by mouth 2 (two) times daily.    mirtazapine (REMERON) 15 MG tablet Take 1 tablet (15 mg total) by mouth every evening.    nicotine (NICODERM CQ) 21 mg/24 hr Place 1 patch onto the skin once daily.    QUEtiapine 150 mg Tab Take 150 mg by mouth nightly.   Last reviewed on 5/6/2023  1:01 PM by Joan Jerry    Review of patient's allergies indicates:   Allergen Reactions    Penicillins Other (See Comments) and Nausea And Vomiting     Trouble swallowing and vomiting.     Sulfa (sulfonamide antibiotics) Swelling     Rash (skin)^    Opioids - morphine analogues      Pt states she is not allergic to morphine      Bactrim [sulfamethoxazole-trimethoprim] Rash   Last reviewed on  5/26/2023 9:00 AM by Hiren Mckinney    Drug Interactions    Drug interactions evaluated: yes  Clinically relevant drug interactions identified: no  Provided the patient with educational material regarding drug  interactions: yes   Educational material comments: Patient aware to update OSP/MDO of any changes to monitor              Assessment Questions - Documented Responses      Flowsheet Row Most Recent Value   Assessment    Medication Reconciliation completed for patient No   During the past 4 weeks, has patient missed any activities due to condition or medication? No   During the past 4 weeks, did patient have any of the following urgent care visits? ER Admission   Goals of Therapy Status Discussed (new start)   Status of the patients ability to self-administer: Is Able   All education points have been covered with patient? Yes, supplemental printed education provided   Welcome packet contents reviewed and discussed with patient? Yes   Assesment completed? Yes   Plan Therapy being initiated   Do you need to open a clinical intervention (i-vent)? No   Do you want to schedule first shipment? Yes   Medication #1 Assessment Info    Patient status New medication, New to OSP   Is this medication appropriate for the patient? Yes   Is this medication effective? Not yet started          Refill Questions - Documented Responses      Flowsheet Row Most Recent Value   Patient Availability and HIPAA Verification    Does patient want to proceed with activity? Yes   HIPAA/medical authority confirmed? Yes   Relationship to patient of person spoken to? Self   Refill Screening Questions    When does the patient need to receive the medication? 05/29/23   Refill Delivery Questions    How will the patient receive the medication? MEDRx   When does the patient need to receive the medication? 05/29/23   Shipping Address Prescription   Address in [facility] confirmed and updated if neccessary? Yes   Expected Copay ($) 0   Is the patient able to afford the medication copay? Yes   Payment Method zero copay   Days supply of Refill 30   Supplies needed? No supplies needed   Refill activity completed? Yes   Refill activity plan Refill  "scheduled   Shipment/Pickup Date: 05/26/23            Objective    She has a past medical history of Allergy, Anxiety, Cervical dysplasia (1998 / 2016), Depression, HIV infection, Hyperthyroidism, and Insomnia.    Tried/failed medications:     BP Readings from Last 4 Encounters:   05/12/23 119/81   04/12/23 116/76   04/08/23 119/76   03/05/23 126/82     Ht Readings from Last 4 Encounters:   05/06/23 5' 6" (1.676 m)   04/12/23 5' 1" (1.549 m)   04/08/23 (!) 6" (0.152 m)   03/03/23 5' 1" (1.549 m)     Wt Readings from Last 4 Encounters:   05/11/23 52.6 kg (115 lb 15.4 oz)   04/12/23 53 kg (116 lb 13.5 oz)   04/08/23 52.9 kg (116 lb 10 oz)   03/05/23 70 kg (154 lb 5.2 oz)     No results found for: [id number]  Recent Labs   Lab Result Units 05/12/23  0448 05/11/23  0452 05/10/23  0447 05/09/23  0429 05/08/23  0455 05/07/23  0335 05/06/23  1006 04/12/23  1355 04/08/23  1208 03/05/23  1231   Creatinine mg/dL 0.7 0.8 0.7 0.7   < > 0.8 0.8 0.7 0.8 0.7   ALT U/L  --   --   --   --   --   --  59 H 54 H 60 H 33   AST U/L  --   --   --   --   --   --  62 H 58 H 55 H 34   Absolute CD4 cells/ul  --   --   --   --   --  10 L  --   --   --   --     < > = values in this interval not displayed.     The goals of treatment for Human Immunodeficiency Disease (HIV) treatment include:  Reducing HIV-associated morbidity and mortality  Restoring and preserving immunologic function  Suppressing and maintaining HIV viral load to undetectable levels  Preventing HIV transmission  Improving or maintaining quality of life  Maintaining optimal therapy adherence  Minimizing and managing side effects  Promoting adequate nutrition  Encouraging proper hydration    Goals of Therapy Status: Discussed (new start)    Assessment/Plan  Patient plans to start therapy on 05/29/23      Indication, dosage, appropriateness, effectiveness, safety and convenience of her specialty medication(s) were reviewed today.     Patient Education   Patient received education " on the following:   Expectations and possible outcomes of therapy  Proper use, timely administration, and missed dose management  Duration of therapy  Side effects, including prevention, minimization, and management  Contraindications and safety precautions  New or changed medications, including prescribe and over the counter medications and supplements  Reviews recommended vaccinations, as appropriate  Storage, safe handling, and disposal    Discuss with patient on provider restarting Biktarvy    Tasks added this encounter   2/26/2024 - Clinical Assessment (1 year recurrence)   Tasks due within next 3 months   No tasks due.     Hiren Mckinney, PharmD  [facility] - Specialty Pharmacy  [address] [place] [address]  Phone: [phone]  Fax: [phone]

## 2023-06-05 ENCOUNTER — TELEPHONE (OUTPATIENT)
Dept: PRIMARY CARE CLINIC | Facility: CLINIC | Age: 48
End: 2023-06-05
Payer: MEDICAID

## 2023-06-05 NOTE — TELEPHONE ENCOUNTER
----- Message from Giana Holm sent at 6/5/2023 12:15 PM CDT -----  Contact: [phone]  .1 Patient would like to get medical advice.  Symptoms (please be specific):low blood pressure, dry mouth  How long has patient had these symptoms: for the past two days  Pharmacy name and phone#:[facility] - [address]  Phone:  [phone]  Fax:  [phone]  Any drug allergies: on file  Comments: Patient would like to get medical advice. Patient only want to be seen by pcp. Please call and advise. Thank you

## 2023-06-12 ENCOUNTER — TELEPHONE (OUTPATIENT)
Dept: PRIMARY CARE CLINIC | Facility: CLINIC | Age: 48
End: 2023-06-12
Payer: MEDICAID
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
## 2023-06-22 ENCOUNTER — PATIENT MESSAGE (OUTPATIENT)
Dept: PHARMACY | Facility: CLINIC | Age: 48
End: 2023-06-22
Payer: MEDICAID

## 2023-07-03 ENCOUNTER — SPECIALTY PHARMACY (OUTPATIENT)
Dept: PHARMACY | Facility: CLINIC | Age: 48
End: 2023-07-03
Payer: MEDICAID

## 2023-07-03 NOTE — TELEPHONE ENCOUNTER
Specialty Pharmacy - Refill Coordination    Specialty Medication Orders Linked to Encounter      Flowsheet Row Most Recent Value   Medication #1 BIKTARVY -25 mg (25 kg or greater) (Order#[id number], Rx#[id number])          Refill Questions - Documented Responses      Flowsheet Row Most Recent Value   Patient Availability and HIPAA Verification    Does patient want to proceed with activity? Unable to Reach          We have had multiple attempts to the patient and have been unsuccessful to reach the patient. We will stop reaching out to the patient but in the event that the patient needs the med and contacts us, we will communicate and begin dispensing for the patient. At your next visit with the patient, please review the importance of being in contact with our specialty pharmacy as a part of our care team.      Hiren Mckinney, PharmD  [facility] - Specialty Pharmacy  [address] [facility] [address]  Phone: [phone]  Fax: [phone]

## 2023-07-07 ENCOUNTER — TELEPHONE (OUTPATIENT)
Dept: SURGERY | Facility: CLINIC | Age: 48
End: 2023-07-07
Payer: MEDICAID

## 2023-08-07 PROBLEM — N12 PYELONEPHRITIS: Status: RESOLVED | Noted: 2023-05-06 | Resolved: 2023-08-07

## 2023-08-28 ENCOUNTER — DOCUMENTATION ONLY (OUTPATIENT)
Dept: GASTROENTEROLOGY | Facility: CLINIC | Age: 48
End: 2023-08-28
Payer: MEDICAID

## 2023-08-28 NOTE — PROGRESS NOTES
Telephone Encounter - Heramn Garcia MD - 05/19/2023 3:02 PM CDT  Formatting of this note might be different from the original.  Called Patricia Nye to review results of pathology from recent endoscopy procedure, as outlined below. Patient did not answer her phone and voicemail was full, plan was to discuss pathology findings of nonspecific inflammation of her colon with plan for follow up in the GI clinic which she previously no showed.    - PCP can reconnect the patient to GI clinic if the patient shows up for future appointments with PCP and is interested in following up with GI.     Herman Garcia MD  [facility] Gastroenterology  Electronically signed by Herman Garcia MD at 05/19/2023 3:04 PM CDT

## 2023-09-18 ENCOUNTER — PATIENT MESSAGE (OUTPATIENT)
Dept: PRIMARY CARE CLINIC | Facility: CLINIC | Age: 48
End: 2023-09-18
Payer: MEDICAID

## 2023-09-26 ENCOUNTER — TELEPHONE (OUTPATIENT)
Dept: PRIMARY CARE CLINIC | Facility: CLINIC | Age: 48
End: 2023-09-26
Payer: MEDICAID

## 2023-09-26 NOTE — TELEPHONE ENCOUNTER
----- Message from Nehal Caal sent at 9/26/2023  7:53 AM CDT -----  Contact: self [phone]  Medicaid Pt requesting a call for appt for sore throat.    Please call and advise

## 2023-09-28 ENCOUNTER — TELEPHONE (OUTPATIENT)
Dept: PRIMARY CARE CLINIC | Facility: CLINIC | Age: 48
End: 2023-09-28
Payer: MEDICAID

## 2023-09-28 NOTE — TELEPHONE ENCOUNTER
----- Message from Franny Leon sent at 9/27/2023  5:17 PM CDT -----  Regarding: PT ADVICE  Contact: PT  Pt called regarding scheduling an appt for tomorrow for flu like symptoms or walking pneumonia. Pt would like an appt ASAP. Unable to schedule pt.    Please advise. Pt can be reached at [phone]

## 2023-09-28 NOTE — TELEPHONE ENCOUNTER
----- Message from Al Gillette sent at 9/27/2023 10:30 AM CDT -----  Contact: [phone]@ patient  Good morning patient would like a call back to see if she can get a apt today. Patient says her whole upper body is hurting. Please give patient a call back [phone]

## 2023-09-28 NOTE — TELEPHONE ENCOUNTER
----- Message from Madison Han sent at 9/28/2023  8:57 AM CDT -----  Contact: Patient, [phone]  Patient is returning a phone call.  Who left a message for the patient: Ashia  Does patient know what this is regarding:  Appointment  Would you like a call back, or a response through your [facility] portal?:   Call back  Comments:  Missed your call, please call her back. Thanks.

## 2023-10-09 ENCOUNTER — TELEPHONE (OUTPATIENT)
Dept: PRIMARY CARE CLINIC | Facility: CLINIC | Age: 48
End: 2023-10-09
Payer: MEDICAID

## 2023-10-09 NOTE — TELEPHONE ENCOUNTER
----- Message from Brooklynn Reid sent at 10/9/2023  8:39 AM CDT -----  Contact: Pt [phone]  Patient is requesting a call back from you (Not the nurse) concerning her care.    Please call and advise.    Thank You

## 2023-10-09 NOTE — TELEPHONE ENCOUNTER
----- Message from Nehal Caal sent at 10/9/2023  8:12 AM CDT -----  Contact: self [phone]  Est medicaid requesting a call for appt.    Please call and advise

## 2023-10-17 ENCOUNTER — OFFICE VISIT (OUTPATIENT)
Dept: PRIMARY CARE CLINIC | Facility: CLINIC | Age: 48
End: 2023-10-17
Payer: MEDICAID

## 2023-10-17 VITALS
HEART RATE: 96 BPM | SYSTOLIC BLOOD PRESSURE: 120 MMHG | OXYGEN SATURATION: 97 % | RESPIRATION RATE: 18 BRPM | BODY MASS INDEX: 19.4 KG/M2 | HEIGHT: 61 IN | DIASTOLIC BLOOD PRESSURE: 80 MMHG | TEMPERATURE: 97 F | WEIGHT: 102.75 LBS

## 2023-10-17 DIAGNOSIS — F31.9 BIPOLAR 1 DISORDER: ICD-10-CM

## 2023-10-17 DIAGNOSIS — F43.10 PTSD (POST-TRAUMATIC STRESS DISORDER): ICD-10-CM

## 2023-10-17 DIAGNOSIS — J01.00 ACUTE NON-RECURRENT MAXILLARY SINUSITIS: Primary | ICD-10-CM

## 2023-10-17 DIAGNOSIS — F25.0 SCHIZOAFFECTIVE DISORDER, BIPOLAR TYPE: ICD-10-CM

## 2023-10-17 DIAGNOSIS — F32.5 MAJOR DEPRESSIVE DISORDER IN REMISSION, UNSPECIFIED WHETHER RECURRENT: Chronic | ICD-10-CM

## 2023-10-17 DIAGNOSIS — Z23 FLU VACCINE NEED: ICD-10-CM

## 2023-10-17 DIAGNOSIS — B20 AIDS: ICD-10-CM

## 2023-10-17 DIAGNOSIS — Z72.0 TOBACCO ABUSE: ICD-10-CM

## 2023-10-17 PROCEDURE — 99213 OFFICE O/P EST LOW 20 MIN: CPT | Mod: PBBFAC,PN | Performed by: FAMILY MEDICINE

## 2023-10-17 PROCEDURE — 99214 PR OFFICE/OUTPT VISIT, EST, LEVL IV, 30-39 MIN: ICD-10-PCS | Mod: S$PBB,,, | Performed by: FAMILY MEDICINE

## 2023-10-17 PROCEDURE — 1159F PR MEDICATION LIST DOCUMENTED IN MEDICAL RECORD: ICD-10-PCS | Mod: CPTII,,, | Performed by: FAMILY MEDICINE

## 2023-10-17 PROCEDURE — 3008F BODY MASS INDEX DOCD: CPT | Mod: CPTII,,, | Performed by: FAMILY MEDICINE

## 2023-10-17 PROCEDURE — 3079F PR MOST RECENT DIASTOLIC BLOOD PRESSURE 80-89 MM HG: ICD-10-PCS | Mod: CPTII,,, | Performed by: FAMILY MEDICINE

## 2023-10-17 PROCEDURE — 99999 PR PBB SHADOW E&M-EST. PATIENT-LVL III: CPT | Mod: PBBFAC,,, | Performed by: FAMILY MEDICINE

## 2023-10-17 PROCEDURE — 1159F MED LIST DOCD IN RCRD: CPT | Mod: CPTII,,, | Performed by: FAMILY MEDICINE

## 2023-10-17 PROCEDURE — 99999 PR PBB SHADOW E&M-EST. PATIENT-LVL III: ICD-10-PCS | Mod: PBBFAC,,, | Performed by: FAMILY MEDICINE

## 2023-10-17 PROCEDURE — 99214 OFFICE O/P EST MOD 30 MIN: CPT | Mod: S$PBB,,, | Performed by: FAMILY MEDICINE

## 2023-10-17 PROCEDURE — 3008F PR BODY MASS INDEX (BMI) DOCUMENTED: ICD-10-PCS | Mod: CPTII,,, | Performed by: FAMILY MEDICINE

## 2023-10-17 PROCEDURE — 3079F DIAST BP 80-89 MM HG: CPT | Mod: CPTII,,, | Performed by: FAMILY MEDICINE

## 2023-10-17 PROCEDURE — 3074F PR MOST RECENT SYSTOLIC BLOOD PRESSURE < 130 MM HG: ICD-10-PCS | Mod: CPTII,,, | Performed by: FAMILY MEDICINE

## 2023-10-17 PROCEDURE — 3074F SYST BP LT 130 MM HG: CPT | Mod: CPTII,,, | Performed by: FAMILY MEDICINE

## 2023-10-17 RX ORDER — OLANZAPINE 10 MG/1
TABLET ORAL
COMMUNITY
Start: 2023-10-04

## 2023-10-17 RX ORDER — LORATADINE 10 MG/1
10 TABLET ORAL DAILY
Qty: 30 TABLET | Refills: 0 | Status: SHIPPED | OUTPATIENT
Start: 2023-10-17 | End: 2024-10-16

## 2023-10-17 RX ORDER — PREDNISONE 5 MG/1
TABLET ORAL
Qty: 20 TABLET | Refills: 0 | Status: SHIPPED | OUTPATIENT
Start: 2023-10-17

## 2023-10-17 RX ORDER — TRAZODONE HYDROCHLORIDE 100 MG/1
100 TABLET ORAL NIGHTLY
COMMUNITY
Start: 2023-09-21

## 2023-10-17 RX ORDER — RISPERIDONE 1 MG/1
TABLET ORAL
COMMUNITY
Start: 2023-10-04

## 2023-10-17 RX ORDER — CEFDINIR 300 MG/1
300 CAPSULE ORAL 2 TIMES DAILY
Qty: 20 CAPSULE | Refills: 0 | Status: SHIPPED | OUTPATIENT
Start: 2023-10-17 | End: 2023-10-27
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
## 2023-10-18 ENCOUNTER — PATIENT MESSAGE (OUTPATIENT)
Dept: CARDIOLOGY | Facility: CLINIC | Age: 48
End: 2023-10-18
Payer: MEDICAID

## 2023-10-19 ENCOUNTER — TELEPHONE (OUTPATIENT)
Dept: PRIMARY CARE CLINIC | Facility: CLINIC | Age: 48
End: 2023-10-19
Payer: MEDICAID

## 2023-10-19 NOTE — TELEPHONE ENCOUNTER
----- Message from Cornell Britt MD sent at 10/19/2023 12:04 AM CDT -----  Contact: [phone]  Call tell pt needs call a [employer] company and set up herself needs no orders from me   ----- Message -----  From: Charbel Ang MA  Sent: 9/29/2023   4:59 PM CDT  To: Cornell Britt MD      ----- Message -----  From: Debby Caal  Sent: 9/29/2023   4:58 PM CDT  To: Balwinder Sarabia Staff    1MEDICALADVICE     Patient is calling for Medical Advice regarding: pt wants to know if she can get the device that alerts the ambulance  if she falls. Is open to a virtual appt if available.     How long has patient had these symptoms:    Pharmacy name and phone#:    Would like response via FeedHenryt:  no    Comments:

## 2023-12-18 ENCOUNTER — HOSPITAL ENCOUNTER (EMERGENCY)
Facility: OTHER | Age: 48
Discharge: HOME | End: 2023-12-18
Attending: EMERGENCY MEDICINE
Payer: MEDICAID

## 2023-12-18 VITALS
SYSTOLIC BLOOD PRESSURE: 123 MMHG | BODY MASS INDEX: 20.58 KG/M2 | HEART RATE: 80 BPM | HEIGHT: 61 IN | TEMPERATURE: 99 F | RESPIRATION RATE: 20 BRPM | WEIGHT: 109 LBS | OXYGEN SATURATION: 100 % | DIASTOLIC BLOOD PRESSURE: 79 MMHG

## 2023-12-18 DIAGNOSIS — R07.89 ATYPICAL CHEST PAIN: Primary | ICD-10-CM

## 2023-12-18 DIAGNOSIS — R07.9 CHEST PAIN: ICD-10-CM

## 2023-12-18 LAB
ALBUMIN SERPL BCP-MCNC: 4.1 G/DL (ref 3.5–5.2)
ALP SERPL-CCNC: 103 U/L (ref 55–135)
ALT SERPL W/O P-5'-P-CCNC: 30 U/L (ref 10–44)
ANION GAP SERPL CALC-SCNC: 13 MMOL/L (ref 8–16)
AST SERPL-CCNC: 25 U/L (ref 10–40)
BASOPHILS # BLD AUTO: 0.04 K/UL (ref 0–0.2)
BASOPHILS NFR BLD: 0.9 % (ref 0–1.9)
BILIRUB SERPL-MCNC: 0.3 MG/DL (ref 0.1–1)
BNP SERPL-MCNC: 13 PG/ML (ref 0–99)
BUN SERPL-MCNC: 8 MG/DL (ref 6–20)
CALCIUM SERPL-MCNC: 9.9 MG/DL (ref 8.7–10.5)
CHLORIDE SERPL-SCNC: 105 MMOL/L (ref 95–110)
CO2 SERPL-SCNC: 22 MMOL/L (ref 23–29)
CREAT SERPL-MCNC: 0.9 MG/DL (ref 0.5–1.4)
DIFFERENTIAL METHOD: ABNORMAL
EOSINOPHIL # BLD AUTO: 0.2 K/UL (ref 0–0.5)
EOSINOPHIL NFR BLD: 5.4 % (ref 0–8)
ERYTHROCYTE [DISTWIDTH] IN BLOOD BY AUTOMATED COUNT: 14.8 % (ref 11.5–14.5)
EST. GFR  (NO RACE VARIABLE): >60 ML/MIN/1.73 M^2
GLUCOSE SERPL-MCNC: 123 MG/DL (ref 70–110)
HCT VFR BLD AUTO: 36.5 % (ref 37–48.5)
HCV AB SERPL QL IA: NEGATIVE
HGB BLD-MCNC: 11.8 G/DL (ref 12–16)
IMM GRANULOCYTES # BLD AUTO: 0 K/UL (ref 0–0.04)
IMM GRANULOCYTES NFR BLD AUTO: 0 % (ref 0–0.5)
INR PPP: 1 (ref 0.8–1.2)
LYMPHOCYTES # BLD AUTO: 1 K/UL (ref 1–4.8)
LYMPHOCYTES NFR BLD: 23.2 % (ref 18–48)
MCH RBC QN AUTO: 27.5 PG (ref 27–31)
MCHC RBC AUTO-ENTMCNC: 32.3 G/DL (ref 32–36)
MCV RBC AUTO: 85 FL (ref 82–98)
MONOCYTES # BLD AUTO: 0.5 K/UL (ref 0.3–1)
MONOCYTES NFR BLD: 11 % (ref 4–15)
NEUTROPHILS # BLD AUTO: 2.5 K/UL (ref 1.8–7.7)
NEUTROPHILS NFR BLD: 59.5 % (ref 38–73)
NRBC BLD-RTO: 0 /100 WBC
PLATELET # BLD AUTO: 302 K/UL (ref 150–450)
PMV BLD AUTO: 10 FL (ref 9.2–12.9)
POTASSIUM SERPL-SCNC: 3.9 MMOL/L (ref 3.5–5.1)
PROT SERPL-MCNC: 9.4 G/DL (ref 6–8.4)
PROTHROMBIN TIME: 10.9 SEC (ref 9–12.5)
RBC # BLD AUTO: 4.29 M/UL (ref 4–5.4)
SODIUM SERPL-SCNC: 140 MMOL/L (ref 136–145)
T4 FREE SERPL-MCNC: 0.92 NG/DL (ref 0.71–1.51)
TROPONIN I SERPL DL<=0.01 NG/ML-MCNC: 0.01 NG/ML (ref 0–0.03)
TSH SERPL DL<=0.005 MIU/L-ACNC: 1.45 UIU/ML (ref 0.4–4)
WBC # BLD AUTO: 4.26 K/UL (ref 3.9–12.7)

## 2023-12-18 PROCEDURE — 83880 ASSAY OF NATRIURETIC PEPTIDE: CPT | Performed by: EMERGENCY MEDICINE

## 2023-12-18 PROCEDURE — 86803 HEPATITIS C AB TEST: CPT | Performed by: EMERGENCY MEDICINE

## 2023-12-18 PROCEDURE — 93005 ELECTROCARDIOGRAM TRACING: CPT

## 2023-12-18 PROCEDURE — 93010 ELECTROCARDIOGRAM REPORT: CPT | Mod: ,,, | Performed by: INTERNAL MEDICINE

## 2023-12-18 PROCEDURE — 93010 EKG 12-LEAD: ICD-10-PCS | Mod: ,,, | Performed by: INTERNAL MEDICINE

## 2023-12-18 PROCEDURE — 80053 COMPREHEN METABOLIC PANEL: CPT

## 2023-12-18 PROCEDURE — 36415 COLL VENOUS BLD VENIPUNCTURE: CPT

## 2023-12-18 PROCEDURE — 99285 EMERGENCY DEPT VISIT HI MDM: CPT | Mod: 25

## 2023-12-18 PROCEDURE — 84439 ASSAY OF FREE THYROXINE: CPT

## 2023-12-18 PROCEDURE — 84484 ASSAY OF TROPONIN QUANT: CPT | Performed by: EMERGENCY MEDICINE

## 2023-12-18 PROCEDURE — 84443 ASSAY THYROID STIM HORMONE: CPT

## 2023-12-18 PROCEDURE — 85610 PROTHROMBIN TIME: CPT

## 2023-12-18 PROCEDURE — 63600175 PHARM REV CODE 636 W HCPCS

## 2023-12-18 PROCEDURE — 25000003 PHARM REV CODE 250

## 2023-12-18 PROCEDURE — 85025 COMPLETE CBC W/AUTO DIFF WBC: CPT | Performed by: EMERGENCY MEDICINE

## 2023-12-18 PROCEDURE — 96374 THER/PROPH/DIAG INJ IV PUSH: CPT

## 2023-12-18 RX ORDER — ASPIRIN 325 MG
325 TABLET ORAL
Status: COMPLETED | OUTPATIENT
Start: 2023-12-18 | End: 2023-12-18

## 2023-12-18 RX ORDER — ASPIRIN 81 MG/1
81 TABLET ORAL EVERY 6 HOURS PRN
Qty: 28 TABLET | Refills: 0 | Status: SHIPPED | OUTPATIENT
Start: 2023-12-18 | End: 2023-12-25

## 2023-12-18 RX ORDER — KETOROLAC TROMETHAMINE 30 MG/ML
10 INJECTION, SOLUTION INTRAMUSCULAR; INTRAVENOUS
Status: COMPLETED | OUTPATIENT
Start: 2023-12-18 | End: 2023-12-18

## 2023-12-18 RX ORDER — ACETAMINOPHEN 500 MG
1000 TABLET ORAL EVERY 8 HOURS PRN
Qty: 20 TABLET | Refills: 0 | Status: SHIPPED | OUTPATIENT
Start: 2023-12-18 | End: 2023-12-23

## 2023-12-18 RX ORDER — ORPHENADRINE CITRATE 100 MG/1
100 TABLET, EXTENDED RELEASE ORAL
Status: COMPLETED | OUTPATIENT
Start: 2023-12-18 | End: 2023-12-18

## 2023-12-18 RX ADMIN — KETOROLAC TROMETHAMINE 10 MG: 30 INJECTION, SOLUTION INTRAMUSCULAR at 03:12

## 2023-12-18 RX ADMIN — ASPIRIN 325 MG ORAL TABLET 325 MG: 325 PILL ORAL at 05:12

## 2023-12-18 RX ADMIN — ORPHENADRINE CITRATE 100 MG: 100 TABLET, EXTENDED RELEASE ORAL at 03:12

## 2023-12-18 NOTE — ED TRIAGE NOTES
Pt presents to the ED with c/o intermittent, mid sternal CP x2 days. Pt reports sharp abd pain as well. Denies SOB or UTI symptoms. Pt AAOx3, NAD noted.

## 2023-12-18 NOTE — DISCHARGE INSTRUCTIONS
Please take Tylenol and Aspirin as needed for atypical chest pain. Make sure you are staying hydrated with fluids containing electrolytes. Follow up with cardiology. Referral has been placed.

## 2023-12-18 NOTE — ED PROVIDER NOTES
"Encounter Date: 12/18/2023       History     Chief Complaint   Patient presents with    Chest Pain     Pt reports intermittent mid-sternal tight chest pain x 2 days that is more severe today. Pt also reporting new onset of constant generalized abdominal pain, states "it feels stuck"     Patricia Langley is a 48 y.o. female with history of hyperthyroidism, HIV, anxiety, depression presenting to the emergency department for evaluation of intermittent, mid sternal, "squeezing and holding" chest pain for a year that worsened last week. She reports this most recent episode started last week and feels similar to previous chest pain. She states that she took a Trudy aspirin this morning, which did provide pain relief. She has been experiencing dry cough and sinus symptoms for the past few days. She also reports intermittent palpitations but states that she has history of mitral valve prolapse. She also reports nausea this morning from the chest pain that she has been experiencing and states that she began having generalized abdominal cramping when she tried to vomit but couldn't. She denies fever, chills, headaches, sweating, SOB, diarrhea, constipation, blood in stool, urinary symptoms, vaginal bleeding or vaginal discharge.       The history is provided by the patient.     Review of patient's allergies indicates:   Allergen Reactions    Penicillins Other (See Comments) and Nausea And Vomiting     Trouble swallowing and vomiting.     Sulfa (sulfonamide antibiotics) Swelling     Rash (skin)^    Opioids - morphine analogues      Pt states she is not allergic to morphine      Bactrim [sulfamethoxazole-trimethoprim] Rash     Past Medical History:   Diagnosis Date    Allergy     Anxiety     Cervical dysplasia 1998 / 2016    Depression     HIV infection     Hyperthyroidism     Insomnia      Past Surgical History:   Procedure Laterality Date    SKULL FRACTURE ELEVATION       Family History   Problem Relation Age of Onset    Heart " disease Mother     Breast cancer Neg Hx     Colon cancer Neg Hx     Ovarian cancer Neg Hx      Social History     Tobacco Use    Smoking status: Some Days     Types: Vaping with nicotine, Cigarettes     Start date: 1987    Smokeless tobacco: Never    Tobacco comments:     Pt states that she started smoking at age 12, and smoked 0.5 pk/day cigarettes until approx. 1 yr ago, at which time she switched to vaping w/ nicotine. Pt states ready to quit. Amb. referral to Smoking Cessation clinic following hospital discharge.    Substance Use Topics    Alcohol use: Yes     Comment: socially    Drug use: Not Currently     Review of Systems   Constitutional:  Negative for chills and fever.   HENT:  Positive for congestion, sinus pressure and sinus pain. Negative for rhinorrhea and sore throat.    Respiratory:  Positive for cough. Negative for shortness of breath.    Cardiovascular:  Positive for chest pain and palpitations.   Gastrointestinal:  Positive for abdominal pain (generalized) and nausea. Negative for diarrhea and vomiting.   Genitourinary:  Negative for dysuria, frequency and urgency.   Musculoskeletal:  Negative for back pain.   Skin:  Negative for rash.   Neurological:  Negative for dizziness and headaches.   Psychiatric/Behavioral:  Negative for confusion.        Physical Exam     Initial Vitals [12/18/23 1331]   BP Pulse Resp Temp SpO2   123/79 80 20 98.8 °F (37.1 °C) 100 %      MAP       --         Physical Exam    Nursing note and vitals reviewed.  Constitutional: She appears well-developed and well-nourished. No distress.   HENT:   Head: Normocephalic and atraumatic.   Nose: Nose normal.   Mouth/Throat: Oropharynx is clear and moist.   Eyes: Conjunctivae and EOM are normal.   Neck: Neck supple.   Normal range of motion.  Cardiovascular:  Normal rate, regular rhythm, normal heart sounds and intact distal pulses.           Pulses:       Radial pulses are 2+ on the right side and 2+ on the left side.         Dorsalis pedis pulses are 2+ on the right side and 2+ on the left side.        Posterior tibial pulses are 2+ on the right side and 2+ on the left side.   Pulmonary/Chest: Breath sounds normal. No respiratory distress. She has no wheezes. She has no rhonchi. She has no rales. She exhibits tenderness (Reproducible chest wall ttp.).   Mild reproducible mid sternal chest wall ttp.    Abdominal: Abdomen is soft. Bowel sounds are normal. She exhibits no distension and no mass. There is no abdominal tenderness.   No focal abdominal or pelvic ttp.  There is no rebound and no guarding.   Musculoskeletal:         General: No edema. Normal range of motion.      Cervical back: Normal range of motion and neck supple.      Comments: No leg edema.      Neurological: She is alert and oriented to person, place, and time. She has normal strength.   Skin: Skin is warm and dry.   Psychiatric: She has a normal mood and affect. Her behavior is normal. Judgment and thought content normal.         ED Course   Procedures  Labs Reviewed   HEPATITIS C ANTIBODY   CBC W/ AUTO DIFFERENTIAL   COMPREHENSIVE METABOLIC PANEL   B-TYPE NATRIURETIC PEPTIDE   TROPONIN I   PROTIME-INR   TSH   T4, FREE          Imaging Results              X-Ray Chest PA And Lateral (Final result)  Result time 12/18/23 14:27:27      Final result by Praveen Fong MD (12/18/23 14:27:27)                   Impression:      No acute abnormality.      Electronically signed by: Praveen Fong MD  Date:    12/18/2023  Time:    14:27               Narrative:    EXAMINATION:  XR CHEST PA AND LATERAL    CLINICAL HISTORY:  Chest Pain;    TECHNIQUE:  PA and lateral views of the chest were performed.    COMPARISON:  09/28/2023    FINDINGS:  The lungs are clear, with normal appearance of pulmonary vasculature and no pleural effusion or pneumothorax.    The cardiac silhouette is normal in size. The hilar and mediastinal contours are unremarkable.    Bones are intact.             "                        X-Rays:   Independently Interpreted Readings:   Chest X-Ray: No infiltrate, effusion, or pneumothorax. No cardiomegaly.      Medications   ketorolac injection 9.999 mg (has no administration in time range)   orphenadrine 12 hr tablet 100 mg (has no administration in time range)     Medical Decision Making  Amount and/or Complexity of Data Reviewed  Labs: ordered.    Risk  OTC drugs.  Prescription drug management.                          Medical Decision Making:   Initial Assessment:   48 y.o. female with history of hyperthyroidism, HIV, anxiety, depression presenting to the emergency department for evaluation of intermittent, mid sternal, "squeezing and holding" chest pain for a year that worsened last week. Temporary pain with aspirin taken earlier this morning.  She reports associated dry cough, sinus symptoms, and palpitations.  On exam, she was well-appearing and nontoxic.  Hemodynamically stable.  Afebrile in the ED. Oxygen saturation 100% on room air. Mild reproducible chest wall ttp.  Plan for labs, chest x-ray, and EKG.  Clinical Tests:   Lab Tests: Ordered and Reviewed  Radiological Study: Ordered and Reviewed  Medical Tests: Ordered and Reviewed  ED Management:  EKG is unremarkable.  Appear similar to previous EKG from 05/06/2023.  No acute process on chest x-ray.  On review of labs, no leukocytosis.  H&H is stable at her baseline.  No electrolyte derangement.  Kidney and liver function are within normal limits.  BNP is within normal limits.  Troponin is negative.  TSH and T4 within normal limits.  I updated the patient on all results.  Explained that she was experiencing atypical chest pain.  She has not been evaluated by Cardiology in over 2 years.  Ambulatory referral to Cardiology was provided.  Patient advised to continue taking Tylenol and aspirin as needed for her pain.  Discharged back to [facility].  Patient verbalized understanding and agreement with this plan of care. " She was given specific return precautions. Advised to follow up with PCP as needed. All questions and concerns addressed. She is stable for discharge.     This note was created with MModal Fluency Direct Dictation. Please excuse any spelling or grammatical errors.             Clinical Impression:  Final diagnoses:  [R07.9] Chest pain                 Lance Chance PA-C  12/19/23 1524

## 2024-01-05 ENCOUNTER — TELEPHONE (OUTPATIENT)
Dept: PRIMARY CARE CLINIC | Facility: CLINIC | Age: 49
End: 2024-01-05
Payer: MEDICAID

## 2024-01-05 NOTE — TELEPHONE ENCOUNTER
----- Message from Vero Mora sent at 1/5/2024 10:46 AM CST -----  Contact: [phone]  Symptom: Abdominal Pain - Female - Not Pregnant  Outcome: Schedule an appointment to be seen within 24 hours.  Reason: Caller denied all higher acuity questions    The caller accepted this outcome    She states she is having heart burn and she asked for an appt next week symptom tree states this

## 2024-01-22 PROBLEM — J01.00 ACUTE NON-RECURRENT MAXILLARY SINUSITIS: Status: RESOLVED | Noted: 2023-10-17 | Resolved: 2024-01-22

## 2024-01-30 ENCOUNTER — TELEPHONE (OUTPATIENT)
Dept: PRIMARY CARE CLINIC | Facility: CLINIC | Age: 49
End: 2024-01-30
Payer: MEDICAID

## 2024-01-30 NOTE — TELEPHONE ENCOUNTER
----- Message from Madison Han sent at 1/30/2024 12:57 PM CST -----  Contact: Patient, [phone]  Patient is returning a phone call.  Who left a message for the patient: Ashia  Does patient know what this is regarding:  Appointment  Would you like a call back, or a response through your [facility] portal?:   Call back  Comments: Missed your call, please call her back. Thanks.

## 2024-01-30 NOTE — TELEPHONE ENCOUNTER
----- Message from Nehal Caal sent at 1/29/2024  4:14 PM CST -----  Contact: self [phone]  Est medicaid pt requesting a call for appt.    Please call and advise

## 2024-01-30 NOTE — TELEPHONE ENCOUNTER
Called patient regarding message. Patient was schedule for an appointment with  on 02/05/2024. Patient confirmed.

## 2024-03-20 ENCOUNTER — TELEPHONE (OUTPATIENT)
Dept: PRIMARY CARE CLINIC | Facility: CLINIC | Age: 49
End: 2024-03-20
Payer: MEDICAID

## 2024-04-13 PROBLEM — J32.9 SINUSITIS: Status: ACTIVE | Noted: 2024-04-13

## 2024-04-13 PROBLEM — Z11.3 SCREENING FOR STD (SEXUALLY TRANSMITTED DISEASE): Status: ACTIVE | Noted: 2024-04-13

## 2024-04-29 ENCOUNTER — PATIENT OUTREACH (OUTPATIENT)
Dept: ADMINISTRATIVE | Facility: HOSPITAL | Age: 49
End: 2024-04-29
Payer: MEDICAID

## 2024-04-29 NOTE — PROGRESS NOTES
Health Maintenance Due   Topic Date Due    Mammogram  Never done    Colorectal Cancer Screening  Never done    Cervical Cancer Screening  04/05/2024     Immunizations - reviewed and updated   Care Everywhere - triggered   Care Teams - updated   Outreach - Pre visit chart review done. Patient due for mammogram. Patient has an appointment with PCP today, 4/29/2024. Mammogram due added to appointment notes. Order placed  Cervical cancer screening overdue - Referral to GYN pended during PCP appointment

## 2024-05-30 ENCOUNTER — TELEPHONE (OUTPATIENT)
Dept: ORTHOPEDICS | Facility: CLINIC | Age: 49
End: 2024-05-30
Payer: MEDICAID

## 2024-05-30 NOTE — TELEPHONE ENCOUNTER
CALLED THE TO CONFIRM HER APPT AND TO FIND OUT IS IT RIGHT OR LEFT THAT WE'RE SEEING. NO ANSWER LVM

## 2024-06-21 ENCOUNTER — TELEPHONE (OUTPATIENT)
Dept: PRIMARY CARE CLINIC | Facility: CLINIC | Age: 49
End: 2024-06-21
Payer: MEDICAID

## 2024-06-21 NOTE — TELEPHONE ENCOUNTER
----- Message from Vero Mora sent at 6/20/2024  1:43 PM CDT -----  Contact: [phone]  1MEDICALADVICE     Patient is calling for Medical Advice regarding:ED follow up for chest pain     How long has patient had these symptoms:    Pharmacy name and phone#:    Patient wants a call back or thru [facility]:call back     Comments:  Pt is asking for an appt to follow up from the ED per the ED please advise       Please advise patient replies from provider may take up to 48 hours.
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
## 2024-09-13 ENCOUNTER — TELEPHONE (OUTPATIENT)
Dept: PRIMARY CARE CLINIC | Facility: CLINIC | Age: 49
End: 2024-09-13
Payer: MEDICAID

## 2024-09-13 NOTE — TELEPHONE ENCOUNTER
----- Message from Kelle Huber sent at 9/13/2024  9:31 AM CDT -----  Regarding: self      .Type: Patient Call Back    Who called: self     What is the request in detail  Pt is requesting to schedule appt for hosp fu for cogestion & sinus      Can the clinic reply by [facility]? Call back     Would the patient rather a call back or a response via [facility]?  Call back     Best call back number:  [phone]

## 2024-10-15 ENCOUNTER — OFFICE VISIT (OUTPATIENT)
Dept: PRIMARY CARE CLINIC | Facility: CLINIC | Age: 49
End: 2024-10-15
Payer: MEDICAID

## 2024-10-15 VITALS
DIASTOLIC BLOOD PRESSURE: 80 MMHG | SYSTOLIC BLOOD PRESSURE: 122 MMHG | BODY MASS INDEX: 20.41 KG/M2 | HEART RATE: 78 BPM | OXYGEN SATURATION: 98 % | WEIGHT: 108 LBS

## 2024-10-15 DIAGNOSIS — Z09 HOSPITAL DISCHARGE FOLLOW-UP: Primary | ICD-10-CM

## 2024-10-15 DIAGNOSIS — F25.0 SCHIZOAFFECTIVE DISORDER, BIPOLAR TYPE: ICD-10-CM

## 2024-10-15 DIAGNOSIS — F43.10 PTSD (POST-TRAUMATIC STRESS DISORDER): ICD-10-CM

## 2024-10-15 DIAGNOSIS — R63.0 ANOREXIA: ICD-10-CM

## 2024-10-15 DIAGNOSIS — J32.9 SINUSITIS, UNSPECIFIED CHRONICITY, UNSPECIFIED LOCATION: ICD-10-CM

## 2024-10-15 DIAGNOSIS — Z72.0 TOBACCO ABUSE: ICD-10-CM

## 2024-10-15 DIAGNOSIS — Z65.8 INTERPERSONAL PROBLEM: ICD-10-CM

## 2024-10-15 DIAGNOSIS — R41.89 DISORGANIZED THINKING: ICD-10-CM

## 2024-10-15 DIAGNOSIS — F31.9 BIPOLAR 1 DISORDER: ICD-10-CM

## 2024-10-15 PROCEDURE — 3079F DIAST BP 80-89 MM HG: CPT | Mod: CPTII,,, | Performed by: STUDENT IN AN ORGANIZED HEALTH CARE EDUCATION/TRAINING PROGRAM

## 2024-10-15 PROCEDURE — 3008F BODY MASS INDEX DOCD: CPT | Mod: CPTII,,, | Performed by: STUDENT IN AN ORGANIZED HEALTH CARE EDUCATION/TRAINING PROGRAM

## 2024-10-15 PROCEDURE — 99999 PR PBB SHADOW E&M-EST. PATIENT-LVL III: CPT | Mod: PBBFAC,,, | Performed by: STUDENT IN AN ORGANIZED HEALTH CARE EDUCATION/TRAINING PROGRAM

## 2024-10-15 PROCEDURE — 3074F SYST BP LT 130 MM HG: CPT | Mod: CPTII,,, | Performed by: STUDENT IN AN ORGANIZED HEALTH CARE EDUCATION/TRAINING PROGRAM

## 2024-10-15 PROCEDURE — 99213 OFFICE O/P EST LOW 20 MIN: CPT | Mod: PBBFAC,PN | Performed by: STUDENT IN AN ORGANIZED HEALTH CARE EDUCATION/TRAINING PROGRAM

## 2024-10-15 PROCEDURE — 1160F RVW MEDS BY RX/DR IN RCRD: CPT | Mod: CPTII,,, | Performed by: STUDENT IN AN ORGANIZED HEALTH CARE EDUCATION/TRAINING PROGRAM

## 2024-10-15 PROCEDURE — 99214 OFFICE O/P EST MOD 30 MIN: CPT | Mod: S$PBB,,, | Performed by: STUDENT IN AN ORGANIZED HEALTH CARE EDUCATION/TRAINING PROGRAM

## 2024-10-15 PROCEDURE — 1159F MED LIST DOCD IN RCRD: CPT | Mod: CPTII,,, | Performed by: STUDENT IN AN ORGANIZED HEALTH CARE EDUCATION/TRAINING PROGRAM

## 2024-10-15 NOTE — PATIENT INSTRUCTIONS
Hospital discharge follow-up    Disorganized thinking  Comments:  changing topic frequently, referrencing many different people who provider is not familiar with.  Does not finish answers or answer most questions.    Interpersonal problem    Sinusitis, unspecified chronicity, unspecified location    Bipolar 1 disorder  -     Cancel: Ambulatory referral/consult to Psychiatry; Future; Expected date: 10/22/2024    PTSD (post-traumatic stress disorder)    Tobacco abuse    Schizoaffective disorder, bipolar type  Comments:  reporting people tracking her, doing thing to the gauges in her care.  States was having people put their names on things she owns.      Anorexia         Assessment & Plan    Assessed respiratory symptoms, noting recent ER visit for shortness of breath and current cough  Considered possibility of anxiety contributing to respiratory symptoms  Evaluated current medication regimen, including albuterol use for breathing issues  Noted complex psychiatric history and ongoing mental health concerns  Assessed safety and living situation  Determined need for closer engagement with psychiatry for management of mental health stressors    RESPIRATORY SYMPTOMS:  - Patricia to continue using albuterol inhaler as needed for respiratory symptoms.  - Continued albuterol inhaler as needed for respiratory symptoms.    CAFFEINE CONSUMPTION:  - Recommend reducing consumption of caffeinated beverages (e.g., Cokes, iced coffee).    HYDRATION:  - Recommend increasing water intake.    MENTAL HEALTH:  - Referred to Dr. Britt for chronic care of medical issues.  - Advised continued meeting with Dr. Tellez, Psych at [facility] for care, or est care with provider at [facility].
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
## 2024-10-18 ENCOUNTER — OFFICE VISIT (OUTPATIENT)
Dept: PRIMARY CARE CLINIC | Facility: CLINIC | Age: 49
End: 2024-10-18
Payer: MEDICAID

## 2024-10-18 VITALS
HEART RATE: 95 BPM | SYSTOLIC BLOOD PRESSURE: 124 MMHG | BODY MASS INDEX: 20.75 KG/M2 | OXYGEN SATURATION: 99 % | WEIGHT: 109.88 LBS | RESPIRATION RATE: 19 BRPM | HEIGHT: 61 IN | DIASTOLIC BLOOD PRESSURE: 78 MMHG

## 2024-10-18 DIAGNOSIS — Z79.899 MEDICATION MANAGEMENT: ICD-10-CM

## 2024-10-18 DIAGNOSIS — D70.3 NEUTROPENIA ASSOCIATED WITH ACQUIRED IMMUNE DEFICIENCY SYNDROME (AIDS): ICD-10-CM

## 2024-10-18 DIAGNOSIS — L72.0 EPIDERMOID CYST: ICD-10-CM

## 2024-10-18 DIAGNOSIS — R53.83 FATIGUE, UNSPECIFIED TYPE: ICD-10-CM

## 2024-10-18 DIAGNOSIS — L72.3 SEBACEOUS CYST OF LEFT AXILLA: Primary | ICD-10-CM

## 2024-10-18 DIAGNOSIS — R05.9 COUGH, UNSPECIFIED TYPE: ICD-10-CM

## 2024-10-18 DIAGNOSIS — B20 NEUTROPENIA ASSOCIATED WITH ACQUIRED IMMUNE DEFICIENCY SYNDROME (AIDS): ICD-10-CM

## 2024-10-18 DIAGNOSIS — F19.10 OTHER PSYCHOACTIVE SUBSTANCE ABUSE, UNCOMPLICATED: ICD-10-CM

## 2024-10-18 PROCEDURE — 99999 PR PBB SHADOW E&M-EST. PATIENT-LVL IV: CPT | Mod: PBBFAC,,,

## 2024-10-18 PROCEDURE — 99214 OFFICE O/P EST MOD 30 MIN: CPT | Mod: PBBFAC,PN

## 2024-10-18 RX ORDER — TRAZODONE HYDROCHLORIDE 100 MG/1
100 TABLET ORAL NIGHTLY
Qty: 30 TABLET | Refills: 2 | Status: SHIPPED | OUTPATIENT
Start: 2024-10-18

## 2024-10-18 RX ORDER — ALBUTEROL SULFATE 90 UG/1
1-2 INHALANT RESPIRATORY (INHALATION) EVERY 6 HOURS PRN
Qty: 18 G | Refills: 0 | Status: SHIPPED | OUTPATIENT
Start: 2024-10-18 | End: 2025-10-18

## 2024-10-18 RX ORDER — CYANOCOBALAMIN 1000 UG/ML
1000 INJECTION, SOLUTION INTRAMUSCULAR; SUBCUTANEOUS
Status: COMPLETED | OUTPATIENT
Start: 2024-10-18 | End: 2024-10-18

## 2024-10-18 RX ORDER — IBUPROFEN 800 MG/1
800 TABLET ORAL 3 TIMES DAILY
Qty: 30 TABLET | Refills: 0 | Status: SHIPPED | OUTPATIENT
Start: 2024-10-18

## 2024-10-18 RX ORDER — LIDOCAINE HYDROCHLORIDE 10 MG/ML
1 INJECTION, SOLUTION EPIDURAL; INFILTRATION; INTRACAUDAL; PERINEURAL
Status: DISCONTINUED | OUTPATIENT
Start: 2024-10-18 | End: 2024-10-18

## 2024-10-18 RX ORDER — DOXYCYCLINE 100 MG/1
100 CAPSULE ORAL 2 TIMES DAILY
Qty: 14 CAPSULE | Refills: 0 | Status: SHIPPED | OUTPATIENT
Start: 2024-10-18

## 2024-10-18 RX ADMIN — CYANOCOBALAMIN 1000 MCG: 1000 INJECTION, SOLUTION INTRAMUSCULAR; SUBCUTANEOUS at 01:10

## 2024-10-18 NOTE — PROCEDURES
INCISION AND DRAINAGE    Date/Time: 10/18/2024 1:00 PM    Performed by: Catina Randall FNP  Authorized by: Catina Randall FNP    Consent Done?:  Yes (Verbal)    Type:  Abscess  Body area:  Upper extremity  Location details:  Left arm  Anesthesia:  Local infiltration  Local anesthetic: Lidocaine 1% without epinephrine  Anesthetic total (ml):  3  Complexity:  Simple  Wound treatment:  Incision, drainage, expression of material and wound packed  Packing material:  1/4 in iodoform gauze  Patient tolerance:  Patient tolerated the procedure well with no immediate complications  Pain Assessment: 2    Procedure completed without complications. Area cleaned and packed, covered with a sterile bandage. Patient instructed to keep the area clean, dry and intact. Pt verbalized understanding. Patient instructed to go immediately to the ER for increase in pain, developing fever. Follow up scheduled for Monday for packing material removal and assessment of wound. Antibiotics sent to patient's pharmacy and patient instructed on the importance of starting the antibiotics today and finishing the full course. Understanding verbalized.

## 2024-10-18 NOTE — PROGRESS NOTES
Assessment:       1. Epidermoid cyst    2. Cough, unspecified type    3. Fatigue, unspecified type    4. Medication management    5. Other psychoactive substance abuse, uncomplicated    6. Neutropenia associated with acquired immune deficiency syndrome (AIDS)         Plan:       Epidermoid cyst  -     Discontinue: LIDOcaine (PF) 10 mg/ml (1%) injection 10 mg  -     ibuprofen (ADVIL,MOTRIN) 800 MG tablet; Take 1 tablet (800 mg total) by mouth 3 (three) times daily.  Dispense: 30 tablet; Refill: 0  -     INCISION AND DRAINAGE  -     doxycycline (VIBRAMYCIN) 100 MG Cap; Take 1 capsule (100 mg total) by mouth 2 (two) times daily.  Dispense: 14 capsule; Refill: 0    Cough, unspecified type  -     albuterol (PROVENTIL/VENTOLIN HFA) 90 mcg/actuation inhaler; Inhale 1-2 puffs into the lungs every 6 (six) hours as needed for Wheezing or Shortness of Breath. Rescue  Dispense: 18 g; Refill: 0    Fatigue, unspecified type  -     cyanocobalamin injection 1,000 mcg    Medication management  -     traZODone (DESYREL) 100 MG tablet; Take 1 tablet (100 mg total) by mouth every evening.  Dispense: 30 tablet; Refill: 2    Other psychoactive substance abuse, uncomplicated    Neutropenia associated with acquired immune deficiency syndrome (AIDS)      Assessment & Plan     Assessed 2 cm cyst on left axilla, likely an epidermoid cyst resulting from folliculitis   Determined incision and drainage (I&D) procedure necessary due to size and patient discomfort   Considered patient's history of recurrent cysts since childhood   Evaluated recent CBC results, noting hemoglobin levels within normal range  LEFT AXILLA CYST:   Explained nature of cyst as likely epidermoid, resulting from irritated hair follicle.   Discussed I&D procedure process, including use of lidocaine for numbing.   Informed patient about expected drainage and need for packing to promote healing.   Patient to avoid taking baths; showers are permitted.   Patient to keep incision  site dry.   Patient to change bandage as needed, noting drainage is not sterile.   Performed incision and drainage of left axilla cyst.   Administered lidocaine injection for local anesthesia.   Follow up on Monday for packing removal.    INFECTION:   Started doxycycline and ibuprofen 800 mg.    FATIGUE:   Administered B12 injection for fatigue.    MEDICATIONS/SUPPLEMENTS:   Continued trazodone.    FOLLOW UP:   Contact the office if any concerns arise before follow-up visit.       Medication List with Changes/Refills   New Medications    DOXYCYCLINE (VIBRAMYCIN) 100 MG CAP    Take 1 capsule (100 mg total) by mouth 2 (two) times daily.    IBUPROFEN (ADVIL,MOTRIN) 800 MG TABLET    Take 1 tablet (800 mg total) by mouth 3 (three) times daily.   Current Medications    ACYCLOVIR (ZOVIRAX) 400 MG TABLET    Take 1 tablet (400 mg total) by mouth 2 (two) times daily. for 7 days    ASPIRIN (ECOTRIN) 81 MG EC TABLET    Take 1 tablet (81 mg total) by mouth every 6 (six) hours as needed for Pain (for chest pain).    PANTOPRAZOLE (PROTONIX) 40 MG TABLET    Take 1 tablet (40 mg total) by mouth once daily.    QUETIAPINE 150 MG TAB    Take 150 mg by mouth nightly.   Changed and/or Refilled Medications    Modified Medication Previous Medication    ALBUTEROL (PROVENTIL/VENTOLIN HFA) 90 MCG/ACTUATION INHALER albuterol (PROVENTIL/VENTOLIN HFA) 90 mcg/actuation inhaler       Inhale 1-2 puffs into the lungs every 6 (six) hours as needed for Wheezing or Shortness of Breath. Rescue    Inhale 1-2 puffs into the lungs every 6 (six) hours as needed for Wheezing or Shortness of Breath. Rescue    TRAZODONE (DESYREL) 100 MG TABLET traZODone (DESYREL) 100 MG tablet       Take 1 tablet (100 mg total) by mouth every evening.    Take 100 mg by mouth every evening.         Subjective:    Patient ID: Patricia Langley is a 49 y.o. female.  Chief Complaint: Cyst (Under left arm )    HPI  History of Present Illness    Patient presents with a painful  cyst in the left axilla that is causing significant discomfort and interfering with sleep.    Patient reports a painful cyst in the left axilla, approximately 2 cm in size, causing severe discomfort and interfering with sleep. Warm compresses and alcohol have provided minimal relief. Patient has a history of similar cysts since age 7, with previous occurrences on her hand at age 7, another location at age 15, and a large cyst at age 22. She denies recent shaving in the area. Patient reports extreme fatigue. . Patient also mentions needing a refill of trazodone and albuterol. She denies any other acute symptoms or concerns besides the painful axilla cyst and fatigue.    Patient has a history of recurring cysts since the age of 7, appearing in various locations. She was misdiagnosed with HIV for 25 years, which was later found to be a false positive.    Fatigue: Patient reporting she needs a blood transfusion. hemoglobin  Patient had a CBC 1 month ago, showing a hemoglobin level of 12.9, which was noted to be within the normal range. Her previous CBC results were 12.3, 12.6, and 11.8.    ROS:  General: denies fever, denies chills, reports fatigue, denies weight gain, denies weight loss  Eyes: denies vision changes, denies redness, denies discharge  ENT: denies ear pain, denies nasal congestion, denies sore throat  Cardiovascular: denies chest pain, denies palpitations, denies lower extremity edema  Respiratory: denies cough, denies shortness of breath  Gastrointestinal: denies abdominal pain, denies nausea, denies vomiting, denies diarrhea, denies constipation, denies blood in stool  Genitourinary: denies dysuria, denies hematuria, denies frequency  Musculoskeletal: denies joint pain, denies muscle pain  Skin: denies rash, denies lesion  Neurological: denies headache, denies dizziness, denies numbness, denies tingling  Psychiatric: denies anxiety, denies depression, reports sleep difficulty       Review of Systems   "  Objective:      Vitals:    10/18/24 1257   BP: 124/78   BP Location: Left arm   Patient Position: Sitting   Pulse: 95   Resp: 19   SpO2: 99%   Weight: 49.9 kg (109 lb 14.4 oz)   Height: 5' 1" (1.549 m)     BP Readings from Last 5 Encounters:   10/18/24 124/78   10/15/24 122/80   09/11/24 128/81   06/17/24 132/86   04/29/24 118/86     Wt Readings from Last 5 Encounters:   10/18/24 49.9 kg (109 lb 14.4 oz)   10/15/24 49 kg (108 lb 0.4 oz)   09/11/24 48.2 kg (106 lb 4.2 oz)   06/17/24 49.4 kg (109 lb)   04/29/24 52 kg (114 lb 10.2 oz)     Physical Exam  Skin:     Comments: Approximately 2 cm left axillary cyst red, swollen, painful to touch   Psychiatric:         Mood and Affect: Mood is anxious and elated.         Behavior: Behavior is hyperactive.       Physical Exam    General: No acute distress. Well-developed. Well-nourished.  Eyes: EOMI. Sclerae anicteric.  HENT: Normocephalic. Atraumatic. Nares patent. Moist oral mucosa.  Ears: Bilateral TMs clear. Bilateral EACs clear.  Cardiovascular: Regular rate. Regular rhythm. No murmurs. No rubs. No gallops. Normal S1, S2.  Respiratory: Normal respiratory effort. Clear to auscultation bilaterally. No rales. No rhonchi. No wheezing.  Abdomen: Soft. Non-tender. Non-distended. Normoactive bowel sounds.  Musculoskeletal: No  obvious deformity.  Extremities: No lower extremity edema.  Neurological: Alert & oriented x3. No slurred speech. Normal gait.  Psychiatric: Normal mood. Normal affect. Good insight. Good judgment.  Skin: Warm. Dry. No rash. Whitish discharge mixed with blood from left axilla cyst. 2 cm cyst on left axilla. Pain on palpation of left axilla cyst.         Lab Results   Component Value Date    WBC 4.90 09/11/2024    HGB 12.9 09/11/2024    HCT 39.9 09/11/2024     09/11/2024    CHOL 144 11/05/2022    TRIG 147 11/05/2022    HDL 61 (H) 11/05/2022    ALT 26 09/11/2024    AST 32 09/11/2024     09/11/2024    K 3.2 (L) 09/11/2024     09/11/2024 "    CREATININE 0.9 09/11/2024    BUN 11 09/11/2024    CO2 25 09/11/2024    TSH 1.445 12/18/2023    INR 1.0 06/17/2024    HGBA1C 5.7 (H) 11/08/2022      This note was generated with the assistance of ambient listening technology. Verbal consent was obtained by the patient and accompanying visitor(s) for the recording of patient appointment to facilitate this note. I attest to having reviewed and edited the generated note for accuracy, though some syntax or spelling errors may persist. Please contact the author of this note for any clarification.

## 2024-10-18 NOTE — PROGRESS NOTES
Verified pt ID using name and . NDKA. Administered b12 1000mcg in left dorsagluteal per physician order using aseptic technique. Aspirated and no blood return noted. Pt tolerated well with no adverse reactions noted.

## 2024-10-21 ENCOUNTER — OFFICE VISIT (OUTPATIENT)
Dept: PRIMARY CARE CLINIC | Facility: CLINIC | Age: 49
End: 2024-10-21
Payer: MEDICAID

## 2024-10-21 VITALS
DIASTOLIC BLOOD PRESSURE: 82 MMHG | OXYGEN SATURATION: 99 % | SYSTOLIC BLOOD PRESSURE: 122 MMHG | HEART RATE: 93 BPM | BODY MASS INDEX: 21.16 KG/M2 | WEIGHT: 112 LBS

## 2024-10-21 DIAGNOSIS — L02.412 ABSCESS OF LEFT AXILLA: Primary | ICD-10-CM

## 2024-10-21 DIAGNOSIS — R11.10 VOMITING, UNSPECIFIED VOMITING TYPE, UNSPECIFIED WHETHER NAUSEA PRESENT: ICD-10-CM

## 2024-10-21 PROCEDURE — 3008F BODY MASS INDEX DOCD: CPT | Mod: CPTII,,,

## 2024-10-21 PROCEDURE — 3079F DIAST BP 80-89 MM HG: CPT | Mod: CPTII,,,

## 2024-10-21 PROCEDURE — 99999 PR PBB SHADOW E&M-EST. PATIENT-LVL IV: CPT | Mod: PBBFAC,,,

## 2024-10-21 PROCEDURE — 96372 THER/PROPH/DIAG INJ SC/IM: CPT | Mod: PBBFAC,PN

## 2024-10-21 PROCEDURE — 1160F RVW MEDS BY RX/DR IN RCRD: CPT | Mod: CPTII,,,

## 2024-10-21 PROCEDURE — 3074F SYST BP LT 130 MM HG: CPT | Mod: CPTII,,,

## 2024-10-21 PROCEDURE — 99214 OFFICE O/P EST MOD 30 MIN: CPT | Mod: S$PBB,,,

## 2024-10-21 PROCEDURE — 1159F MED LIST DOCD IN RCRD: CPT | Mod: CPTII,,,

## 2024-10-21 PROCEDURE — 99214 OFFICE O/P EST MOD 30 MIN: CPT | Mod: PBBFAC,PN

## 2024-10-21 PROCEDURE — 99999PBSHW PR PBB SHADOW TECHNICAL ONLY FILED TO HB: Mod: JZ,PBBFAC,,

## 2024-10-21 RX ORDER — ONDANSETRON 2 MG/ML
4 INJECTION INTRAMUSCULAR; INTRAVENOUS
Status: COMPLETED | OUTPATIENT
Start: 2024-10-21 | End: 2024-10-21

## 2024-10-21 RX ORDER — MUPIROCIN 20 MG/G
OINTMENT TOPICAL 3 TIMES DAILY
Qty: 1 G | Refills: 1 | Status: SHIPPED | OUTPATIENT
Start: 2024-10-21

## 2024-10-21 RX ADMIN — ONDANSETRON 4 MG: 2 INJECTION, SOLUTION INTRAMUSCULAR; INTRAVENOUS at 01:10

## 2024-10-21 NOTE — PROGRESS NOTES
Assessment:       1. Abscess of left axilla    2. Vomiting, unspecified vomiting type, unspecified whether nausea present       Plan:       Abscess of left axilla  -     mupirocin (BACTROBAN) 2 % ointment; Apply topically 3 (three) times daily.  Dispense: 1 g; Refill: 1    Vomiting, unspecified vomiting type, unspecified whether nausea present  -     ondansetron injection 4 mg      Assessment & Plan     Assessed wound healing, noting good progress   Recommend ER visit for severe headache, considering potential underlying issues beyond current treatment    SKIN INFECTION:   Explained mupirocin as similar to Neosporin.   Discussed proper wound care, including keeping the area dry after showering.   Patient to keep wound area dry after showering.   Started mupirocin ointment, apply to affected area 3 times daily using a Q-tip.    HEADACHE:   Continued Zofran, may help with headache.    FOLLOW UP:   Follow up in 3 months.   Contact office if any questions or concerns arise.       Medication List with Changes/Refills   New Medications    MUPIROCIN (BACTROBAN) 2 % OINTMENT    Apply topically 3 (three) times daily.   Current Medications    ACYCLOVIR (ZOVIRAX) 400 MG TABLET    Take 1 tablet (400 mg total) by mouth 2 (two) times daily. for 7 days    ALBUTEROL (PROVENTIL/VENTOLIN HFA) 90 MCG/ACTUATION INHALER    Inhale 1-2 puffs into the lungs every 6 (six) hours as needed for Wheezing or Shortness of Breath. Rescue    ASPIRIN (ECOTRIN) 81 MG EC TABLET    Take 1 tablet (81 mg total) by mouth every 6 (six) hours as needed for Pain (for chest pain).    DOXYCYCLINE (VIBRAMYCIN) 100 MG CAP    Take 1 capsule (100 mg total) by mouth 2 (two) times daily.    IBUPROFEN (ADVIL,MOTRIN) 800 MG TABLET    Take 1 tablet (800 mg total) by mouth 3 (three) times daily.    PANTOPRAZOLE (PROTONIX) 40 MG TABLET    Take 1 tablet (40 mg total) by mouth once daily.    QUETIAPINE 150 MG TAB    Take 150 mg by mouth nightly.    TRAZODONE (DESYREL) 100  MG TABLET    Take 1 tablet (100 mg total) by mouth every evening.         Subjective:    Patient ID: Patricia Langley is a 49 y.o. female.  Chief Complaint: Follow-up    Follow-up      History of Present Illness    Patient presents for follow-up of a healing wound. Patient is nauseated and appears to be having withdrawal symptoms.   Abscess to left axilla healing appropriately. Denies pain or discomfort to the area. Pt continues to take antibiotics as prescribed. Packing removed from wound.Patient has been instructed to apply mupirocin 3 times daily after showering and ensuring the area is dry.    ROS:  General: denies fever, denies chills, denies fatigue, denies weight gain, denies weight loss  Eyes: denies vision changes, denies redness, denies discharge  ENT: denies ear pain, denies nasal congestion, denies sore throat  Cardiovascular: denies chest pain, denies palpitations, denies lower extremity edema  Respiratory: denies cough, denies shortness of breath  Gastrointestinal: denies abdominal pain, denies nausea, denies vomiting, denies diarrhea, denies constipation, denies blood in stool  Genitourinary: denies dysuria, denies hematuria, denies frequency  Musculoskeletal: denies joint pain, denies muscle pain  Skin: denies rash, denies lesion  Neurological: reports headache, denies dizziness, denies numbness, denies tingling, denies weakness  Psychiatric: denies anxiety, denies depression, denies sleep difficulty       Review of Systems    Objective:      Vitals:    10/21/24 1305   BP: 122/82   BP Location: Right arm   Patient Position: Sitting   Pulse: 93   SpO2: 99%   Weight: 50.8 kg (111 lb 15.9 oz)     BP Readings from Last 5 Encounters:   10/21/24 122/82   10/18/24 124/78   10/15/24 122/80   09/11/24 128/81   06/17/24 132/86     Wt Readings from Last 5 Encounters:   10/21/24 50.8 kg (111 lb 15.9 oz)   10/18/24 49.9 kg (109 lb 14.4 oz)   10/15/24 49 kg (108 lb 0.4 oz)   09/11/24 48.2 kg (106 lb 4.2 oz)    06/17/24 49.4 kg (109 lb)     Physical Exam  Skin:     Comments: Left axiallry abscess healing. No redness or drainage noted.      Physical Exam    General: No acute distress. Well-developed. Well-nourished.  Eyes: EOMI. Sclerae anicteric.  HENT: Normocephalic. Atraumatic. Nares patent. Moist oral mucosa.  Ears: Bilateral TMs clear. Bilateral EACs clear.  Cardiovascular: Regular rate. Regular rhythm. No murmurs. No rubs. No gallops. Normal S1, S2.  Respiratory: Normal respiratory effort. Clear to auscultation bilaterally. No rales. No rhonchi. No wheezing.  Abdomen: Soft. Non-tender. Non-distended. Normoactive bowel sounds.  Musculoskeletal: No  obvious deformity.  Extremities: No lower extremity edema.  Neurological: Alert & oriented x3. No slurred speech. Normal gait.  Psychiatric: Normal mood. Normal affect. Good insight. Good judgment.  Skin: Warm. Dry. No rash. Healing wound.         Lab Results   Component Value Date    WBC 4.90 09/11/2024    HGB 12.9 09/11/2024    HCT 39.9 09/11/2024     09/11/2024    CHOL 144 11/05/2022    TRIG 147 11/05/2022    HDL 61 (H) 11/05/2022    ALT 26 09/11/2024    AST 32 09/11/2024     09/11/2024    K 3.2 (L) 09/11/2024     09/11/2024    CREATININE 0.9 09/11/2024    BUN 11 09/11/2024    CO2 25 09/11/2024    TSH 1.445 12/18/2023    INR 1.0 06/17/2024    HGBA1C 5.7 (H) 11/08/2022      This note was generated with the assistance of ambient listening technology. Verbal consent was obtained by the patient and accompanying visitor(s) for the recording of patient appointment to facilitate this note. I attest to having reviewed and edited the generated note for accuracy, though some syntax or spelling errors may persist. Please contact the author of this note for any clarification.    EUGENIE Oliver, ALEXISC

## 2024-10-21 NOTE — PROGRESS NOTES
Verified pt by name and . NKDA. Per physician orders pt was administered ZOFRAN  IM to right dorsalgluteal using aseptic technique. Pt tolerated well. No adverse effects or pain reported. MD notified.

## 2024-11-18 ENCOUNTER — HOSPITAL ENCOUNTER (EMERGENCY)
Facility: HOSPITAL | Age: 49
Discharge: HOME | End: 2024-11-18
Attending: EMERGENCY MEDICINE
Payer: MEDICARE

## 2024-11-18 ENCOUNTER — NURSE TRIAGE (OUTPATIENT)
Dept: ADMINISTRATIVE | Facility: CLINIC | Age: 49
End: 2024-11-18
Payer: MEDICARE

## 2024-11-18 VITALS
BODY MASS INDEX: 20.96 KG/M2 | OXYGEN SATURATION: 98 % | HEIGHT: 61 IN | RESPIRATION RATE: 16 BRPM | SYSTOLIC BLOOD PRESSURE: 128 MMHG | DIASTOLIC BLOOD PRESSURE: 85 MMHG | HEART RATE: 91 BPM | TEMPERATURE: 99 F | WEIGHT: 111 LBS

## 2024-11-18 DIAGNOSIS — B37.0 ORAL PHARYNGEAL CANDIDIASIS: Primary | ICD-10-CM

## 2024-11-18 LAB
ALBUMIN SERPL BCP-MCNC: 3.8 G/DL (ref 3.5–5.2)
ALP SERPL-CCNC: 91 U/L (ref 40–150)
ALT SERPL W/O P-5'-P-CCNC: 24 U/L (ref 10–44)
ANION GAP SERPL CALC-SCNC: 11 MMOL/L (ref 8–16)
AST SERPL-CCNC: 32 U/L (ref 10–40)
BASOPHILS # BLD AUTO: ABNORMAL K/UL (ref 0–0.2)
BASOPHILS NFR BLD: 0 % (ref 0–1.9)
BILIRUB SERPL-MCNC: 0.2 MG/DL (ref 0.1–1)
BUN SERPL-MCNC: 7 MG/DL (ref 6–20)
CALCIUM SERPL-MCNC: 8.8 MG/DL (ref 8.7–10.5)
CHLORIDE SERPL-SCNC: 108 MMOL/L (ref 95–110)
CO2 SERPL-SCNC: 22 MMOL/L (ref 23–29)
CREAT SERPL-MCNC: 0.8 MG/DL (ref 0.5–1.4)
DACRYOCYTES BLD QL SMEAR: ABNORMAL
DIFFERENTIAL METHOD BLD: ABNORMAL
EOSINOPHIL # BLD AUTO: ABNORMAL K/UL (ref 0–0.5)
EOSINOPHIL NFR BLD: 4 % (ref 0–8)
ERYTHROCYTE [DISTWIDTH] IN BLOOD BY AUTOMATED COUNT: 15.9 % (ref 11.5–14.5)
EST. GFR  (NO RACE VARIABLE): >60 ML/MIN/1.73 M^2
GLUCOSE SERPL-MCNC: 104 MG/DL (ref 70–110)
GROUP A STREP, MOLECULAR: NEGATIVE
HCT VFR BLD AUTO: 33.5 % (ref 37–48.5)
HGB BLD-MCNC: 11.2 G/DL (ref 12–16)
IMM GRANULOCYTES # BLD AUTO: ABNORMAL K/UL (ref 0–0.04)
IMM GRANULOCYTES NFR BLD AUTO: ABNORMAL % (ref 0–0.5)
LYMPHOCYTES # BLD AUTO: ABNORMAL K/UL (ref 1–4.8)
LYMPHOCYTES NFR BLD: 26 % (ref 18–48)
MCH RBC QN AUTO: 27.9 PG (ref 27–31)
MCHC RBC AUTO-ENTMCNC: 33.4 G/DL (ref 32–36)
MCV RBC AUTO: 84 FL (ref 82–98)
MONOCYTES # BLD AUTO: ABNORMAL K/UL (ref 0.3–1)
MONOCYTES NFR BLD: 6 % (ref 4–15)
NEUTROPHILS NFR BLD: 64 % (ref 38–73)
NRBC BLD-RTO: 0 /100 WBC
PLATELET # BLD AUTO: 232 K/UL (ref 150–450)
PMV BLD AUTO: 10.9 FL (ref 9.2–12.9)
POIKILOCYTOSIS BLD QL SMEAR: SLIGHT
POTASSIUM SERPL-SCNC: 3.5 MMOL/L (ref 3.5–5.1)
PROT SERPL-MCNC: 8.7 G/DL (ref 6–8.4)
RBC # BLD AUTO: 4.01 M/UL (ref 4–5.4)
SODIUM SERPL-SCNC: 141 MMOL/L (ref 136–145)
WBC # BLD AUTO: 1.64 K/UL (ref 3.9–12.7)

## 2024-11-18 PROCEDURE — 25000003 PHARM REV CODE 250: Performed by: EMERGENCY MEDICINE

## 2024-11-18 PROCEDURE — 80053 COMPREHEN METABOLIC PANEL: CPT | Performed by: EMERGENCY MEDICINE

## 2024-11-18 PROCEDURE — 85007 BL SMEAR W/DIFF WBC COUNT: CPT | Performed by: EMERGENCY MEDICINE

## 2024-11-18 PROCEDURE — 99283 EMERGENCY DEPT VISIT LOW MDM: CPT

## 2024-11-18 PROCEDURE — 87651 STREP A DNA AMP PROBE: CPT | Performed by: EMERGENCY MEDICINE

## 2024-11-18 PROCEDURE — 85027 COMPLETE CBC AUTOMATED: CPT | Performed by: EMERGENCY MEDICINE

## 2024-11-18 RX ORDER — FLUCONAZOLE 50 MG/1
100 TABLET ORAL DAILY
Qty: 14 TABLET | Refills: 0 | Status: SHIPPED | OUTPATIENT
Start: 2024-11-18 | End: 2024-11-25

## 2024-11-18 RX ORDER — FLUCONAZOLE 200 MG/1
200 TABLET ORAL
Status: COMPLETED | OUTPATIENT
Start: 2024-11-18 | End: 2024-11-18

## 2024-11-18 RX ADMIN — FLUCONAZOLE 200 MG: 200 TABLET ORAL at 05:11

## 2024-11-18 NOTE — FIRST PROVIDER EVALUATION
Medical screening examination initiated.  I have conducted a focused provider triage encounter, findings are as follows:    Brief history of present illness:  Sore throat and feeling like thrush in mouth    There were no vitals filed for this visit.    Pertinent physical exam:  clear speech, patchy exudate to right tonsil    Brief workup plan:  rapid strept    Preliminary workup initiated; this workup will be continued and followed by the physician or advanced practice provider that is assigned to the patient when roomed.

## 2024-11-18 NOTE — ED NOTES
Patricia Langley, a 49 y.o. female presents to the ED w/ complaint of thrush to throat    Triage note:  Chief Complaint   Patient presents with    Sore Throat     Pt endorses sore throat and thrush.      Review of patient's allergies indicates:   Allergen Reactions    Sulfa (sulfonamide antibiotics) Swelling     Rash (skin)^    Opioids - morphine analogues      Pt states she is not allergic to morphine      Bactrim [sulfamethoxazole-trimethoprim] Rash     Past Medical History:   Diagnosis Date    Allergy     Anxiety     Cervical dysplasia 1998 / 2016    Depression     HIV infection     Hyperthyroidism     Insomnia     Mitral valve regurgitation      LOC: Patient name and date of birth verified. The patient is awake, alert and aware of environment with an appropriate affect, the patient is oriented x 3 and speaking appropriately.   APPEARANCE: Patient resting comfortably, patient is clean and well groomed, patient's clothing is properly fastened.  SKIN: The skin is warm and dry, color consistent with ethnicity, patient has normal skin turgor and moist mucus membranes, skin intact, no breakdown or bruising noted.  MUSCULOSKELETAL: Patient moving all extremities well, no obvious swelling or deformities noted.   RESPIRATORY: Respirations are spontaneous, patient has a normal effort and rate, no accessory muscle use noted.  CARDIAC: Patient has a normal rate and rhythm, no periphreal edema noted, capillary refill < 3 seconds.  ABDOMEN: Soft and non tender to palpation, no distention noted. Bowel sounds present in all four quadrants.  NEUROLOGIC: Eyes open spontaneously, behavior appropriate to situation, follows commands, facial expression symmetrical, bilateral hand grasp equal and even, purposeful motor response noted, normal sensation in all extremities when touched with a finger.

## 2024-11-18 NOTE — DISCHARGE INSTRUCTIONS
Take diflucan as prescribed.  Follow up with your infectious disease doctor next week  Return to ED for worsening pain, difficulty swallowing, lightheadedness, dizziness, chest pain or any other concerns

## 2024-11-18 NOTE — TELEPHONE ENCOUNTER
Pt reports she just arrived to the ED and is asking for her PCP to call her. Will route message.   Reason for Disposition   Patient already left for the hospital/clinic    Protocols used: No Contact or Duplicate Contact Call-A-OH

## 2024-11-18 NOTE — ED PROVIDER NOTES
Encounter Date: 11/18/2024       History     Chief Complaint   Patient presents with    Sore Throat     Pt endorses sore throat and thrush.      HPI  49 year old F with medical history of HIV infection, anxiety and depression presents with complaint of sore throat. States she thinks she has thrush as she has had it before and it feels the same. States it hurts to swallow but she is able to eat and drink without difficulty. Pt concerned she has 'thrush' down her 'esophagus'She was seen earlier today in infectious diseases clinic and per pt they told her she should be admitted but she refused. Per note in care everywhere pt not taking HIV meds and 'Patient's psych status is major barrier to care' they recommended blood work today and follow up.     Pt denies fevers. No other complaints.    Review of patient's allergies indicates:   Allergen Reactions    Sulfa (sulfonamide antibiotics) Swelling     Rash (skin)^    Opioids - morphine analogues      Pt states she is not allergic to morphine      Bactrim [sulfamethoxazole-trimethoprim] Rash     Past Medical History:   Diagnosis Date    Allergy     Anxiety     Cervical dysplasia 1998 / 2016    Depression     HIV infection     Hyperthyroidism     Insomnia     Mitral valve regurgitation      Past Surgical History:   Procedure Laterality Date    SKULL FRACTURE ELEVATION       Family History   Problem Relation Name Age of Onset    Heart disease Mother      Breast cancer Neg Hx      Colon cancer Neg Hx      Ovarian cancer Neg Hx       Social History     Tobacco Use    Smoking status: Some Days     Current packs/day: 1.00     Average packs/day: 1 pack/day for 37.5 years (37.5 ttl pk-yrs)     Types: Vaping with nicotine, Cigarettes     Start date: 2022    Smokeless tobacco: Never    Tobacco comments:     Pt states that she started smoking at age 12, and smoked 0.5 pk/day cigarettes until approx. 1 yr ago, at which time she switched to vaping w/ nicotine. Pt states ready to quit.  Amb. referral to Smoking Cessation clinic following hospital discharge.    Substance Use Topics    Alcohol use: Yes     Comment: infrequent, about once a month    Drug use: Yes     Comment: unclear which, but agrees to use       Physical Exam     Initial Vitals [11/18/24 1424]   BP Pulse Resp Temp SpO2   123/70 102 20 99 °F (37.2 °C) 100 %      MAP       --         Physical Exam    Nursing note and vitals reviewed.  Constitutional: She appears well-developed and well-nourished. She is not diaphoretic. No distress.   HENT:   Head: Normocephalic and atraumatic.   Moist MM, minimal area of erythema over left tonsillar pillar with whitish patch, no significant swelling, uvula midline   Eyes: Conjunctivae are normal. Pupils are equal, round, and reactive to light.   Neck: Neck supple.   Cardiovascular:  Normal rate, regular rhythm and intact distal pulses.           No murmur heard.  Pulmonary/Chest: Breath sounds normal. No respiratory distress. She has no wheezes. She has no rales.   Abdominal: Abdomen is soft. She exhibits no distension. There is no abdominal tenderness. There is no rebound.   Musculoskeletal:      Cervical back: Neck supple.     Lymphadenopathy:     She has no cervical adenopathy.   Neurological: She is alert and oriented to person, place, and time. GCS score is 15. GCS eye subscore is 4. GCS verbal subscore is 5. GCS motor subscore is 6.   Skin: Skin is warm and dry.         ED Course   Procedures  Labs Reviewed   CBC W/ AUTO DIFFERENTIAL - Abnormal       Result Value    WBC 1.64 (*)     RBC 4.01      Hemoglobin 11.2 (*)     Hematocrit 33.5 (*)     MCV 84      MCH 27.9      MCHC 33.4      RDW 15.9 (*)     Platelets 232      MPV 10.9      Immature Granulocytes CANCELED      Immature Grans (Abs) CANCELED      Lymph # Test Not Performed      Mono # Test Not Performed      Eos # Test Not Performed      Baso # Test Not Performed      nRBC 0      Gran % 64.0      Lymph % 26.0      Mono % 6.0       Eosinophil % 4.0      Basophil % 0.0      Poik Slight      Tear Drop Cells Occasional      Differential Method Manual      Narrative:      WBC  critical result(s) called and verbal readback obtained from   YUAN MILLIGAN RN by SHANNON 11/18/2024 16:52   COMPREHENSIVE METABOLIC PANEL - Abnormal    Sodium 141      Potassium 3.5      Chloride 108      CO2 22 (*)     Glucose 104      BUN 7      Creatinine 0.8      Calcium 8.8      Total Protein 8.7 (*)     Albumin 3.8      Total Bilirubin 0.2      Alkaline Phosphatase 91      AST 32      ALT 24      eGFR >60.0      Anion Gap 11     GROUP A STREP, MOLECULAR    Group A Strep, Molecular Negative            Imaging Results    None          Medications   fluconazole tablet 200 mg (200 mg Oral Given 11/18/24 3668)     Medical Decision Making  Well appearing F in NAD with c/o sore throat. Pt known HIV positive, not compliant with anti-viral meds- states she has been having problems with having meds delivered to her. No fevers. Able to tolerate PO- eating sushi and drinking juice in the ED. Rapid strep sent and negative. Routine blood work to with leukopenia, blood work otherwise unremarkable. Discussed with pt admission given immunosuppressed status but pt rfusing admission. States she needs to go home to help take care of her mother. Pt given first dose of diflucan in ED - able to tolerate. Discharged home with 7 day course of diflucan. Pt requesting referral to infectious diseases [place] at [facility] provided with referral- instructed to follow up next week and encouraged to resume her routine meds as per her ID team    Amount and/or Complexity of Data Reviewed  Labs: ordered.    Risk  Prescription drug management.                                Clinical Impression:  Final diagnoses:  [B37.0] Oral pharyngeal candidiasis (Primary)          ED Disposition Condition    Discharge Stable          ED Prescriptions       Medication Sig Dispense Start Date End Date Auth. Provider     fluconazole (DIFLUCAN) 50 MG Tab Take 2 tablets (100 mg total) by mouth once daily. for 7 days 14 tablet 11/18/2024 11/25/2024 Kelly Parsons MD          Follow-up Information       Follow up With Specialties Details Why Contact Info Additional Information    Cornell Britt MD Family Medicine In 1 week  [address] [place] [address]  [phone]       [facility] - Infectious Disease [facility] Infectious Diseases   [facility] [facility] [address] [phone] Main Building, 1st floor near [address] Please park in [facility].  parking is available on the first floor of the main parking garage.    Disease/[facility], [facility] - Infectious Infectious Diseases Schedule an appointment as soon as possible for a visit in 5 days  [address] [facility] [address]  [phone]                Kelly Parsons MD  11/20/24 1659       Kelly Parsons MD  11/20/24 1701

## 2024-12-13 ENCOUNTER — HOSPITAL ENCOUNTER (EMERGENCY)
Facility: OTHER | Age: 49
Discharge: HOME | End: 2024-12-13
Attending: EMERGENCY MEDICINE
Payer: MEDICARE

## 2024-12-13 VITALS
HEART RATE: 109 BPM | OXYGEN SATURATION: 99 % | DIASTOLIC BLOOD PRESSURE: 82 MMHG | BODY MASS INDEX: 23.6 KG/M2 | HEIGHT: 61 IN | WEIGHT: 125 LBS | SYSTOLIC BLOOD PRESSURE: 121 MMHG | TEMPERATURE: 99 F | RESPIRATION RATE: 17 BRPM

## 2024-12-13 DIAGNOSIS — F30.9 MANIA: Primary | ICD-10-CM

## 2024-12-13 LAB
ALBUMIN SERPL BCP-MCNC: 4.3 G/DL (ref 3.5–5.2)
ALP SERPL-CCNC: 100 U/L (ref 40–150)
ALT SERPL W/O P-5'-P-CCNC: 23 U/L (ref 10–44)
AMPHET+METHAMPHET UR QL: NEGATIVE
ANION GAP SERPL CALC-SCNC: 10 MMOL/L (ref 8–16)
APAP SERPL-MCNC: 8 UG/ML (ref 10–20)
AST SERPL-CCNC: 39 U/L (ref 10–40)
B-HCG UR QL: NEGATIVE
BARBITURATES UR QL SCN>200 NG/ML: NEGATIVE
BASOPHILS # BLD AUTO: 0.02 K/UL (ref 0–0.2)
BASOPHILS NFR BLD: 1.6 % (ref 0–1.9)
BENZODIAZ UR QL SCN>200 NG/ML: NEGATIVE
BILIRUB SERPL-MCNC: 0.3 MG/DL (ref 0.1–1)
BILIRUB UR QL STRIP: NEGATIVE
BUN SERPL-MCNC: 9 MG/DL (ref 6–20)
BZE UR QL SCN: ABNORMAL
CALCIUM SERPL-MCNC: 9.3 MG/DL (ref 8.7–10.5)
CANNABINOIDS UR QL SCN: NEGATIVE
CHLORIDE SERPL-SCNC: 98 MMOL/L (ref 95–110)
CLARITY UR: CLEAR
CO2 SERPL-SCNC: 23 MMOL/L (ref 23–29)
COLOR UR: YELLOW
CREAT SERPL-MCNC: 0.8 MG/DL (ref 0.5–1.4)
CREAT UR-MCNC: 63.6 MG/DL (ref 15–325)
CTP QC/QA: YES
DIFFERENTIAL METHOD BLD: ABNORMAL
EOSINOPHIL # BLD AUTO: 0 K/UL (ref 0–0.5)
EOSINOPHIL NFR BLD: 0.8 % (ref 0–8)
ERYTHROCYTE [DISTWIDTH] IN BLOOD BY AUTOMATED COUNT: 16.6 % (ref 11.5–14.5)
EST. GFR  (NO RACE VARIABLE): >60 ML/MIN/1.73 M^2
ETHANOL SERPL-MCNC: <10 MG/DL
GLUCOSE SERPL-MCNC: 86 MG/DL (ref 70–110)
GLUCOSE UR QL STRIP: NEGATIVE
HCT VFR BLD AUTO: 33.1 % (ref 37–48.5)
HGB BLD-MCNC: 11.4 G/DL (ref 12–16)
HGB UR QL STRIP: NEGATIVE
IMM GRANULOCYTES # BLD AUTO: 0 K/UL (ref 0–0.04)
IMM GRANULOCYTES NFR BLD AUTO: 0 % (ref 0–0.5)
KETONES UR QL STRIP: NEGATIVE
LEUKOCYTE ESTERASE UR QL STRIP: NEGATIVE
LYMPHOCYTES # BLD AUTO: 0.2 K/UL (ref 1–4.8)
LYMPHOCYTES NFR BLD: 18 % (ref 18–48)
MCH RBC QN AUTO: 29.1 PG (ref 27–31)
MCHC RBC AUTO-ENTMCNC: 34.4 G/DL (ref 32–36)
MCV RBC AUTO: 84 FL (ref 82–98)
METHADONE UR QL SCN>300 NG/ML: NEGATIVE
MONOCYTES # BLD AUTO: 0.2 K/UL (ref 0.3–1)
MONOCYTES NFR BLD: 19.7 % (ref 4–15)
NEUTROPHILS # BLD AUTO: 0.7 K/UL (ref 1.8–7.7)
NEUTROPHILS NFR BLD: 59.9 % (ref 38–73)
NITRITE UR QL STRIP: NEGATIVE
NRBC BLD-RTO: 0 /100 WBC
OPIATES UR QL SCN: NEGATIVE
PCP UR QL SCN>25 NG/ML: NEGATIVE
PH UR STRIP: 6 [PH] (ref 5–8)
PLATELET # BLD AUTO: 227 K/UL (ref 150–450)
PLATELET BLD QL SMEAR: ABNORMAL
PMV BLD AUTO: 10.9 FL (ref 9.2–12.9)
POTASSIUM SERPL-SCNC: 3.4 MMOL/L (ref 3.5–5.1)
PROT SERPL-MCNC: 8.9 G/DL (ref 6–8.4)
PROT UR QL STRIP: NEGATIVE
RBC # BLD AUTO: 3.92 M/UL (ref 4–5.4)
SODIUM SERPL-SCNC: 131 MMOL/L (ref 136–145)
SP GR UR STRIP: 1.01 (ref 1–1.03)
TOXICOLOGY INFORMATION: ABNORMAL
TSH SERPL DL<=0.005 MIU/L-ACNC: 2.17 UIU/ML (ref 0.4–4)
URN SPEC COLLECT METH UR: NORMAL
UROBILINOGEN UR STRIP-ACNC: NEGATIVE EU/DL
WBC # BLD AUTO: 1.22 K/UL (ref 3.9–12.7)

## 2024-12-13 PROCEDURE — 80307 DRUG TEST PRSMV CHEM ANLYZR: CPT | Performed by: NURSE PRACTITIONER

## 2024-12-13 PROCEDURE — 96372 THER/PROPH/DIAG INJ SC/IM: CPT | Performed by: EMERGENCY MEDICINE

## 2024-12-13 PROCEDURE — 99284 EMERGENCY DEPT VISIT MOD MDM: CPT | Mod: 25

## 2024-12-13 PROCEDURE — 63600175 PHARM REV CODE 636 W HCPCS: Performed by: EMERGENCY MEDICINE

## 2024-12-13 PROCEDURE — 80143 DRUG ASSAY ACETAMINOPHEN: CPT | Performed by: NURSE PRACTITIONER

## 2024-12-13 PROCEDURE — 81003 URINALYSIS AUTO W/O SCOPE: CPT | Mod: 59 | Performed by: NURSE PRACTITIONER

## 2024-12-13 PROCEDURE — 82077 ASSAY SPEC XCP UR&BREATH IA: CPT | Performed by: NURSE PRACTITIONER

## 2024-12-13 PROCEDURE — 85007 BL SMEAR W/DIFF WBC COUNT: CPT | Performed by: NURSE PRACTITIONER

## 2024-12-13 PROCEDURE — 80053 COMPREHEN METABOLIC PANEL: CPT | Performed by: NURSE PRACTITIONER

## 2024-12-13 PROCEDURE — 81025 URINE PREGNANCY TEST: CPT | Performed by: EMERGENCY MEDICINE

## 2024-12-13 PROCEDURE — 25000003 PHARM REV CODE 250: Performed by: EMERGENCY MEDICINE

## 2024-12-13 PROCEDURE — 84443 ASSAY THYROID STIM HORMONE: CPT | Performed by: NURSE PRACTITIONER

## 2024-12-13 PROCEDURE — G0425 INPT/ED TELECONSULT30: HCPCS | Mod: 95,,, | Performed by: PSYCHIATRY & NEUROLOGY

## 2024-12-13 PROCEDURE — 85027 COMPLETE CBC AUTOMATED: CPT | Performed by: NURSE PRACTITIONER

## 2024-12-13 RX ORDER — OLANZAPINE 5 MG/1
10 TABLET ORAL ONCE
Status: COMPLETED | OUTPATIENT
Start: 2024-12-13 | End: 2024-12-13

## 2024-12-13 RX ORDER — KETOROLAC TROMETHAMINE 30 MG/ML
30 INJECTION, SOLUTION INTRAMUSCULAR; INTRAVENOUS
Status: COMPLETED | OUTPATIENT
Start: 2024-12-13 | End: 2024-12-13

## 2024-12-13 RX ADMIN — OLANZAPINE 10 MG: 5 TABLET, FILM COATED ORAL at 09:12

## 2024-12-13 RX ADMIN — KETOROLAC TROMETHAMINE 30 MG: 30 INJECTION, SOLUTION INTRAMUSCULAR; INTRAVENOUS at 08:12

## 2024-12-14 NOTE — ED NOTES
LOC: The patient is awake, alert, and oriented to self, place, time, and situation though pt appears to be experiencing flight of ideas. Pt is calm and cooperative. Affect is appropriate. Speech is appropriate and clear.     APPEARANCE: Patient resting on stretcher in no acute distress.  Patient is clean and well groomed.    SKIN: The skin is warm and dry; color consistent with ethnicity.  Patient has normal skin turgor and moist mucus membranes.  Skin intact; no breakdown or bruising noted.     MUSCULOSKELETAL: Patient moving upper and lower extremities without difficulty; denies pain in the extremities or back.  Denies weakness.     RESPIRATORY: Airway is open and patent. Respirations spontaneous, even, easy, and non-labored.  Patient has a normal effort and rate.  No accessory muscle use noted. Denies cough.     CARDIAC:  Normal heart rate noted.  No peripheral edema noted. No complaints of chest pain.     ABDOMEN: Soft and non tender to palpation though pt complains of generalized abdominal pain. No distention noted. Pt denies nausea, vomiting, diarrhea, or constipation.    NEUROLOGIC: Eyes open spontaneously.  Behavior appropriate to situation.  Follows commands; facial expression symmetrical.  Purposeful motor response noted; normal sensation in all extremities. Pt denies headache; denies lightheadedness or dizziness; denies visual disturbances; denies loss of balance; denies unilateral weakness.

## 2024-12-14 NOTE — ED TRIAGE NOTES
Pt presents to the ER from [facility] for medical and psychiatric evaluation. Pt appears to be experiencing flight of ideas; states she is experiencing abdominal pain and would like to be evaluated for cancer.      c/o feeling dizzy and passed out in MD waiting room. Per EMS hypotensive 70/50 on scene. Dialysis pt M-W-F

## 2024-12-14 NOTE — DISCHARGE INSTRUCTIONS
Patient is medically cleared to return to [facility].    Please have the patient scheduled with Psychiatry to get re-started on her zyprexa.

## 2024-12-14 NOTE — ED PROVIDER NOTES
"Encounter Date: 12/13/2024       History     Chief Complaint   Patient presents with    Abdominal Pain     Sent by [facility] for medical and psychiatric evaluation. C/O ABD pain and nausea x "a while". Pt poor historian due to flight of ideas; denies SI/HI, AH/VH. States she has untreated anal, heart, and cervical cancer.       HPI    49-year-old female with past medical history anxiety, depression, HIV, hyperthyroidism, presents with abdominal pain for the last 1 week.  Patient reports that she has a history of substance use, using mostly crystal meth and cocaine, states she last used crystal meth earlier today, states she got kicked out of her house with her mother and went to [facility] for treatment.  She reports concern about abdominal pain recently, states it has being getting worse, she has had nausea, vomiting, diarrhea recently.  She reports concern for possible cervical cancer.  Patient was sent from [facility] as he has static concern for possible flight of thoughts and delusions as she was concerned about possible cervical or anal cancer.  She reports this has not been seen previously, has had biopsies but nothing recently.  She reports taking some antibiotics recently but is unclear of what they were for.  She denies any recent alcohol use, denies any further complaints.    Review of patient's allergies indicates:   Allergen Reactions    Sulfa (sulfonamide antibiotics) Swelling     Rash (skin)^    Opioids - morphine analogues      Pt states she is not allergic to morphine      Bactrim [sulfamethoxazole-trimethoprim] Rash     Past Medical History:   Diagnosis Date    Allergy     Anxiety     Cervical dysplasia 1998 / 2016    Depression     HIV infection     Hyperthyroidism     Insomnia     Mitral valve regurgitation      Past Surgical History:   Procedure Laterality Date    SKULL FRACTURE ELEVATION       Family History   Problem Relation Name Age of Onset    Heart disease Mother      Breast cancer Neg Hx      Colon " cancer Neg Hx      Ovarian cancer Neg Hx       Social History     Tobacco Use    Smoking status: Some Days     Current packs/day: 1.00     Average packs/day: 1 pack/day for 37.5 years (37.5 ttl pk-yrs)     Types: Vaping with nicotine, Cigarettes     Start date: 2022    Smokeless tobacco: Never    Tobacco comments:     Pt states that she started smoking at age 12, and smoked 0.5 pk/day cigarettes until approx. 1 yr ago, at which time she switched to vaping w/ nicotine. Pt states ready to quit. Amb. referral to Smoking Cessation clinic following hospital discharge.    Substance Use Topics    Alcohol use: Yes     Comment: infrequent, about once a month    Drug use: Yes     Comment: unclear which, but agrees to use     Review of Systems   Constitutional: Negative.    HENT: Negative.     Eyes: Negative.    Respiratory: Negative.     Cardiovascular: Negative.    Gastrointestinal:  Positive for abdominal pain, diarrhea, nausea and vomiting.   Genitourinary: Negative.    Musculoskeletal: Negative.    Skin: Negative.    Neurological: Negative.    Psychiatric/Behavioral:  Positive for agitation. The patient is hyperactive.        Physical Exam     Initial Vitals [12/13/24 1837]   BP Pulse Resp Temp SpO2   121/81 109 20 98.6 °F (37 °C) 99 %      MAP       --         Physical Exam    Nursing note and vitals reviewed.  Constitutional: She appears well-developed. She is not diaphoretic. She appears distressed (mildly, conversing with ease).   HENT:   Head: Normocephalic and atraumatic.   Nose: Nose normal.   Eyes: EOM are normal. Pupils are equal, round, and reactive to light.   Neck: Neck supple. No JVD present.   Normal range of motion.  Cardiovascular:  Regular rhythm, normal heart sounds and intact distal pulses.           Tachycardic   Pulmonary/Chest: Breath sounds normal. No stridor. No respiratory distress. She has no wheezes. She has no rhonchi. She has no rales.   Abdominal: Abdomen is soft. Bowel sounds are normal. She  exhibits no distension. There is abdominal tenderness (mild tenderness to palpation suprapubically). There is no rebound and no guarding.   Musculoskeletal:         General: No tenderness or edema. Normal range of motion.      Cervical back: Normal range of motion and neck supple.     Neurological: She is alert and oriented to person, place, and time. She has normal strength.   Skin: Skin is warm and dry. Capillary refill takes less than 2 seconds. No rash noted. No erythema.   Psychiatric:   Hyperverbal, alert and oriented, no auditory visual hallucinations, no suicide or homicide ideation         ED Course   Procedures  Labs Reviewed   CBC W/ AUTO DIFFERENTIAL - Abnormal       Result Value    WBC 1.22 (*)     RBC 3.92 (*)     Hemoglobin 11.4 (*)     Hematocrit 33.1 (*)     MCV 84      MCH 29.1      MCHC 34.4      RDW 16.6 (*)     Platelets 227      MPV 10.9      Immature Granulocytes 0.0      Gran # (ANC) 0.7 (*)     Immature Grans (Abs) 0.00      Lymph # 0.2 (*)     Mono # 0.2 (*)     Eos # 0.0      Baso # 0.02      nRBC 0      Gran % 59.9      Lymph % 18.0      Mono % 19.7 (*)     Eosinophil % 0.8      Basophil % 1.6      Platelet Estimate Appears normal      Differential Method Automated      Narrative:     WBC critical result(s) called and verbal readback obtained from KAIT Aviles RN by JONO 12/13/2024 21:05   COMPREHENSIVE METABOLIC PANEL - Abnormal    Sodium 131 (*)     Potassium 3.4 (*)     Chloride 98      CO2 23      Glucose 86      BUN 9      Creatinine 0.8      Calcium 9.3      Total Protein 8.9 (*)     Albumin 4.3      Total Bilirubin 0.3      Alkaline Phosphatase 100      AST 39      ALT 23      eGFR >60      Anion Gap 10     ACETAMINOPHEN LEVEL - Abnormal    Acetaminophen (Tylenol), Serum 8.0 (*)    DRUG SCREEN PANEL, URINE EMERGENCY - Abnormal    Benzodiazepines Negative      Methadone metabolites Negative      Cocaine (Metab.) Presumptive Positive (*)     Opiate Scrn, Ur Negative      Barbiturate  Screen, Ur Negative      Amphetamine Screen, Ur Negative      THC Negative      Phencyclidine Negative      Creatinine, Urine 63.6      Toxicology Information SEE COMMENT      Narrative:     Specimen Source->Urine   TSH    TSH 2.170     URINALYSIS, REFLEX TO URINE CULTURE    Specimen UA Urine, Clean Catch      Color, UA Yellow      Appearance, UA Clear      pH, UA 6.0      Specific Gravity, UA 1.015      Protein, UA Negative      Glucose, UA Negative      Ketones, UA Negative      Bilirubin (UA) Negative      Occult Blood UA Negative      Nitrite, UA Negative      Urobilinogen, UA Negative      Leukocytes, UA Negative      Narrative:     Specimen Source->Urine   ALCOHOL,MEDICAL (ETHANOL)    Alcohol, Serum <10     DRUG SCREEN PANEL, URINE EMERGENCY   POCT URINE PREGNANCY    POC Preg Test, Ur Negative       Acceptable Yes            Imaging Results    None          Medications   ketorolac injection 30 mg (30 mg Intramuscular Given 12/13/24 2051)   OLANZapine tablet 10 mg (10 mg Oral Given 12/13/24 2135)     Medical Decision Making                  MDM:    49-year-old female with past medical history anxiety, depression, HIV, hyperthyroidism, presents with abdominal pain for the last 1 week.  Differential Diagnosis includes:  Acute psychosis, intoxication, substance use.  Physical exam as noted above.  ED workup notable for negative pregnancy test, urinalysis unremarkable, UDS positive for cocaine, CBC white blood cell count 1.22, hemoglobin 11.4, CMP with BUN 9, creatinine 0.8, TSH 2.17, alcohol negative, Tylenol negative,.  Patient presentation appears more consistent with bizarre behavior, suspect this is substance use related, sent by [facility] for behavioral and psychiatric clearance, will proceed with tele psychiatry consultation for further evaluation.  Upon my review patient is not suicidal, homicidal, does not appear to be slightly hyperverbal but is redirectable, pleasant with conversation,  does not appear to be agitated and is alert and oriented.  Patient has leukopenia appears baseline as she is currently immunocompromise, noncompliant with medications, was recently evaluated for altered mental status at [facility] and medically cleared.  Pending tele psychiatry consultation will disposition appropriately.  Tele psychiatry evaluated patient and appears consistent with my evaluation the patient does not meet any pec criteria.  Would recommend giving a dose of Zyprexa here, and having psychiatry evaluate patient at [facility] to evaluate for restarting Zyprexa.  Patient appears stable at this point for discharge back to [facility].        Note was created using voice recognition software. Note may have occasional typographical or grammatical errors, garbled syntax, and other bizarre constructions that may not have been identified and edited despite good debby initial review prior to signing.                           Clinical Impression:  Final diagnoses:  [F30.9] Jo (Primary)          ED Disposition Condition    Discharge Stable          ED Prescriptions    None       Follow-up Information       Follow up With Specialties Details Why Contact Info    [facility] - Emergency Dept Emergency Medicine Go to  If symptoms worsen [address]  [facility] [id number] [phone]    Cornell Britt MD Family Medicine Go in 1 week As needed [address] [place] [id number] [phone]               Juan Antonio Lux MD  12/13/24 1211
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
## 2024-12-14 NOTE — ED NOTES
Called [facility], pt unable to return due to incomplete intake process. Pt told to go back to [facility] in the morning around 9 to get belonging and complete intake.

## 2024-12-14 NOTE — ED NOTES
No PEC/code watch/sitter indicated for this patient, is not at risk for self harm per Dr. Lux. Pt restless, says she works for the [employer]; ok if pt elopes from ED. Awaiting telepsych.

## 2025-01-03 ENCOUNTER — TELEPHONE (OUTPATIENT)
Dept: INFECTIOUS DISEASES | Facility: CLINIC | Age: 50
End: 2025-01-03
Payer: MEDICARE

## 2025-01-03 NOTE — TELEPHONE ENCOUNTER
Called pt, no answer, left vm stating that provider she was scheduled with does not see what she is coming for and needs to see a staff provider    ROBERT mayberry

## 2025-01-06 ENCOUNTER — TELEPHONE (OUTPATIENT)
Dept: PRIMARY CARE CLINIC | Facility: CLINIC | Age: 50
End: 2025-01-06
Payer: MEDICARE

## 2025-01-06 NOTE — TELEPHONE ENCOUNTER
----- Message from Vero sent at 1/2/2025 10:01 AM CST -----  Contact: [phone]  [email or web address]     Patient is calling for Medical Advice regarding:asking for a stress medication     How long has patient had these symptoms:to help stop drinking     Pharmacy name and phone#:  [facility] - [place], [place] - [address]  [address]  [facility] [address]  Phone: [phone] Fax: [phone]       Patient wants a call back or thru [facility]:call back     Comments:  Please advise she states she is needing some help with the stress to stop drinking   Please advise patient replies from provider may take up to 48 hours.

## 2025-01-13 ENCOUNTER — OFFICE VISIT (OUTPATIENT)
Dept: PRIMARY CARE CLINIC | Facility: CLINIC | Age: 50
End: 2025-01-13
Payer: MEDICARE

## 2025-01-13 VITALS
SYSTOLIC BLOOD PRESSURE: 116 MMHG | HEART RATE: 83 BPM | DIASTOLIC BLOOD PRESSURE: 85 MMHG | TEMPERATURE: 99 F | HEIGHT: 61 IN | WEIGHT: 110.81 LBS | RESPIRATION RATE: 18 BRPM | BODY MASS INDEX: 20.92 KG/M2

## 2025-01-13 DIAGNOSIS — F31.9 BIPOLAR 1 DISORDER: ICD-10-CM

## 2025-01-13 DIAGNOSIS — Z72.0 TOBACCO ABUSE: ICD-10-CM

## 2025-01-13 DIAGNOSIS — D70.3 NEUTROPENIA ASSOCIATED WITH ACQUIRED IMMUNE DEFICIENCY SYNDROME (AIDS): ICD-10-CM

## 2025-01-13 DIAGNOSIS — F25.0 SCHIZOAFFECTIVE DISORDER, BIPOLAR TYPE: ICD-10-CM

## 2025-01-13 DIAGNOSIS — Z13.6 ENCOUNTER FOR LIPID SCREENING FOR CARDIOVASCULAR DISEASE: ICD-10-CM

## 2025-01-13 DIAGNOSIS — Z86.19 HISTORY OF THRUSH: ICD-10-CM

## 2025-01-13 DIAGNOSIS — J40 BRONCHITIS: ICD-10-CM

## 2025-01-13 DIAGNOSIS — Z13.220 ENCOUNTER FOR LIPID SCREENING FOR CARDIOVASCULAR DISEASE: ICD-10-CM

## 2025-01-13 DIAGNOSIS — N30.00 ACUTE CYSTITIS WITHOUT HEMATURIA: Primary | ICD-10-CM

## 2025-01-13 DIAGNOSIS — F19.10 POLYSUBSTANCE ABUSE: ICD-10-CM

## 2025-01-13 DIAGNOSIS — F43.10 PTSD (POST-TRAUMATIC STRESS DISORDER): ICD-10-CM

## 2025-01-13 DIAGNOSIS — F41.9 ANXIETY DISORDER, UNSPECIFIED TYPE: ICD-10-CM

## 2025-01-13 DIAGNOSIS — F32.5 MAJOR DEPRESSIVE DISORDER IN REMISSION, UNSPECIFIED WHETHER RECURRENT: Chronic | ICD-10-CM

## 2025-01-13 DIAGNOSIS — Z21 HIV INFECTION, UNSPECIFIED SYMPTOM STATUS: ICD-10-CM

## 2025-01-13 DIAGNOSIS — B20 NEUTROPENIA ASSOCIATED WITH ACQUIRED IMMUNE DEFICIENCY SYNDROME (AIDS): ICD-10-CM

## 2025-01-13 PROCEDURE — 81001 URINALYSIS AUTO W/SCOPE: CPT | Performed by: FAMILY MEDICINE

## 2025-01-13 PROCEDURE — 99999 PR PBB SHADOW E&M-EST. PATIENT-LVL V: CPT | Mod: PBBFAC,,, | Performed by: FAMILY MEDICINE

## 2025-01-13 PROCEDURE — 99214 OFFICE O/P EST MOD 30 MIN: CPT | Mod: S$PBB,,, | Performed by: FAMILY MEDICINE

## 2025-01-13 PROCEDURE — 99215 OFFICE O/P EST HI 40 MIN: CPT | Mod: PBBFAC,PN | Performed by: FAMILY MEDICINE

## 2025-01-13 RX ORDER — CEFDINIR 300 MG/1
300 CAPSULE ORAL 2 TIMES DAILY
Qty: 20 CAPSULE | Refills: 0 | Status: SHIPPED | OUTPATIENT
Start: 2025-01-13 | End: 2025-01-23

## 2025-01-13 RX ORDER — NYSTATIN 100000 [USP'U]/ML
5 SUSPENSION ORAL 4 TIMES DAILY
Qty: 200 ML | Refills: 0 | Status: SHIPPED | OUTPATIENT
Start: 2025-01-13 | End: 2025-01-23

## 2025-01-13 RX ORDER — BUPROPION HYDROCHLORIDE 150 MG/1
150 TABLET ORAL DAILY
Qty: 30 TABLET | Refills: 11 | Status: SHIPPED | OUTPATIENT
Start: 2025-01-13 | End: 2026-01-13

## 2025-01-13 RX ORDER — PHENAZOPYRIDINE HYDROCHLORIDE 200 MG/1
200 TABLET, FILM COATED ORAL 3 TIMES DAILY PRN
Qty: 15 TABLET | Refills: 0 | Status: SHIPPED | OUTPATIENT
Start: 2025-01-13 | End: 2025-01-23

## 2025-01-13 RX ORDER — PROMETHAZINE HYDROCHLORIDE AND DEXTROMETHORPHAN HYDROBROMIDE 6.25; 15 MG/5ML; MG/5ML
5 SYRUP ORAL EVERY 6 HOURS PRN
Qty: 180 ML | Refills: 1 | Status: SHIPPED | OUTPATIENT
Start: 2025-01-13
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
## 2025-01-13 NOTE — PATIENT INSTRUCTIONS
UTI  Omnicef 300 mg 1 p.o. b.i.d. times 10 days  Peridium 200 mg 1 p.o. t.i.d. x5 days  Upper respiratory infection  COVID swab  Omnicef 300 mg 1 p.o. b.i.d. times 10 days  Phenergan DM 1 tsp q.6h p.r.n. cough  Toothache  Omnicef 300 mg 1 p.o. b.i.d. times 10 days  Has appointment with dentist in 1 week  Schizophrenia/bipolar/anxiety/depression  Psychiatric consult  Wellbutrin 151 p.o. q.d. for depression  Asking for Adderall needs to see psychiatrist  Thrush  Nystatin 1 tsp q.6h gargle swish and swallow  GERD Protonix 1 p.o. q.d. for gastritis  Infectious Disease consult  HIV needs to get HIV medication from HIV Clinic  History recurrent herpes should consider continuing Zovirax 400 mg  Return 3 months CBCs CMP lipids T4 TSH

## 2025-01-14 LAB
BACTERIA #/AREA URNS HPF: NORMAL /HPF
BILIRUB UR QL STRIP: NEGATIVE
CAOX CRY URNS QL MICRO: NORMAL
CLARITY UR: ABNORMAL
COLOR UR: YELLOW
GLUCOSE UR QL STRIP: NEGATIVE
HGB UR QL STRIP: NEGATIVE
KETONES UR QL STRIP: NEGATIVE
LEUKOCYTE ESTERASE UR QL STRIP: NEGATIVE
MICROSCOPIC COMMENT: NORMAL
NITRITE UR QL STRIP: POSITIVE
PH UR STRIP: 6 [PH] (ref 5–8)
PROT UR QL STRIP: NEGATIVE
RBC #/AREA URNS HPF: 0 /HPF (ref 0–4)
SP GR UR STRIP: 1.02 (ref 1–1.03)
URN SPEC COLLECT METH UR: ABNORMAL
UROBILINOGEN UR STRIP-ACNC: NEGATIVE EU/DL
WBC #/AREA URNS HPF: 1 /HPF (ref 0–5)
YEAST URNS QL MICRO: NORMAL

## 2025-02-25 ENCOUNTER — OFFICE VISIT (OUTPATIENT)
Dept: INFECTIOUS DISEASES | Facility: CLINIC | Age: 50
End: 2025-02-25
Payer: MEDICARE

## 2025-02-25 VITALS
SYSTOLIC BLOOD PRESSURE: 103 MMHG | WEIGHT: 107.56 LBS | TEMPERATURE: 100 F | DIASTOLIC BLOOD PRESSURE: 76 MMHG | HEIGHT: 61 IN | BODY MASS INDEX: 20.31 KG/M2 | HEART RATE: 121 BPM

## 2025-02-25 DIAGNOSIS — Z21 HIV POSITIVE: Primary | ICD-10-CM

## 2025-02-25 PROCEDURE — 99499 UNLISTED E&M SERVICE: CPT | Mod: S$PBB,,, | Performed by: INTERNAL MEDICINE

## 2025-02-25 PROCEDURE — 99999 PR PBB SHADOW E&M-EST. PATIENT-LVL III: CPT | Mod: PBBFAC,,, | Performed by: INTERNAL MEDICINE

## 2025-02-25 PROCEDURE — 99213 OFFICE O/P EST LOW 20 MIN: CPT | Mod: PBBFAC | Performed by: INTERNAL MEDICINE

## 2025-02-25 NOTE — PROGRESS NOTES
Infectious Disease Clinic Note  02/25/2025       Subjective:       Patient ID: Patricia Langley is a 49 y.o. female with history of HIV being seen for a new visit.    Chief Complaint: Follow-up    HPI  50 y/o F with hx fo CKD, HIV who presents for an initial visit.    HIV hx:  Year of diagnosis: 1990s.   ART- viromune, biktarvy  OI- not that she recalls, but did have AIDS.  Undetectable 11/21/24    Used to follow up at the [facility] clinic until it closed down. Says she has been taking biktarvy. Takes it daily, rarely missed a dose. Not sure who prescribes it. Reports she has been drinking alcohol all day today.   Additionally, reports she has a family emergency and has to leave.                Family History   Problem Relation Name Age of Onset    Heart disease Mother      Breast cancer Neg Hx      Colon cancer Neg Hx      Ovarian cancer Neg Hx         Past Surgical History:   Procedure Laterality Date    SKULL FRACTURE ELEVATION         Patient's Medications   New Prescriptions    No medications on file   Previous Medications    ALBUTEROL (PROVENTIL/VENTOLIN HFA) 90 MCG/ACTUATION INHALER    Inhale 1-2 puffs into the lungs every 6 (six) hours as needed for Wheezing or Shortness of Breath. Rescue    ASPIRIN (ECOTRIN) 81 MG EC TABLET    Take 1 tablet (81 mg total) by mouth every 6 (six) hours as needed for Pain (for chest pain).    BUPROPION (WELLBUTRIN XL) 150 MG TB24 TABLET    Take 1 tablet (150 mg total) by mouth once daily.    IBUPROFEN (ADVIL,MOTRIN) 800 MG TABLET    Take 1 tablet (800 mg total) by mouth 3 (three) times daily.    MUPIROCIN (BACTROBAN) 2 % OINTMENT    Apply topically 3 (three) times daily.    PANTOPRAZOLE (PROTONIX) 40 MG TABLET    Take 1 tablet (40 mg total) by mouth once daily.    PROMETHAZINE-DEXTROMETHORPHAN (PROMETHAZINE-DM) 6.25-15 MG/5 ML SYRP    Take 5 mLs by mouth every 6 (six) hours as needed (cough).    QUETIAPINE 150 MG TAB    Take 150 mg by mouth nightly.    TRAZODONE (DESYREL) 100 MG  TABLET    Take 1 tablet (100 mg total) by mouth every evening.   Modified Medications    No medications on file   Discontinued Medications    No medications on file       Patient Active Problem List    Diagnosis Date Noted    Disorganized thinking 10/15/2024    Interpersonal problem 10/15/2024    Sinusitis 04/13/2024    Screening for STD (sexually transmitted disease) 04/13/2024    Herpes labialis 05/10/2023    Neutropenia associated with acquired immune deficiency syndrome (AIDS) 05/10/2023    Leukopenia 05/09/2023    Hypokalemia 05/06/2023    Hemorrhoid 05/06/2023    Closed fracture dislocation of toe of left foot 04/12/2023    Closed fracture of left foot with routine healing 04/12/2023    Abdominal pain, periumbilical 04/12/2023    Hx of hemorrhoids 04/12/2023    Foreign body in vagina 04/09/2023    Abscess 12/12/2022    COVID 09/01/2022    Difficulty taking medication 07/28/2022    Vaginal discharge 07/27/2022    At risk for opportunistic infections 07/26/2022    Adnexal mass 07/26/2022    Dysuria 06/01/2022    Pruritic dermatitis 03/26/2022    Herpes zoster ophthalmicus of right eye 03/19/2022    Sore throat 12/06/2021    Schizoaffective disorder, bipolar type     Anxiety disorder     Polysubstance abuse     Trauma and stressor-related disorder     UTI (urinary tract infection) 09/01/2020    Folliculitis 09/01/2020    Insomnia 02/06/2020    Bacterial vaginosis 10/22/2019    Tachycardia 06/29/2018    Intractable acute post-traumatic headache 01/23/2018    Bipolar 1 disorder 12/28/2017    Excessive crying 12/28/2017    Acute cystitis without hematuria 12/11/2017    Cervical strain 12/11/2017    Thoracic myofascial strain 12/11/2017    PTSD (post-traumatic stress disorder) 12/11/2017    Tobacco abuse 12/11/2017    Headache, unspecified headache type 12/11/2017    Major depressive disorder in remission 07/25/2017    Primary insomnia 07/25/2017    AIDS 05/18/2012       Immunization History   Administered Date(s)  "Administered    COVID-19 MRNA, LN-S PF (MODERNA HALF 0.25 ML DOSE) 02/10/2022    COVID-19 Vaccine 02/10/2022    COVID-19, MRNA, LN-S, PF (MODERNA FULL 0.5 ML DOSE) 02/10/2022    COVID-19, MRNA, LN-S, PF (Pfizer) (Gray Cap) 02/04/2022    COVID-19, MRNA, LN-S, PF (Pfizer) (Purple Cap) 01/14/2022, 02/04/2022    Influenza 01/03/2018    Influenza - Quadrivalent - MDCK - PF 10/10/2019, 01/05/2022    Influenza - Quadrivalent - PF *Preferred* (6 months and older) 11/09/2016, 11/20/2020, 10/11/2022    Influenza - Trivalent - Afluria, Fluzone MDV 10/17/2011    Influenza - Trivalent - Fluarix, Flulaval, Fluzone, Afluria - PF 01/03/2018    Pneumococcal Conjugate - 13 Valent 08/23/2017    Pneumococcal Polysaccharide - 23 Valent 02/06/2020    Td - PF (ADULT) 02/06/2020    Tdap 11/09/2016         No results for input(s): "TA8DYQJO", "ABSOLUTECD4" in the last 2160 hours.  Recent Labs   Lab 07/26/22  1801 12/18/23  1515   Hepatitis C Ab Negative Negative         Review of Systems   ROS        Objective:      /76 (BP Location: Left arm, Patient Position: Sitting)   Pulse (!) 121   Temp 99.8 °F (37.7 °C) (Oral)   Ht 5' 1" (1.549 m)   Wt 48.8 kg (107 lb 9.4 oz)   LMP 11/03/2022   BMI 20.33 kg/m²   Estimated body mass index is 20.33 kg/m² as calculated from the following:    Height as of this encounter: 5' 1" (1.549 m).    Weight as of this encounter: 48.8 kg (107 lb 9.4 oz).    Physical Exam    Assessment:         No diagnosis found.      Plan:        Pt left mid-encounter.   Will need to reschedule to establish care with a provider.         "

## 2025-04-14 ENCOUNTER — OFFICE VISIT (OUTPATIENT)
Dept: PRIMARY CARE CLINIC | Facility: CLINIC | Age: 50
End: 2025-04-14
Payer: MEDICARE

## 2025-04-14 VITALS
RESPIRATION RATE: 18 BRPM | HEIGHT: 61 IN | HEART RATE: 91 BPM | SYSTOLIC BLOOD PRESSURE: 104 MMHG | BODY MASS INDEX: 19.94 KG/M2 | WEIGHT: 105.63 LBS | DIASTOLIC BLOOD PRESSURE: 70 MMHG

## 2025-04-14 DIAGNOSIS — F32.5 MAJOR DEPRESSIVE DISORDER IN REMISSION, UNSPECIFIED WHETHER RECURRENT: Chronic | ICD-10-CM

## 2025-04-14 DIAGNOSIS — Z79.899 ENCOUNTER FOR LONG-TERM CURRENT USE OF MEDICATION: ICD-10-CM

## 2025-04-14 DIAGNOSIS — G47.00 INSOMNIA, UNSPECIFIED TYPE: ICD-10-CM

## 2025-04-14 DIAGNOSIS — Z72.0 TOBACCO ABUSE: ICD-10-CM

## 2025-04-14 DIAGNOSIS — Z12.31 SCREENING MAMMOGRAM, ENCOUNTER FOR: ICD-10-CM

## 2025-04-14 DIAGNOSIS — R45.83 EXCESSIVE CRYING: ICD-10-CM

## 2025-04-14 DIAGNOSIS — F41.9 ANXIETY DISORDER, UNSPECIFIED TYPE: ICD-10-CM

## 2025-04-14 DIAGNOSIS — F31.9 BIPOLAR 1 DISORDER: Primary | ICD-10-CM

## 2025-04-14 DIAGNOSIS — F43.10 PTSD (POST-TRAUMATIC STRESS DISORDER): ICD-10-CM

## 2025-04-14 DIAGNOSIS — B20 AIDS: ICD-10-CM

## 2025-04-14 DIAGNOSIS — J01.00 ACUTE NON-RECURRENT MAXILLARY SINUSITIS: ICD-10-CM

## 2025-04-14 DIAGNOSIS — Z13.6 ENCOUNTER FOR LIPID SCREENING FOR CARDIOVASCULAR DISEASE: ICD-10-CM

## 2025-04-14 DIAGNOSIS — Z13.220 ENCOUNTER FOR LIPID SCREENING FOR CARDIOVASCULAR DISEASE: ICD-10-CM

## 2025-04-14 PROCEDURE — 3074F SYST BP LT 130 MM HG: CPT | Mod: CPTII,S$GLB,, | Performed by: FAMILY MEDICINE

## 2025-04-14 PROCEDURE — 3078F DIAST BP <80 MM HG: CPT | Mod: CPTII,S$GLB,, | Performed by: FAMILY MEDICINE

## 2025-04-14 PROCEDURE — 1159F MED LIST DOCD IN RCRD: CPT | Mod: CPTII,S$GLB,, | Performed by: FAMILY MEDICINE

## 2025-04-14 PROCEDURE — 99214 OFFICE O/P EST MOD 30 MIN: CPT | Mod: S$GLB,,, | Performed by: FAMILY MEDICINE

## 2025-04-14 PROCEDURE — 99999 PR PBB SHADOW E&M-EST. PATIENT-LVL IV: CPT | Mod: PBBFAC,,, | Performed by: FAMILY MEDICINE

## 2025-04-14 PROCEDURE — 3008F BODY MASS INDEX DOCD: CPT | Mod: CPTII,S$GLB,, | Performed by: FAMILY MEDICINE

## 2025-04-14 RX ORDER — CETIRIZINE HYDROCHLORIDE 10 MG/1
1 TABLET ORAL DAILY
COMMUNITY

## 2025-04-14 RX ORDER — BUPROPION HYDROCHLORIDE 150 MG/1
150 TABLET ORAL DAILY
Qty: 30 TABLET | Refills: 11 | Status: SHIPPED | OUTPATIENT
Start: 2025-04-14 | End: 2026-04-14

## 2025-04-14 RX ORDER — LORAZEPAM 1 MG/1
TABLET ORAL
Qty: 30 TABLET | Refills: 5 | Status: SHIPPED | OUTPATIENT
Start: 2025-04-14

## 2025-04-14 RX ORDER — BUSPIRONE HYDROCHLORIDE 10 MG/1
TABLET ORAL
COMMUNITY

## 2025-04-14 RX ORDER — BICTEGRAVIR SODIUM, EMTRICITABINE, AND TENOFOVIR ALAFENAMIDE FUMARATE 50; 200; 25 MG/1; MG/1; MG/1
1 TABLET ORAL DAILY
Qty: 30 TABLET | Refills: 2 | Status: SHIPPED | OUTPATIENT
Start: 2025-04-14

## 2025-04-14 RX ORDER — PROMETHAZINE HYDROCHLORIDE AND DEXTROMETHORPHAN HYDROBROMIDE 6.25; 15 MG/5ML; MG/5ML
5 SYRUP ORAL EVERY 6 HOURS PRN
Qty: 180 ML | Refills: 1 | Status: SHIPPED | OUTPATIENT
Start: 2025-04-14

## 2025-04-14 RX ORDER — BICTEGRAVIR SODIUM, EMTRICITABINE, AND TENOFOVIR ALAFENAMIDE FUMARATE 50; 200; 25 MG/1; MG/1; MG/1
TABLET ORAL
COMMUNITY
End: 2025-04-14 | Stop reason: SDUPTHER

## 2025-04-14 RX ORDER — TRAZODONE HYDROCHLORIDE 50 MG/1
50 TABLET ORAL NIGHTLY
Qty: 30 TABLET | Refills: 11 | Status: SHIPPED | OUTPATIENT
Start: 2025-04-14 | End: 2026-04-14

## 2025-04-14 RX ORDER — QUETIAPINE FUMARATE 25 MG/1
TABLET, FILM COATED ORAL
COMMUNITY
End: 2025-04-14

## 2025-04-14 RX ORDER — AZITHROMYCIN 250 MG/1
TABLET, FILM COATED ORAL
Qty: 6 TABLET | Refills: 0 | Status: SHIPPED | OUTPATIENT
Start: 2025-04-14 | End: 2025-04-18

## 2025-04-14 NOTE — PROGRESS NOTES
Subjective:       Patient ID: Patricia Langley is a 49 y.o. female.    Chief Complaint: Medication Refill, Sinus Problem, and Gastroesophageal Reflux    HPI: 4  Subjective:       Patient ID: Patricia Langley is a 49 y.o. female.    Chief Complaint: Medication Refill, Sinus Problem, and Gastroesophageal Reflux    HPI:  49-year-old white female in for refills sinus problems GERD wants Xanax and Phenergan  Pt crying upset feels like failure --feels alone --going [employer] --worried about people in their homes   Eating well--ambulating well --+BM   Weird people living here   Sinus problems --wakes up and can not breathe---no fever---+stuffy nose congested a lot of mucus---had sore throat but went away---+cough--+phlegm-clear---no pneumonia asthma TB no wheeze  Gerd --hit in stomach [place] years ago --protonix  LMP years ago   HIV not going to clinic ---every time there wants admit pt to psych hernadez   Anxiety--trazodone patient would prefer to take Xanax  Finally got  out of her life --went to court -- for over tinea  History of herpes on Zovirax  Pt was in [facility]2 mo ago   Has appt with dentist in one week   Recent drug screen in ER positive for cocaine      ROS:  Skin: no psoriasis, eczema, skin cancer  HEENT: No headache, ocular pain, blurred vision, diplopia, +epistaxis, hoarseness change in voice, thyroid trouble  Lung: Hx  pneumonia younger been awhile ,no  asthma, Tb, wheezing, SOB,+ pt vaps   Heart: No chest pain, ankle edema, +palpitations, no MI, john murmur, hypertension, hyperlipidemia saw Dr Gordon 5-10 yrs ago   Abdomen: No nausea, vomiting, diarrhea, constipation, ulcers, hepatitis, gallbladder disease, melena, hematochezia, hematemesis--history GERD on Protonix  : no UTI, renal disease, stones  GYN LMP yrs ago ??mammogram   MS: no fractures, O/A, lupus, rheumatoid, gout  Neuro: + occas dizziness with ear pain feels like equilibrium on,no  LOC, seizures    No diabetes, + hx  anemia, + anxiety, + depression--wants Wellbutrin wants adderall    --2 children SSI --HIV ADHD Herpes lives with mother      Objective:   Physical Exam:  General: Well nourished, well developed, no acute distress  Skin: No lesions  HEENT: Eyes PERRLA, EOM intact, nose pain over sinuses bilaterally , throat +1/4 erythematous   NECK: Supple, no bruits, No JVD, no nodes  Lungs: Clear, no rales, rhonchi, wheezing coarse cough  Heart: Regular rate and rhythm, no murmurs, gallops, or rubs  Abdomen: flat, bowel sounds positive, no tenderness, or organomegaly  MS: Range of motion and muscle strength intact  Neuro: Alert, CN intact, oriented X 3--pt with bizzare thoughts some paranoia   Extremities: No cyanosis, clubbing, or edema         Assessment:       1. Bipolar 1 disorder    2. Anxiety disorder, unspecified type    3. AIDS    4. Excessive crying    5. Insomnia, unspecified type    6. Major depressive disorder in remission, unspecified whether recurrent    7. PTSD (post-traumatic stress disorder)    8. Acute non-recurrent maxillary sinusitis    9. Tobacco abuse    10. Screening mammogram, encounter for    11. Encounter for lipid screening for cardiovascular disease    12. Encounter for long-term current use of medication            Plan:       Bipolar 1 disorder  -     Ambulatory referral/consult to Psychiatry; Future; Expected date: 04/21/2025    Anxiety disorder, unspecified type  -     LORazepam (ATIVAN) 1 MG tablet; 1 po qd prn anxiety  Dispense: 30 tablet; Refill: 5    AIDS  -     jitmrdbgh-mcxcekmz-awrvegv ala (BIKTARVY) -25 mg (25 kg or greater); Take 1 tablet by mouth once daily.  Dispense: 30 tablet; Refill: 2  -     Ambulatory referral/consult to Infectious Disease; Future; Expected date: 04/21/2025    Excessive crying    Insomnia, unspecified type    Major depressive disorder in remission, unspecified whether recurrent  -     CBC Auto Differential; Future; Expected date:  04/14/2025  -     Comprehensive Metabolic Panel; Future; Expected date: 04/14/2025  -     Lipid Panel; Future; Expected date: 04/14/2025  -     Hemoglobin A1C; Future; Expected date: 04/14/2025  -     T4, Free; Future; Expected date: 04/14/2025  -     TSH; Future; Expected date: 04/14/2025    PTSD (post-traumatic stress disorder)    Acute non-recurrent maxillary sinusitis    Tobacco abuse    Screening mammogram, encounter for  -     Mammo Digital Screening Bilat; Future; Expected date: 04/14/2025    Encounter for lipid screening for cardiovascular disease  -     Lipid Panel; Future; Expected date: 04/14/2025    Encounter for long-term current use of medication  -     Hemoglobin A1C; Future; Expected date: 04/14/2025    Other orders  -     buPROPion (WELLBUTRIN XL) 150 MG TB24 tablet; Take 1 tablet (150 mg total) by mouth once daily.  Dispense: 30 tablet; Refill: 11  -     azithromycin (Z-RUBÉN) 250 MG tablet; 2 tabs by mouth day 1, then 1 tab by mouth daily x 4 days  Dispense: 6 tablet; Refill: 0  -     promethazine-dextromethorphan (PROMETHAZINE-DM) 6.25-15 mg/5 mL Syrp; Take 5 mLs by mouth every 6 (six) hours as needed (cough).  Dispense: 180 mL; Refill: 1  -     traZODone (DESYREL) 50 MG tablet; Take 1 tablet (50 mg total) by mouth every evening.  Dispense: 30 tablet; Refill: 11            Main Reason for Visit  Sinusitis---Zithromax and Phenergan DM  HIV--infectious disease clinic--given refill of medications for 3 months in order to allow time to get into clinic--was kicked out clinic came in drunk --every time there they try put pt in psych unit   Anxiety depression bipolar--needs to see Psychiatry--has Wellbutrin--Ativan---depressed miscarriage   History of herpes patient has been on Zovirax---told should probably continue Zovirax daily  History of mental issues anxiety/depression/posttraumatic stress disorder/bipolar/schizoaffective/history of polysubstance abuse states all of her friends are using  fentanyl
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
